# Patient Record
Sex: MALE | Race: WHITE | NOT HISPANIC OR LATINO | Employment: UNEMPLOYED | URBAN - METROPOLITAN AREA
[De-identification: names, ages, dates, MRNs, and addresses within clinical notes are randomized per-mention and may not be internally consistent; named-entity substitution may affect disease eponyms.]

---

## 2017-01-25 ENCOUNTER — TELEPHONE (OUTPATIENT)
Dept: NEUROLOGY | Facility: CLINIC | Age: 27
End: 2017-01-25

## 2017-01-25 DIAGNOSIS — G40.219 LOCALIZATION-RELATED EPILEPSY WITH COMPLEX PARTIAL SEIZURES WITH INTRACTABLE EPILEPSY (H): Primary | ICD-10-CM

## 2017-01-25 RX ORDER — DIAZEPAM ORAL SOLUTION (CONCENTRATE) 5 MG/ML
SOLUTION ORAL
Qty: 30 ML | Refills: 1 | Status: SHIPPED | OUTPATIENT
Start: 2017-01-25 | End: 2017-01-30

## 2017-01-30 ENCOUNTER — OFFICE VISIT (OUTPATIENT)
Dept: NEUROLOGY | Facility: CLINIC | Age: 27
End: 2017-01-30

## 2017-01-30 VITALS
DIASTOLIC BLOOD PRESSURE: 72 MMHG | HEART RATE: 91 BPM | SYSTOLIC BLOOD PRESSURE: 115 MMHG | BODY MASS INDEX: 28.25 KG/M2 | WEIGHT: 180 LBS | HEIGHT: 67 IN

## 2017-01-30 DIAGNOSIS — G40.219 LOCALIZATION-RELATED EPILEPSY WITH COMPLEX PARTIAL SEIZURES WITH INTRACTABLE EPILEPSY (H): Primary | ICD-10-CM

## 2017-01-30 RX ORDER — DIAZEPAM ORAL SOLUTION (CONCENTRATE) 5 MG/ML
SOLUTION ORAL
Qty: 30 ML | Refills: 1 | Status: SHIPPED | OUTPATIENT
Start: 2017-01-30 | End: 2018-09-13

## 2017-01-30 RX ORDER — TOPIRAMATE 100 MG/1
100 TABLET, FILM COATED ORAL 2 TIMES DAILY
Qty: 60 TABLET | Refills: 3 | Status: SHIPPED | OUTPATIENT
Start: 2017-01-30 | End: 2017-05-23

## 2017-01-30 NOTE — PROGRESS NOTES
[facility] Epilepsy Care Progress Note      Patient:  Tha Mcghee  :  1990   Age:  26 year old   Today's Office Visit:  2017    Epilepsy Data:    Patient History  Primary Epileptologist/Provider: Rajni Araujo M.D.  Epilepsy Syndrome: Localization-related epilepsy unspecified  Epilepsy Syndrome Status: Preliminary  Age of Onset: 20  Etiology  : Unknown  Other Relevant Dx/ Issues: Marijuana use 2-3x/day to treat anxiety.  History of alcohol abuse; has undergone treatment. K2 (synthetic marijuana) use     Tests/Surgery History  Last EE2011  Last MRI: 2011    Seizure Record  Current Visit Date: 17  Previous Visit Date: 10/18/16  Months since last visit: 3.42  Seizure Type 1: Partial seizures with secondary generalization  -  with complex partial seizures evolving to generalized seizures  Description of Sz Type 1: aura flush of anxiety starting in his chest and abdomen -> confusion with lip smacking -> GTC  Seizure Type 2: Complex partial seizures unspecified  Description of Sz Type 2: Stares, clicking noise with mouth,    History of Present Illness:    Copied forward: The patient had his first seizure on 2011 and he came under [facility] care on  2011 under Dr. Blanco's care initially.  He typically has aura -> GTC. His seizure type he describes as a flush of anxiety followed by confusion with lip smacking and clicking sound in his throat.  This progresses to a generalized tonic-clonic seizure.  He was initially started on phenytoin (this helps his GTC), then VPA was added 2013 (this helps HA and seizure). Lastly, topiramate was added in  which has helped his headaches and seizure. His EEGs in the past have been notable for rare left temporal epileptiform discharges.  His MRI of the brain is normal and nonlesional.     Interval History:    The patient is accompanied by his mom.  He was last seen by Dr. Araujo on 10/18/2016.  He had 2 grandmal seizures on 2017,  3 hours apart.  The patient lost his job on Monday, 2 days prior to the seizure, unrelated to seizures, and he was under stress.  The night he had seizures it was very hot in the basement.  He forgot to take his morning meds 5 days prior to the seizure, and remembered it at 10 pm and took it, and then took the night dose at 5 am, and then the next day, he took his meds at regular time.      His AED levels at the hospital, per patient's report:  Phenytoin total level: 15, Depkote: 91.5    Current Outpatient Prescriptions   Medication Sig Dispense Refill     diazepam (DIAZEPAM INTENSOL) 5 MG/ML (HIGH CONC) solution For generalized seizures lasting greater than 3 minutes give 5 mg. May repeat and give 2.5 mg after 10 minutes if needed. Do not exceed 7.5mg 30 mL 1     venlafaxine (EFFEXOR XR) 75 MG 24 hr capsule Take 225 mg by mouth every morning        topiramate (TOPAMAX) 25 MG tablet 75 mg am and 100 mg pm 630 tablet 3     phenytoin (DILANTIN) 30 MG ER capsule Take two po Q  capsule 3     phenytoin (DILANTIN) 100 MG ER capsule TAKE 1 CAP BY MOUTH DAILY IN THE AM AND 2 CAPS IN THE  capsule 3     divalproex (DEPAKOTE ER) 250 MG 24 hr tablet Take 1 tab in the morning (along with two 500mg tabs) 90 tablet 3     divalproex (DEPAKOTE ER) 500 MG 24 hr tablet Take 2 tabs in the morning and 3 tabs at night. 450 tablet 3     mometasone (ELOCON) 0.1 % ointment Apply twice daily for 3 days as needed for itching in the perianal area 15 g 1     medical cannabis inhalation (Patient's own supply.  Not a prescription) Inhale 200 mLs into the lungs Take one to two puffs every four to six hours       COMPOUND (CMPD RX) - PHARMACY TO MIX COMPOUNDED MEDICATION Apply twice daily to affected areas as needed. 30 g 0     OMEPRAZOLE PO        Multiple Vitamin (MULTIVITAMINS PO) 2 Chew a day       ALPRAZolam (XANAX) 0.5 MG tablet Take 0.5 mg by mouth as needed.        Medication Notes:        AED Medication Compliance:   "noncompliant some of the time    Results for SANTHOSH HODGES (MRN [id number]) as of 1/30/2017 11:21   Ref. Range 3/16/2016 12:23   Phenytoin Level Latest Ref Range: 10-20 mg/L 19.4   Phenytoin Free Unknown 2.25 (H)   Valproic Acid Level Latest Ref Range:  mg/L 92   Valproic Acid Free Unknown 7.0     Review of Systems:  Lethargy / Tiredness:  Yes  Nausea / Vomiting:  Yes  Double Vision:  No  Sleepiness:  Yes  Depression:  Yes  Slowed Cognitive Function:  Yes  Memory Problems:  No  Dizziness:  No  Appetite Changes:  No  Blurred Vision:  No  Sleep Changes:  No  Behavioral Changes:  No  Skin: negative  Respiratory: No shortness of breath and No cough  Cardiovascular: negative  Have you experienced a traumatic fall since your last visit: NO    Other Issues:    Is patient safe to drive:  No, last seizure 1/25/2017    Exam:    /72 mmHg  Pulse 91  Ht 5' 7.01\" (170.2 cm)  Wt 180 lb (81.647 kg)  BMI 28.19 kg/m2     Wt Readings from Last 5 Encounters:   01/30/17 180 lb (81.647 kg)   10/18/16 183 lb 9.6 oz (83.28 kg)   03/16/16 188 lb 12.8 oz (85.639 kg)   09/15/15 167 lb 12.8 oz (76.114 kg)   08/20/15 168 lb 3.2 oz (76.295 kg)     General Appearance: Alert, awake, cooperative, NAD  Gait and tandem gait: steady  Attention Span:  Normal  Language/speech:  No aphasia or dysarthria  Extraocular Movements:  Intact  Coordination:  Normal FNF  Facial Strength:  Normal  Tongue Strength:  Normal  Limb Strength:  Normal tone, bulk and strength  5/5 bilaterally     Assessment and Plan:     Localization-related epilepsy:  The patient had 2 GTC seizures last week.  He hasn't had any GTC seizures for a while.  We decided to increase his topiramate by 25 mg/d to 100 mg bid.  Patient's mom is confused about the instructions on giving DZP.  I instructed her to give 1 ml (5 mg) for a GTC seizures and may give an additional 2.5 mg, if needed.    - Increase topiramate to 100 mg bid  - Obtain AED levels for efficacy and " compliance  - RTC in 6 months        As described above, I met with the patient for 25 minutes and during this time counseling was greater than 50% of the visit time.  Mirela Stokes MD

## 2017-01-30 NOTE — LETTER
2017      RE: Tha Mcghee  [address]  [place] [address]       [facility] Epilepsy Care Progress Note      Patient:  Tha Mcghee  :  1990   Age:  26 year old   Today's Office Visit:  2017    Epilepsy Data:    Patient History  Primary Epileptologist/Provider: Rajni Araujo M.D.  Epilepsy Syndrome: Localization-related epilepsy unspecified  Epilepsy Syndrome Status: Preliminary  Age of Onset: 20  Etiology  : Unknown  Other Relevant Dx/ Issues: Marijuana use 2-3x/day to treat anxiety.  History of alcohol abuse; has undergone treatment. K2 (synthetic marijuana) use     Tests/Surgery History  Last EE2011  Last MRI: 2011    Seizure Record  Current Visit Date: 17  Previous Visit Date: 10/18/16  Months since last visit: 3.42  Seizure Type 1: Partial seizures with secondary generalization  -  with complex partial seizures evolving to generalized seizures  Description of Sz Type 1: aura flush of anxiety starting in his chest and abdomen -> confusion with lip smacking -> GTC  Seizure Type 2: Complex partial seizures unspecified  Description of Sz Type 2: Stares, clicking noise with mouth,    History of Present Illness:    Copied forward: The patient had his first seizure on 2011 and he came under [facility] care on  2011 under Dr. Blanco's care initially.  He typically has aura -> GTC. His seizure type he describes as a flush of anxiety followed by confusion with lip smacking and clicking sound in his throat.  This progresses to a generalized tonic-clonic seizure.  He was initially started on phenytoin (this helps his GTC), then VPA was added 2013 (this helps HA and seizure). Lastly, topiramate was added in  which has helped his headaches and seizure. His EEGs in the past have been notable for rare left temporal epileptiform discharges.  His MRI of the brain is normal and nonlesional.     Interval History:    The patient is accompanied by his mom.  He was last seen by  Dr. Araujo on 10/18/2016.  He had 2 grandmal seizures on Wednesday 1/25/2017, 3 hours apart.  The patient lost his job on Monday, 2 days prior to the seizure, unrelated to seizures, and he was under stress.  The night he had seizures it was very hot in the basement.  He forgot to take his morning meds 5 days prior to the seizure, and remembered it at 10 pm and took it, and then took the night dose at 5 am, and then the next day, he took his meds at regular time.      His AED levels at the hospital, per patient's report:  Phenytoin total level: 15, Depkote: 91.5    Current Outpatient Prescriptions   Medication Sig Dispense Refill     diazepam (DIAZEPAM INTENSOL) 5 MG/ML (HIGH CONC) solution For generalized seizures lasting greater than 3 minutes give 5 mg. May repeat and give 2.5 mg after 10 minutes if needed. Do not exceed 7.5mg 30 mL 1     venlafaxine (EFFEXOR XR) 75 MG 24 hr capsule Take 225 mg by mouth every morning        topiramate (TOPAMAX) 25 MG tablet 75 mg am and 100 mg pm 630 tablet 3     phenytoin (DILANTIN) 30 MG ER capsule Take two po Q  capsule 3     phenytoin (DILANTIN) 100 MG ER capsule TAKE 1 CAP BY MOUTH DAILY IN THE AM AND 2 CAPS IN THE  capsule 3     divalproex (DEPAKOTE ER) 250 MG 24 hr tablet Take 1 tab in the morning (along with two 500mg tabs) 90 tablet 3     divalproex (DEPAKOTE ER) 500 MG 24 hr tablet Take 2 tabs in the morning and 3 tabs at night. 450 tablet 3     mometasone (ELOCON) 0.1 % ointment Apply twice daily for 3 days as needed for itching in the perianal area 15 g 1     medical cannabis inhalation (Patient's own supply.  Not a prescription) Inhale 200 mLs into the lungs Take one to two puffs every four to six hours       COMPOUND (CMPD RX) - PHARMACY TO MIX COMPOUNDED MEDICATION Apply twice daily to affected areas as needed. 30 g 0     OMEPRAZOLE PO        Multiple Vitamin (MULTIVITAMINS PO) 2 Chew a day       ALPRAZolam (XANAX) 0.5 MG tablet Take 0.5 mg by mouth as  "needed.        Medication Notes:        AED Medication Compliance:  noncompliant some of the time    Results for SANTHOSH HODGES (MRN [id number]) as of 1/30/2017 11:21   Ref. Range 3/16/2016 12:23   Phenytoin Level Latest Ref Range: 10-20 mg/L 19.4   Phenytoin Free Unknown 2.25 (H)   Valproic Acid Level Latest Ref Range:  mg/L 92   Valproic Acid Free Unknown 7.0     Review of Systems:  Lethargy / Tiredness:  Yes  Nausea / Vomiting:  Yes  Double Vision:  No  Sleepiness:  Yes  Depression:  Yes  Slowed Cognitive Function:  Yes  Memory Problems:  No  Dizziness:  No  Appetite Changes:  No  Blurred Vision:  No  Sleep Changes:  No  Behavioral Changes:  No  Skin: negative  Respiratory: No shortness of breath and No cough  Cardiovascular: negative  Have you experienced a traumatic fall since your last visit: NO    Other Issues:    Is patient safe to drive:  No, last seizure 1/25/2017    Exam:    /72 mmHg  Pulse 91  Ht 5' 7.01\" (170.2 cm)  Wt 180 lb (81.647 kg)  BMI 28.19 kg/m2     Wt Readings from Last 5 Encounters:   01/30/17 180 lb (81.647 kg)   10/18/16 183 lb 9.6 oz (83.28 kg)   03/16/16 188 lb 12.8 oz (85.639 kg)   09/15/15 167 lb 12.8 oz (76.114 kg)   08/20/15 168 lb 3.2 oz (76.295 kg)     General Appearance: Alert, awake, cooperative, NAD  Gait and tandem gait: steady  Attention Span:  Normal  Language/speech:  No aphasia or dysarthria  Extraocular Movements:  Intact  Coordination:  Normal FNF  Facial Strength:  Normal  Tongue Strength:  Normal  Limb Strength:  Normal tone, bulk and strength  5/5 bilaterally     Assessment and Plan:     Localization-related epilepsy:  The patient had 2 GTC seizures last week.  He hasn't had any GTC seizures for a while.  We decided to increase his topiramate by 25 mg/d to 100 mg bid.  Patient's mom is confused about the instructions on giving DZP.  I instructed her to give 1 ml (5 mg) for a GTC seizures and may give an additional 2.5 mg, if needed.    - Increase " topiramate to 100 mg bid  - Obtain AED levels for efficacy and compliance  - RTC in 6 months        As described above, I met with the patient for 25 minutes and during this time counseling was greater than 50% of the visit time.  MD Mirela Lance MD

## 2017-01-30 NOTE — MR AVS SNAPSHOT
"              After Visit Summary   1/30/2017    Tha Mcghee    MRN: [id number]           Patient Information     Date Of Birth          1990        Visit Information        Provider Department      1/30/2017 11:00 AM Mirela Stokes [facility]        Today's Diagnoses     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)    -  1        Follow-ups after your visit        Follow-up notes from your care team     Return in about 6 months (around 7/30/2017).      Your next 10 appointments already scheduled     Aug 01, 2017  1:00 PM   Return Visit with MD JOSÉ MANUEL [facility] ([facility])    [address], [address]  [place] [address] [phone]              Who to contact     Please call your clinic at [phone] to:    Ask questions about your health    Make or cancel appointments    Discuss your medicines    Learn about your test results    Speak to your doctor   If you have compliments or concerns about an experience at your clinic, or if you wish to file a complaint, please contact [facility] Patient Relations at [phone] or email us at [email or web address]         Additional Information About Your Visit        Care EveryWhere ID     This is your Care EveryWhere ID. This could be used by other organizations to access your [place] medical records  [id number]        Your Vitals Were     Pulse Height BMI (Body Mass Index)             91 5' 7.01\" (170.2 cm) 28.19 kg/m2          Blood Pressure from Last 3 Encounters:   01/30/17 115/72   10/18/16 130/83   03/16/16 121/83    Weight from Last 3 Encounters:   01/30/17 180 lb (81.647 kg)   10/18/16 183 lb 9.6 oz (83.28 kg)   03/16/16 188 lb 12.8 oz (85.639 kg)              Today, you had the following     No orders found for display         Today's Medication Changes          These changes are accurate as of: 1/30/17 " 11:48 AM.  If you have any questions, ask your nurse or doctor.               These medicines have changed or have updated prescriptions.        Dose/Directions    diazepam 5 MG/ML (HIGH CONC) solution   Commonly known as:  DIAZEPAM INTENSOL   This may have changed:  additional instructions   Used for:  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)   Changed by:  Mirela Stokes MD        For generalized seizures give 5 mg.  May repeat and give 2.5 mg after 10 minutes if needed. Do not exceed 7.5mg   Quantity:  30 mL   Refills:  1       topiramate 100 MG tablet   Commonly known as:  TOPAMAX   This may have changed:    - medication strength  - how much to take  - how to take this  - when to take this  - additional instructions   Used for:  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)   Changed by:  Mirela Stokes MD        Dose:  100 mg   Take 1 tablet (100 mg) by mouth 2 times daily   Quantity:  60 tablet   Refills:  3            Where to get your medicines      These medications were sent to [place] [address] [facility] [place] - [address]  [address] [address]     Phone:  [phone]    - topiramate 100 MG tablet      Some of these will need a paper prescription and others can be bought over the counter.  Ask your nurse if you have questions.     Bring a paper prescription for each of these medications    - diazepam 5 MG/ML (HIGH CONC) solution             Primary Care Provider Office Phone # Fax #    Bhaskar Morales [phone]       [facility] [address]  [place] [address]        Thank you!     Thank you for choosing [facility]  for your care. Our goal is always to provide you with excellent care. Hearing back from our patients is one way we can continue to improve our services. Please take a few minutes to complete the written survey that  you may receive in the mail after your visit with us. Thank you!             Your Updated Medication List - Protect others around you: Learn how to safely use, store and throw away your medicines at www.disposemymeds.org.          This list is accurate as of: 1/30/17 11:48 AM.  Always use your most recent med list.                   Brand Name Dispense Instructions for use    ALPRAZolam 0.5 MG tablet    XANAX     Take 0.5 mg by mouth as needed.       COMPOUND - PHARMACY TO MIX COMPOUNDED MEDICATION    CMPD RX    30 g    Apply twice daily to affected areas as needed.       diazepam 5 MG/ML (HIGH CONC) solution    DIAZEPAM INTENSOL    30 mL    For generalized seizures give 5 mg.  May repeat and give 2.5 mg after 10 minutes if needed. Do not exceed 7.5mg       * divalproex 250 MG 24 hr tablet    DEPAKOTE ER    90 tablet    Take 1 tab in the morning (along with two 500mg tabs)       * divalproex 500 MG 24 hr tablet    DEPAKOTE ER    450 tablet    Take 2 tabs in the morning and 3 tabs at night.       EFFEXOR XR 75 MG 24 hr capsule   Generic drug:  venlafaxine      Take 225 mg by mouth every morning       medical cannabis inhalation (Patient's own supply.  Not a prescription)      Inhale 200 mLs into the lungs Take one to two puffs every four to six hours       mometasone 0.1 % ointment    ELOCON    15 g    Apply twice daily for 3 days as needed for itching in the perianal area       MULTIVITAMINS PO      2 Chew a day       OMEPRAZOLE PO          * phenytoin 30 MG CR capsule    DILANTIN    180 capsule    Take two po Q AM       * phenytoin 100 MG CR capsule    DILANTIN    270 capsule    TAKE 1 CAP BY MOUTH DAILY IN THE AM AND 2 CAPS IN THE PM       topiramate 100 MG tablet    TOPAMAX    60 tablet    Take 1 tablet (100 mg) by mouth 2 times daily       * Notice:  This list has 4 medication(s) that are the same as other medications prescribed for you. Read the directions carefully, and ask your doctor or other care provider  to review them with you.

## 2017-01-30 NOTE — Clinical Note
2017       RE: Tha Mcghee  : 1990   MRN: [id number]      Dear Colleague,    Thank you for referring your patient, Tha Mcghee, to the [facility] EPILEPSY CARE at [facility]. Please see a copy of my visit note below.    [facility] Epilepsy Care Progress Note      Patient:  Tha Mcghee  :  1990   Age:  26 year old   Today's Office Visit:  2017    Epilepsy Data:    Patient History  Primary Epileptologist/Provider: Rajni Araujo M.D.  Epilepsy Syndrome: Localization-related epilepsy unspecified  Epilepsy Syndrome Status: Preliminary  Age of Onset: 20  Etiology  : Unknown  Other Relevant Dx/ Issues: Marijuana use 2-3x/day to treat anxiety.  History of alcohol abuse; has undergone treatment. K2 (synthetic marijuana) use     Tests/Surgery History  Last EE2011  Last MRI: 2011    Seizure Record  Current Visit Date: 17  Previous Visit Date: 10/18/16  Months since last visit: 3.42  Seizure Type 1: Partial seizures with secondary generalization  -  with complex partial seizures evolving to generalized seizures  Description of Sz Type 1: aura flush of anxiety starting in his chest and abdomen -> confusion with lip smacking -> GTC  Seizure Type 2: Complex partial seizures unspecified  Description of Sz Type 2: Stares, clicking noise with mouth,    History of Present Illness:   Copied forward: The patient had his first seizure on 2011 and he came under [facility] care on  2011 under Dr. Blanco's care initially.  He typically has aura -> GTC. His seizure type he describes as a flush of anxiety followed by confusion with lip smacking and clicking sound in his throat.  This progresses to a generalized tonic-clonic seizure.  He was initially started on phenytoin (this helps his GTC), then VPA was added 2013 (this helps HA and seizure). Lastly, topiramate was added in  which has helped his headaches and seizure. His EEGs in the past have  been notable for rare left temporal epileptiform discharges.  His MRI of the brain is normal and nonlesional.     Interval History:   The patient is accompanied by his mom.  He had 2 grandmal seizures on Wednesday 1/25/2017, 3 hours apart.  The patient lost his job on Monday, 2 days prior to the seizure, unrelated to seizures, and he was under stress. That night he was very hot in the basement.  He forgot to take his morning meds 5 days prior to the seizure, and remembered it at 10 pm and took it, and then took the night dose at 5 am, and then the next day, he took his meds at regular time.      His AED levels at the hospital, per patient's report:  Phenytoin total level: 15, Depkote: 91.5    Current Outpatient Prescriptions   Medication Sig Dispense Refill     diazepam (DIAZEPAM INTENSOL) 5 MG/ML (HIGH CONC) solution For generalized seizures lasting greater than 3 minutes give 5 mg. May repeat and give 2.5 mg after 10 minutes if needed. Do not exceed 7.5mg 30 mL 1     venlafaxine (EFFEXOR XR) 75 MG 24 hr capsule Take 225 mg by mouth every morning        topiramate (TOPAMAX) 25 MG tablet 75 mg am and 100 mg pm 630 tablet 3     phenytoin (DILANTIN) 30 MG ER capsule Take two po Q  capsule 3     phenytoin (DILANTIN) 100 MG ER capsule TAKE 1 CAP BY MOUTH DAILY IN THE AM AND 2 CAPS IN THE  capsule 3     divalproex (DEPAKOTE ER) 250 MG 24 hr tablet Take 1 tab in the morning (along with two 500mg tabs) 90 tablet 3     divalproex (DEPAKOTE ER) 500 MG 24 hr tablet Take 2 tabs in the morning and 3 tabs at night. 450 tablet 3     mometasone (ELOCON) 0.1 % ointment Apply twice daily for 3 days as needed for itching in the perianal area 15 g 1     medical cannabis inhalation (Patient's own supply.  Not a prescription) Inhale 200 mLs into the lungs Take one to two puffs every four to six hours       COMPOUND (CMPD RX) - PHARMACY TO MIX COMPOUNDED MEDICATION Apply twice daily to affected areas as needed. 30 g 0      "OMEPRAZOLE PO        Multiple Vitamin (MULTIVITAMINS PO) 2 Chew a day       ALPRAZolam (XANAX) 0.5 MG tablet Take 0.5 mg by mouth as needed.        Medication Notes:        AED Medication Compliance:  {COMPLIANCE:5303::\"compliant most of the time\"}  Using a pill box:  {YES / NO:329629::\"Yes\"}    Results for SANTHOSH HODGES (MRN [id number]) as of 1/30/2017 11:21   Ref. Range 3/16/2016 12:23   Phenytoin Level Latest Ref Range: 10-20 mg/L 19.4   Phenytoin Free Unknown 2.25 (H)   Valproic Acid Level Latest Ref Range:  mg/L 92   Valproic Acid Free Unknown 7.0     Review of Systems:  Lethargy / Tiredness:  Yes  Nausea / Vomiting:  Yes  Double Vision:  No  Sleepiness:  Yes  Depression:  Yes  Slowed Cognitive Function:  Yes  Memory Problems:  No  Dizziness:  No  Appetite Changes:  No  Blurred Vision:  No  Sleep Changes:  No  Behavioral Changes:  No  Skin: negative  Respiratory: No shortness of breath and No cough  Cardiovascular: negative  Have you experienced a traumatic fall since your last visit: NO    Other Issues:    Is patient safe to drive:  No, last seizure 1/25/2017    Exam:    /72 mmHg  Pulse 91  Ht 5' 7.01\" (170.2 cm)  Wt 180 lb (81.647 kg)  BMI 28.19 kg/m2     Wt Readings from Last 5 Encounters:   01/30/17 180 lb (81.647 kg)   10/18/16 183 lb 9.6 oz (83.28 kg)   03/16/16 188 lb 12.8 oz (85.639 kg)   09/15/15 167 lb 12.8 oz (76.114 kg)   08/20/15 168 lb 3.2 oz (76.295 kg)     General Appearance: {NORMAL/AB/NE:093507::\"Normal\"}  Gait:  {NORMAL/AB/NE:514710::\"Normal\"}  Attention Span:  {NORMAL/AB/NE:141997::\"Normal\"}  Language:  {NORMAL/AB/NE:464738::\"Normal\"}  Extraocular Movements:  {NORMAL/AB/NE:421831::\"Normal\"}  Coordination:  {NORMAL/AB/NE:399476::\"Normal\"}  Visual Fields:  {NORMAL/AB/NE:078247::\"Normal\"}  Facial Sensation:  {NORMAL/AB/NE:076121::\"Normal\"}  Facial Strength:  {NORMAL/AB/NE:853064::\"Normal\"}  Tongue Strength:  {NORMAL/AB/NE:278508::\"Normal\"}  Limb Strength:  " "{NORMAL/AB/NE:424757::\"Normal\"}  Limb Tone:  {NORMAL/AB/NE:542464::\"Normal\"}  Limb Sensation:  {NORMAL/AB/NE:637453::\"Normal\"}  General Physical Findings:  {NORMAL/AB/NE:115662::\"Normal\"}    Assessment and Plan:     Localization-related epilepsy:      - Increase topiramate to 100 mg bid      As described above, I met with the patient for *** minutes and during this time counseling was {GREATER THAN_LESS THAN_WITHIN:385949487} 50% of the visit time.                      Again, thank you for allowing me to participate in the care of your patient.      Sincerely,    Mirela Stokes MD    "

## 2017-04-27 ENCOUNTER — TRANSFERRED RECORDS (OUTPATIENT)
Dept: HEALTH INFORMATION MANAGEMENT | Facility: CLINIC | Age: 27
End: 2017-04-27

## 2017-05-08 ENCOUNTER — TELEPHONE (OUTPATIENT)
Dept: NEUROLOGY | Facility: CLINIC | Age: 27
End: 2017-05-08

## 2017-05-08 NOTE — LETTER
2017       RE: Tha Mcghee  : 1990   MRN: [id number]      To Whom It May Concern,    This letter is written to confirm that Tha has diagnosis of Epilepsy and as a result has complex partial seizures. These seizures cause him to lose awareness, may become confused, and he may have certain automatisms such as lip smacking and making clicking noises. These seizures may also evolve into generalized tonic-clonic convulsions.      Tha is seen at [facility] Epilepsy clinic for evaluation and management of seizures. He is on three anti-epileptic medications including Topiramate, Depakote, and Phenytoin. Seizure length and frequency can vary.      For any additional information needed regarding his Epilepsy diagnosis and management please contact [facility] at [phone].                Dr. Mirela Stokes

## 2017-05-08 NOTE — TELEPHONE ENCOUNTER
Spoke with patient who explains that he was recently at the mall and had a seizure while in a store and was carrying some items at the time. Patient had complex partial seizure and as a results wandered out of the store and was accused of shop lifting. He needs letter confirming diagnosis of epilepsy to bring with him to court.     Letter to be drafted and routed to [facility] to be signed by house doc. Patient to pick letter up from clinic.

## 2017-05-08 NOTE — TELEPHONE ENCOUNTER
----- Message from Mik Rivera CMA sent at 5/8/2017 12:21 PM CDT -----  Regarding: brendan  Caller: Tha    Relationship to Patient: self    Call Back Number: [phone]    Reason for Call: patient has court tomorrow and needs a letter by today. Please call by today.

## 2017-05-18 ENCOUNTER — TELEPHONE (OUTPATIENT)
Dept: NEUROLOGY | Facility: CLINIC | Age: 27
End: 2017-05-18

## 2017-05-18 NOTE — TELEPHONE ENCOUNTER
Tha reports he's had a migraine x 2-3 weeks.  His eyes hurt if he makes a position change.  He was taken to [facility] after a breakthrough seizure (3 weeks ago).  While in the ER he was prescribed prednisone to help with headaches.  He also received ABX Z-for sinus infection  He has also tried Imitrex which has not been helpful.  He has also tried Benadryl/compazine which changed his headache to a dull/sharp pain.  He continues to have a migraine headache and would like further direction.  As of last Friday, he began to see spots/blotches.     PLAN: Discussed with Dr. Wayne    1. Increase Topiramate to 150-100. May further increase to 150 BID PRN headaches (Liza has TPM 25 mg tablets on hand and will use these to achieve the additional 50 mg QAM)  2. Follow up with Dr. Araujo on 5/23/17 as scheduled.

## 2017-05-23 ENCOUNTER — OFFICE VISIT (OUTPATIENT)
Dept: NEUROLOGY | Facility: CLINIC | Age: 27
End: 2017-05-23

## 2017-05-23 DIAGNOSIS — G40.219 PARTIAL EPILEPSY WITH IMPAIRMENT OF CONSCIOUSNESS, INTRACTABLE (H): Primary | ICD-10-CM

## 2017-05-23 DIAGNOSIS — G40.219 LOCALIZATION-RELATED EPILEPSY WITH COMPLEX PARTIAL SEIZURES WITH INTRACTABLE EPILEPSY (H): ICD-10-CM

## 2017-05-23 DIAGNOSIS — G43.809 MIGRAINE VARIANT: ICD-10-CM

## 2017-05-23 RX ORDER — OXYCODONE AND ACETAMINOPHEN 5; 325 MG/1; MG/1
1 TABLET ORAL EVERY 6 HOURS PRN
Qty: 20 TABLET | Refills: 0 | Status: SHIPPED | OUTPATIENT
Start: 2017-05-23 | End: 2018-09-26

## 2017-05-23 RX ORDER — PROMETHAZINE HYDROCHLORIDE 25 MG/1
25 TABLET ORAL EVERY 6 HOURS PRN
Qty: 20 TABLET | Refills: 1 | Status: SHIPPED | OUTPATIENT
Start: 2017-05-23 | End: 2018-01-04

## 2017-05-23 RX ORDER — ZOLMITRIPTAN 2.5 MG/1
2.5-5 TABLET, FILM COATED ORAL
Qty: 6 TABLET | Refills: 1 | Status: SHIPPED | OUTPATIENT
Start: 2017-05-23 | End: 2017-06-27

## 2017-05-23 RX ORDER — TOPIRAMATE 100 MG/1
TABLET, FILM COATED ORAL
Qty: 90 TABLET | Refills: 3 | Status: SHIPPED | OUTPATIENT
Start: 2017-05-23 | End: 2017-08-08

## 2017-05-23 NOTE — MR AVS SNAPSHOT
After Visit Summary   5/23/2017    Tha Mcghee    MRN: [id number]           Patient Information     Date Of Birth          1990        Visit Information        Provider Department      5/23/2017 3:00 PM Rajni Araujo MD [facility]        Today's Diagnoses     Partial epilepsy with impairment of consciousness, intractable (H)    -  1    Migraine variant        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)          Care Instructions    1. Migraine acute treatment (only take when you have a bad migraine - not everyday): Drink water, take phenergan 25 mg with Zomig 5 mg. This will make you sleepy. If this does not work try percocet.  If neither of these works we can try other triptans such as Eletriptan, Rizatriptan.    2. Increase topiramate 150 mg twice a day          Follow-ups after your visit        Follow-up notes from your care team     Return in about 4 weeks (around 6/20/2017).      Your next 10 appointments already scheduled     Jun 27, 2017 10:30 AM CDT   [facility]    [address] [place], [address]  [facility] [id number] [phone]            Aug 01, 2017  1:00 PM CDT   Return Visit with Rajni Araujo MD   [facility] ([facility])    [address] [place], [address]  [facility] [id number] [phone]              Who to contact     Please call your clinic at [phone] to:    Ask questions about your health    Make or cancel appointments    Discuss your medicines    Learn about your test results    Speak to your doctor   If you have compliments or concerns about an experience at your clinic, or if you wish to file a complaint, please contact [facility] Patient Relations at [phone] or email us at [email or web address]         Additional Information About Your Visit        [employer]  Information     [employer] is an electronic gateway that provides easy, online access to your medical records. With [employer], you can request a clinic appointment, read your test results, renew a prescription or communicate with your care team.     To sign up for [employer] visit the website at [email or web address]   You will be asked to enter the access code listed below, as well as some personal information. Please follow the directions to create your username and password.     Your access code is: [email or web address]  Expires: 2017  4:27 PM     Your access code will  in 90 days. If you need help or a new code, please contact your [facility] or call [phone] for assistance.        Care EveryWhere ID     This is your Care EveryWhere ID. This could be used by other organizations to access your [facility] medical records  [id number]         Blood Pressure from Last 3 Encounters:   17 115/72   10/18/16 130/83   16 121/83    Weight from Last 3 Encounters:   17 180 lb (81.6 kg)   10/18/16 183 lb 9.6 oz (83.3 kg)   16 188 lb 12.8 oz (85.6 kg)              Today, you had the following     No orders found for display         Today's Medication Changes          These changes are accurate as of: 17  4:27 PM.  If you have any questions, ask your nurse or doctor.               Start taking these medicines.        Dose/Directions    oxyCODONE-acetaminophen 5-325 MG per tablet   Commonly known as:  PERCOCET   Used for:  Partial epilepsy with impairment of consciousness, intractable (H), Migraine variant        Dose:  1 tablet   Take 1 tablet by mouth every 6 hours as needed for pain maximum 4 tablet(s) per day   Quantity:  20 tablet   Refills:  0       promethazine 25 MG tablet   Commonly known as:  PHENERGAN   Used for:  Partial epilepsy with impairment of consciousness, intractable (H), Migraine variant        Dose:  25 mg   Take 1 tablet (25 mg) by mouth  every 6 hours as needed for nausea   Quantity:  20 tablet   Refills:  1       ZOLMitriptan 2.5 MG tablet   Commonly known as:  ZOMIG   Used for:  Partial epilepsy with impairment of consciousness, intractable (H), Migraine variant        Dose:  2.5-5 mg   Take 1-2 tablets (2.5-5 mg) by mouth at onset of headache for migraine May repeat in 2 hours. Max 4 tablets/24 hours.   Quantity:  6 tablet   Refills:  1         These medicines have changed or have updated prescriptions.        Dose/Directions    topiramate 100 MG tablet   Commonly known as:  TOPAMAX   This may have changed:    - how much to take  - how to take this  - when to take this  - additional instructions   Used for:  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)        150 mg twice a day   Quantity:  90 tablet   Refills:  3            Where to get your medicines      These medications were sent to [place] [address] [place] [facility] - [address]  [address] [facility] [place] [address]     Phone:  [phone]     promethazine 25 MG tablet    topiramate 100 MG tablet    ZOLMitriptan 2.5 MG tablet         Some of these will need a paper prescription and others can be bought over the counter.  Ask your nurse if you have questions.     Bring a paper prescription for each of these medications     oxyCODONE-acetaminophen 5-325 MG per tablet                Primary Care Provider Office Phone # Fax #    Bhaskar Morales [phone]       [facility] [address]   [facility] [address]        Thank you!     Thank you for choosing [facility]  for your care. Our goal is always to provide you with excellent care. Hearing back from our patients is one way we can continue to improve our services. Please take a few minutes to complete the written survey that you may receive in the mail after your visit with us. Thank you!             Your Updated Medication List - Protect others around  you: Learn how to safely use, store and throw away your medicines at www.disposemymeds.org.          This list is accurate as of: 5/23/17  4:27 PM.  Always use your most recent med list.                   Brand Name Dispense Instructions for use    ALPRAZolam 0.5 MG tablet    XANAX     Take 0.5 mg by mouth as needed.       COMPOUND - PHARMACY TO MIX COMPOUNDED MEDICATION    CMPD RX    30 g    Apply twice daily to affected areas as needed.       diazepam 5 MG/ML (HIGH CONC) solution    DIAZEPAM INTENSOL    30 mL    For generalized seizures give 5 mg.  May repeat and give 2.5 mg after 10 minutes if needed. Do not exceed 7.5mg       * divalproex 250 MG 24 hr tablet    DEPAKOTE ER    90 tablet    Take 1 tab in the morning (along with two 500mg tabs)       * divalproex 500 MG 24 hr tablet    DEPAKOTE ER    450 tablet    Take 2 tabs in the morning and 3 tabs at night.       EFFEXOR XR 75 MG 24 hr capsule   Generic drug:  venlafaxine      Take 225 mg by mouth every morning       medical cannabis inhalation (Patient's own supply.  Not a prescription)      Inhale 200 mLs into the lungs Take one to two puffs every four to six hours       mometasone 0.1 % ointment    ELOCON    15 g    Apply twice daily for 3 days as needed for itching in the perianal area       MULTIVITAMINS PO      2 Chew a day       OMEPRAZOLE PO          oxyCODONE-acetaminophen 5-325 MG per tablet    PERCOCET    20 tablet    Take 1 tablet by mouth every 6 hours as needed for pain maximum 4 tablet(s) per day       * phenytoin 30 MG CR capsule    DILANTIN    180 capsule    Take two po Q AM       * phenytoin 100 MG CR capsule    DILANTIN    270 capsule    TAKE 1 CAP BY MOUTH DAILY IN THE AM AND 2 CAPS IN THE PM       promethazine 25 MG tablet    PHENERGAN    20 tablet    Take 1 tablet (25 mg) by mouth every 6 hours as needed for nausea       topiramate 100 MG tablet    TOPAMAX    90 tablet    150 mg twice a day       ZOLMitriptan 2.5 MG tablet    ZOMIG    6  tablet    Take 1-2 tablets (2.5-5 mg) by mouth at onset of headache for migraine May repeat in 2 hours. Max 4 tablets/24 hours.       * Notice:  This list has 4 medication(s) that are the same as other medications prescribed for you. Read the directions carefully, and ask your doctor or other care provider to review them with you.

## 2017-05-23 NOTE — LETTER
2017       RE: Tha Mcghee  : 1990   MRN: [id number]      Dear Colleague,    Thank you for referring your patient, Tha Mcghee, to the [facility] EPILEPSY CARE at [facility]. Please see a copy of my visit note below.    [facility] Epilepsy Care Progress Note    Patient:  Tha Mcghee  :  1990   Age:  26 year old   Today's Office Visit:  2017    Epilepsy Data:  Patient History  Primary Epileptologist/Provider: Rajni Araujo M.D.  Epilepsy Syndrome: Localization-related epilepsy unspecified  Epilepsy Syndrome Status: Preliminary  Age of Onset: 20  Etiology  : Unknown  Other Relevant Dx/ Issues: Marijuana use 2-3x/day to treat anxiety.  History of alcohol abuse; has undergone treatment. K2 (synthetic marijuana) use   Tests/Surgery History  Last EE2011  Last MRI: 2011  Seizure Record  Current Visit Date: 17  Previous Visit Date: 17  Months since last visit: 3.71  Seizure Type 1: Partial seizures with secondary generalization  -  with complex partial seizures evolving to generalized seizures  Description of Sz Type 1: aura flush of anxiety starting in his chest and abdomen -> confusion with lip smacking -> GTC  # of Type 1 Seizure since last visit: 2  Freq. Type 1 / Month: 0.54  Seizure Type 2: Complex partial seizures unspecified  Description of Sz Type 2: Stares, clicking noise with mouth,  # of Type 2 Seizure since last visit: 4  Freq. Type 2 / Month: 1.08      EPILEPSY HISTORY:  Copied forward: The patient had his first seizure on 2011 and he came under [facility] care on  2011 under Dr. Blanco's care initially.  He typically has aura -> GTC. His seizure type he describes as a flush of anxiety followed by confusion with lip smacking and clicking sound in his throat.  This progresses to a generalized tonic-clonic seizure.  He was initially started on phenytoin (this helps his GTC), then VPA was added 2013 (this helps HA  and seizure). Lastly, topiramate was added in 2014 which has helped his headaches and seizure. His EEGs in the past have been notable for rare left temporal epileptiform discharges.  His MRI of the brain is normal and nonlesional.        INTERVAL HISTORY:  Since last visit he had 2 generalized tonic-clonic convulsion and 4 complex partial seizure. He has a severe migraines, pain in left temporal region and left eye has retro-orbital stabbing pain, no N/V, pain is 6/10 on pain scale, not able to see on the left (spots in vision), he tired steroids for 6 days and this did not help, Imitrex/ibprofen has not helped. Compazine/benadryl IV helps headache. Worse with moving head, moving eyes, bending over worsens headache. No fevers. Topiramate was 150 mg am 100 mg pm       Prior to Admission medications    Medication Sig Start Date End Date Taking? Authorizing Provider   diazepam (DIAZEPAM INTENSOL) 5 MG/ML (HIGH CONC) solution For generalized seizures give 5 mg.  May repeat and give 2.5 mg after 10 minutes if needed. Do not exceed 7.5mg 1/30/17   Mirela Stokes MD   topiramate (TOPAMAX) 100 MG tablet Take 1 tablet (100 mg) by mouth 2 times daily 1/30/17   Mirela Stokes MD   venlafaxine (EFFEXOR XR) 75 MG 24 hr capsule Take 225 mg by mouth every morning  10/18/16   Reported, Patient   phenytoin (DILANTIN) 30 MG ER capsule Take two po Q AM 10/18/16   Rajni Araujo MD   phenytoin (DILANTIN) 100 MG ER capsule TAKE 1 CAP BY MOUTH DAILY IN THE AM AND 2 CAPS IN THE PM 10/18/16   Rajni Araujo MD   divalproex (DEPAKOTE ER) 250 MG 24 hr tablet Take 1 tab in the morning (along with two 500mg tabs) 10/18/16   Rajni Araujo MD   divalproex (DEPAKOTE ER) 500 MG 24 hr tablet Take 2 tabs in the morning and 3 tabs at night. 10/18/16   Rajni Araujo MD   mometasone (ELOCON) 0.1 % ointment Apply twice daily for 3 days as needed for itching in the perianal area 5/26/16   Jesi Martínez MD   medical cannabis  inhalation (Patient's own supply.  Not a prescription) Inhale 200 mLs into the lungs Take one to two puffs every four to six hours    Reported, Patient   COMPOUND (CMPD RX) - PHARMACY TO MIX COMPOUNDED MEDICATION Apply twice daily to affected areas as needed. 7/27/15   Camelia Starkey MD   OMEPRAZOLE PO     Reported, Patient   Multiple Vitamin (MULTIVITAMINS PO) 2 Chew a day    Reported, Patient   ALPRAZolam (XANAX) 0.5 MG tablet Take 0.5 mg by mouth as needed.    Reported, Patient          MEDICATIONS:     Depakote ER 1250 mg in the morning 1500 mg at night   Dilantin 160 mg in the morning, 200 mg in the evening.   Topirimate 150 mg AM and 100 mg PM         MEDICATION ISSUES:    1. Depakote: Started VPA 1/7/2013 increased depakote 1426-1725 July 2015.   2. Topiramate: TPM started 5/9/14 for seizure and headache. Topiramate higher than 75mg twice a day causes cognitive slowing.   3. Phenytoin:      REVIEW OF SYSTEMS:  He is not having any significant side effects.  Patient denies nausea, vomiting, diarrhea.      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 He is driving. Lives with parents.      NEUROLOGICAL EXAMINATION:  There were no vitals taken for this visit.  Pupils are equal, round, reactive to light. Decreased visual acuity in left lower quadrant.  Face is symmetric.  No focal weakness, no finger-to-nose dysmetria.  Tandem gait and gait is stable.     ASSESSMENT:   1.  Localization-related epilepsy, controlled.   Etiology is unclear.  His EEGs in the past have been notable for rare left temporal epileptiform discharges.  His MRI of the brain is normal and nonlesional.  Goal phenytoin  Level near 2.0-2.5 and depakote level above 90. In 2013 he averaged a generalized tonic-clonic convulsion every 2-3 months, in 2014 and 2015 we optimized depakote and phenytoin level which resulted in best seizure control.     2. Migraines with aura: Increase topiramate. Rescue with zomig or  percocet.      3.  Anxiety and depression.  The patient is taking effexor     PLAN:   1. Continue phenytoin, depakote as noted above.   2. Migraine acute treatment: phenergan 25 mg with Zomig 5 mg. If this does not work try percocet.  If neither of these works we can try other triptans such as Eletriptan, Rizatriptan.  3. Increase topiramate 150 mg twice a day    4. Follow up  1 month     I spent 35 minutes with the patient.  During this time, counseling and coordination of care exceeded 50% of the time.        cc:   Bhaskar Morales MD   [facility]   [address], [address]   [place] [address]         JORGE GARCIA MD

## 2017-05-23 NOTE — Clinical Note
Schedule nurse phone call. Please call Thursday or Friday to check on patients pain control with migraine. He has a headache 3 weeks. Thanks. Rajni Araujo MD

## 2017-05-23 NOTE — PATIENT INSTRUCTIONS
1. Migraine acute treatment (only take when you have a bad migraine - not everyday): Drink water, take phenergan 25 mg with Zomig 5 mg. This will make you sleepy. If this does not work try percocet.  If neither of these works we can try other triptans such as Eletriptan, Rizatriptan.    2. Increase topiramate 150 mg twice a day

## 2017-05-23 NOTE — PROGRESS NOTES
[facility] Epilepsy Care Progress Note    Patient:  Tha Mcghee  :  1990   Age:  26 year old   Today's Office Visit:  2017    Epilepsy Data:  Patient History  Primary Epileptologist/Provider: Rajni Araujo M.D.  Epilepsy Syndrome: Localization-related epilepsy unspecified  Epilepsy Syndrome Status: Preliminary  Age of Onset: 20  Etiology  : Unknown  Other Relevant Dx/ Issues: Marijuana use 2-3x/day to treat anxiety.  History of alcohol abuse; has undergone treatment. K2 (synthetic marijuana) use   Tests/Surgery History  Last EE2011  Last MRI: 2011  Seizure Record  Current Visit Date: 17  Previous Visit Date: 17  Months since last visit: 3.71  Seizure Type 1: Partial seizures with secondary generalization  -  with complex partial seizures evolving to generalized seizures  Description of Sz Type 1: aura flush of anxiety starting in his chest and abdomen -> confusion with lip smacking -> GTC  # of Type 1 Seizure since last visit: 2  Freq. Type 1 / Month: 0.54  Seizure Type 2: Complex partial seizures unspecified  Description of Sz Type 2: Stares, clicking noise with mouth,  # of Type 2 Seizure since last visit: 4  Freq. Type 2 / Month: 1.08      EPILEPSY HISTORY:  Copied forward: The patient had his first seizure on 2011 and he came under [facility] care on  2011 under Dr. Blanco's care initially.  He typically has aura -> GTC. His seizure type he describes as a flush of anxiety followed by confusion with lip smacking and clicking sound in his throat.  This progresses to a generalized tonic-clonic seizure.  He was initially started on phenytoin (this helps his GTC), then VPA was added 2013 (this helps HA and seizure). Lastly, topiramate was added in  which has helped his headaches and seizure. His EEGs in the past have been notable for rare left temporal epileptiform discharges.  His MRI of the brain is normal and nonlesional.        INTERVAL HISTORY:  Since last  visit he had 2 generalized tonic-clonic convulsion and 4 complex partial seizure. He has a severe migraines, pain in left temporal region and left eye has retro-orbital stabbing pain, no N/V, pain is 6/10 on pain scale, not able to see on the left (spots in vision), he tired steroids for 6 days and this did not help, Imitrex/ibprofen has not helped. Compazine/benadryl IV helps headache. Worse with moving head, moving eyes, bending over worsens headache. No fevers. Topiramate was 150 mg am 100 mg pm       Prior to Admission medications    Medication Sig Start Date End Date Taking? Authorizing Provider   diazepam (DIAZEPAM INTENSOL) 5 MG/ML (HIGH CONC) solution For generalized seizures give 5 mg.  May repeat and give 2.5 mg after 10 minutes if needed. Do not exceed 7.5mg 1/30/17   Mirela Stokes MD   topiramate (TOPAMAX) 100 MG tablet Take 1 tablet (100 mg) by mouth 2 times daily 1/30/17   Mirela Stokes MD   venlafaxine (EFFEXOR XR) 75 MG 24 hr capsule Take 225 mg by mouth every morning  10/18/16   Reported, Patient   phenytoin (DILANTIN) 30 MG ER capsule Take two po Q AM 10/18/16   Rajni Araujo MD   phenytoin (DILANTIN) 100 MG ER capsule TAKE 1 CAP BY MOUTH DAILY IN THE AM AND 2 CAPS IN THE PM 10/18/16   Rajni Araujo MD   divalproex (DEPAKOTE ER) 250 MG 24 hr tablet Take 1 tab in the morning (along with two 500mg tabs) 10/18/16   Rajni Araujo MD   divalproex (DEPAKOTE ER) 500 MG 24 hr tablet Take 2 tabs in the morning and 3 tabs at night. 10/18/16   Rajni Araujo MD   mometasone (ELOCON) 0.1 % ointment Apply twice daily for 3 days as needed for itching in the perianal area 5/26/16   Jesi Martínez MD   medical cannabis inhalation (Patient's own supply.  Not a prescription) Inhale 200 mLs into the lungs Take one to two puffs every four to six hours    Reported, Patient   COMPOUND (CMPD RX) - PHARMACY TO MIX COMPOUNDED MEDICATION Apply twice daily to affected areas as needed. 7/27/15    Camelia Starkey MD   OMEPRAZOLE PO     Reported, Patient   Multiple Vitamin (MULTIVITAMINS PO) 2 Chew a day    Reported, Patient   ALPRAZolam (XANAX) 0.5 MG tablet Take 0.5 mg by mouth as needed.    Reported, Patient          MEDICATIONS:     Depakote ER 1250 mg in the morning 1500 mg at night   Dilantin 160 mg in the morning, 200 mg in the evening.   Topirimate 150 mg AM and 100 mg PM         MEDICATION ISSUES:    1. Depakote: Started VPA 1/7/2013 increased depakote [id number] July 2015.   2. Topiramate: TPM started 5/9/14 for seizure and headache. Topiramate higher than 75mg twice a day causes cognitive slowing.   3. Phenytoin:      REVIEW OF SYSTEMS:  He is not having any significant side effects.  Patient denies nausea, vomiting, diarrhea.      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 He is driving. Lives with parents.      NEUROLOGICAL EXAMINATION:  There were no vitals taken for this visit.  Pupils are equal, round, reactive to light. Decreased visual acuity in left lower quadrant.  Face is symmetric.  No focal weakness, no finger-to-nose dysmetria.  Tandem gait and gait is stable.     ASSESSMENT:   1.  Localization-related epilepsy, controlled.   Etiology is unclear.  His EEGs in the past have been notable for rare left temporal epileptiform discharges.  His MRI of the brain is normal and nonlesional.  Goal phenytoin  Level near 2.0-2.5 and depakote level above 90. In 2013 he averaged a generalized tonic-clonic convulsion every 2-3 months, in 2014 and 2015 we optimized depakote and phenytoin level which resulted in best seizure control.     2. Migraines with aura: Increase topiramate. Rescue with zomig or percocet.      3.  Anxiety and depression.  The patient is taking effexor     PLAN:   1. Continue phenytoin, depakote as noted above.   2. Migraine acute treatment: phenergan 25 mg with Zomig 5 mg. If this does not work try percocet.  If neither of these works we can try  other triptans such as Eletriptan, Rizatriptan.  3. Increase topiramate 150 mg twice a day    4. Follow up  1 month     I spent 35 minutes with the patient.  During this time, counseling and coordination of care exceeded 50% of the time.        cc:   Bhaskar Morales MD   [facility]   [address], [address]   [place] [address]         JORGE GARCIA MD

## 2017-05-25 ENCOUNTER — TELEPHONE (OUTPATIENT)
Dept: NEUROLOGY | Facility: CLINIC | Age: 27
End: 2017-05-25

## 2017-05-25 DIAGNOSIS — G43.909 MIGRAINE: Primary | ICD-10-CM

## 2017-05-25 RX ORDER — RIZATRIPTAN BENZOATE 5 MG/1
10 TABLET ORAL
Qty: 18 TABLET | Refills: 0 | Status: SHIPPED | OUTPATIENT
Start: 2017-05-25 | End: 2017-05-28

## 2017-05-25 NOTE — TELEPHONE ENCOUNTER
Patient called into clinic with c/o continueing Migraine. Review of chart note indicates following plan outlined by Dr. Araujo;    PLAN:   1. Continue phenytoin, depakote as noted above.   2. Migraine acute treatment: phenergan 25 mg with Zomig 5 mg. If this does not work try percocet.  If neither of these works we can try other triptans such as Eletriptan, Rizatriptan.  3. Increase topiramate 150 mg twice a day      Writer called patient who indicates that he tried  phenergan with Zomig, and also tried percocet.  Patient also mentioned that Dr. Araujo had mentioned Eletriptan and  Rizatriptan.  As writer did not know what doses of Eletriptan or  Rizatriptan dr. Araujo would like to Prescribe, he spoke to Dr. Araujo who decided on giving patient option of 5-10 mg of Rizatriptan or he could go into hospital for infusion of DHE.    Writer called patient back and he chose to try Rizatriptan before resorting to going to hospital.   Order placed in chart and sent to pharmacy.

## 2017-06-08 ENCOUNTER — TRANSFERRED RECORDS (OUTPATIENT)
Dept: HEALTH INFORMATION MANAGEMENT | Facility: CLINIC | Age: 27
End: 2017-06-08

## 2017-06-13 ENCOUNTER — TELEPHONE (OUTPATIENT)
Dept: NEUROLOGY | Facility: CLINIC | Age: 27
End: 2017-06-13

## 2017-06-13 NOTE — TELEPHONE ENCOUNTER
----- Message from Mik Rivera CMA sent at 6/13/2017  9:59 AM CDT -----  Regarding: brendan  Caller: sandhya    Relationship to Patient: self    Call Back Number: [phone]    Reason for Call: patient missed yesterdays morning dose, would like to know what to do.

## 2017-06-19 DIAGNOSIS — G43.909 MIGRAINE: ICD-10-CM

## 2017-06-19 RX ORDER — RIZATRIPTAN BENZOATE 5 MG/1
TABLET ORAL
Qty: 18 TABLET | Refills: 0 | OUTPATIENT
Start: 2017-06-19

## 2017-06-20 DIAGNOSIS — L30.9 PERIANAL DERMATITIS: ICD-10-CM

## 2017-06-20 RX ORDER — MOMETASONE FUROATE 1 MG/G
OINTMENT TOPICAL
Qty: 15 G | Refills: 1 | Status: ON HOLD | OUTPATIENT
Start: 2017-06-20 | End: 2018-10-23

## 2017-06-20 NOTE — TELEPHONE ENCOUNTER
As resident on call, received refill request. Chart and attached communication reviewed. Patient last seen 11/3/16. Per Dr. Stephen and Dr. Richey, plan at that time was to continue mometasone ointment once daily as needed. Next appt scheduled for 7/6/17. Rx refilled.    Julita Crane MD  PGY-2, Dermatology  [phone]  Resident on Call

## 2017-06-20 NOTE — TELEPHONE ENCOUNTER
Last seen:11/3/16  Next appt:7/6/17      1. Perianal dermatitis, suspect irritant dermatitis possibly exacerbated by hemorrhoidal bleeding. Previously responded to clobetasol but would prefer not to resume this given potency. At this time, will continue current regimen with addition of barrier protection. Also recommended he start senna-docusate for further treatment of constipation and discuss hemorrhoids with general surgery given report of fairly significant bleeding. Lastly, will obtain bacterial culture to ensure no strep overgrowth.    Continue mometasone ointment and ketoconazole cream once daily as needed.    Start barrier protection with thick emollient such as zinc, Vaseline, Aquaphor, or Desitin.    Bacterial culture obtained today. Consider course of oral antibiotics pending results.    Start senna-docusate 1-2 tabs 1-2 times daily. Titrate to one loose stool daily.    General surgery referral replaced to discuss hemorrhoidal bleeding.   2. Benign nevi in setting of first degree history of melanoma. Counseled on ABCDEs of melanoma. Continue sun protection. Asked patient to return sooner if noticing changing or symptomatic moles.     Follow-up in 6 weeks, earlier for new or changing lesions

## 2017-06-27 ENCOUNTER — OFFICE VISIT (OUTPATIENT)
Dept: NEUROLOGY | Facility: CLINIC | Age: 27
End: 2017-06-27

## 2017-06-27 ENCOUNTER — APPOINTMENT (OUTPATIENT)
Dept: GENERAL RADIOLOGY | Facility: CLINIC | Age: 27
DRG: 123 | End: 2017-06-27
Attending: PSYCHIATRY & NEUROLOGY
Payer: COMMERCIAL

## 2017-06-27 ENCOUNTER — HOSPITAL ENCOUNTER (INPATIENT)
Facility: CLINIC | Age: 27
LOS: 2 days | Discharge: HOME | DRG: 123 | End: 2017-06-29
Attending: PSYCHIATRY & NEUROLOGY | Admitting: PSYCHIATRY & NEUROLOGY
Payer: COMMERCIAL

## 2017-06-27 VITALS
SYSTOLIC BLOOD PRESSURE: 130 MMHG | BODY MASS INDEX: 29.41 KG/M2 | WEIGHT: 187.4 LBS | HEIGHT: 67 IN | DIASTOLIC BLOOD PRESSURE: 87 MMHG | HEART RATE: 97 BPM

## 2017-06-27 DIAGNOSIS — H54.62 VISION LOSS OF LEFT EYE: Primary | ICD-10-CM

## 2017-06-27 DIAGNOSIS — H46.9 OPTIC NEURITIS: Primary | ICD-10-CM

## 2017-06-27 DIAGNOSIS — G43.809 MIGRAINE VARIANT: ICD-10-CM

## 2017-06-27 LAB
ALBUMIN SERPL-MCNC: 3.9 G/DL (ref 3.4–5)
ALBUMIN UR-MCNC: NEGATIVE MG/DL
ALP SERPL-CCNC: 68 U/L (ref 40–150)
ALT SERPL W P-5'-P-CCNC: 27 U/L (ref 0–70)
ANION GAP SERPL CALCULATED.3IONS-SCNC: 8 MMOL/L (ref 3–14)
APPEARANCE UR: CLEAR
AST SERPL W P-5'-P-CCNC: 16 U/L (ref 0–45)
BASOPHILS # BLD AUTO: 0.1 10E9/L (ref 0–0.2)
BASOPHILS NFR BLD AUTO: 0.6 %
BILIRUB SERPL-MCNC: 0.3 MG/DL (ref 0.2–1.3)
BILIRUB UR QL STRIP: NEGATIVE
BUN SERPL-MCNC: 13 MG/DL (ref 7–30)
CALCIUM SERPL-MCNC: 8.5 MG/DL (ref 8.5–10.1)
CHLORIDE SERPL-SCNC: 108 MMOL/L (ref 94–109)
CO2 SERPL-SCNC: 22 MMOL/L (ref 20–32)
COLOR UR AUTO: ABNORMAL
CREAT SERPL-MCNC: 0.66 MG/DL (ref 0.66–1.25)
DIFFERENTIAL METHOD BLD: ABNORMAL
EOSINOPHIL # BLD AUTO: 0.1 10E9/L (ref 0–0.7)
EOSINOPHIL NFR BLD AUTO: 1.3 %
ERYTHROCYTE [DISTWIDTH] IN BLOOD BY AUTOMATED COUNT: 13.2 % (ref 10–15)
FOLATE SERPL-MCNC: 6.9 NG/ML
GFR SERPL CREATININE-BSD FRML MDRD: ABNORMAL ML/MIN/1.7M2
GLUCOSE SERPL-MCNC: 103 MG/DL (ref 70–99)
GLUCOSE UR STRIP-MCNC: NEGATIVE MG/DL
HCT VFR BLD AUTO: 35.1 % (ref 40–53)
HGB BLD-MCNC: 12.1 G/DL (ref 13.3–17.7)
HGB UR QL STRIP: NEGATIVE
IMM GRANULOCYTES # BLD: 0 10E9/L (ref 0–0.4)
IMM GRANULOCYTES NFR BLD: 0.4 %
KETONES UR STRIP-MCNC: NEGATIVE MG/DL
LEUKOCYTE ESTERASE UR QL STRIP: NEGATIVE
LYMPHOCYTES # BLD AUTO: 3 10E9/L (ref 0.8–5.3)
LYMPHOCYTES NFR BLD AUTO: 39.1 %
MCH RBC QN AUTO: 31.6 PG (ref 26.5–33)
MCHC RBC AUTO-ENTMCNC: 34.5 G/DL (ref 31.5–36.5)
MCV RBC AUTO: 92 FL (ref 78–100)
MONOCYTES # BLD AUTO: 0.8 10E9/L (ref 0–1.3)
MONOCYTES NFR BLD AUTO: 10.4 %
MUCOUS THREADS #/AREA URNS LPF: PRESENT /LPF
NEUTROPHILS # BLD AUTO: 3.7 10E9/L (ref 1.6–8.3)
NEUTROPHILS NFR BLD AUTO: 48.2 %
NITRATE UR QL: NEGATIVE
NRBC # BLD AUTO: 0 10*3/UL
NRBC BLD AUTO-RTO: 0 /100
PH UR STRIP: 7.5 PH (ref 5–7)
PLATELET # BLD AUTO: 278 10E9/L (ref 150–450)
POTASSIUM SERPL-SCNC: 3.7 MMOL/L (ref 3.4–5.3)
PROT SERPL-MCNC: 7 G/DL (ref 6.8–8.8)
RBC # BLD AUTO: 3.83 10E12/L (ref 4.4–5.9)
RBC #/AREA URNS AUTO: <1 /HPF (ref 0–2)
SODIUM SERPL-SCNC: 138 MMOL/L (ref 133–144)
SP GR UR STRIP: 1 (ref 1–1.03)
TSH SERPL DL<=0.005 MIU/L-ACNC: 3.46 MU/L (ref 0.4–4)
URN SPEC COLLECT METH UR: ABNORMAL
UROBILINOGEN UR STRIP-MCNC: NORMAL MG/DL (ref 0–2)
WBC # BLD AUTO: 7.8 10E9/L (ref 4–11)
WBC #/AREA URNS AUTO: <1 /HPF (ref 0–2)

## 2017-06-27 PROCEDURE — 84425 ASSAY OF VITAMIN B-1: CPT | Performed by: PSYCHIATRY & NEUROLOGY

## 2017-06-27 PROCEDURE — 83921 ORGANIC ACID SINGLE QUANT: CPT | Performed by: PSYCHIATRY & NEUROLOGY

## 2017-06-27 PROCEDURE — 82746 ASSAY OF FOLIC ACID SERUM: CPT | Performed by: PSYCHIATRY & NEUROLOGY

## 2017-06-27 PROCEDURE — 85025 COMPLETE CBC W/AUTO DIFF WBC: CPT | Performed by: PSYCHIATRY & NEUROLOGY

## 2017-06-27 PROCEDURE — 80053 COMPREHEN METABOLIC PANEL: CPT | Performed by: PSYCHIATRY & NEUROLOGY

## 2017-06-27 PROCEDURE — 25000132 ZZH RX MED GY IP 250 OP 250 PS 637: Performed by: STUDENT IN AN ORGANIZED HEALTH CARE EDUCATION/TRAINING PROGRAM

## 2017-06-27 PROCEDURE — 86618 LYME DISEASE ANTIBODY: CPT | Performed by: PSYCHIATRY & NEUROLOGY

## 2017-06-27 PROCEDURE — 40000141 ZZH STATISTIC PERIPHERAL IV START W/O US GUIDANCE

## 2017-06-27 PROCEDURE — 82607 VITAMIN B-12: CPT | Performed by: PSYCHIATRY & NEUROLOGY

## 2017-06-27 PROCEDURE — 84443 ASSAY THYROID STIM HORMONE: CPT | Performed by: PSYCHIATRY & NEUROLOGY

## 2017-06-27 PROCEDURE — 81001 URINALYSIS AUTO W/SCOPE: CPT | Performed by: PSYCHIATRY & NEUROLOGY

## 2017-06-27 PROCEDURE — 83090 ASSAY OF HOMOCYSTEINE: CPT | Performed by: PSYCHIATRY & NEUROLOGY

## 2017-06-27 PROCEDURE — 12000008 ZZH R&B INTERMEDIATE UMMC

## 2017-06-27 PROCEDURE — 93005 ELECTROCARDIOGRAM TRACING: CPT

## 2017-06-27 PROCEDURE — 36415 COLL VENOUS BLD VENIPUNCTURE: CPT | Performed by: PSYCHIATRY & NEUROLOGY

## 2017-06-27 PROCEDURE — 71010 XR CHEST PORT 1 VW: CPT

## 2017-06-27 PROCEDURE — 93010 ELECTROCARDIOGRAM REPORT: CPT | Performed by: INTERNAL MEDICINE

## 2017-06-27 RX ORDER — POTASSIUM CHLORIDE 7.45 MG/ML
10 INJECTION INTRAVENOUS
Status: DISCONTINUED | OUTPATIENT
Start: 2017-06-27 | End: 2017-06-29 | Stop reason: HOSPADM

## 2017-06-27 RX ORDER — POTASSIUM CL/LIDO/0.9 % NACL 10MEQ/0.1L
10 INTRAVENOUS SOLUTION, PIGGYBACK (ML) INTRAVENOUS
Status: DISCONTINUED | OUTPATIENT
Start: 2017-06-27 | End: 2017-06-29 | Stop reason: HOSPADM

## 2017-06-27 RX ORDER — DIVALPROEX SODIUM 500 MG/1
1500 TABLET, EXTENDED RELEASE ORAL AT BEDTIME
Status: DISCONTINUED | OUTPATIENT
Start: 2017-06-27 | End: 2017-06-29 | Stop reason: HOSPADM

## 2017-06-27 RX ORDER — RIZATRIPTAN BENZOATE 10 MG/1
10 TABLET ORAL
Qty: 18 TABLET | Refills: 3 | Status: SHIPPED | OUTPATIENT
Start: 2017-06-27 | End: 2019-03-05

## 2017-06-27 RX ORDER — ACETAMINOPHEN 325 MG/1
650 TABLET ORAL EVERY 4 HOURS PRN
Status: DISCONTINUED | OUTPATIENT
Start: 2017-06-27 | End: 2017-06-29 | Stop reason: HOSPADM

## 2017-06-27 RX ORDER — POTASSIUM CHLORIDE 1.5 G/1.58G
20-40 POWDER, FOR SOLUTION ORAL
Status: DISCONTINUED | OUTPATIENT
Start: 2017-06-27 | End: 2017-06-29 | Stop reason: HOSPADM

## 2017-06-27 RX ORDER — MOMETASONE FUROATE 1 MG/G
OINTMENT TOPICAL 3 TIMES DAILY PRN
Status: DISCONTINUED | OUTPATIENT
Start: 2017-06-27 | End: 2017-06-29 | Stop reason: HOSPADM

## 2017-06-27 RX ORDER — PHENYTOIN SODIUM 100 MG/1
200 CAPSULE, EXTENDED RELEASE ORAL AT BEDTIME
Status: DISCONTINUED | OUTPATIENT
Start: 2017-06-27 | End: 2017-06-29 | Stop reason: HOSPADM

## 2017-06-27 RX ORDER — ONDANSETRON 4 MG/1
4 TABLET, ORALLY DISINTEGRATING ORAL EVERY 6 HOURS PRN
Status: DISCONTINUED | OUTPATIENT
Start: 2017-06-27 | End: 2017-06-29 | Stop reason: HOSPADM

## 2017-06-27 RX ORDER — NALOXONE HYDROCHLORIDE 0.4 MG/ML
.1-.4 INJECTION, SOLUTION INTRAMUSCULAR; INTRAVENOUS; SUBCUTANEOUS
Status: DISCONTINUED | OUTPATIENT
Start: 2017-06-27 | End: 2017-06-29 | Stop reason: HOSPADM

## 2017-06-27 RX ORDER — PROCHLORPERAZINE 25 MG
25 SUPPOSITORY, RECTAL RECTAL EVERY 12 HOURS PRN
Status: DISCONTINUED | OUTPATIENT
Start: 2017-06-27 | End: 2017-06-29 | Stop reason: HOSPADM

## 2017-06-27 RX ORDER — VENLAFAXINE HYDROCHLORIDE 225 MG/1
225 TABLET, EXTENDED RELEASE ORAL EVERY MORNING
Status: DISCONTINUED | OUTPATIENT
Start: 2017-06-28 | End: 2017-06-29 | Stop reason: HOSPADM

## 2017-06-27 RX ORDER — ALPRAZOLAM 0.5 MG
0.5 TABLET ORAL 2 TIMES DAILY PRN
Status: DISCONTINUED | OUTPATIENT
Start: 2017-06-27 | End: 2017-06-29 | Stop reason: HOSPADM

## 2017-06-27 RX ORDER — PHENYTOIN 50 MG/1
160 TABLET, CHEWABLE ORAL EVERY MORNING
Status: DISCONTINUED | OUTPATIENT
Start: 2017-06-28 | End: 2017-06-27

## 2017-06-27 RX ORDER — ONDANSETRON 2 MG/ML
4 INJECTION INTRAMUSCULAR; INTRAVENOUS EVERY 6 HOURS PRN
Status: DISCONTINUED | OUTPATIENT
Start: 2017-06-27 | End: 2017-06-29 | Stop reason: HOSPADM

## 2017-06-27 RX ORDER — AMOXICILLIN 250 MG
1-2 CAPSULE ORAL 2 TIMES DAILY
Status: DISCONTINUED | OUTPATIENT
Start: 2017-06-27 | End: 2017-06-29 | Stop reason: HOSPADM

## 2017-06-27 RX ORDER — MAGNESIUM CARB/ALUMINUM HYDROX 105-160MG
148 TABLET,CHEWABLE ORAL
Status: DISCONTINUED | OUTPATIENT
Start: 2017-06-27 | End: 2017-06-29 | Stop reason: HOSPADM

## 2017-06-27 RX ORDER — POTASSIUM CHLORIDE 750 MG/1
20-40 TABLET, EXTENDED RELEASE ORAL
Status: DISCONTINUED | OUTPATIENT
Start: 2017-06-27 | End: 2017-06-29 | Stop reason: HOSPADM

## 2017-06-27 RX ORDER — CALCIUM CARBONATE 500 MG/1
500-1000 TABLET, CHEWABLE ORAL 4 TIMES DAILY PRN
Status: DISCONTINUED | OUTPATIENT
Start: 2017-06-27 | End: 2017-06-29 | Stop reason: HOSPADM

## 2017-06-27 RX ORDER — MAGNESIUM SULFATE HEPTAHYDRATE 40 MG/ML
4 INJECTION, SOLUTION INTRAVENOUS EVERY 4 HOURS PRN
Status: DISCONTINUED | OUTPATIENT
Start: 2017-06-27 | End: 2017-06-29 | Stop reason: HOSPADM

## 2017-06-27 RX ORDER — PROCHLORPERAZINE MALEATE 5 MG
5-10 TABLET ORAL EVERY 6 HOURS PRN
Status: DISCONTINUED | OUTPATIENT
Start: 2017-06-27 | End: 2017-06-29 | Stop reason: HOSPADM

## 2017-06-27 RX ORDER — POTASSIUM CHLORIDE 29.8 MG/ML
20 INJECTION INTRAVENOUS
Status: DISCONTINUED | OUTPATIENT
Start: 2017-06-27 | End: 2017-06-29 | Stop reason: HOSPADM

## 2017-06-27 RX ORDER — BISACODYL 5 MG
5-15 TABLET, DELAYED RELEASE (ENTERIC COATED) ORAL DAILY PRN
Status: DISCONTINUED | OUTPATIENT
Start: 2017-06-27 | End: 2017-06-29 | Stop reason: HOSPADM

## 2017-06-27 RX ADMIN — PHENYTOIN SODIUM 200 MG: 100 CAPSULE ORAL at 22:15

## 2017-06-27 RX ADMIN — SENNOSIDES AND DOCUSATE SODIUM 2 TABLET: 8.6; 5 TABLET ORAL at 21:29

## 2017-06-27 RX ADMIN — TOPIRAMATE 150 MG: 50 TABLET ORAL at 22:16

## 2017-06-27 RX ADMIN — DIVALPROEX SODIUM 1500 MG: 500 TABLET, EXTENDED RELEASE ORAL at 22:16

## 2017-06-27 ASSESSMENT — ACTIVITIES OF DAILY LIVING (ADL)
TRANSFERRING: 0-->INDEPENDENT
RETIRED_EATING: 0-->INDEPENDENT
NUMBER_OF_TIMES_PATIENT_HAS_FALLEN_WITHIN_LAST_SIX_MONTHS: 0
RETIRED_COMMUNICATION: 0-->UNDERSTANDS/COMMUNICATES WITHOUT DIFFICULTY
FALL_HISTORY_WITHIN_LAST_SIX_MONTHS: NO
DRESS: 0-->INDEPENDENT
BATHING: 0-->INDEPENDENT
AMBULATION: 0-->INDEPENDENT
TOILETING: 0-->INDEPENDENT
COGNITION: 0 - NO COGNITION ISSUES REPORTED
SWALLOWING: 0-->SWALLOWS FOODS/LIQUIDS WITHOUT DIFFICULTY

## 2017-06-27 ASSESSMENT — VISUAL ACUITY: OU: NORMAL ACUITY

## 2017-06-27 NOTE — IP AVS SNAPSHOT
[address] [address]    Phone:  [phone]                                       After Visit Summary   6/27/2017    Tha Mcghee    MRN: [id number]           After Visit Summary Signature Page     I have received my discharge instructions, and my questions have been answered. I have discussed any challenges I see with this plan with the nurse or doctor.    ..........................................................................................................................................  Patient/Patient Representative Signature      ..........................................................................................................................................  Patient Representative Print Name and Relationship to Patient    ..................................................               ................................................  Date                                            Time    ..........................................................................................................................................  Reviewed by Signature/Title    ...................................................              ..............................................  Date                                                            Time

## 2017-06-27 NOTE — LETTER
2017       RE: Tha Mcghee  : 1990   MRN: [id number]      Dear Colleague,    Thank you for referring your patient, Tha Mcghee, to the [facility] EPILEPSY CARE at [facility]. Please see a copy of my visit note below.    [facility] Epilepsy Care Progress Note    Patient:  Tha Mcghee  :  1990   Age:  26 year old   Today's Office Visit:  2017    Epilepsy Data:  Patient History  Primary Epileptologist/Provider: Rajni Araujo M.D.  Epilepsy Syndrome: Localization-related epilepsy unspecified  Epilepsy Syndrome Status: Preliminary  Age of Onset: 20  Etiology  : Unknown  Other Relevant Dx/ Issues: Marijuana use 2-3x/day to treat anxiety.  History of alcohol abuse; has undergone treatment. K2 (synthetic marijuana) use   Tests/Surgery History  Last EE2011  Last MRI: 2011  Seizure Record  Current Visit Date: 17  Previous Visit Date: 17  Months since last visit: 1.15  Seizure Type 1: Partial seizures with secondary generalization  -  with complex partial seizures evolving to generalized seizures  Description of Sz Type 1: aura flush of anxiety starting in his chest and abdomen -> confusion with lip smacking -> GTC  # of Type 1 Seizure since last visit: 0  Freq. Type 1 / Month: 0  Seizure Type 2: Complex partial seizures unspecified  Description of Sz Type 2: Stares, clicking noise with mouth,  # of Type 2 Seizure since last visit: 0  Freq. Type 2 / Month: 0       EPILEPSY HISTORY:  Copied forward: The patient had his first seizure on 2011 and he came under [facility] care on  2011 under Dr. Blanco's care initially.  He typically has aura -> GTC. His seizure type he describes as a flush of anxiety followed by confusion with lip smacking and clicking sound in his throat.  This progresses to a generalized tonic-clonic seizure.  He was initially started on phenytoin (this helps his GTC), then VPA was added 2013 (this helps HA and  seizure). Lastly, topiramate was added in 2014 which has helped his headaches and seizure. His EEGs in the past have been notable for rare left temporal epileptiform discharges.  His MRI of the brain is normal and nonlesional.        INTERVAL HISTORY:  Last seizure was 5/14/2017 or 5/7/2017 he had generalized tonic-clonic convulsion. Since last visit he had no seizures. He states that the rizatriptan did help his headache and it resolved, but, he has loss of vision in left eye. I last saw him 5/23/2017 he had vision difficulty in his left eye in the setting of a migraine. But, 1 week after this his left eye vision has worsened and now it is very blurry vision. He is not blind in his left eye, but, he has a hard time seeing out of his eye. He has no headache now. No nausea, no vomiting, no focal weakness, and no sensory loss.  Vision problem in left eye started May 2017, he had a migraine that was 6 weeks long, this gradually progressed to whole left eye vision blurriness in early June. He saw doctor in opthometrist and was told he has swelling behind optic nerve. In May 2017 he got 7 days of steroids for severe migraines.     Prior to Admission medications    Medication Sig Start Date End Date Taking? Authorizing Provider   diazepam (DIAZEPAM INTENSOL) 5 MG/ML (HIGH CONC) solution For generalized seizures give 5 mg.  May repeat and give 2.5 mg after 10 minutes if needed. Do not exceed 7.5mg 1/30/17   Mirela Stokes MD   topiramate (TOPAMAX) 100 MG tablet Take 1 tablet (100 mg) by mouth 2 times daily 1/30/17   Mirela Stokes MD   venlafaxine (EFFEXOR XR) 75 MG 24 hr capsule Take 225 mg by mouth every morning  10/18/16   Reported, Patient   phenytoin (DILANTIN) 30 MG ER capsule Take two po Q AM 10/18/16   Rajni Araujo MD   phenytoin (DILANTIN) 100 MG ER capsule TAKE 1 CAP BY MOUTH DAILY IN THE AM AND 2 CAPS IN THE PM 10/18/16   Rajni Araujo MD   divalproex (DEPAKOTE ER) 250 MG 24 hr tablet Take 1  "tab in the morning (along with two 500mg tabs) 10/18/16   Rajni Araujo MD   divalproex (DEPAKOTE ER) 500 MG 24 hr tablet Take 2 tabs in the morning and 3 tabs at night. 10/18/16   Rajni Araujo MD   mometasone (ELOCON) 0.1 % ointment Apply twice daily for 3 days as needed for itching in the perianal area 5/26/16   Jesi Martínez MD   medical cannabis inhalation (Patient's own supply.  Not a prescription) Inhale 200 mLs into the lungs Take one to two puffs every four to six hours    Reported, Patient   COMPOUND (CMPD RX) - PHARMACY TO MIX COMPOUNDED MEDICATION Apply twice daily to affected areas as needed. 7/27/15   Camelia Starkey MD   OMEPRAZOLE PO     Reported, Patient   Multiple Vitamin (MULTIVITAMINS PO) 2 Chew a day    Reported, Patient   ALPRAZolam (XANAX) 0.5 MG tablet Take 0.5 mg by mouth as needed.    Reported, Patient          MEDICATIONS:     Depakote ER 1250 mg in the morning 1500 mg at night   Dilantin 160 mg in the morning, 200 mg in the evening.   Topirimate 150 mg AM and 150 mg PM         MEDICATION ISSUES:    1. Depakote: Started VPA 1/7/2013 increased depakote [id number] July 2015.   2. Topiramate: TPM started 5/9/14 for seizure and headache. Topiramate higher than 75mg twice a day causes cognitive slowing.   3. Phenytoin:      REVIEW OF SYSTEMS:  Left eye vision loss.  Patient denies nausea, vomiting, diarrhea.      SOCIAL: He is not working, he has a job appointment today. He is not driving. Lives with parents.      NEUROLOGICAL EXAMINATION:  /87 (BP Location: Left arm, Patient Position: Chair, Cuff Size: Adult Regular)  Pulse 97  Ht 5' 7.01\" (170.2 cm)  Wt 187 lb 6.4 oz (85 kg)  BMI 29.34 kg/m2  Pupils are equal, round, reactive to light. Decreased visual acuity in left lower quadrant.  Face is symmetric.  No focal weakness, no finger-to-nose dysmetria.  Tandem gait and gait is stable.   Fundoscopic exam: Left eye disc flat, more pale, and more atrophic than right " fundoscopic. Right eye visual acuity is 20/50 and left eye visual acuity is 20/200. Left eye is sluggish to light, but, does react, right eye is reactive to light (more brisk than left eye).       ASSESSMENT:   1.  Localization-related epilepsy, controlled.   Etiology is unclear.  His EEGs in the past have been notable for rare left temporal epileptiform discharges.  His MRI of the brain is normal and nonlesional.  Goal phenytoin  Level near 2.0-2.5 and depakote level above 90. In 2013 he averaged a generalized tonic-clonic convulsion every 2-3 months, in 2014 and 2015 we optimized depakote and phenytoin level which resulted in best seizure control.     2. Left eye vision loss: Left eye vision blurriness is concerning, he has a pale disc, optic nerve appears atrophic. He needs hospital admission. MRI brain/orbit, optho consult, LP, and consideration for trial of steroids if their is swelling. Patient does not have known diagnosis of optic neuritis of MS.      3. Migraines with aura: Increase topiramate. Headaches stopped. Rescue with rizatriptan or percocet.      4.  Anxiety and depression.  The patient is taking effexor         PLAN:   1. Hospital admission for left eye vision loss. He needs hospital admission. MRI brain/orbit, optho consult, LP, and consideration for trial of steroids if their is swelling.  2. Continue phenytoin, depakote as noted above.   3. Follow up  3 weeks     I spent 45 minutes with the patient.  During this time, counseling and coordination of care exceeded 50% of the time.        cc:   Bhaskar Morales MD   [address], [address]   [place] [address]         JORGE GARCIA MD

## 2017-06-27 NOTE — IP AVS SNAPSHOT
MRN:[id number]                      After Visit Summary   6/27/2017    Tha Mcghee    MRN: [id number]           Thank you!     Thank you for choosing [facility] for your care. Our goal is always to provide you with excellent care. Hearing back from our patients is one way we can continue to improve our services. Please take a few minutes to complete the written survey that you may receive in the mail after you visit with us. Thank you!        Patient Information     Date Of Birth          1990        Designated Caregiver       Most Recent Value    Caregiver    Will someone help with your care after discharge? no      About your hospital stay     You were admitted on:  June 27, 2017 You last received care in the:  [address] [facility] [place]    You were discharged on:  June 29, 2017        Reason for your hospital stay       You were hospitalized for blurry vision and evaluated for optic neuritis.                  Who to Call     For medical emergencies, please call 911.  For non-urgent questions about your medical care, please call your primary care provider or clinic, [phone]          Attending Provider     Provider Specialty    Lui Gillespie MD Student in organized health care education/training program       Primary Care Provider Office Phone # Fax #    Bhaskar Morales [phone]      After Care Instructions     Activity       Your activity upon discharge: activity as tolerated            Diet       Follow this diet upon discharge: Orders Placed This Encounter      Combination Diet Regular Diet Adult            Discharge Instructions       Continue your current seizure medications. Take prednisone as prescribed. Follow up with Dr. Araujo within 2 weeks. Follow up with Neuro-opthalmology within 4 weeks.                  Follow-up Appointments     Adult [facility] Follow-up and recommended labs and tests       Follow up with Neuro-opthalmology, at (location with  clinic name or city) [facility], within 4 weeks regarding new diagnosis. No follow up labs or test are needed.    Follow up with Dr. Araujo at [facility] neurology within 2-4 weeks regarding new diagnosis and for laboratory follow up.    Appointments on [place] and/or [facility] (with [facility] or [facility] provider or service). Call [phone] if you haven't heard regarding these appointments within 7 days of discharge.                  Your next 10 appointments already scheduled     Jul 06, 2017  8:00 AM CDT   (Arrive by 7:45 AM)   Return Visit with Erick Jasso MD   [facility] ([facility])    [address]  3[address] Floor  [place] [address] [phone]            Aug 01, 2017  1:00 PM CDT   Return Visit with Rajni Araujo MD   [facility] ([facility])    [address], [address]  [place] [id number] [phone]              Pending Results     Date and Time Order Name Status Description    6/29/2017 0101 Pyruvic acid In process     6/28/2017 1521 Oligoclonal banding CSF Tube 3 and Blood In process     6/28/2017 1521 CSF Culture Aerobic Bacterial Preliminary     6/28/2017 0100 Neuromyelitis Optica AQP4 IgG w Reflex Blood In process     6/28/2017 0100 Lupus Anticoagulant Panel In process     6/27/2017 2057 Methylmalonic acid In process     6/27/2017 2055 Vitamin B1 whole blood In process             Statement of Approval     Ordered          06/29/17 1314  I have reviewed and agree with all the recommendations and orders detailed in this document.  EFFECTIVE NOW     Approved and electronically signed by:  Ranulfo Werner MD             Admission Information     Date & Time Provider Department Dept. Phone    6/27/2017 Lui Gillespie MD [address] [facility] [place] [phone]      Your Vitals Were     Blood Pressure Pulse Temperature Respirations Height Weight    133/66 (BP Location: Right arm) 68 96.5  F (35.8  C) (Oral) 16  "1.727 m (5' 8\") 80.1 kg (176 lb 8 oz)    Pulse Oximetry BMI (Body Mass Index)                98% 26.84 kg/m2          [employer] Information     [employer] lets you send messages to your doctor, view your test results, renew your prescriptions, schedule appointments and more. To sign up, go to [email or web address] . Click on \"Log in\" on the left side of the screen, which will take you to the Welcome page. Then click on \"Sign up Now\" on the right side of the page.     You will be asked to enter the access code listed below, as well as some personal information. Please follow the directions to create your username and password.     Your access code is: [email or web address]  Expires: 2017  4:27 PM     Your access code will  in 90 days. If you need help or a new code, please call your [facility] clinic or [phone].        Care EveryWhere ID     This is your Care EveryWhere ID. This could be used by other organizations to access your [facility] medical records  [id number]        Equal Access to Services     KARTHIK CURTIS AH: Hadjanice Hernandez, vinayak vaughan, qaseth mccarthy, redd sinclair . So [facility] [phone].    ATENCIÓN: Si habla español, tiene a andrade disposición servicios gratuitos de asistencia lingüística. Dominic al [phone].    We comply with applicable federal civil rights laws and [place] laws. We do not discriminate on the basis of race, color, national origin, age, disability sex, sexual orientation or gender identity.               Review of your medicines      START taking        Dose / Directions    predniSONE 20 MG tablet   Commonly known as:  DELTASONE        Take 3 tabs (60 mg) by mouth daily x 3 days, 2 tabs (40 mg) daily x 3 days, 1 tab (20 mg) daily x 3 days, then 1/2 tab (10 mg) x 3 days.   Quantity:  20 tablet   Refills:  0         CONTINUE these medicines which have NOT CHANGED        Dose / Directions    ALPRAZolam 0.5 MG tablet   Commonly " known as:  XANAX        Dose:  0.5 mg   Take 0.5 mg by mouth as needed.   Refills:  0       COMPOUND - PHARMACY TO MIX COMPOUNDED MEDICATION   Commonly known as:  CMPD RX   Used for:  Perianal dermatitis        Apply twice daily to affected areas as needed.   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  May repeat and give 2.5 mg after 10 minutes if needed. 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   Quantity:  450 tablet   Refills:  3       EFFEXOR XR 75 MG 24 hr capsule   Generic drug:  venlafaxine        Dose:  225 mg   Take 225 mg by mouth every morning   Refills:  0       medical cannabis inhalation (Patient's own supply.  Not a prescription)   Used for:  Localization-related (focal) (partial) epilepsy and epileptic syndromes with complex partial seizures, with intractable epilepsy, Variants of migraine, not elsewhere classified, without mention of intractable migraine without mention of status migrainosus        Dose:  200 mL   Inhale 200 mLs into the lungs Take one to two puffs every four to six hours   Refills:  0       mometasone 0.1 % ointment   Commonly known as:  ELOCON   Used for:  Perianal dermatitis        Apply twice daily for 3 days as needed for itching in the perianal area    Quantity:  15 g   Refills:  1       MULTIVITAMINS PO   Used for:  Localization-related (focal) (partial) epilepsy and epileptic syndromes with complex partial seizures, with intractable epilepsy        2 Chew a day   Refills:  0       OMEPRAZOLE PO   Used for:  Localization-related (focal) (partial) epilepsy and epileptic syndromes with complex partial seizures, with intractable epilepsy, Variants of migraine, not elsewhere classified, without mention of intractable migraine without mention of status migrainosus        Refills:  0       oxyCODONE-acetaminophen 5-325 MG per tablet   Commonly known as:  PERCOCET   Used for:  Partial epilepsy with impairment of consciousness, intractable (H), Migraine variant        Dose:  1 tablet   Take 1 tablet by mouth every 6 hours as needed for pain maximum 4 tablet(s) per day   Quantity:  20 tablet   Refills:  0       * phenytoin 30 MG CR capsule   Commonly known as:  DILANTIN   Used for:  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)        Take two po Q AM   Quantity:  180 capsule   Refills:  3       * phenytoin 100 MG CR capsule   Commonly known as:  DILANTIN   Used for:  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)        TAKE 1 CAP BY MOUTH DAILY IN THE AM AND 2 CAPS IN THE PM   Quantity:  270 capsule   Refills:  3       promethazine 25 MG tablet   Commonly known as:  PHENERGAN   Used for:  Partial epilepsy with impairment of consciousness, intractable (H), Migraine variant        Dose:  25 mg   Take 1 tablet (25 mg) by mouth every 6 hours as needed for nausea   Quantity:  20 tablet   Refills:  1       rizatriptan 10 MG tablet   Commonly known as:  MAXALT   Used for:  Vision loss of left eye, Migraine variant        Dose:  10 mg   Take 1 tablet (10 mg) by mouth at onset of headache for migraine May repeat in 2 hours. Max 3 tablets/24 hours.   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 Read the directions carefully, and ask your doctor or other care provider to review them with you.         Where to get your medicines      These medications were sent to [facility] [place], [place] - [address] [address]     Phone:  [phone]     predniSONE 20 MG tablet                Protect others around you: Learn how to safely use, store and throw away your medicines at www.disposemymeds.org.             Medication List: This is a list of all your medications and when to take them. Check marks below indicate your daily home schedule. Keep this list as a reference.      Medications           Morning Afternoon Evening Bedtime As Needed    ALPRAZolam 0.5 MG tablet   Commonly known as:  XANAX   Take 0.5 mg by mouth as needed.                                COMPOUND - PHARMACY TO MIX COMPOUNDED MEDICATION   Commonly known as:  CMPD RX   Apply twice daily to affected areas as needed.                                diazepam 5 MG/ML (HIGH CONC) solution   Commonly known as:  DIAZEPAM INTENSOL   For generalized seizures give 5 mg.  May repeat and give 2.5 mg after 10 minutes if needed. Do not exceed 7.5mg                                * divalproex 250 MG 24 hr tablet   Commonly known as:  DEPAKOTE ER   Take 1 tab in the morning (along with two 500mg tabs)   Last time this was given:  1,250 mg on 6/29/2017  9:54 AM                                * divalproex 500 MG 24 hr tablet   Commonly known as:  DEPAKOTE ER   Take 2 tabs in the morning and 3 tabs at night.   Last time this was given:  1,250 mg on 6/29/2017  9:54 AM                                 EFFEXOR XR 75 MG 24 hr capsule   Take 225 mg by mouth every morning   Generic drug:  venlafaxine                                medical cannabis inhalation (Patient's own supply.  Not a prescription)   Inhale 200 mLs into the lungs Take one to two puffs every four to six hours                                mometasone 0.1 % ointment   Commonly known as:  ELOCON   Apply twice daily for 3 days as needed for itching in the perianal area                                MULTIVITAMINS PO   2 Chew a day                                OMEPRAZOLE PO   Last time this was given:  20 mg on 6/29/2017  9:56 AM                                oxyCODONE-acetaminophen 5-325 MG per tablet   Commonly known as:  PERCOCET   Take 1 tablet by mouth every 6 hours as needed for pain maximum 4 tablet(s) per day                                * phenytoin 30 MG CR capsule   Commonly known as:  DILANTIN   Take two po Q AM   Last time this was given:  160 mg on 6/29/2017  9:55 AM                                * phenytoin 100 MG CR capsule   Commonly known as:  DILANTIN   TAKE 1 CAP BY MOUTH DAILY IN THE AM AND 2 CAPS IN THE PM   Last time this was given:  160 mg on 6/29/2017  9:55 AM                                predniSONE 20 MG tablet   Commonly known as:  DELTASONE   Take 3 tabs (60 mg) by mouth daily x 3 days, 2 tabs (40 mg) daily x 3 days, 1 tab (20 mg) daily x 3 days, then 1/2 tab (10 mg) x 3 days.                                promethazine 25 MG tablet   Commonly known as:  PHENERGAN   Take 1 tablet (25 mg) by mouth every 6 hours as needed for nausea                                rizatriptan 10 MG tablet   Commonly known as:  MAXALT   Take 1 tablet (10 mg) by mouth at onset of headache for migraine May repeat in 2 hours. Max 3 tablets/24 hours.                                topiramate 100 MG tablet   Commonly known as:  TOPAMAX   150 mg twice a day   Last time this was given:  150 mg on 6/29/2017  9:54 AM                                 * Notice:  This list has 4 medication(s) that are the same as other medications prescribed for you. Read the directions carefully, and ask your doctor or other care provider to review them with you.

## 2017-06-27 NOTE — PROGRESS NOTES
[facility] Ep[facility]psy [facility] Progress Note    Patient:  Tha Mcghee  :  1990   Age:  26 year old   Today's Office Visit:  2017    Epilepsy Data:  Patient History  Primary Epileptologist/Provider: Rajni Araujo M.D.  Epilepsy Syndrome: Localization-related epilepsy unspecified  Epilepsy Syndrome Status: Preliminary  Age of Onset: 20  Etiology  : Unknown  Other Relevant Dx/ Issues: Marijuana use 2-3x/day to treat anxiety.  History of alcohol abuse; has undergone treatment. K2 (synthetic marijuana) use   Tests/Surgery History  Last EE2011  Last MRI: 2011  Seizure Record  Current Visit Date: 17  Previous Visit Date: 17  Months since last visit: 1.15  Seizure Type 1: Partial seizures with secondary generalization  -  with complex partial seizures evolving to generalized seizures  Description of Sz Type 1: aura flush of anxiety starting in his chest and abdomen -> confusion with lip smacking -> GTC  # of Type 1 Seizure since last visit: 0  Freq. Type 1 / Month: 0  Seizure Type 2: Complex partial seizures unspecified  Description of Sz Type 2: Stares, clicking noise with mouth,  # of Type 2 Seizure since last visit: 0  Freq. Type 2 / Month: 0       EPILEPSY HISTORY:  Copied forward: The patient had his first seizure on 2011 and he came under [facility] care on  2011 under Dr. Blanco's care initially.  He typically has aura -> GTC. His seizure type he describes as a flush of anxiety followed by confusion with lip smacking and clicking sound in his throat.  This progresses to a generalized tonic-clonic seizure.  He was initially started on phenytoin (this helps his GTC), then VPA was added 2013 (this helps HA and seizure). Lastly, topiramate was added in  which has helped his headaches and seizure. His EEGs in the past have been notable for rare left temporal epileptiform discharges.  His MRI of the brain is normal and nonlesional.        INTERVAL HISTORY:  Last seizure was  5/14/2017 or 5/7/2017 he had generalized tonic-clonic convulsion. Since last visit he had no seizures. He states that the rizatriptan did help his headache and it resolved, but, he has loss of vision in left eye. I last saw him 5/23/2017 he had vision difficulty in his left eye in the setting of a migraine. But, 1 week after this his left eye vision has worsened and now it is very blurry vision. He is not blind in his left eye, but, he has a hard time seeing out of his eye. He has no headache now. No nausea, no vomiting, no focal weakness, and no sensory loss.  Vision problem in left eye started May 2017, he had a migraine that was 6 weeks long, this gradually progressed to whole left eye vision blurriness in early June. He saw doctor in opthometrist and was told he has swelling behind optic nerve. In May 2017 he got 7 days of steroids for severe migraines.     Prior to Admission medications    Medication Sig Start Date End Date Taking? Authorizing Provider   diazepam (DIAZEPAM INTENSOL) 5 MG/ML (HIGH CONC) solution For generalized seizures give 5 mg.  May repeat and give 2.5 mg after 10 minutes if needed. Do not exceed 7.5mg 1/30/17   Mirela Stokes MD   topiramate (TOPAMAX) 100 MG tablet Take 1 tablet (100 mg) by mouth 2 times daily 1/30/17   Mirela Stokes MD   venlafaxine (EFFEXOR XR) 75 MG 24 hr capsule Take 225 mg by mouth every morning  10/18/16   Reported, Patient   phenytoin (DILANTIN) 30 MG ER capsule Take two po Q AM 10/18/16   Rajni Araujo MD   phenytoin (DILANTIN) 100 MG ER capsule TAKE 1 CAP BY MOUTH DAILY IN THE AM AND 2 CAPS IN THE PM 10/18/16   Rajni Araujo MD   divalproex (DEPAKOTE ER) 250 MG 24 hr tablet Take 1 tab in the morning (along with two 500mg tabs) 10/18/16   Rajni Araujo MD   divalproex (DEPAKOTE ER) 500 MG 24 hr tablet Take 2 tabs in the morning and 3 tabs at night. 10/18/16   Rajni Araujo MD   mometasone (ELOCON) 0.1 % ointment Apply twice daily for 3 days as  "needed for itching in the perianal area 5/26/16   Jesi Martínez MD   medical cannabis inhalation (Patient's own supply.  Not a prescription) Inhale 200 mLs into the lungs Take one to two puffs every four to six hours    Reported, Patient   COMPOUND (CMPD RX) - PHARMACY TO MIX COMPOUNDED MEDICATION Apply twice daily to affected areas as needed. 7/27/15   Camelia Starkey MD   OMEPRAZOLE PO     Reported, Patient   Multiple Vitamin (MULTIVITAMINS PO) 2 Chew a day    Reported, Patient   ALPRAZolam (XANAX) 0.5 MG tablet Take 0.5 mg by mouth as needed.    Reported, Patient          MEDICATIONS:     Depakote ER 1250 mg in the morning 1500 mg at night   Dilantin 160 mg in the morning, 200 mg in the evening.   Topirimate 150 mg AM and 150 mg PM         MEDICATION ISSUES:    1. Depakote: Started VPA 1/7/2013 increased depakote 4930-3834 July 2015.   2. Topiramate: TPM started 5/9/14 for seizure and headache. Topiramate higher than 75mg twice a day causes cognitive slowing.   3. Phenytoin:      REVIEW OF SYSTEMS:  Left eye vision loss.  Patient denies nausea, vomiting, diarrhea.      SOCIAL: He is not working, he has a job appointment today. He is not driving. Lives with parents.      NEUROLOGICAL EXAMINATION:  /87 (BP Location: Left arm, Patient Position: Chair, Cuff Size: Adult Regular)  Pulse 97  Ht 5' 7.01\" (170.2 cm)  Wt 187 lb 6.4 oz (85 kg)  BMI 29.34 kg/m2  Pupils are equal, round, reactive to light. Decreased visual acuity in left lower quadrant.  Face is symmetric.  No focal weakness, no finger-to-nose dysmetria.  Tandem gait and gait is stable.   Fundoscopic exam: Left eye disc flat, more pale, and more atrophic than right fundoscopic. Right eye visual acuity is 20/50 and left eye visual acuity is 20/200. Left eye is sluggish to light, but, does react, right eye is reactive to light (more brisk than left eye).       ASSESSMENT:   1.  Localization-related epilepsy, controlled.   Etiology is " unclear.  His EEGs in the past have been notable for rare left temporal epileptiform discharges.  His MRI of the brain is normal and nonlesional.  Goal phenytoin  Level near 2.0-2.5 and depakote level above 90. In 2013 he averaged a generalized tonic-clonic convulsion every 2-3 months, in 2014 and 2015 we optimized depakote and phenytoin level which resulted in best seizure control.     2. Left eye vision loss: Left eye vision blurriness is concerning, he has a pale disc, optic nerve appears atrophic. He needs hospital admission. MRI brain/orbit, optho consult, LP, and consideration for trial of steroids if their is swelling. Patient does not have known diagnosis of optic neuritis of MS.      3. Migraines with aura: Increase topiramate. Headaches stopped. Rescue with rizatriptan or percocet.      4.  Anxiety and depression.  The patient is taking effexor         PLAN:   1. Hospital admission for left eye vision loss. He needs hospital admission. MRI brain/orbit, optho consult, LP, and consideration for trial of steroids if their is swelling.  2. Continue phenytoin, depakote as noted above.   3. Follow up  3 weeks     I spent 45 minutes with the patient.  During this time, counseling and coordination of care exceeded 50% of the time.        cc:   Bhaskar Morales MD   [address], [address]   [place] [address]         JORGE GARCIA MD

## 2017-06-27 NOTE — MR AVS SNAPSHOT
After Visit Summary   6/27/2017    Tha Mcghee    MRN: [id number]           Patient Information     Date Of Birth          1990        Visit Information        Provider Department      6/27/2017 10:30 AM Rajni Araujo MD [facility]        Today's Diagnoses     Vision loss of left eye    -  1    Migraine variant           Follow-ups after your visit        Additional Services     OPHTHALMOLOGY ADULT REFERRAL       Your provider has referred you to: [facility]: Eye Clinic - [place] [phone]   [email or web address] He has left eye vision.     Please be aware that coverage of these services is subject to the terms and limitations of your health insurance plan.  Call member services at your health plan with any benefit or coverage questions.      Please bring the following with you to your appointment:    (1) Any X-Rays, CTs or MRIs which have been performed.  Contact the facility where they were done to arrange for  prior to your scheduled appointment.    (2) List of current medications  (3) This referral request   (4) Any documents/labs given to you for this referral                  Follow-up notes from your care team     Return in about 3 weeks (around 7/18/2017).      Your next 10 appointments already scheduled     Jul 06, 2017  8:00 AM CDT   (Arrive by 7:45 AM)   Return Visit with Erick Jasso MD   [facility] ([facility])    [address]  [address] [facility] [address] [phone]            Aug 01, 2017  1:00 PM CDT   Return Visit with MD JOSÉ [facility] ([facility])    [address] [place] [address], [address]  [facility] [id number] [phone]              Future tests that were ordered for you today     Open Future Orders        Priority Expected Expires Ordered    MR Brain and Orbits Routine  6/27/2018 6/27/2017            Who to contact     Please call  "your clinic at [phone] to:    Ask questions about your health    Make or cancel appointments    Discuss your medicines    Learn about your test results    Speak to your doctor   If you have compliments or concerns about an experience at your clinic, or if you wish to file a complaint, please contact [facility] Patient Relations at [phone] or email us at [email or web address]         Additional Information About Your Visit        [employer] Information     Evver is an electronic gateway that provides easy, online access to your medical records. With Evver, you can request a clinic appointment, read your test results, renew a prescription or communicate with your care team.     To sign up for Evver visit the website at [email or web address]   You will be asked to enter the access code listed below, as well as some personal information. Please follow the directions to create your username and password.     Your access code is: [email or web address]  Expires: 2017  4:27 PM     Your access code will  in 90 days. If you need help or a new code, please contact your [facility] or call [phone] for assistance.        Care EveryWhere ID     This is your Care EveryWhere ID. This could be used by other organizations to access your [place] medical records  [id number]        Your Vitals Were     Pulse Height BMI (Body Mass Index)             97 5' 7.01\" (170.2 cm) 29.34 kg/m2          Blood Pressure from Last 3 Encounters:   17 130/87   17 115/72   10/18/16 130/83    Weight from Last 3 Encounters:   17 187 lb 6.4 oz (85 kg)   17 180 lb (81.6 kg)   10/18/16 183 lb 9.6 oz (83.3 kg)              We Performed the Following     OPHTHALMOLOGY ADULT REFERRAL          Today's Medication Changes          These changes are accurate as of: 17 11:20 AM.  If you have any questions, ask your nurse or doctor.             "   Start taking these medicines.        Dose/Directions    rizatriptan 10 MG tablet   Commonly known as:  MAXALT   Used for:  Vision loss of left eye, Migraine variant   Started by:  Rajni Araujo MD        Dose:  10 mg   Take 1 tablet (10 mg) by mouth at onset of headache for migraine May repeat in 2 hours. Max 3 tablets/24 hours.   Quantity:  18 tablet   Refills:  3         Stop taking these medicines if you haven't already. Please contact your care team if you have questions.     ZOLMitriptan 2.5 MG tablet   Commonly known as:  ZOMIG   Stopped by:  Rajni Araujo MD                Where to get your medicines      These medications were sent to [place] [address] [place] [facility] [address]  [address] [facility] [address]     Phone:  [phone]     rizatriptan 10 MG tablet                Primary Care Provider Office Phone # Fax #    Bhaskar Morales [phone]       [facility] [address]   [facility] [place] [address]        Equal Access to Services     [facility]: Hadii aad ku hadasho Soomaali, waaxda luqadaha, qaybta kaalmada adeegyada, waxay idiin hayerosn ginna sinclair . So [facility] [phone].    ATENCIÓN: Si habla español, tiene a andrade disposición servicios gratuitos de asistencia lingüística. Llame al [phone].    We comply with applicable federal civil rights laws and [place] laws. We do not discriminate on the basis of race, color, national origin, age, disability sex, sexual orientation or gender identity.            Thank you!     Thank you for choosing [facility]  for your care. Our goal is always to provide you with excellent care. Hearing back from our patients is one way we can continue to improve our services. Please take a few minutes to complete the written survey that you may receive in the mail after your visit with us. Thank you!             Your Updated Medication List - Protect others around you: Learn how to safely use, store and  throw away your medicines at www.disposemymeds.org.          This list is accurate as of: 6/27/17 11:20 AM.  Always use your most recent med list.                   Brand Name Dispense Instructions for use Diagnosis    ALPRAZolam 0.5 MG tablet    XANAX     Take 0.5 mg by mouth as needed.        COMPOUND - PHARMACY TO MIX COMPOUNDED MEDICATION    CMPD RX    30 g    Apply twice daily to affected areas as needed.    Perianal dermatitis       diazepam 5 MG/ML (HIGH CONC) solution    DIAZEPAM INTENSOL    30 mL    For generalized seizures give 5 mg.  May repeat and give 2.5 mg after 10 minutes if needed. 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  Not a prescription)      Inhale 200 mLs into the lungs Take one to two puffs every four to six hours    Localization-related (focal) (partial) epilepsy and epileptic syndromes with complex partial seizures, with intractable epilepsy, Variants of migraine, not elsewhere classified, without mention of intractable migraine without mention of status migrainosus       mometasone 0.1 % ointment    ELOCON    15 g    Apply twice daily for 3 days as needed for itching in the perianal area    Perianal dermatitis       MULTIVITAMINS PO      2 Chew a day    Localization-related (focal) (partial) epilepsy and epileptic syndromes with complex  partial seizures, with intractable epilepsy       OMEPRAZOLE PO       Localization-related (focal) (partial) epilepsy and epileptic syndromes with complex partial seizures, with intractable epilepsy, Variants of migraine, not elsewhere classified, without mention of intractable migraine without mention of status migrainosus       oxyCODONE-acetaminophen 5-325 MG per tablet    PERCOCET    20 tablet    Take 1 tablet by mouth every 6 hours as needed for pain maximum 4 tablet(s) per day    Partial epilepsy with impairment of consciousness, intractable (H), Migraine variant       * phenytoin 30 MG CR capsule    DILANTIN    180 capsule    Take two po Q AM    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)       * phenytoin 100 MG CR capsule    DILANTIN    270 capsule    TAKE 1 CAP BY MOUTH DAILY IN THE AM AND 2 CAPS IN THE PM    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)       promethazine 25 MG tablet    PHENERGAN    20 tablet    Take 1 tablet (25 mg) by mouth every 6 hours as needed for nausea    Partial epilepsy with impairment of consciousness, intractable (H), Migraine variant       rizatriptan 10 MG tablet    MAXALT    18 tablet    Take 1 tablet (10 mg) by mouth at onset of headache for migraine May repeat in 2 hours. Max 3 tablets/24 hours.    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 Read the directions carefully, and ask your doctor or other care provider to review them with you.

## 2017-06-28 ENCOUNTER — APPOINTMENT (OUTPATIENT)
Dept: MRI IMAGING | Facility: CLINIC | Age: 27
DRG: 123 | End: 2017-06-28
Attending: PSYCHIATRY & NEUROLOGY
Payer: COMMERCIAL

## 2017-06-28 ENCOUNTER — OFFICE VISIT (OUTPATIENT)
Dept: OPHTHALMOLOGY | Facility: CLINIC | Age: 27
DRG: 123 | End: 2017-06-28
Attending: OPHTHALMOLOGY
Payer: COMMERCIAL

## 2017-06-28 DIAGNOSIS — H46.9 OPTIC NEUROPATHY, LEFT: Primary | ICD-10-CM

## 2017-06-28 LAB
ACE SERPL-CCNC: 22 U/L
ANION GAP SERPL CALCULATED.3IONS-SCNC: 10 MMOL/L (ref 3–14)
APPEARANCE CSF: CLEAR
B BURGDOR IGG+IGM SER QL: 0.18 (ref 0–0.89)
BUN SERPL-MCNC: 15 MG/DL (ref 7–30)
CALCIUM SERPL-MCNC: 9.3 MG/DL (ref 8.5–10.1)
CHLORIDE SERPL-SCNC: 109 MMOL/L (ref 94–109)
CO2 SERPL-SCNC: 21 MMOL/L (ref 20–32)
COLOR CSF: COLORLESS
CREAT SERPL-MCNC: 0.75 MG/DL (ref 0.66–1.25)
CRP SERPL-MCNC: <2.9 MG/L (ref 0–8)
ENA SS-A IGG SER IA-ACNC: NORMAL AI (ref 0–0.9)
ENA SS-B IGG SER IA-ACNC: NORMAL AI (ref 0–0.9)
ERYTHROCYTE [DISTWIDTH] IN BLOOD BY AUTOMATED COUNT: 13.1 % (ref 10–15)
ERYTHROCYTE [SEDIMENTATION RATE] IN BLOOD BY WESTERGREN METHOD: 2 MM/H (ref 0–15)
GFR SERPL CREATININE-BSD FRML MDRD: ABNORMAL ML/MIN/1.7M2
GLUCOSE BLDC GLUCOMTR-MCNC: 151 MG/DL (ref 70–99)
GLUCOSE BLDC GLUCOMTR-MCNC: 160 MG/DL (ref 70–99)
GLUCOSE CSF-MCNC: 77 MG/DL (ref 40–70)
GLUCOSE SERPL-MCNC: 146 MG/DL (ref 70–99)
GRAM STN SPEC: NORMAL
HCT VFR BLD AUTO: 38.7 % (ref 40–53)
HCYS SERPL-SCNC: 8.5 UMOL/L (ref 4–12)
HGB BLD-MCNC: 13.5 G/DL (ref 13.3–17.7)
INTERPRETATION ECG - MUSE: NORMAL
MCH RBC QN AUTO: 31.8 PG (ref 26.5–33)
MCHC RBC AUTO-ENTMCNC: 34.9 G/DL (ref 31.5–36.5)
MCV RBC AUTO: 91 FL (ref 78–100)
MICRO REPORT STATUS: NORMAL
MYELOPEROXIDASE AB SER-ACNC: 0.5 AI (ref 0–0.9)
PLATELET # BLD AUTO: 329 10E9/L (ref 150–450)
POTASSIUM SERPL-SCNC: 4 MMOL/L (ref 3.4–5.3)
PROT CSF-MCNC: 43 MG/DL (ref 15–60)
PROTEINASE3 IGG SER-ACNC: NORMAL AI (ref 0–0.9)
RBC # BLD AUTO: 4.25 10E12/L (ref 4.4–5.9)
RBC # CSF MANUAL: 2 /UL (ref 0–2)
SODIUM SERPL-SCNC: 140 MMOL/L (ref 133–144)
SPECIMEN SOURCE: NORMAL
TUBE # CSF: 3 #
VIT B12 SERPL-MCNC: 840 PG/ML (ref 193–986)
WBC # BLD AUTO: 9.4 10E9/L (ref 4–11)
WBC # CSF MANUAL: 2 /UL (ref 0–5)

## 2017-06-28 PROCEDURE — 92133 CPTRZD OPH DX IMG PST SGM ON: CPT | Mod: ZF | Performed by: OPHTHALMOLOGY

## 2017-06-28 PROCEDURE — 83916 OLIGOCLONAL BANDS: CPT | Performed by: STUDENT IN AN ORGANIZED HEALTH CARE EDUCATION/TRAINING PROGRAM

## 2017-06-28 PROCEDURE — 92083 EXTENDED VISUAL FIELD XM: CPT | Mod: ZF | Performed by: OPHTHALMOLOGY

## 2017-06-28 PROCEDURE — 85652 RBC SED RATE AUTOMATED: CPT | Performed by: PSYCHIATRY & NEUROLOGY

## 2017-06-28 PROCEDURE — 82164 ANGIOTENSIN I ENZYME TEST: CPT | Performed by: PSYCHIATRY & NEUROLOGY

## 2017-06-28 PROCEDURE — 86235 NUCLEAR ANTIGEN ANTIBODY: CPT | Performed by: PSYCHIATRY & NEUROLOGY

## 2017-06-28 PROCEDURE — 86140 C-REACTIVE PROTEIN: CPT | Performed by: PSYCHIATRY & NEUROLOGY

## 2017-06-28 PROCEDURE — 84157 ASSAY OF PROTEIN OTHER: CPT | Performed by: STUDENT IN AN ORGANIZED HEALTH CARE EDUCATION/TRAINING PROGRAM

## 2017-06-28 PROCEDURE — 36415 COLL VENOUS BLD VENIPUNCTURE: CPT | Performed by: PSYCHIATRY & NEUROLOGY

## 2017-06-28 PROCEDURE — 25000132 ZZH RX MED GY IP 250 OP 250 PS 637: Performed by: STUDENT IN AN ORGANIZED HEALTH CARE EDUCATION/TRAINING PROGRAM

## 2017-06-28 PROCEDURE — 00000167 ZZHCL STATISTIC INR NC: Performed by: PSYCHIATRY & NEUROLOGY

## 2017-06-28 PROCEDURE — 72156 MRI NECK SPINE W/O & W/DYE: CPT

## 2017-06-28 PROCEDURE — 80048 BASIC METABOLIC PNL TOTAL CA: CPT | Performed by: PSYCHIATRY & NEUROLOGY

## 2017-06-28 PROCEDURE — 89050 BODY FLUID CELL COUNT: CPT | Performed by: STUDENT IN AN ORGANIZED HEALTH CARE EDUCATION/TRAINING PROGRAM

## 2017-06-28 PROCEDURE — 00000146 ZZHCL STATISTIC GLUCOSE BY METER IP

## 2017-06-28 PROCEDURE — 85613 RUSSELL VIPER VENOM DILUTED: CPT | Performed by: PSYCHIATRY & NEUROLOGY

## 2017-06-28 PROCEDURE — 86255 FLUORESCENT ANTIBODY SCREEN: CPT | Performed by: PSYCHIATRY & NEUROLOGY

## 2017-06-28 PROCEDURE — 85730 THROMBOPLASTIN TIME PARTIAL: CPT | Performed by: PSYCHIATRY & NEUROLOGY

## 2017-06-28 PROCEDURE — 009U3ZX DRAINAGE OF SPINAL CANAL, PERCUTANEOUS APPROACH, DIAGNOSTIC: ICD-10-PCS | Performed by: PSYCHIATRY & NEUROLOGY

## 2017-06-28 PROCEDURE — 86038 ANTINUCLEAR ANTIBODIES: CPT | Performed by: PSYCHIATRY & NEUROLOGY

## 2017-06-28 PROCEDURE — 83516 IMMUNOASSAY NONANTIBODY: CPT | Performed by: PSYCHIATRY & NEUROLOGY

## 2017-06-28 PROCEDURE — 25000128 H RX IP 250 OP 636: Performed by: STUDENT IN AN ORGANIZED HEALTH CARE EDUCATION/TRAINING PROGRAM

## 2017-06-28 PROCEDURE — 00000401 ZZHCL STATISTIC THROMBIN TIME NC: Performed by: PSYCHIATRY & NEUROLOGY

## 2017-06-28 PROCEDURE — 85027 COMPLETE CBC AUTOMATED: CPT | Performed by: PSYCHIATRY & NEUROLOGY

## 2017-06-28 PROCEDURE — 82042 OTHER SOURCE ALBUMIN QUAN EA: CPT | Performed by: STUDENT IN AN ORGANIZED HEALTH CARE EDUCATION/TRAINING PROGRAM

## 2017-06-28 PROCEDURE — 99212 OFFICE O/P EST SF 10 MIN: CPT | Mod: 25,ZF

## 2017-06-28 PROCEDURE — 82164 ANGIOTENSIN I ENZYME TEST: CPT | Performed by: STUDENT IN AN ORGANIZED HEALTH CARE EDUCATION/TRAINING PROGRAM

## 2017-06-28 PROCEDURE — 82784 ASSAY IGA/IGD/IGG/IGM EACH: CPT | Performed by: STUDENT IN AN ORGANIZED HEALTH CARE EDUCATION/TRAINING PROGRAM

## 2017-06-28 PROCEDURE — 83876 ASSAY MYELOPEROXIDASE: CPT | Performed by: PSYCHIATRY & NEUROLOGY

## 2017-06-28 PROCEDURE — 25000128 H RX IP 250 OP 636: Performed by: PSYCHIATRY & NEUROLOGY

## 2017-06-28 PROCEDURE — 82040 ASSAY OF SERUM ALBUMIN: CPT | Performed by: STUDENT IN AN ORGANIZED HEALTH CARE EDUCATION/TRAINING PROGRAM

## 2017-06-28 PROCEDURE — 82945 GLUCOSE OTHER FLUID: CPT | Performed by: STUDENT IN AN ORGANIZED HEALTH CARE EDUCATION/TRAINING PROGRAM

## 2017-06-28 PROCEDURE — 87070 CULTURE OTHR SPECIMN AEROBIC: CPT | Performed by: STUDENT IN AN ORGANIZED HEALTH CARE EDUCATION/TRAINING PROGRAM

## 2017-06-28 PROCEDURE — 87205 SMEAR GRAM STAIN: CPT | Performed by: STUDENT IN AN ORGANIZED HEALTH CARE EDUCATION/TRAINING PROGRAM

## 2017-06-28 PROCEDURE — 12000008 ZZH R&B INTERMEDIATE UMMC

## 2017-06-28 PROCEDURE — A9585 GADOBUTROL INJECTION: HCPCS | Performed by: PSYCHIATRY & NEUROLOGY

## 2017-06-28 PROCEDURE — 70553 MRI BRAIN STEM W/O & W/DYE: CPT

## 2017-06-28 RX ORDER — GADOBUTROL 604.72 MG/ML
7.5 INJECTION INTRAVENOUS ONCE
Status: COMPLETED | OUTPATIENT
Start: 2017-06-28 | End: 2017-06-28

## 2017-06-28 RX ADMIN — SODIUM CHLORIDE 1000 MG: 9 INJECTION, SOLUTION INTRAVENOUS at 13:02

## 2017-06-28 RX ADMIN — GADOBUTROL 7.5 ML: 604.72 INJECTION INTRAVENOUS at 13:00

## 2017-06-28 RX ADMIN — VENLAFAXINE HYDROCHLORIDE 225 MG: 225 TABLET, EXTENDED RELEASE ORAL at 09:25

## 2017-06-28 RX ADMIN — TOPIRAMATE 150 MG: 50 TABLET ORAL at 09:24

## 2017-06-28 RX ADMIN — ACETAMINOPHEN 650 MG: 325 TABLET, FILM COATED ORAL at 16:23

## 2017-06-28 RX ADMIN — PHENYTOIN SODIUM 160 MG: 100 CAPSULE ORAL at 09:24

## 2017-06-28 RX ADMIN — SENNOSIDES AND DOCUSATE SODIUM 2 TABLET: 8.6; 5 TABLET ORAL at 21:48

## 2017-06-28 RX ADMIN — TOPIRAMATE 150 MG: 50 TABLET ORAL at 21:48

## 2017-06-28 RX ADMIN — DIVALPROEX SODIUM 1500 MG: 500 TABLET, EXTENDED RELEASE ORAL at 21:48

## 2017-06-28 RX ADMIN — ACETAMINOPHEN 650 MG: 325 TABLET, FILM COATED ORAL at 20:11

## 2017-06-28 RX ADMIN — SODIUM CHLORIDE 1000 MG: 9 INJECTION, SOLUTION INTRAVENOUS at 00:01

## 2017-06-28 RX ADMIN — VITAMIN D, TAB 1000IU (100/BT) 5000 UNITS: 25 TAB at 09:25

## 2017-06-28 RX ADMIN — DIVALPROEX SODIUM 1250 MG: 250 TABLET, FILM COATED, EXTENDED RELEASE ORAL at 09:23

## 2017-06-28 RX ADMIN — PHENYTOIN SODIUM 200 MG: 100 CAPSULE ORAL at 21:48

## 2017-06-28 RX ADMIN — OMEPRAZOLE 20 MG: 20 CAPSULE, DELAYED RELEASE ORAL at 09:24

## 2017-06-28 ASSESSMENT — VISUAL ACUITY
OU: NORMAL ACUITY
METHOD: SNELLEN - LINEAR
CORRECTION_TYPE: GLASSES
OD_CC: 20/20
OS_PH_CC: 20/70
OU: NORMAL ACUITY
OS_CC: 20/100
OU: BLURRED VISION

## 2017-06-28 ASSESSMENT — CONF VISUAL FIELD
METHOD: COUNTING FINGERS
OS_NORMAL: 1
OD_NORMAL: 1

## 2017-06-28 ASSESSMENT — CUP TO DISC RATIO
OS_RATIO: 0.3
OD_RATIO: 0.3

## 2017-06-28 ASSESSMENT — TONOMETRY
OS_IOP_MMHG: 21
IOP_METHOD: ICARE
OD_IOP_MMHG: 17

## 2017-06-28 ASSESSMENT — SLIT LAMP EXAM - LIDS
COMMENTS: NORMAL
COMMENTS: NORMAL

## 2017-06-28 ASSESSMENT — EXTERNAL EXAM - RIGHT EYE: OD_EXAM: NORMAL

## 2017-06-28 ASSESSMENT — EXTERNAL EXAM - LEFT EYE: OS_EXAM: NORMAL

## 2017-06-28 NOTE — PROCEDURES
Lumbar Puncture Note    Date: 06/28/17    Procedure: Lumbar Puncture to collect cerebral spinal fluid    Consent: Procedure, benefits, risks, and alternatives explained to the patient who voiced understanding of the information and agreed to proceed with lumbar puncture.      Indication: Diagnostic, rule out demyelinating disease     Performed by: Ranulfo Werner    Description: Pt placed in lateral decubitus position. Area prepped and draped in a sterile fashion. 22 gauge needle placed between third and fourth vertebrate and approximately 3-4 mL spinal fluid collected in 4 different containers. Specimen appears clear and colorless. Specimen sent for cell count & differential, protein & glucose, and cytology.    Complications: none including bleeding, very stable.    Opening Pressure: 19 cm     Disposition: Patient will remain on back for 1 hour post procedure.    Ranulfo Werner MD  Neurology PGY4  1052

## 2017-06-28 NOTE — PROGRESS NOTES
"HPI  Tha Mcghee is a 26 year old male inpatient here for ophthalmology consult for left eye vision loss. He began to notice vision changes and pain in the left eye in early May of 2017. This was associated with the \"worst migraine\" he's ever had as well as with pain with eye movements. He was treated with a \"small\" steroid and azithromycin for presumed sinus infection. This was not helpful. He was seen in the ER on Mother's Day and treated with his usual post-ictal regiment of IV benadryl and compazine. This made him groggy, but he still had both the pain and migraine. He was without health insurance until May 23, so he dealt with the symptoms until then. At that time, he saw Dr. Araujo who thought his vision would improve after his migraine resolved. His migraine and extreme eye pain did finally improve with razitriptan. However, the vision has gotten no better. He was seen by his local eye care provider in [place] about 3 weeks ago who performed a visual field test. Tha was told his vision and visual field test were abnormal and that there was \"swelling behind\" the optic disc on the left. Per patient, he had a normal eye exam in January with that same eye care provider (Dr. Rhoades) where his vision was correctable to 20/20 OD and OS.     POH: Normal eye exam in January 2017, 3 weeks ago told he had left disc swelling  PMH: Epilepsy, migraine    Assessment & Plan      (H46.9) Optic neuropathy, left  (primary encounter diagnosis)  Comment: Decreased acuity left eye with left relative afferent pupillary defect, severe generalized depression on left visual field testing, and optic nerve pallor on exam.   Plan: He is scheduled for MRI and LP today - agree with this evaluate for retrobulbar etiology of optic neuropathy.     -----------------------------------------------------------------------------------    Patient disposition:   Return pending results of MRI today.    Teaching statement:  Complete documentation " of historical and exam elements from today's encounter can be found in the full encounter summary report (not reduplicated in this progress note). I personally obtained the chief complaint(s) and history of present illness.  I confirmed and edited as necessary the review of systems, past medical/surgical history, family history, social history, and examination findings as documented by others; and I examined the patient myself. I personally reviewed the relevant tests, images, and reports as documented above.     I formulated and edited as necessary the assessment and plan and discussed the findings and management plan with the patient and family.    Marina Watson MD  Comprehensive Ophthalmology & Ocular Pathology  Department of Ophthalmology and Visual Neurosciences  [email or web address]  Pager [phone]    ADDENDUM 6/28/17 4:55PM: MRI reviewed with the neuro-ophthalmology team (Dr. Montiel). Increased T2 signal and subtle intraorbital left optic nerve enhancement. No findings in the remainder of the brain.  Imaging and exam consistent with episode of isolated optic neuritis 2 months ago. Isaiah of vision unknown at this time. Continue work-up as ordered by inpatient neurology team. No role for steroid treatment at this time.    Marina Watson MD  Comprehensive Ophthalmology & Ocular Pathology  Department of Ophthalmology and Visual Neurosciences  [email or web address]  Pager [phone]

## 2017-06-28 NOTE — PLAN OF CARE
Problem: Goal Outcome Summary  Goal: Goal Outcome Summary  Outcome: No Change  VSS aside from tachycardia at 1600. Neuro's intact aside from L eye blurry vision/neglect. C/o moderate HA; Tylenol administered with fair relief. PIV SL between solumedrol doses. Voiding spontaneously. Adequate PO. Pt to optho clinic this am. MR completed. LP performed at bedside around 1500; to remain flat until 1700. Up ad alonso in room. Family at bedside and supportive in cares. Will continue to monitor and update MD's accordingly.

## 2017-06-28 NOTE — MR AVS SNAPSHOT
After Visit Summary   6/28/2017    Tha Mcghee    MRN: [id number]           Patient Information     Date Of Birth          1990        Visit Information        Provider Department      6/28/2017 9:45 AM Marina Watson MD [facility]        Today's Diagnoses     Optic neuropathy, left    -  1       Follow-ups after your visit        Follow-up notes from your care team     Return for pending results of MRI .      Your next 10 appointments already scheduled     Jun 28, 2017 12:00 PM CDT   MR BRAIN AND ORBITS with UUMR2   [facility], [place], [facility] ([facility], [facility])    [address] [id number] [phone]           Take your medicines as usual, unless your doctor tells you not to. Bring a list of your current medicines to your exam (including vitamins, minerals and over-the-counter drugs). Also bring the results of similar scans you may have had.  Please remove any body piercings and hair extensions before you arrive.  Follow your doctor s orders. If you do not, we may have to postpone your exam.  You will not have contrast for this exam. You do not need to do anything special to prepare.  The MRI machine uses a strong magnet. Please wear clothes without metal (snaps, zippers). A sweatsuit works well, or we may give you a hospital gown.   **IMPORTANT** THE INSTRUCTIONS BELOW ARE ONLY FOR THOSE PATIENTS WHO HAVE BEEN TOLD THEY WILL RECEIVE SEDATION OR GENERAL ANESTHESIA DURING THEIR MRI PROCEDURE:  IF YOU WILL RECEIVE SEDATION (take medicine to help you relax during your exam):   You must get the medicine from your doctor before you arrive. Bring the medicine to the exam. Do not take it at home.   Arrive one hour early. Bring someone who can take you home after the test. Your medicine will make you sleepy. After the exam, you may not drive, take a bus or take a taxi by yourself.   No eating 8 hours before your exam. You may have  clear liquids up until 4 hours before your exam. (Clear liquids include water, clear tea, black coffee and fruit juice without pulp.)  IF YOU WILL RECEIVE ANESTHESIA (be asleep for your exam):   Arrive 1 1/2 hours early. Bring someone who can take you home after the test. You may not drive, take a bus or take a taxi by yourself.   No eating 8 hours before your exam. You may have clear liquids up until 4 hours before your exam. (Clear liquids include water, clear tea, black coffee and fruit juice without pulp.)   You will spend four to five hours in the recovery room.  Please call the Imaging Department at your exam site with any questions.            Jun 28, 2017  1:00 PM CDT   MR CERVICAL SPINE W/O & W CONTRAST with UUMR2   [facility], [place], [facility] ([facility], [facility])    [address] [address] [phone]           Take your medicines as usual, unless your doctor tells you not to. Bring a list of your current medicines to your exam (including vitamins, minerals and over-the-counter drugs).  You will be given intravenous contrast for this exam. To prepare:   The day before your exam, drink extra fluids at least six 8-ounce glasses (unless your doctor tells you to restrict your fluids).   Have a blood test (creatinine test) within 30 days of your exam. Go to your clinic or Diagnostic Imaging Department for this test.  The MRI machine uses a strong magnet. Please wear clothes without metal (snaps, zippers). A sweatsuit works well, or we may give you a hospital gown.  Please remove any body piercings and hair extensions before you arrive. You will also remove watches, jewelry, hairpins, wallets, dentures, partial dental plates and hearing aids. You may wear contact lenses, and you may be able to wear your rings. We have a safe place to keep your personal items, but it is safer to leave them at home.   **IMPORTANT** THE INSTRUCTIONS BELOW ARE ONLY FOR  THOSE PATIENTS WHO HAVE BEEN TOLD THEY WILL RECEIVE SEDATION OR GENERAL ANESTHESIA DURING THEIR MRI PROCEDURE:  IF YOU WILL RECEIVE SEDATION (take medicine to help you relax during your exam):   You must get the medicine from your doctor before you arrive. Bring the medicine to the exam. Do not take it at home.   Arrive one hour early. Bring someone who can take you home after the test. Your medicine will make you sleepy. After the exam, you may not drive, take a bus or take a taxi by yourself.   No eating 8 hours before your exam. You may have clear liquids up until 4 hours before your exam. (Clear liquids include water, clear tea, black coffee and fruit juice without pulp.)  IF YOU WILL RECEIVE ANESTHESIA (be asleep for your exam):   Arrive 1 1/2 hours early. Bring someone who can take you home after the test. You may not drive, take a bus or take a taxi by yourself.   No eating 8 hours before your exam. You may have clear liquids up until 4 hours before your exam. (Clear liquids include water, clear tea, black coffee and fruit juice without pulp.)  Please call the Imaging Department at your exam site with any questions.            Jul 06, 2017  8:00 AM CDT   (Arrive by 7:45 AM)   Return Visit with Erick Jasso MD   [facility] ([facility])    [address]  [address] [facility] [address] [phone]            Aug 01, 2017  1:00 PM CDT   Return Visit with Rajni Araujo MD   [facility] ([facility])    [address], [address]  [facility] [address] [phone]              Future tests that were ordered for you today     Open Standing Orders        Priority Remaining Interval Expires Ordered    Magnesium Routine 100/100 CONDITIONAL (SPECIFY)  6/27/2017    Basic metabolic panel Routine 15/16 AM DRAW  6/27/2017    CBC with platelets Routine 15/16 AM DRAW  6/27/2017    Potassium Routine 100/100 CONDITIONAL (SPECIFY)   2017          Open Future Orders        Priority Expected Expires Ordered    MR Brain and Orbits Routine  2018            Who to contact     Please call your clinic at [phone] to:    Ask questions about your health    Make or cancel appointments    Discuss your medicines    Learn about your test results    Speak to your doctor   If you have compliments or concerns about an experience at your clinic, or if you wish to file a complaint, please contact [facility] Patient Relations at [phone] or email us at [email or web address]         Additional Information About Your Visit        [employer] Information     Mobile Complete is an electronic gateway that provides easy, online access to your medical records. With Mobile Complete, you can request a clinic appointment, read your test results, renew a prescription or communicate with your care team.     To sign up for Mobile Complete visit the website at [email or web address]   You will be asked to enter the access code listed below, as well as some personal information. Please follow the directions to create your username and password.     Your access code is: [email or web address]  Expires: 2017  4:27 PM     Your access code will  in 90 days. If you need help or a new code, please contact your [facility] Clinic or call [phone] for assistance.        Care EveryWhere ID     This is your Care EveryWhere ID. This could be used by other organizations to access your [place] medical records  [id number]         Blood Pressure from Last 3 Encounters:   17 121/71   17 130/87   17 115/72    Weight from Last 3 Encounters:   17 80.1 kg (176 lb 8 oz)   17 85 kg (187 lb 6.4 oz)   17 81.6 kg (180 lb)              We Performed the Following     Glaucoma Top OU     OCT Optic Nerve RNFL Spectralis OU (both eyes)          Today's Medication Changes      Notice     This visit is  during an admission. Changes to the med list made in this visit will be reflected in the After Visit Summary of the admission.             Primary Care Provider Office Phone # Fax #    Bhaskar Morales [phone]       [address] [place] [address]   [facility] [address]        Equal Access to Services     [facility]: Hadii aad ku hadasho Soomaali, waaxda luqadaha, qaybta kaalmada adeegyada, waxay mimiin hayaan adeeg kharash laenedelian . So [facility] [phone].    ATENCIÓN: Si habla español, tiene a andrade disposición servicios gratuitos de asistencia lingüística. Llame al [phone].    We comply with applicable federal civil rights laws and [place] laws. We do not discriminate on the basis of race, color, national origin, age, disability sex, sexual orientation or gender identity.            Thank you!     Thank you for choosing [facility]  for your care. Our goal is always to provide you with excellent care. Hearing back from our patients is one way we can continue to improve our services. Please take a few minutes to complete the written survey that you may receive in the mail after your visit with us. Thank you!             Your Updated Medication List - Protect others around you: Learn how to safely use, store and throw away your medicines at www.disposemymeds.org.      Notice     This visit is during an admission. Changes to the med list made in this visit will be reflected in the After Visit Summary of the admission.

## 2017-06-28 NOTE — H&P
[facility]: [place]  Neurology History and Physical    Patient Name:  Tha Mcghee  MRN:  [id number]    :  1990  Date of Admission:  2017  Date of Service:  2017  Primary care provider:  Bhaskar Morales      Chief Complaint:  L eye vision blurring for about 8 weeks    History of Present Illness:   26 year old male h/o migraines and epilepsy presents for about 8 weeks of L eye vision blurring. The patient awoke with his usual severe migraine about 8 weeks ago, this was associated with L eye vision blurring and painful eye movements in both eyes. The patient's migraines usually last no longer than one day and have never before been associated with vision changes or eye pain. Unfortunately, the patient's headache and eye problems persisted for 6 weeks despite several attempts at treatment: steroid burst, compazine/benadryl, NSAIDs, and imitrex. Then, the patient was prescribed rizatriptan by his neurologist which resolved the headache but did not change his vision nor his eye pain. The eye pain resolved spontaneously about a week later. The patient was seen today by his epileptologist who noted worsening vision and pale/atrophic optic disc and thus directly admitted him to the Neurology service for further work-up. No recent tick bites, fevers, numbness/tingling, weaknesss, clumsiness, speech/cognitive changes, rashes/lesions, no lhermitte's sign. The patient denies any personal or family h/o autoimmune diseases or eye problems. Of note, during much of this time, the patient was suffering from maxillary sinusitis, which finally resolved after a course of azithromycin.      ROS: A 10-point ROS was performed as per HPI.     PMH:  Past Medical History:   Diagnosis Date     Anxiety      Depression      Localization-related (focal) (partial) epilepsy and epileptic syndromes with complex partial seizures, without mention of intractable epilepsy     preliminary     No past  surgical history on file.    Allergies:  Allergies   Allergen Reactions     Amoxicillin Hives     Ceclor [Cefaclor Monohydrate] Hives     Hydrocodone-Acetaminophen Nausea     Penicillins Hives     Sulfa Drugs Hives       Medications:      Current Facility-Administered Medications:      ALPRAZolam (XANAX) tablet 0.5 mg, 0.5 mg, Oral, BID PRN, Baldomero Pablo MD     mometasone (ELOCON) 0.1 % ointment, , Topical, TID PRN, Baldomero Pablo MD     omeprazole (priLOSEC) CR capsule 20 mg, 20 mg, Oral, QAM, Baldomero Pablo MD     topiramate (TOPAMAX) tablet 150 mg, 150 mg, Oral, BID, Baldomero Pablo MD, 150 mg at 06/27/17 2216     venlafaxine (EFFEXOR-ER) 24 hr tablet 225 mg, 225 mg, Oral, Samy SANTIAGO Edward, MD     naloxone (NARCAN) injection 0.1-0.4 mg, 0.1-0.4 mg, Intravenous, Q2 Min PRN, Baldomero Pablo MD     potassium chloride SA (K-DUR/KLOR-CON M) CR tablet 20-40 mEq, 20-40 mEq, Oral, Q2H PRN, Baldomero Pablo MD     potassium chloride (KLOR-CON) Packet 20-40 mEq, 20-40 mEq, Oral or Feeding Tube, Q2H PRN, Baldomero Pablo MD     potassium chloride 10 mEq in 100 mL intermittent infusion, 10 mEq, Intravenous, Q1H PRN, Baldomero Pablo MD     potassium chloride 10 mEq in 100 mL intermittent infusion with 10 mg lidocaine, 10 mEq, Intravenous, Q1H PRN, Baldomero Pablo MD     potassium chloride 20 mEq in 50 mL intermittent infusion, 20 mEq, Intravenous, Q1H PRN, Baldomero Pablo MD     magnesium sulfate 2 g in NS intermittent infusion (PharMEDium or FV Cmpd), 2 g, Intravenous, Daily PRNSamy Edward, MD     magnesium sulfate 4 g in 100 mL sterile water (premade), 4 g, Intravenous, Q4H PRN, Baldomero Pablo MD     acetaminophen (TYLENOL) tablet 650 mg, 650 mg, Oral, Q4H PRN, Baldomero Pablo MD     senna-docusate (SENOKOT-S;PERICOLACE) 8.6-50 MG per tablet 1-2 tablet, 1-2 tablet, Oral, BID, Baldomero Pablo MD, 2 tablet at 06/27/17 2129     melatonin tablet 1 mg, 1 mg, Oral, At Bedtime PRN, Baldomero Pablo MD     bisacodyl (DULCOLAX) EC tablet  5-15 mg, 5-15 mg, Oral, Daily PRN, Baldomero Pablo MD     magnesium citrate solution 148 mL, 148 mL, Oral, Once PRN, Baldomero Pablo MD     ondansetron (ZOFRAN-ODT) ODT tab 4 mg, 4 mg, Oral, Q6H PRN **OR** ondansetron (ZOFRAN) injection 4 mg, 4 mg, Intravenous, Q6H PRN, Baldomero Pablo MD     prochlorperazine (COMPAZINE) injection 5-10 mg, 5-10 mg, Intravenous, Q6H PRN **OR** prochlorperazine (COMPAZINE) tablet 5-10 mg, 5-10 mg, Oral, Q6H PRN **OR** prochlorperazine (COMPAZINE) Suppository 25 mg, 25 mg, Rectal, Q12H PRN, Baldomero Pablo MD     calcium carbonate (TUMS) chewable tablet 500-1,000 mg, 500-1,000 mg, Oral, 4x Daily PRN, Baldomero Pablo MD     divalproex (DEPAKOTE ER) 24 hr tablet 1,500 mg, 1,500 mg, Oral, At Bedtime, Baldomero Pablo MD, 1,500 mg at 06/27/17 2216     divalproex (DEPAKOTE ER) 24 hr tablet 1,250 mg, 1,250 mg, Oral, Daily, Baldomero Pablo MD     phenytoin (DILANTIN) CR capsule 200 mg, 200 mg, Oral, At Bedtime, Baldomero Pablo MD, 200 mg at 06/27/17 2215     methylPREDNISolone sodium succinate (solu-MEDROL) 1,000 mg in NaCl 0.9 % 250 mL intermittent infusion, 1,000 mg, Intravenous, Q24H, Baldomero Pablo MD     phenytoin (DILANTIN) CR capsule 160 mg, 160 mg, Oral, QAMSamy Edward, MD    Social History:  Social History   Substance Use Topics     Smoking status: Former Smoker     Years: 9.00     Types: Cigars     Quit date: 7/22/2013     Smokeless tobacco: Former User     Types: Chew     Alcohol use No       Family History:    Family History   Problem Relation Age of Onset     CANCER No family hx of      No family history of skin cancer     Skin Cancer No family hx of      Melanoma Mother        Physical Examination:   Vitals:  B/P: 121/78, T: 96.7, P: 88, R: 16  General: pt laying comfortably in bed, breathing easily on ra, in NAD   HEENT: no oral ulcers   Chest: cta   Heart: rrr  Abdomen: soft, nt, nd, +BS  Ext: no edema   Skin: no rashes  Neuro:   -MS: alert, speech fluent and coherent  -CN: vff, R  eye vision 20/30, L eye vision 20/200 with red desaturation, perrla, eomi, facial sensation and movement intact b/l, hearing intact to conversation, palate raises symmetrically, shoulder shrug and scm intact, tongue midline  -Motor: tone and bulk normal   --LUE: 5/5 shoulder abd, arm flex/ext, wrist flex/ext, finger flex/ext/abd/add  --RUE: 5/5 shoulder abd, arm flex/ext, wrist flex/ext, finger flex/ext/abd/add  --LLE: 5/5 hip flexor, leg flex/ext, plantar/dorsiflexion  --RLE: 5/5 hip flexor, leg flex/ext, plantar/dorsiflexion  -Reflexes: normoactive and symmetric at achilles, patella, brachioradialis, and biceps. Toes downgoing b/l   -Sensory: intact to LT and PP in all ext  -Coordination: intact to FNF, H2S b/l  -Gait: deferred    Investigations:    -pending below    Assessment and Plan:  27 yo m with h/o epilepsy and migraines presents for approximately 8 weeks of L eye vision blurring that started with headache and previously associated with painful eye movements.    #Optic Neuritis: Ongoing for ~ 8 weeks, initially associated with bilateral eye pain with movement. Now, ongoing blurry vision and red desaturation in the L eye. No s/sx to suggest other lesions. Ddx MS, NMO, SLE, Sarcoid, Sjogrens, Behcets, ?complication from sinusitis, b12/folate/b1 deficiency, less likely infectious/parainfectious, Margarito's.   -MR Brain with special cuts through orbits and MR C Spine WOW  -1g methylpred x 5 doses   +omeprazole, vitamin D  -LIEN, SSA/SSB, ACE, b1, b12, folate levels  -lumbar puncture in AM  -ophthalmology consult    #Epilepsy/Migraine  -continue pta depakote 4905-3104  -continue pta dilantin 160-200  -continue pta topamax 150-150  -continue pta venlafaxine    FEN: lyte replacement protocol, regular diet, senna  PPx: SCDs, omeprazole, chemical ppx not indicated-ambulate  Full Code    Patient was seen and discussed with attending neurologist, Dr. Jose Miguel Pablo MD  Neurology G3  [phone]    Lui SPAIN  Jose Miguel, saw this patient on June 28, 2017  with the resident and agree with the resident s findings and plan of care as documented in the resident s note, with the following additional information he has a left APD, able to count finger with left eye  MRI brain and C spine with no suggestion of other demyelination. LP and labs pending    Total time spent seeing the patient was 35 minutes, more than half that time in discussing possible diagnosis and treatment options and counseling the patient.    Lui Gillespie MD Neurology

## 2017-06-28 NOTE — NURSING NOTE
Chief Complaints and History of Present Illnesses   Patient presents with     Consult For     Optic neuritis     HPI    Affected eye(s):  Both   Symptoms:        Duration:  2 months   Frequency:  Constant       Do you have eye pain now?:  No      Comments:  Pt. States that he started with a migraine in the beginning of May.  Headache was a stabbing pain behind BE.  Headaches did resolve by the end of May.  Was treated with steroids and was given new seizure regimen.   VA LE has been extremely blurry since.   Patricia Siegel COT 9:59 AM June 28, 2017

## 2017-06-28 NOTE — PLAN OF CARE
Problem: Goal Outcome Summary  Goal: Goal Outcome Summary  Outcome: No Change  Pt admitted for left eye blurriness that he has had for the past 8 weeks. Has hx of migraines and seizures but stated he has never had vision changes before. Denied any pain. IV SL. On a regular diet with good PO intake. Voiding spon. Pt up with SBA d/t hx of sz. Pt is compliant and calls appropriately. Pt states he gets an aura with his sz (warm flush feeling and gets anxious) and will be able to alert staff, last sz was approx one month ago. A&Ox4. AVSS. Neuros intact besides left eye blurriness and left field cut. Optho consult placed. Chest CT, EKG, and first dose of methylpred completed. Will have brain/spine MRI and LP today. On evenings pt asked to go outside to smoke, this writer stated it was okay as long as pt went in WC with family. When pt got back he stated he was smoking medical mariajuana as he has a prescription for this for his sz. Charge RN and MD notified, no new orders placed. Pharmacy consulted about this. Continue with POC.

## 2017-06-29 VITALS
DIASTOLIC BLOOD PRESSURE: 66 MMHG | SYSTOLIC BLOOD PRESSURE: 133 MMHG | WEIGHT: 176.5 LBS | BODY MASS INDEX: 26.75 KG/M2 | OXYGEN SATURATION: 98 % | HEART RATE: 68 BPM | RESPIRATION RATE: 16 BRPM | HEIGHT: 68 IN | TEMPERATURE: 96.5 F

## 2017-06-29 LAB
ANA SER QL IA: 1.3
ANION GAP SERPL CALCULATED.3IONS-SCNC: 7 MMOL/L (ref 3–14)
AQP4 H2O CHANNEL IGG TITR SERPL IF: NORMAL {TITER}
BUN SERPL-MCNC: 18 MG/DL (ref 7–30)
CALCIUM SERPL-MCNC: 8.9 MG/DL (ref 8.5–10.1)
CHLORIDE SERPL-SCNC: 111 MMOL/L (ref 94–109)
CO2 SERPL-SCNC: 22 MMOL/L (ref 20–32)
CREAT SERPL-MCNC: 0.7 MG/DL (ref 0.66–1.25)
ERYTHROCYTE [DISTWIDTH] IN BLOOD BY AUTOMATED COUNT: 13.3 % (ref 10–15)
GFR SERPL CREATININE-BSD FRML MDRD: ABNORMAL ML/MIN/1.7M2
GLUCOSE BLDC GLUCOMTR-MCNC: 127 MG/DL (ref 70–99)
GLUCOSE SERPL-MCNC: 100 MG/DL (ref 70–99)
HCT VFR BLD AUTO: 34.9 % (ref 40–53)
HGB BLD-MCNC: 12 G/DL (ref 13.3–17.7)
LA PPP-IMP: NORMAL
LACTATE BLD-SCNC: 1.3 MMOL/L (ref 0.7–2.1)
MCH RBC QN AUTO: 31.2 PG (ref 26.5–33)
MCHC RBC AUTO-ENTMCNC: 34.4 G/DL (ref 31.5–36.5)
MCV RBC AUTO: 91 FL (ref 78–100)
METHYLMALONATE SERPL-SCNC: NORMAL
PLATELET # BLD AUTO: 308 10E9/L (ref 150–450)
POTASSIUM SERPL-SCNC: 3.4 MMOL/L (ref 3.4–5.3)
RBC # BLD AUTO: 3.85 10E12/L (ref 4.4–5.9)
SODIUM SERPL-SCNC: 140 MMOL/L (ref 133–144)
WBC # BLD AUTO: 12.4 10E9/L (ref 4–11)

## 2017-06-29 PROCEDURE — 25000132 ZZH RX MED GY IP 250 OP 250 PS 637: Performed by: STUDENT IN AN ORGANIZED HEALTH CARE EDUCATION/TRAINING PROGRAM

## 2017-06-29 PROCEDURE — 25000128 H RX IP 250 OP 636: Performed by: STUDENT IN AN ORGANIZED HEALTH CARE EDUCATION/TRAINING PROGRAM

## 2017-06-29 PROCEDURE — 85027 COMPLETE CBC AUTOMATED: CPT | Performed by: PSYCHIATRY & NEUROLOGY

## 2017-06-29 PROCEDURE — 36415 COLL VENOUS BLD VENIPUNCTURE: CPT | Performed by: PSYCHIATRY & NEUROLOGY

## 2017-06-29 PROCEDURE — 80048 BASIC METABOLIC PNL TOTAL CA: CPT | Performed by: PSYCHIATRY & NEUROLOGY

## 2017-06-29 PROCEDURE — 83605 ASSAY OF LACTIC ACID: CPT | Performed by: PSYCHIATRY & NEUROLOGY

## 2017-06-29 PROCEDURE — 84210 ASSAY OF PYRUVATE: CPT | Performed by: PSYCHIATRY & NEUROLOGY

## 2017-06-29 PROCEDURE — 00000146 ZZHCL STATISTIC GLUCOSE BY METER IP

## 2017-06-29 RX ORDER — PREDNISONE 20 MG/1
TABLET ORAL
Qty: 20 TABLET | Refills: 0 | Status: SHIPPED | OUTPATIENT
Start: 2017-06-29 | End: 2018-01-04

## 2017-06-29 RX ADMIN — OMEPRAZOLE 20 MG: 20 CAPSULE, DELAYED RELEASE ORAL at 09:56

## 2017-06-29 RX ADMIN — VENLAFAXINE HYDROCHLORIDE 225 MG: 225 TABLET, EXTENDED RELEASE ORAL at 09:56

## 2017-06-29 RX ADMIN — MAGNESIUM SULFATE IN DEXTROSE 1 G: 10 INJECTION, SOLUTION INTRAVENOUS at 11:58

## 2017-06-29 RX ADMIN — SODIUM CHLORIDE 1000 MG: 9 INJECTION, SOLUTION INTRAVENOUS at 09:53

## 2017-06-29 RX ADMIN — VITAMIN D, TAB 1000IU (100/BT) 5000 UNITS: 25 TAB at 10:08

## 2017-06-29 RX ADMIN — TOPIRAMATE 150 MG: 50 TABLET ORAL at 09:54

## 2017-06-29 RX ADMIN — DIVALPROEX SODIUM 1250 MG: 250 TABLET, FILM COATED, EXTENDED RELEASE ORAL at 09:54

## 2017-06-29 RX ADMIN — PHENYTOIN SODIUM 160 MG: 100 CAPSULE ORAL at 09:55

## 2017-06-29 ASSESSMENT — VISUAL ACUITY
OU: BLURRED VISION

## 2017-06-29 NOTE — PLAN OF CARE
Problem: Goal Outcome Summary  Goal: Goal Outcome Summary  OT: cancel, pt DISCH prior to OT eval.

## 2017-06-29 NOTE — DISCHARGE SUMMARY
"[facility]  NEUROLOGY DISCHARGE SUMMARY    Patient Name:  Tha Mcghee  MRN:  [id number]      :  1990      Date of Admission:  2017  Date of Discharge:  {DISCHARGE DATE:1194}  Admitting Physician:  Lui Gillespie MD  Discharge Physician:  Lui Gillespie MD  Primary Care Provider:   Bhaskar Morales  Discharge Disposition:   { :[id number]::\"Discharged to home\"}    Admission Diagnoses:  left eye vision loss  Optic neuritis    Discharge Diagnoses:    left eye vision loss  Optic neuritis    Brief History of Illness:   26 year old male h/o migraines and epilepsy presents for about 8 weeks of L eye vision blurring. The patient awoke with his usual severe migraine about 8 weeks ago, this was associated with L eye vision blurring and painful eye movements in both eyes. The patient's migraines usually last no longer than one day and have never before been associated with vision changes or eye pain. Unfortunately, the patient's headache and eye problems persisted for 6 weeks despite several attempts at treatment: steroid burst, compazine/benadryl, NSAIDs, and imitrex. Then, the patient was prescribed rizatriptan by his neurologist which resolved the headache but did not change his vision nor his eye pain. The eye pain resolved spontaneously about a week later. The patient was seen today by his epileptologist who noted worsening vision and pale/atrophic optic disc and thus directly admitted him to the Neurology service for further work-up. No recent tick bites, fevers, numbness/tingling, weaknesss, clumsiness, speech/cognitive changes, rashes/lesions, no lhermitte's sign. The patient denies any personal or family h/o autoimmune diseases or eye problems. Of note, during much of this time, the patient was suffering from maxillary sinusitis, which finally resolved after a course of azithromycin.    For additional information regarding Mr. Mcghee's presentation, " "please see H&P dated 6/28/17.    Hospital Course:  Mr. Mcghee was admitted for high dose IV methylprednisolone (1g) to treat optic neuritis. He received three total doses, the last on the morning of 6/29/17. Mr. Mcghee remained clinically stable, with progressive improvement in left eye vision (20/200 in clinic, 20/100 on admission, 20/70 on discharge). A lumbar puncture was performed: initial CSF studies were unremarkable, oligoclonal band studies were pending at time of discharge. Autoimmune workup was negative or pending at time of discharge.    Pertinent Investigations:  CSF Studies:  - Opening Pressure 19, Glucose 77, protein 43, RBC 2, WBC 2, Colorless, Clear  - Gram Stain Negative, Aerobic Culture Pending  - Oligoclonal Bands, IgG, ACE pending    Autoimmune Studies  - SSA/SSB    Negative  - ACE     22 (normal limits)  - Myeloperoxidase IgG Negative  - Proteinase 3 IgG  Negative  - Lupus Anticoagulant  Pending  - LIEN    Pending  - AQP4 IgG   Pending    Consultations:    None    Discharge physical examination:   /66 (BP Location: Right arm)  Pulse 68  Temp 96.5  F (35.8  C) (Oral)  Resp 16  Ht 1.727 m (5' 8\")  Wt 80.1 kg (176 lb 8 oz)  SpO2 98%  BMI 26.84 kg/m2  General: No acute distress, laying comfortably in bed  HEENT: Normocephalic, atraumatic  Cardiac: RRR, no m/g/r  Chest: Normal chest wall movement, no respiratory distress  Abdomen: Soft, non-tender, non-distended, bowel sounds present.  Extremities: No cyanosis or edema.  Skin: Gynecomastia, no obvious rash or lesion. LP site clear.  Psych: Situation-appropriate anxiety, full range of affect  Neuro:   Mental status: Alert, oriented X3. Speech fluent.   Cranial nerves: 20/70 left eye vision, weak left afferent pupil reflex, EOMI, facial sensation intact to light touch, facial movements full and symmetric, hearing intact to conversation, no dysarthria, palate elevates symmetric, 5/5 trapezius, tongue midline.   Motor: No abnormal posture, tone, " atrophy, or movements/fasciculations. 5/5 symmetric strength throughout upper and lower extremities.   Reflexes: 2+ and symmetric throughout. Absent Babinski.   Sensory: Intact to light touch in all extremities.   Coordination: Intact FNF and heel-shin. No Dysmetria. No Dysdiadochokinesia.   Gait: Normal width, stride length, turn, and arm swing.    Discharge Medications:  Current Discharge Medication List      CONTINUE these medications which have NOT CHANGED    Details   rizatriptan (MAXALT) 10 MG tablet Take 1 tablet (10 mg) by mouth at onset of headache for migraine May repeat in 2 hours. Max 3 tablets/24 hours.  Qty: 18 tablet, Refills: 3    Associated Diagnoses: Vision loss of left eye; Migraine variant      mometasone (ELOCON) 0.1 % ointment Apply twice daily for 3 days as needed for itching in the perianal area  Qty: 15 g, Refills: 1    Associated Diagnoses: Perianal dermatitis      promethazine (PHENERGAN) 25 MG tablet Take 1 tablet (25 mg) by mouth every 6 hours as needed for nausea  Qty: 20 tablet, Refills: 1    Associated Diagnoses: Partial epilepsy with impairment of consciousness, intractable (H); Migraine variant      oxyCODONE-acetaminophen (PERCOCET) 5-325 MG per tablet Take 1 tablet by mouth every 6 hours as needed for pain maximum 4 tablet(s) per day  Qty: 20 tablet, Refills: 0    Associated Diagnoses: Partial epilepsy with impairment of consciousness, intractable (H); Migraine variant      topiramate (TOPAMAX) 100 MG tablet 150 mg twice a day  Qty: 90 tablet, Refills: 3    Associated Diagnoses: 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)      diazepam (DIAZEPAM INTENSOL) 5 MG/ML (HIGH CONC) solution For generalized seizures give 5 mg.  May repeat and give 2.5 mg after 10 minutes if needed. Do not exceed 7.5mg  Qty: 30 mL, Refills: 1    Associated Diagnoses: 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)       venlafaxine (EFFEXOR XR) 75 MG 24 hr capsule Take 225 mg by mouth every morning       !! phenytoin (DILANTIN) 30 MG ER capsule Take two po Q AM  Qty: 180 capsule, Refills: 3    Associated Diagnoses: 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)      !! phenytoin (DILANTIN) 100 MG ER capsule TAKE 1 CAP BY MOUTH DAILY IN THE AM AND 2 CAPS IN THE PM  Qty: 270 capsule, Refills: 3    Associated Diagnoses: 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)      !! divalproex (DEPAKOTE ER) 250 MG 24 hr tablet Take 1 tab in the morning (along with two 500mg tabs)  Qty: 90 tablet, Refills: 3    Associated Diagnoses: 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)      !! divalproex (DEPAKOTE ER) 500 MG 24 hr tablet Take 2 tabs in the morning and 3 tabs at night.  Qty: 450 tablet, Refills: 3    Associated Diagnoses: 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)      medical cannabis inhalation (Patient's own supply.  Not a prescription) Inhale 200 mLs into the lungs Take one to two puffs every four to six hours    Associated Diagnoses: Localization-related (focal) (partial) epilepsy and epileptic syndromes with complex partial seizures, with intractable epilepsy; Variants of migraine, not elsewhere classified, without mention of intractable migraine without mention of status migrainosus      COMPOUND (CMPD RX) - PHARMACY TO MIX COMPOUNDED MEDICATION Apply twice daily to affected areas as needed.  Qty: 30 g, Refills: 0    Comments: 50 parts Nizoral 2% cream mixed with 50 parts mometasone 0.1% ointment.  Please dispense 30 g tube.  Associated Diagnoses: Perianal dermatitis      OMEPRAZOLE PO     Associated Diagnoses: Localization-related (focal) (partial) epilepsy and epileptic syndromes with complex partial seizures, with intractable epilepsy; Variants of  migraine, not elsewhere classified, without mention of intractable migraine without mention of status migrainosus      Multiple Vitamin (MULTIVITAMINS PO) 2 Chew a day    Associated Diagnoses: Localization-related (focal) (partial) epilepsy and epileptic syndromes with complex partial seizures, with intractable epilepsy      ALPRAZolam (XANAX) 0.5 MG tablet Take 0.5 mg by mouth as needed.       !! - Potential duplicate medications found. Please discuss with provider.          Discharge follow up and instructions:  No discharge procedures on file.    Patient seen and discussed with  ***    Sathish Blackwell  Neurology PGY2  Pgr: [phone]

## 2017-06-29 NOTE — DISCHARGE SUMMARY
[facility], [place]    Neurology Discharge Summary    Date of Admission: 6/27/2017  Date of Discharge: June 29, 2017    Disposition: Discharged to home  Primary Care Physician: Bhaskar Morales     Admission Diagnosis:   Optic Neuritis  Epilepsy  Migraine    Discharge Diagnosis:   Optic Neuritis  Epilepsy  Migraine    Problem Leading to Hospitalization (from [facility]):   From H&P dated 6/27/17:    26 year old male h/o migraines and epilepsy presents for about 8 weeks of L eye vision blurring. The patient awoke with his usual severe migraine about 8 weeks ago, this was associated with L eye vision blurring and painful eye movements in both eyes. The patient's migraines usually last no longer than one day and have never before been associated with vision changes or eye pain. Unfortunately, the patient's headache and eye problems persisted for 6 weeks despite several attempts at treatment: steroid burst, compazine/benadryl, NSAIDs, and imitrex. Then, the patient was prescribed rizatriptan by his neurologist which resolved the headache but did not change his vision nor his eye pain. The eye pain resolved spontaneously about a week later. The patient was seen today by his epileptologist who noted worsening vision and pale/atrophic optic disc and thus directly admitted him to the Neurology service for further work-up. No recent tick bites, fevers, numbness/tingling, weaknesss, clumsiness, speech/cognitive changes, rashes/lesions, no lhermitte's sign. The patient denies any personal or family h/o autoimmune diseases or eye problems. Of note, during much of this time, the patient was suffering from maxillary sinusitis, which finally resolved after a course of azithromycin.    Please see H&P dated 6/27/2017 for further details about presentation.    Brief Hospital Course:   # Optic neuritis:  Patient presented with ~8 week history of decreased vision in his left eye. Originally in setting of headache  although headache resolved within about 2 weeks. Patient was admitted for further workup. MRI was performed which showed slight FLAIR lesion in left optic nerve (see below). LP was performed, initial labs unconcerning, labs are pending on discharge. Patient was seen by ophthalmology who agreed with diagnosis of optic neuritis. Patient was started on methylprednisolone 1g q24 and received three doses total. It is thought due to the chronic nature of the condition that further high dose steroids are not warranted. Will discharge on prednisone taper with follow up with Dr. Araujo and with neuro-opthamology.   -Prednisone taper starting at 60mg over 12 days  -F/U with Dr. Araujo within 4 weeks  -F/U neuro-opthalmology within 4 weeks    # Epilepsy:  # Migraine:  Patient was continued on home regimen and did not have any seizure-like events.      PERTINENT INVESTIGATIONS    Labs  Lab Results   Component Value Date    WBC 12.4 06/29/2017     Lab Results   Component Value Date    RBC 3.85 06/29/2017     Lab Results   Component Value Date    HGB 12.0 06/29/2017     Lab Results   Component Value Date    HCT 34.9 06/29/2017     No components found for: MCT  Lab Results   Component Value Date    MCV 91 06/29/2017     Lab Results   Component Value Date    MCH 31.2 06/29/2017     Lab Results   Component Value Date    MCHC 34.4 06/29/2017     Lab Results   Component Value Date    RDW 13.3 06/29/2017     Lab Results   Component Value Date     06/29/2017     Last Basic Metabolic Panel:  Lab Results   Component Value Date     06/29/2017      Lab Results   Component Value Date    POTASSIUM 3.4 06/29/2017     Lab Results   Component Value Date    CHLORIDE 111 06/29/2017     Lab Results   Component Value Date    ROSA 8.9 06/29/2017     Lab Results   Component Value Date    CO2 22 06/29/2017     Lab Results   Component Value Date    BUN 18 06/29/2017     Lab Results   Component Value Date    CR 0.70 06/29/2017     Lab Results    Component Value Date     06/29/2017           PHYSICAL EXAMINATION  Constitutional : well-built  Head: atraumatic, anicteric. See neuroexam  Eyes: see neuroexam  CVC: S1/S2 sinusal no murmurs  LUng: Clear. On room air. No respiratory distress.   Adomen: soft, non tender, bowel movements heard  Extremities: Pulses preserved in four extremities. No edema. Well perfused  Psychiatry: in good mood. Collaborative    NEURO EXAM:  Mental Status: Fully alert, attentive and oriented. Speech clear and fluent. Thought processes clear and goal oriented.    Cranial Nerves: Visual fields intact. PERRL. Visual acuity 20/20 in R eye, 20/70 in left eye on day of discharge. EOMI with normal smooth pursuit. Facial movements symmetric. Hearing not formally tested but intact to conversation. Palate elevation symmetric, uvula midline. No dysarthria. Shoulder shrug strong bilaterally. Tongue protrusion midline.  Motor: No abnormal movements. Normal tone throughout. No pronator drift. Strength 5/5 throughout upper and lower extremities.   Reflexes: 2+ and symmetric throughout. No clonus. Toes downgoing.  Sensory: Intact/symmetric to light touch throughout upper and lower extremities.   Coordination: FNF and HS intact without dysmetria.   Station/Gait: Deferred      Additional recommendations and follow up:       Review of your medicines      START taking       Dose / Directions    predniSONE 20 MG tablet   Commonly known as:  DELTASONE   Used for:  Optic neuritis        Take 3 tabs (60 mg) by mouth daily x 3 days, 2 tabs (40 mg) daily x 3 days, 1 tab (20 mg) daily x 3 days, then 1/2 tab (10 mg) x 3 days.   Quantity:  20 tablet   Refills:  0         CONTINUE these medicines which have NOT CHANGED       Dose / Directions    ALPRAZolam 0.5 MG tablet   Commonly known as:  XANAX        Dose:  0.5 mg   Take 0.5 mg by mouth as needed.   Refills:  0       COMPOUND - PHARMACY TO MIX COMPOUNDED MEDICATION   Commonly known as:  CMPD RX   Used  for:  Perianal dermatitis        Apply twice daily to affected areas as needed.   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  May repeat and give 2.5 mg after 10 minutes if needed. 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   Quantity:  450 tablet   Refills:  3       EFFEXOR XR 75 MG 24 hr capsule   Generic drug:  venlafaxine        Dose:  225 mg   Take 225 mg by mouth every morning   Refills:  0       medical cannabis inhalation (Patient's own supply.  Not a prescription)   Used for:  Localization-related (focal) (partial) epilepsy and epileptic syndromes with complex partial seizures, with intractable epilepsy, Variants of migraine, not elsewhere classified, without mention of intractable migraine without mention of status migrainosus        Dose:  200 mL   Inhale 200 mLs into the lungs Take one to two puffs every four to six hours   Refills:  0       mometasone 0.1 % ointment   Commonly known as:  ELOCON   Used for:  Perianal dermatitis        Apply twice daily for 3 days as needed for itching in the perianal area   Quantity:  15 g   Refills:  1       MULTIVITAMINS PO   Used for:  Localization-related (focal) (partial) epilepsy and epileptic syndromes with complex partial seizures, with  intractable epilepsy        2 Chew a day   Refills:  0       OMEPRAZOLE PO   Used for:  Localization-related (focal) (partial) epilepsy and epileptic syndromes with complex partial seizures, with intractable epilepsy, Variants of migraine, not elsewhere classified, without mention of intractable migraine without mention of status migrainosus        Refills:  0       oxyCODONE-acetaminophen 5-325 MG per tablet   Commonly known as:  PERCOCET   Used for:  Partial epilepsy with impairment of consciousness, intractable (H), Migraine variant        Dose:  1 tablet   Take 1 tablet by mouth every 6 hours as needed for pain maximum 4 tablet(s) per day   Quantity:  20 tablet   Refills:  0       * phenytoin 30 MG CR capsule   Commonly known as:  DILANTIN   Used for:  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)        Take two po Q AM   Quantity:  180 capsule   Refills:  3       * phenytoin 100 MG CR capsule   Commonly known as:  DILANTIN   Used for:  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)        TAKE 1 CAP BY MOUTH DAILY IN THE AM AND 2 CAPS IN THE PM   Quantity:  270 capsule   Refills:  3       promethazine 25 MG tablet   Commonly known as:  PHENERGAN   Used for:  Partial epilepsy with impairment of consciousness, intractable (H), Migraine variant        Dose:  25 mg   Take 1 tablet (25 mg) by mouth every 6 hours as needed for nausea   Quantity:  20 tablet   Refills:  1       rizatriptan 10 MG tablet   Commonly known as:  MAXALT   Used for:  Vision loss of left eye, Migraine variant        Dose:  10 mg   Take 1 tablet (10 mg) by mouth at onset of headache for migraine May repeat in 2 hours. Max 3 tablets/24 hours.   Quantity:  18 tablet   Refills:  3       topiramate 100 MG tablet   Commonly known as:  TOPAMAX   Used for:  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)        150 mg twice  a day   Quantity:  90 tablet   Refills:  3       * Notice:  This list has 4 medication(s) that are the same as other medications prescribed for you. Read the directions carefully, and ask your doctor or other care provider to review them with you.         Where to get your medicines      These medications were sent to [facility] - [place], [place] - [address]  [address], [place] [address]     Phone:  [phone]      predniSONE 20 MG tablet             Reason for your hospital stay   You were hospitalized for blurry vision and evaluated for optic neuritis.     Adult [facility] Follow-up and recommended labs and tests   Follow up with Neuro-opthalmology, at (location with clinic name or city) [facility], within 4 weeks regarding new diagnosis. No follow up labs or test are needed.    Follow up with Dr. Araujo at [facility] neurology within 2-4 weeks regarding new diagnosis and for laboratory follow up.    Appointments on [place] and/or [facility] (with [facility] or [facility] provider or service). Call [phone] if you haven't heard regarding these appointments within 7 days of discharge.     Activity   Your activity upon discharge: activity as tolerated     Discharge Instructions   Continue your current seizure medications. Take prednisone as prescribed. Follow up with Dr. Araujo within 2 weeks. Follow up with Neuro-opthalmology within 4 weeks.     Full Code     Diet   Follow this diet upon discharge: Orders Placed This Encounter     Combination Diet Regular Diet Adult         Patient was seen and discussed with Dr. Gillespie.    Musa Love  PGY2 Neurology  [phone]      I, Lui Gillespie, saw this patient on June 29, 2017  with the resident and agree with the resident s findings and plan of care as documented in the resident s note, with the following additional information follow up with Dr Araujo and Neuro-opthalmo    Total time spent seeing the patient was 25 minutes, more than  half that time in discussing possible diagnosis and treatment options and counseling the patient.    Lui Gillespie MD Neurology

## 2017-06-29 NOTE — PLAN OF CARE
Problem: Goal Outcome Summary  Goal: Goal Outcome Summary  Outcome: Adequate for Discharge Date Met:  06/29/17  Pt admitted w L eye blurry vision. Labs still pending. Received 3 doses of Methylpred. Discharged home on Prednisone taper. DC instructions/followups reviewed.

## 2017-06-29 NOTE — PLAN OF CARE
Problem: Goal Outcome Summary  Goal: Goal Outcome Summary  Outcome: No Change  Pt admitted with blurry vision for the past 8 weeks. VSS except tachcardic in 110's at times. Pt anxious at times d/t hospitalizatio; hx ADHD. A&Ox4. Neuros intact except L eye blurry vision/neglect. C/o of HA, given Tylenol x1 with slight relief. LP done yesterday, LP site with bandage, no drainage noted. Regular diet. Good PO intake. Voiding spont. Pt up ad alonso in room. PIV SL. Dose 3 of 5 Methypred to be given this AM. Continue to monitor and follow POC.

## 2017-06-30 ENCOUNTER — CARE COORDINATION (OUTPATIENT)
Dept: CARE COORDINATION | Facility: CLINIC | Age: 27
End: 2017-06-30

## 2017-06-30 LAB
ACE CSF-CCNC: 2.4 U/L
VIT B1 BLD-MCNC: 136 UG/DL

## 2017-06-30 NOTE — PROGRESS NOTES
"[facility]  \"Hello, my name is Ayanna Rincon , and I am calling from the [facility].  I want to check in and see how you are doing, after leaving the hospital.  You may also receive a call from your Care Coordinator (care team), but I want to make sure you don t have any urgent needs.  I have a couple questions to review with you:     Post-Discharge Outreach                                                    Tha Mcghee is a 26 year old male     Follow-up Appointments           Adult [facility] Follow-up and recommended labs and tests         Follow up with Neuro-opthalmology, at (location with clinic name or city) [facility], within 4 weeks regarding new diagnosis. No follow up labs or test are needed.     Follow up with Dr. Araujo at [facility] neurology within 2-4 weeks regarding new diagnosis and for laboratory follow up.     Appointments on [place] and/or [facility] (with [facility] or [facility] provider or service). Call [phone] if you haven't heard regarding these appointments within 7 days of discharge.                       Your next 10 appointments already scheduled            Jul 06, 2017  8:00 AM CDT   (Arrive by 7:45 AM)   Return Visit with Erick Jasso MD   [facility] Dermatology ([facility])     [address]  3rd Floor  [facility] [address] [phone]                  Aug 01, 2017  1:00 PM CDT   Return Visit with Rajni Araujo MD   [facility] Epilepsy Care ([facility])     [address], [address]  [place] [address] [phone]                      Care Team:    Patient Care Team       Relationship Specialty Notifications Start End    Bhaskar Morales MD PCP - General Emergency Medicine  6/11/13     Phone: [phone] Fax: [phone]         [facility] [address]  [facility] [address]    Camelia Starkey MD Resident Student in organized health care education/training " program  7/17/15     Phone: [phone] Pager: [phone] Fax: [phone]        [address] [facility] [address]    Rajni Araujo MD MD Neurology  8/20/15     Phone: [phone] Fax: [phone]         [facility] [address] [facility] [address]    Bo Ortega MD MD Dermapathology  4/5/16     Phone: [phone] Fax: [phone]         [address] [address]            Transition of Care Review                                                      Did you have a surgery or procedure during your hospital visit? Yes   If yes, do you have any of the following:     Signs of infection:  NO    Pain:  Yes     Pain Scale (0-10) 2/10     Location: Back     Wound/incision concerns? NO    Do you have all of your medications/refills?  Yes    Are you having any side effects or questions about your medication(s)? No    Do you have any new or worsening symptoms?  No    Do you have any future appointments scheduled?   Yes             Plan                                                      Thanks for your time.  Your Care Coordinator may follow-up within the next couple days.  In the meantime if you have questions, concerns or problems call your care team.        Ayanna Rincon

## 2017-07-01 LAB
ALB CSF/SERPL: 4.2 {RATIO}
ALBUMIN CSF-MCNC: 20 MG/DL
ALBUMIN SERPL-MCNC: 4750 G/DL
IGG CSF-MCNC: 2.6 MG/DL
IGG SERPL-MCNC: 1130 MG/DL
IGG SYNTH RATE SER+CSF CALC-MRATE: NORMAL MG/DL
IGG/ALB CLEAR SER+CSF-RTO: 0.55
IGG/ALB CSF: 0.13 {RATIO}
OLIGOCLONAL BANDS CSF ELPH-IMP: NORMAL
OLIGOCLONAL BANDS CSF ELPH-IMP: NORMAL
OLIGOCLONAL BANDS CSF IEF: 0

## 2017-07-03 LAB
BACTERIA SPEC CULT: NO GROWTH
MICRO REPORT STATUS: NORMAL
PYRUVATE CSF-SCNC: NORMAL MMOL/L
SPECIMEN SOURCE: NORMAL

## 2017-07-06 ENCOUNTER — DOCUMENTATION ONLY (OUTPATIENT)
Dept: DERMATOLOGY | Facility: CLINIC | Age: 27
End: 2017-07-06

## 2017-07-06 NOTE — PROGRESS NOTES
Patient no showed his Dermatology appointment on 7/6/2017. Please do not refill any topical steroids until seen by Dermatology.     Erick Jasso MD, PhD  Medicine-Dermatology PGY-2

## 2017-07-07 ENCOUNTER — TELEPHONE (OUTPATIENT)
Dept: NEUROLOGY | Facility: CLINIC | Age: 27
End: 2017-07-07

## 2017-07-07 NOTE — TELEPHONE ENCOUNTER
Called Tha,     His vision in left eye is slightly better after steriods 20/70 as oppose to 20/200. He was seen by optho and had an extensive workup (MRI brain/orbits, LP, labs) in the hospital with no clear cause for his optic neuritis. I encouraged him to follow up   With neurophthalmology.     Rajni Araujo MD     Component      Latest Ref Rng & Units 6/27/2017 6/28/2017            7:52 AM   Immunoglobulin Serum IgG           Oligoclonal IgG CSF           Albumin by Nephelometry           Oligoclonal Albumin CSF           Oligoclonal Albumin Index           Oligoclonal IgG Index           CSF IgG/Albumin Ratio           Oligoclonal Banding           CSF Oligoclonal Bands Number           IgG Synthesis Rate      mg/dL     Oligoclonal Interpretation           WBC CSF      0 - 5 /uL     RBC CSF      0 - 2 /uL     Tube Number      #     Color CSF      CLRL     Appearance CSF      CLER     Specimen Description           Gram Stain Smear           Micro Report Status           Culture Micro           Myeloperoxidase Antibody IgG      0.0 - 0.9 AI  0.5   Proteinase 3 Antibody IgG      0.0 - 0.9 AI  <0.2 . . .   Vitamin B12      193 - 986 pg/mL 840    Vitamin B1 Whole Blood Level       136    Lyme Disease Antibodies Serum      0.00 - 0.89 0.18    Methylmalonic Acid       <0.10 . . .    Homocysteine umol/L      4.0 - 12.0 umol/L 8.5    Folate      >5.4 ng/mL 6.9    TSH      0.40 - 4.00 mU/L 3.46    Angiotensin Converting Enzyme        22   Lupus Result      NEG  Negative . . .   Neuromyelitis Optica AQP4 IgG Blood        <1:10 . . .   SSA (Ro) (ERINN) Antibody, IgG      0.0 - 0.9 AI  <0.2 . . .   SSB (La) (ERINN) Antibody, IgG      0.0 - 0.9 AI  <0.2 . . .   LIEN Screen by EIA      <1.0  1.3 (H)   Sed Rate      0 - 15 mm/h     CRP Inflammation      0.0 - 8.0 mg/L  <2.9   Glucose CSF      40 - 70 mg/dL     Protein Total CSF      15 - 60 mg/dL     ACE Angiotensin Conv Enz CSF             Component      Latest Ref Rng &  Units 6/28/2017 6/28/2017           3:24 PM  3:24 PM   Immunoglobulin Serum IgG       1130    Oligoclonal IgG CSF       2.6    Albumin by Nephelometry       4750    Oligoclonal Albumin CSF       20    Oligoclonal Albumin Index       4.2    Oligoclonal IgG Index       0.55    CSF IgG/Albumin Ratio       0.13    Oligoclonal Banding       Negative . . .    CSF Oligoclonal Bands Number       0    IgG Synthesis Rate      mg/dL <0.0 . . .    Oligoclonal Interpretation       SEE NOTE . . .    WBC CSF      0 - 5 /uL 2    RBC CSF      0 - 2 /uL 2    Tube Number      # 3    Color CSF      CLRL Colorless    Appearance CSF      CLER Clear    Specimen Description       Cerebrospinal fluid Cerebrospinal fluid   Gram Stain Smear       No organisms seen . . .    Micro Report Status       FINAL 06/28/2017 FINAL 07/03/2017   Culture Micro        No growth   Myeloperoxidase Antibody IgG      0.0 - 0.9 AI     Proteinase 3 Antibody IgG      0.0 - 0.9 AI     Vitamin B12      193 - 986 pg/mL     Vitamin B1 Whole Blood Level           Lyme Disease Antibodies Serum      0.00 - 0.89     Methylmalonic Acid           Homocysteine umol/L      4.0 - 12.0 umol/L     Folate      >5.4 ng/mL     TSH      0.40 - 4.00 mU/L     Angiotensin Converting Enzyme           Lupus Result      NEG     Neuromyelitis Optica AQP4 IgG Blood           SSA (Ro) (ERINN) Antibody, IgG      0.0 - 0.9 AI     SSB (La) (ERINN) Antibody, IgG      0.0 - 0.9 AI     LIEN Screen by EIA      <1.0     Sed Rate      0 - 15 mm/h     CRP Inflammation      0.0 - 8.0 mg/L     Glucose CSF      40 - 70 mg/dL 77 (H)    Protein Total CSF      15 - 60 mg/dL 43    ACE Angiotensin Conv Enz CSF       2.4      Component      Latest Ref Rng & Units 6/28/2017           3:49 PM   Immunoglobulin Serum IgG          Oligoclonal IgG CSF          Albumin by Nephelometry          Oligoclonal Albumin CSF          Oligoclonal Albumin Index          Oligoclonal IgG Index          CSF IgG/Albumin Ratio           Oligoclonal Banding          CSF Oligoclonal Bands Number          IgG Synthesis Rate      mg/dL    Oligoclonal Interpretation          WBC CSF      0 - 5 /uL    RBC CSF      0 - 2 /uL    Tube Number      #    Color CSF      CLRL    Appearance CSF      CLER    Specimen Description          Gram Stain Smear          Micro Report Status          Culture Micro          Myeloperoxidase Antibody IgG      0.0 - 0.9 AI    Proteinase 3 Antibody IgG      0.0 - 0.9 AI    Vitamin B12      193 - 986 pg/mL    Vitamin B1 Whole Blood Level          Lyme Disease Antibodies Serum      0.00 - 0.89    Methylmalonic Acid          Homocysteine umol/L      4.0 - 12.0 umol/L    Folate      >5.4 ng/mL    TSH      0.40 - 4.00 mU/L    Angiotensin Converting Enzyme          Lupus Result      NEG    Neuromyelitis Optica AQP4 IgG Blood          SSA (Ro) (ERINN) Antibody, IgG      0.0 - 0.9 AI    SSB (La) (ERINN) Antibody, IgG      0.0 - 0.9 AI    LIEN Screen by EIA      <1.0    Sed Rate      0 - 15 mm/h 2   CRP Inflammation      0.0 - 8.0 mg/L    Glucose CSF      40 - 70 mg/dL    Protein Total CSF      15 - 60 mg/dL    ACE Angiotensin Conv Enz CSF

## 2017-08-08 ENCOUNTER — VIRTUAL VISIT (OUTPATIENT)
Dept: NEUROLOGY | Facility: CLINIC | Age: 27
End: 2017-08-08

## 2017-08-08 DIAGNOSIS — G40.219 LOCALIZATION-RELATED EPILEPSY WITH COMPLEX PARTIAL SEIZURES WITH INTRACTABLE EPILEPSY (H): ICD-10-CM

## 2017-08-08 RX ORDER — DIVALPROEX SODIUM 500 MG/1
TABLET, EXTENDED RELEASE ORAL
Qty: 450 TABLET | Refills: 11 | Status: SHIPPED | OUTPATIENT
Start: 2017-08-08 | End: 2018-01-04

## 2017-08-08 RX ORDER — TOPIRAMATE 100 MG/1
TABLET, FILM COATED ORAL
Qty: 90 TABLET | Refills: 11 | Status: SHIPPED | OUTPATIENT
Start: 2017-08-08 | End: 2018-01-04

## 2017-08-08 RX ORDER — DIVALPROEX SODIUM 250 MG/1
TABLET, EXTENDED RELEASE ORAL
Qty: 90 TABLET | Refills: 11 | Status: SHIPPED | OUTPATIENT
Start: 2017-08-08 | End: 2018-01-04

## 2017-08-08 RX ORDER — PHENYTOIN SODIUM 100 MG/1
CAPSULE, EXTENDED RELEASE ORAL
Qty: 270 CAPSULE | Refills: 11 | Status: SHIPPED | OUTPATIENT
Start: 2017-08-08 | End: 2018-01-04

## 2017-08-08 NOTE — PROGRESS NOTES
His eyesight on left is better, he is able to see out of his left eye. He did not go opthalmology due to lack of insurance. He is working on trying to get [facility]. He started working [employer], he will not get health insurance for some time.   He has prescription assistance, he is paying cash for antiepileptic drugs. He had a seizure 2 weeks ago ( generalized tonic-clonic convulsion).     I did advised him to call us if he has any acute neurological deficit (vision loss, weakness, sensory loss, etc). He had one episode of optic neuritis with no clear etiology.     I renewed all his antiepileptic drug     Phone visit in 6 months.     Rajni Araujo MD

## 2017-08-08 NOTE — MR AVS SNAPSHOT
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)           Follow-ups after your visit        Who to contact     Please call your clinic at [phone] to:    Ask questions about your health    Make or cancel appointments    Discuss your medicines    Learn about your test results    Speak to your doctor   If you have compliments or concerns about an experience at your clinic, or if you wish to file a complaint, please contact [facility] Patient Relations at [phone] or email us at [email or web address]         Additional Information About Your Visit        MyChart Information     [employer] is an electronic gateway that provides easy, online access to your medical records. With [employer], you can request a clinic appointment, read your test results, renew a prescription or communicate with your care team.     To sign up for [employer] visit the website at [email or web address]   You will be asked to enter the access code listed below, as well as some personal information. Please follow the directions to create your username and password.     Your access code is: [email or web address]  Expires: 2017  4:27 PM     Your access code will  in 90 days. If you need help or a new code, please contact your [facility] or call [phone] for assistance.        Care EveryWhere ID     This is your Care EveryWhere ID. This could be used by other organizations to access your [place] medical records  [id number]         Blood Pressure from Last 3 Encounters:   17 133/66   17 130/87   17 115/72    Weight from Last  3 Encounters:   06/28/17 176 lb 8 oz (80.1 kg)   06/27/17 187 lb 6.4 oz (85 kg)   01/30/17 180 lb (81.6 kg)              Today, you had the following     No orders found for display         Where to get your medicines      These medications were sent to [id number] - [place], [place] - [address]  [address], [place] [address]     Phone:  [phone]     divalproex 250 MG 24 hr tablet    divalproex 500 MG 24 hr tablet    phenytoin 100 MG CR capsule    phenytoin 30 MG CR capsule    topiramate 100 MG tablet          Primary Care Provider Office Phone # Fax #    Bhaskar Morales [phone]       [facility] [address]   [facility] [address]        Equal Access to Services     [facility] Hospital[facility] : Hadii aad ku hadasho Soximena, waaxda luqadaha, qaybta kaalmada adejonathonyada, redd sinclair . So [facility] [phone].    ATENCIÓN: Si habla español, tiene a andrade disposición servicios gratuitos de asistencia lingüística. Dominic al [phone].    We comply with applicable federal civil rights laws and [place] laws. We do not discriminate on the basis of race, color, national origin, age, disability sex, sexual orientation or gender identity.            Thank you!     Thank you for choosing [facility]  for your care. Our goal is always to provide you with excellent care. Hearing back from our patients is one way we can continue to improve our services. Please take a few minutes to complete the written survey that you may receive in the mail after your visit with us. Thank you!             Your Updated Medication List - Protect others around you: Learn how to safely use, store and throw away your medicines at www.disposemymeds.org.          This list is accurate as of: 8/8/17 11:35 AM.  Always use your most recent med list.                   Brand Name Dispense Instructions for use Diagnosis    ALPRAZolam 0.5 MG tablet    XANAX     Take 0.5 mg by mouth  as needed.        COMPOUNDED NON-CONTROLLED SUBSTANCE - PHARMACY TO MIX COMPOUNDED MEDICATION    CMPD RX    30 g    Apply twice daily to affected areas as needed.    Perianal dermatitis       diazepam 5 MG/ML (HIGH CONC) solution    DIAZEPAM INTENSOL    30 mL    For generalized seizures give 5 mg.  May repeat and give 2.5 mg after 10 minutes if needed. 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  Not a prescription)      Inhale 200 mLs into the lungs Take one to two puffs every four to six hours    Localization-related (focal) (partial) epilepsy and epileptic syndromes with complex partial seizures, with intractable epilepsy, Variants of migraine, not elsewhere classified, without mention of intractable migraine without mention of status migrainosus       mometasone 0.1 % ointment    ELOCON    15 g    Apply twice daily for 3 days as needed for itching in the perianal area    Perianal dermatitis       MULTIVITAMINS PO      2 Chew a day    Localization-related (focal) (partial) epilepsy and epileptic syndromes with complex partial seizures, with intractable epilepsy       OMEPRAZOLE PO       Localization-related (focal) (partial) epilepsy and epileptic syndromes with complex partial seizures, with intractable epilepsy, Variants of migraine, not elsewhere classified,  without mention of intractable migraine without mention of status migrainosus       oxyCODONE-acetaminophen 5-325 MG per tablet    PERCOCET    20 tablet    Take 1 tablet by mouth every 6 hours as needed for pain maximum 4 tablet(s) per day    Partial epilepsy with impairment of consciousness, intractable (H), Migraine variant       * phenytoin 100 MG CR capsule    DILANTIN    270 capsule    TAKE 1 CAP BY MOUTH DAILY IN THE AM AND 2 CAPS IN THE PM    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    Optic neuritis       promethazine 25 MG tablet    PHENERGAN    20 tablet    Take 1 tablet (25 mg) by mouth every 6 hours as needed for nausea    Partial epilepsy with impairment of consciousness, intractable (H), Migraine variant       rizatriptan 10 MG tablet    MAXALT    18 tablet    Take 1 tablet (10 mg) by mouth at onset of headache for migraine May repeat in 2 hours. Max 3 tablets/24 hours.    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 Read the directions carefully, and ask your doctor or other care provider to review them with you.

## 2017-08-14 ENCOUNTER — TELEPHONE (OUTPATIENT)
Dept: NEUROLOGY | Facility: CLINIC | Age: 27
End: 2017-08-14

## 2017-08-14 NOTE — TELEPHONE ENCOUNTER
Social Work:  D/I: Request from Dr Araujo to contact Pt re insurance issues. Pt had insurance that ended 6/30. He went to [facility] to get an application for a state plan. He had questions about how to respond to some of the questions on the paper application but there staff there said they couldn't help him and gave him a phone #. He hasn't completed the application.   Discussed that it's faster if he can do the online application. He indicated he is able to use the internet and could do that. Provided the website info and he indicated he plans to complete the application today on his day off. He recently started a new part time job that may bloom into a full time job. Enc him to answer the questions based on his reality today.  P: Pt has my contact info and encouraged him to call me if he encounters any issues w/the application or getting his medications. For now, He has Pt assistance programs for some of his meds and his parents are helping with the depakote for now.

## 2017-11-17 ENCOUNTER — TELEPHONE (OUTPATIENT)
Dept: NEUROLOGY | Facility: CLINIC | Age: 27
End: 2017-11-17
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
## 2018-01-04 ENCOUNTER — TELEPHONE (OUTPATIENT)
Dept: NEUROLOGY | Facility: CLINIC | Age: 28
End: 2018-01-04

## 2018-01-04 ENCOUNTER — OFFICE VISIT (OUTPATIENT)
Dept: NEUROLOGY | Facility: CLINIC | Age: 28
End: 2018-01-04

## 2018-01-04 VITALS
BODY MASS INDEX: 28.76 KG/M2 | HEIGHT: 68 IN | HEART RATE: 94 BPM | DIASTOLIC BLOOD PRESSURE: 83 MMHG | SYSTOLIC BLOOD PRESSURE: 118 MMHG | WEIGHT: 189.8 LBS

## 2018-01-04 DIAGNOSIS — F33.9 EPISODE OF RECURRENT MAJOR DEPRESSIVE DISORDER, UNSPECIFIED DEPRESSION EPISODE SEVERITY (H): Primary | ICD-10-CM

## 2018-01-04 DIAGNOSIS — G40.219 LOCALIZATION-RELATED EPILEPSY WITH COMPLEX PARTIAL SEIZURES WITH INTRACTABLE EPILEPSY (H): ICD-10-CM

## 2018-01-04 PROBLEM — F32.9 MAJOR DEPRESSIVE DISORDER: Status: ACTIVE | Noted: 2018-01-04

## 2018-01-04 RX ORDER — DIVALPROEX SODIUM 500 MG/1
TABLET, EXTENDED RELEASE ORAL
Qty: 450 TABLET | Refills: 3 | Status: SHIPPED | OUTPATIENT
Start: 2018-01-04 | End: 2018-02-14

## 2018-01-04 RX ORDER — TOPIRAMATE 100 MG/1
TABLET, FILM COATED ORAL
Qty: 270 TABLET | Refills: 3 | Status: SHIPPED | OUTPATIENT
Start: 2018-01-04 | End: 2018-02-14

## 2018-01-04 RX ORDER — DIVALPROEX SODIUM 250 MG/1
TABLET, EXTENDED RELEASE ORAL
Qty: 90 TABLET | Refills: 3 | Status: SHIPPED | OUTPATIENT
Start: 2018-01-04 | End: 2018-02-14

## 2018-01-04 RX ORDER — DOCUSATE SODIUM 100 MG/1
100 CAPSULE, LIQUID FILLED ORAL 2 TIMES DAILY
COMMUNITY
Start: 2017-08-10 | End: 2020-07-28

## 2018-01-04 RX ORDER — LEVETIRACETAM 250 MG/1
TABLET ORAL
Qty: 180 TABLET | Refills: 11 | Status: SHIPPED | OUTPATIENT
Start: 2018-01-04 | End: 2018-02-14

## 2018-01-04 RX ORDER — VENLAFAXINE HYDROCHLORIDE 75 MG/1
225 CAPSULE, EXTENDED RELEASE ORAL EVERY MORNING
Qty: 270 CAPSULE | Refills: 3 | Status: SHIPPED | OUTPATIENT
Start: 2018-01-04 | End: 2020-11-10

## 2018-01-04 RX ORDER — PHENYTOIN SODIUM 100 MG/1
CAPSULE, EXTENDED RELEASE ORAL
Qty: 270 CAPSULE | Refills: 3 | Status: SHIPPED | OUTPATIENT
Start: 2018-01-04 | End: 2018-02-14

## 2018-01-04 NOTE — MR AVS SNAPSHOT
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 Establish working relationship with a county . Go to your city call or call for . This person will help identify resources for your in your community.     2. We can do phone visit in 6-8 months, if you do not have insurance.     3. No driving until you are 3 months seizure free.      4. Call Hospital and see if ricardo funds can be applied to your hospital bill because he is not able to pay hospital bill.     5. If you have any symptoms of (vision loss, weakness, sensory loss, slurred speech) call JOSÉ [facility]EL [phone]                   Medication Name   Tablet Size       Week 1 (2/19/2018)   AM  (morning)   PM (Night)   Notes    Levetiracetam 250 mg   250 mg   250 mg start after you have insurance    Topiramate 100 mg   150 mg   150 mg      Phenytoin   mg   100 mg   200 mg      Phenytoin  CR 30 mg   60 mg   0 mg      Divalproex  mg   1000 mg   1500 mg      Divalproex  mg   250 mg                                         Medication Name   Tablet Size       Week 2   AM  (morning)   PM (Night)   Notes    Levetiracetam 250 mg   500 mg   500 mg start after you have insurance    Topiramate 100 mg   150 mg   150 mg      Phenytoin   mg   100 mg   200 mg      Phenytoin  CR 30 mg   60 mg   0 mg      Divalproex  mg   1000 mg   1500 mg      Divalproex  mg   250 mg                                       Medication Name   Tablet Size       Week 3 - onward   AM   (morning)   PM (Night)   Notes    Levetiracetam 250 mg   750 mg   750 mg start after you have insurance    Topiramate 100 mg   150 mg   150 mg      Phenytoin   mg   100 mg   200 mg      Phenytoin  CR 30 mg   60 mg   0 mg      Divalproex  mg   1000 mg   1500 mg      Divalproex  mg   250 mg                              CONTINUE TAKING YOUR OTHER MEDICATIONS AS PREVIOUSLY DIRECTED.  IF YOU  HAVE ANY SIDE EFFECTS OR CONCERNS ABOUT YOUR ANTIEPILEPTIC DRUG CALL [facility] OFFICE [phone]. PLEASE FOLLOW MEDICATION CHANGES AS ADVISED.     MD Rajni LANGE MD           Follow-ups after your visit        Follow-up notes from your care team     Return in about 6 months (around 7/4/2018).      Your next 10 appointments already scheduled     Feb 14, 2018  3:00 PM CST   Telephone Call with Rajni Araujo MD   [facility] ([facility])    [address], [address]  [facility] [address] [phone]           Note: This is not an onsite visit; there is no need to come to the facility.              Who to contact     Please call your clinic at [phone] to:    Ask questions about your health    Make or cancel appointments    Discuss your medicines    Learn about your test results    Speak to your doctor   If you have compliments or concerns about an experience at your clinic, or if you wish to file a complaint, please contact [facility] Patient Relations at [phone] or email us at [email or web address]         Additional Information About Your Visit        MyChart Information     [employer] is an electronic gateway that provides easy, online access to your medical records. With [employer], you can request a clinic appointment, read your test results, renew a prescription or communicate with your care team.     To sign up for [employer] visit the website at [email or web address]   You will be asked to enter the  "access code listed below, as well as some personal information. Please follow the directions to create your username and password.     Your access code is: [email or web address]  Expires: 2018 12:22 PM     Your access code will  in 90 days. If you need help or a new code, please contact your [facility] or call [phone] for assistance.        Care EveryWhere ID     This is your Care EveryWhere ID. This could be used by other organizations to access your [place] medical records  [id number]        Your Vitals Were     Pulse Height BMI (Body Mass Index)             94 5' 8\" (172.7 cm) 28.86 kg/m2          Blood Pressure from Last 3 Encounters:   18 118/83   17 133/66   17 130/87    Weight from Last 3 Encounters:   18 189 lb 12.8 oz (86.1 kg)   17 176 lb 8 oz (80.1 kg)   17 187 lb 6.4 oz (85 kg)              Today, you had the following     No orders found for display         Today's Medication Changes          These changes are accurate as of: 18 12:29 PM.  If you have any questions, ask your nurse or doctor.               Start taking these medicines.        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)   Started by:  Rajni Araujo MD        Titrate to 750 mg mg as instructed   Quantity:  180 tablet   Refills:  11            Where to get your medicines      These medications were sent to [facility] [id number] [facility] [address]   [address] [address]     Phone:  [phone]     divalproex 250 MG 24 hr tablet    divalproex 500 MG 24 hr tablet    levETIRAcetam 250 MG tablet    phenytoin 100 MG CR capsule    phenytoin 30 MG CR capsule    topiramate 100 MG tablet    venlafaxine 75 MG 24 hr capsule                Primary Care Provider Office Phone # Fax " #    Bhaskar Morales [phone]       [facility] [address]   [facility] [address]        Equal Access to Services     KARTHIK KIRSTEN : Bridger kyle hernandez mckaylao Sohuyenali, waaxda luqadaha, qaybta kaalmada adejazzyda, redd olmedo laenedeliamalika shira. So [facility] [phone].    ATENCIÓN: Si habla español, tiene a andrade disposición servicios gratuitos de asistencia lingüística. Llame al [phone].    We comply with applicable federal civil rights laws and [place] laws. We do not discriminate on the basis of race, color, national origin, age, disability, sex, sexual orientation, or gender identity.            Thank you!     Thank you for choosing [facility]  for your care. Our goal is always to provide you with excellent care. Hearing back from our patients is one way we can continue to improve our services. Please take a few minutes to complete the written survey that you may receive in the mail after your visit with us. Thank you!             Your Updated Medication List - Protect others around you: Learn how to safely use, store and throw away your medicines at www.disposemymeds.org.          This list is accurate as of: 1/4/18 12:29 PM.  Always use your most recent med list.                   Brand Name Dispense Instructions for use Diagnosis    ALPRAZolam 0.5 MG tablet    XANAX     Take 0.5 mg by mouth as needed.        COMPOUNDED NON-CONTROLLED SUBSTANCE - PHARMACY TO MIX COMPOUNDED MEDICATION    CMPD RX    30 g    Apply twice daily to affected areas as needed.    Perianal dermatitis       diazepam 5 MG/ML (HIGH CONC) solution    DIAZEPAM INTENSOL    30 mL    For generalized seizures give 5 mg.  May repeat and give 2.5 mg after 10 minutes if needed. 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  Not a prescription)      Inhale 200 mLs into the lungs Take one to two puffs every four to six hours    Localization-related (focal) (partial) epilepsy and epileptic syndromes with complex partial seizures, with intractable epilepsy, Variants of migraine, not elsewhere classified, without mention of intractable migraine without mention of status migrainosus       mometasone 0.1 % ointment    ELOCON    15 g    Apply twice daily for 3 days as needed for itching in the perianal area    Perianal dermatitis       MULTIVITAMINS PO      2 Chew a day    Localization-related (focal) (partial) epilepsy and epileptic syndromes with complex partial seizures, with intractable epilepsy       OMEPRAZOLE PO       Localization-related (focal) (partial) epilepsy and epileptic syndromes with complex partial seizures, with intractable epilepsy, Variants of migraine, not elsewhere classified, without mention of intractable migraine without mention of status migrainosus       oxyCODONE-acetaminophen 5-325 MG per tablet    PERCOCET    20 tablet    Take 1 tablet by mouth every 6 hours as needed for pain maximum 4 tablet(s) per day    Partial epilepsy with impairment of consciousness, intractable (H), Migraine variant       * phenytoin 100 MG CR capsule    DILANTIN    270 capsule    TAKE 1 CAP BY MOUTH  DAILY IN THE AM AND 2 CAPS IN THE PM    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 Max 3 tablets/24 hours.    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 Read the directions carefully, and ask your doctor or other care provider to review them with you.

## 2018-01-04 NOTE — PATIENT INSTRUCTIONS
1. Establish working relationship with a county . Go to your city call or call for . This person will help identify resources for your in your community.     2. We can do phone visit in 6-8 months, if you do not have insurance.     3. No driving until you are 3 months seizure free.      4. Call Hospital and see if ricardo funds can be applied to your hospital bill because he is not able to pay hospital bill.     5. If you have any symptoms of (vision loss, weakness, sensory loss, slurred speech) [facility] [phone]                   Medication Name   Tablet Size       Week 1 (2/19/2018)   AM  (morning)   PM (Night)   Notes    Levetiracetam 250 mg   250 mg   250 mg start after you have insurance    Topiramate 100 mg   150 mg   150 mg      Phenytoin   mg   100 mg   200 mg      Phenytoin  CR 30 mg   60 mg   0 mg      Divalproex  mg   1000 mg   1500 mg      Divalproex  mg   250 mg                                         Medication Name   Tablet Size       Week 2   AM  (morning)   PM (Night)   Notes    Levetiracetam 250 mg   500 mg   500 mg start after you have insurance    Topiramate 100 mg   150 mg   150 mg      Phenytoin   mg   100 mg   200 mg      Phenytoin  CR 30 mg   60 mg   0 mg      Divalproex  mg   1000 mg   1500 mg      Divalproex  mg   250 mg                                       Medication Name   Tablet Size       Week 3 - onward   AM  (morning)   PM (Night)   Notes    Levetiracetam 250 mg   750 mg   750 mg start after you have insurance    Topiramate 100 mg   150 mg   150 mg      Phenytoin   mg   100 mg   200 mg      Phenytoin  CR 30 mg   60 mg   0 mg      Divalproex  mg   1000 mg   1500 mg      Divalproex  mg   250 mg                              CONTINUE TAKING YOUR OTHER MEDICATIONS AS PREVIOUSLY DIRECTED.  IF YOU  HAVE ANY SIDE EFFECTS OR CONCERNS ABOUT YOUR ANTIEPILEPTIC DRUG [facility] [phone]. PLEASE  FOLLOW MEDICATION CHANGES AS ADVISED.     MD Rajni LANGE MD
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
## 2018-01-04 NOTE — TELEPHONE ENCOUNTER
DMV form received, via patient in clinic on 1-4-18. Form completed by Dr. Araujo during office visit.    DMV form signed, faxed to [employer] on 1-4-18, sent to scanning, and copy given to patient at check out.
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
## 2018-02-14 ENCOUNTER — VIRTUAL VISIT (OUTPATIENT)
Dept: NEUROLOGY | Facility: CLINIC | Age: 28
End: 2018-02-14
Payer: COMMERCIAL

## 2018-02-14 DIAGNOSIS — G40.219 LOCALIZATION-RELATED EPILEPSY WITH COMPLEX PARTIAL SEIZURES WITH INTRACTABLE EPILEPSY (H): ICD-10-CM

## 2018-02-14 RX ORDER — LEVETIRACETAM 250 MG/1
TABLET ORAL
Qty: 180 TABLET | Refills: 11 | Status: SHIPPED | OUTPATIENT
Start: 2018-02-14 | End: 2018-07-27

## 2018-02-14 RX ORDER — DIVALPROEX SODIUM 250 MG/1
TABLET, EXTENDED RELEASE ORAL
Qty: 90 TABLET | Refills: 3 | Status: SHIPPED | OUTPATIENT
Start: 2018-02-14 | End: 2018-07-27

## 2018-02-14 RX ORDER — TOPIRAMATE 100 MG/1
TABLET, FILM COATED ORAL
Qty: 270 TABLET | Refills: 3 | Status: SHIPPED | OUTPATIENT
Start: 2018-02-14 | End: 2018-07-27

## 2018-02-14 RX ORDER — DIVALPROEX SODIUM 500 MG/1
TABLET, EXTENDED RELEASE ORAL
Qty: 450 TABLET | Refills: 3 | Status: SHIPPED | OUTPATIENT
Start: 2018-02-14 | End: 2018-07-27

## 2018-02-14 RX ORDER — PHENYTOIN SODIUM 100 MG/1
CAPSULE, EXTENDED RELEASE ORAL
Qty: 270 CAPSULE | Refills: 3 | Status: SHIPPED | OUTPATIENT
Start: 2018-02-14 | End: 2018-07-27

## 2018-02-14 NOTE — MR AVS SNAPSHOT
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)           Follow-ups after your visit        Your next 10 appointments already scheduled     2018 11:30 AM CDT   Return Visit with MD [facility] ([facility])    [address], [address]  [facility] [id number] [phone]              Who to contact     Please call your clinic at [phone] to:    Ask questions about your health    Make or cancel appointments    Discuss your medicines    Learn about your test results    Speak to your doctor            Additional Information About Your Visit        MyChart Information     [employer] is an electronic gateway that provides easy, online access to your medical records. With [employer], you can request a clinic appointment, read your test results, renew a prescription or communicate with your care team.     To sign up for [employer] visit the website at [email or web address]   You will be asked to enter the access code listed below, as well as some personal information. Please follow the directions to create your username and password.     Your access code is: [email or web address]  Expires: 2018 12:22 PM     Your access code will  in 90 days. If you need help or a new code, please contact your [facility] or call [phone] for assistance.        Care EveryWhere ID     This is your Care EveryWhere ID. This could be used by other organizations to access your [facility] medical records  [id number]         Blood Pressure from Last 3 Encounters:   18 118/83   17 133/66   17 130/87    Weight  from Last 3 Encounters:   01/04/18 189 lb 12.8 oz (86.1 kg)   06/28/17 176 lb 8 oz (80.1 kg)   06/27/17 187 lb 6.4 oz (85 kg)              Today, you had the following     No orders found for display         Where to get your medicines      These medications were sent to [facility] [id number]  [place], [place] - [address] [place] [address]   [address] [place] [address] [address]     Phone:  [phone]     divalproex sodium extended-release 250 MG 24 hr tablet    divalproex sodium extended-release 500 MG 24 hr tablet    levETIRAcetam 250 MG tablet    phenytoin 100 MG CR capsule    phenytoin 30 MG CR capsule    topiramate 100 MG tablet          Primary Care Provider Office Phone # Fax #    Bhaskar Morales [phone]       [facility] [address]   [place] [address]        Equal Access to Services     : Hadii aad ku hadasho Soomaali, waaxda luqadaha, qaybta kaalmada adeegyada, waxay idiin hayerosn ginna sinclair . So [facility] [phone].    ATENCIÓN: Si habla español, tiene a andrade disposición servicios gratuitos de asistencia lingüística. Llame al [phone].    We comply with applicable federal civil rights laws and [place] laws. We do not discriminate on the basis of race, color, national origin, age, disability, sex, sexual orientation, or gender identity.            Thank you!     Thank you for choosing [facility] EPILEPSY [facility]  for your care. Our goal is always to provide you with excellent care. Hearing back from our patients is one way we can continue to improve our services. Please take a few minutes to complete the written survey that you may receive in the mail after your visit with us. Thank you!             Your Updated Medication List - Protect others around you: Learn how to safely use, store and throw away your medicines at www.disposemymeds.org.          This list is accurate as of 2/14/18  3:47 PM.  Always use your most recent med  list.                   Brand Name Dispense Instructions for use Diagnosis    ALPRAZolam 0.5 MG tablet    XANAX     Take 0.5 mg by mouth as needed.        COMPOUNDED NON-CONTROLLED SUBSTANCE - PHARMACY TO MIX COMPOUNDED MEDICATION    CMPD RX    30 g    Apply twice daily to affected areas as needed.    Perianal dermatitis       diazepam 5 MG/ML (HIGH CONC) solution    DIAZEPAM INTENSOL    30 mL    For generalized seizures give 5 mg.  May repeat and give 2.5 mg after 10 minutes if needed. 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  Not a prescription)      Inhale 200 mLs into the lungs Take one to two puffs every four to six hours    Localization-related (focal) (partial) epilepsy and epileptic syndromes with complex partial seizures, with intractable epilepsy, Variants of migraine, not elsewhere classified, without mention of intractable migraine without mention of status migrainosus       mometasone 0.1 % ointment    ELOCON    15 g    Apply twice daily for 3 days as needed for itching in the perianal area    Perianal dermatitis       MULTIVITAMINS PO      2 Chew a  day    Localization-related (focal) (partial) epilepsy and epileptic syndromes with complex partial seizures, with intractable epilepsy       OMEPRAZOLE PO       Localization-related (focal) (partial) epilepsy and epileptic syndromes with complex partial seizures, with intractable epilepsy, Variants of migraine, not elsewhere classified, without mention of intractable migraine without mention of status migrainosus       oxyCODONE-acetaminophen 5-325 MG per tablet    PERCOCET    20 tablet    Take 1 tablet by mouth every 6 hours as needed for pain maximum 4 tablet(s) per day    Partial epilepsy with impairment of consciousness, intractable (H), Migraine variant       * phenytoin 100 MG CR capsule    DILANTIN    270 capsule    TAKE 1 CAP BY MOUTH DAILY IN THE AM AND 2 CAPS IN THE PM    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 Max 3 tablets/24 hours.    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 Read the directions carefully, and ask your doctor or other care provider to review them with you.
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
## 2018-04-24 ENCOUNTER — TELEPHONE (OUTPATIENT)
Dept: NEUROLOGY | Facility: CLINIC | Age: 28
End: 2018-04-24

## 2018-04-24 NOTE — TELEPHONE ENCOUNTER
A letter was prepared on behalf of Tha Mcghee stating that he is certified to take medical cannabis.\  No further action needed.

## 2018-07-27 ENCOUNTER — OFFICE VISIT (OUTPATIENT)
Dept: NEUROLOGY | Facility: CLINIC | Age: 28
End: 2018-07-27
Payer: COMMERCIAL

## 2018-07-27 VITALS
TEMPERATURE: 98.3 F | SYSTOLIC BLOOD PRESSURE: 136 MMHG | BODY MASS INDEX: 26.13 KG/M2 | HEART RATE: 105 BPM | DIASTOLIC BLOOD PRESSURE: 78 MMHG | WEIGHT: 172.4 LBS | HEIGHT: 68 IN

## 2018-07-27 DIAGNOSIS — G43.809 MIGRAINE VARIANT: ICD-10-CM

## 2018-07-27 DIAGNOSIS — G40.219 LOCALIZATION-RELATED EPILEPSY WITH COMPLEX PARTIAL SEIZURES WITH INTRACTABLE EPILEPSY (H): ICD-10-CM

## 2018-07-27 DIAGNOSIS — H54.62 VISION LOSS OF LEFT EYE: ICD-10-CM

## 2018-07-27 RX ORDER — DIVALPROEX SODIUM 250 MG/1
TABLET, EXTENDED RELEASE ORAL
Qty: 90 TABLET | Refills: 3 | Status: SHIPPED | OUTPATIENT
Start: 2018-07-27 | End: 2018-08-29

## 2018-07-27 RX ORDER — TOPIRAMATE 100 MG/1
TABLET, FILM COATED ORAL
Qty: 270 TABLET | Refills: 3 | Status: ON HOLD | OUTPATIENT
Start: 2018-07-27 | End: 2018-10-26

## 2018-07-27 RX ORDER — PHENYTOIN SODIUM 100 MG/1
CAPSULE, EXTENDED RELEASE ORAL
Qty: 270 CAPSULE | Refills: 3 | Status: SHIPPED | OUTPATIENT
Start: 2018-07-27 | End: 2019-08-03

## 2018-07-27 RX ORDER — DIVALPROEX SODIUM 500 MG/1
TABLET, EXTENDED RELEASE ORAL
Qty: 450 TABLET | Refills: 3 | Status: SHIPPED | OUTPATIENT
Start: 2018-07-27 | End: 2019-03-05

## 2018-07-27 NOTE — MR AVS SNAPSHOT
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     Wean off levetiracetam because your mood has worsened.        Medication Name   Tablet Size       Week 1  AM  (morning)   PM (Night)   Notes    Levetiracetam 250 mg   500 mg   0 mg      Topiramate 100 mg   150 mg   150 mg      Phenytoin   mg   100 mg   200 mg      Phenytoin  CR 30 mg   60 mg   0 mg      Divalproex  mg   1000 mg   1500 mg      Divalproex  mg   250 mg                           Medication Name   Tablet Size       Week 2  AM  (morning)   PM (Night)   Notes    Levetiracetam 250 mg   250 mg  0 mg      Topiramate 100 mg   150 mg   150 mg      Phenytoin   mg   100 mg   200 mg      Phenytoin  CR 30 mg   60 mg   0 mg      Divalproex  mg   1000 mg   1500 mg      Divalproex  mg   250 mg                             Medication Name   Tablet Size       Week 3  AM  (morning)   PM (Night)   Notes    Levetiracetam 250 mg   0 mg  0 mg      Topiramate 100 mg   150 mg   150 mg      Phenytoin   mg   100 mg   200 mg      Phenytoin  CR 30 mg   60 mg   0 mg      Divalproex  mg   1000 mg   1500 mg      Divalproex  mg   250 mg                        Please be cautious and do not drive as we wean off levetiracetam      If you have any symptoms of (vision loss, weakness, sensory loss, slurred speech) call [facility] [phone], he has a history of optic neuritis and should get immediate MRI of brain and treatment as needed.     Rajni Araujo MD           Follow-ups after your  "visit        Follow-up notes from your care team     Return in about 4 weeks (around 8/24/2018).      Your next 10 appointments already scheduled     Aug 16, 2018  1:30 PM CDT   Telephone Call with [facility] Nurse 2   [facility] ([facility])    [address], [address]  [facility] [id number] [phone]           Note: this is not an onsite visit; there is no need to come to the facility.            Aug 28, 2018  3:00 PM CDT   Return Visit with [facility] Nurse 1   [facility] ([facility])    [address], [address]  [facility] [id number] [phone]              Who to contact     Please call your clinic at [phone] to:    Ask questions about your health    Make or cancel appointments    Discuss your medicines    Learn about your test results    Speak to your doctor            Additional Information About Your Visit        Care EveryWhere ID     This is your Care EveryWhere ID. This could be used by other organizations to access your [place] medical records  [id number]        Your Vitals Were     Pulse Temperature Height BMI (Body Mass Index)          105 98.3  F (36.8  C) (Temporal) 5' 8\" (172.7 cm) 26.21 kg/m2         Blood Pressure from Last 3 Encounters:   07/27/18 136/78   01/04/18 118/83   06/29/17 133/66    Weight from Last 3 Encounters:   07/27/18 172 lb 6.4 oz (78.2 kg)   01/04/18 189 lb 12.8 oz (86.1 kg)   06/28/17 176 lb 8 oz (80.1 kg)              Today, you had the following     No orders found for display         Today's Medication Changes          These changes are accurate as of 7/27/18  2:48 PM.  If you have any questions, ask your nurse or doctor.               Stop taking these medicines if you haven't already. Please contact your care team if you have questions.     levETIRAcetam 250 MG tablet   Commonly known as:  KEPPRA   Stopped by:  Rajni Araujo MD                Where to get your medicines      These medications " were sent to [facility] - [place] - [address]   [address] [address]     Phone:  [phone]     divalproex sodium extended-release 250 MG 24 hr tablet    divalproex sodium extended-release 500 MG 24 hr tablet    phenytoin 100 MG CR capsule    phenytoin 30 MG CR capsule    topiramate 100 MG tablet                Primary Care Provider Office Phone # Fax #    Bhaskar Morales [phone]       [facility] [address]   [place] [address]        Equal Access to Services     [facility] : Hadii kyle hernandez hadjudyo Sohuyenali, waaxda luqadaha, qaybta kaalmada adeegyada, redd sinclair . So [facility] [phone].    ATENCIÓN: Si habla español, tiene a andrade disposición servicios gratuitos de asistencia lingüística. Llame al [phone].    We comply with applicable federal civil rights laws and [place] laws. We do not discriminate on the basis of race, color, national origin, age, disability, sex, sexual orientation, or gender identity.            Thank you!     Thank you for choosing [facility]  for your care. Our goal is always to provide you with excellent care. Hearing back from our patients is one way we can continue to improve our services. Please take a few minutes to complete the written survey that you may receive in the mail after your visit with us. Thank you!             Your Updated Medication List - Protect others around you: Learn how to safely use, store and throw away your medicines at www.disposemymeds.org.          This list is accurate as of 7/27/18  2:48 PM.  Always use your most recent med list.                   Brand Name Dispense Instructions for use Diagnosis    ALPRAZolam 0.5 MG tablet    XANAX     Take 0.5 mg by mouth as needed.        cholecalciferol 5000 units Tabs tablet    vitamin D3     Take 5,000 Units by mouth        COMPOUNDED NON-CONTROLLED SUBSTANCE -  PHARMACY TO MIX COMPOUNDED MEDICATION    CMPD RX    30 g    Apply twice daily to affected areas as needed.    Perianal dermatitis       diazepam 5 MG/ML (HIGH CONC) solution    DIAZEPAM INTENSOL    30 mL    For generalized seizures give 5 mg.  May repeat and give 2.5 mg after 10 minutes if needed. 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  Not a prescription)      Inhale 200 mLs into the lungs Take one to two puffs every four to six hours    Localization-related (focal) (partial) epilepsy and epileptic syndromes with complex partial seizures, with intractable epilepsy, Variants of migraine, not elsewhere classified, without mention of intractable migraine without mention of status migrainosus       mometasone 0.1 % ointment    ELOCON    15 g    Apply twice daily for 3 days as needed for itching in the perianal area    Perianal dermatitis       MULTIVITAMINS PO      2 Chew a day    Localization-related (focal) (partial) epilepsy and epileptic syndromes with complex partial seizures, with intractable epilepsy       OMEPRAZOLE PO       Localization-related (focal) (partial) epilepsy and epileptic syndromes with complex partial seizures, with intractable epilepsy, Variants of migraine, not elsewhere classified, without mention of intractable migraine  without mention of status migrainosus       oxyCODONE-acetaminophen 5-325 MG per tablet    PERCOCET    20 tablet    Take 1 tablet by mouth every 6 hours as needed for pain maximum 4 tablet(s) per day    Partial epilepsy with impairment of consciousness, intractable (H), Migraine variant       * phenytoin 100 MG CR capsule    DILANTIN    270 capsule    TAKE 1 CAP BY MOUTH DAILY IN THE AM AND 2 CAPS IN THE PM    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 Max 3 tablets/24 hours.    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 Read the directions carefully, and ask your doctor or other care provider to review them with you.
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
## 2018-07-27 NOTE — PATIENT INSTRUCTIONS
Call [facility]     Wean off levetiracetam because your mood has worsened.        Medication Name   Tablet Size       Week 1  AM  (morning)   PM (Night)   Notes    Levetiracetam 250 mg   500 mg   0 mg      Topiramate 100 mg   150 mg   150 mg      Phenytoin   mg   100 mg   200 mg      Phenytoin  CR 30 mg   60 mg   0 mg      Divalproex  mg   1000 mg   1500 mg      Divalproex  mg   250 mg                           Medication Name   Tablet Size       Week 2  AM  (morning)   PM (Night)   Notes    Levetiracetam 250 mg   250 mg  0 mg      Topiramate 100 mg   150 mg   150 mg      Phenytoin   mg   100 mg   200 mg      Phenytoin  CR 30 mg   60 mg   0 mg      Divalproex  mg   1000 mg   1500 mg      Divalproex  mg   250 mg                             Medication Name   Tablet Size       Week 3  AM  (morning)   PM (Night)   Notes    Levetiracetam 250 mg   0 mg  0 mg      Topiramate 100 mg   150 mg   150 mg      Phenytoin   mg   100 mg   200 mg      Phenytoin  CR 30 mg   60 mg   0 mg      Divalproex  mg   1000 mg   1500 mg      Divalproex  mg   250 mg                        Please be cautious and do not drive as we wean off levetiracetam      If you have any symptoms of (vision loss, weakness, sensory loss, slurred speech) call [facility] [phone], he has a history of optic neuritis and should get immediate MRI of brain and treatment as needed.     Rajni Araujo MD

## 2018-08-13 ENCOUNTER — TELEPHONE (OUTPATIENT)
Dept: NEUROLOGY | Facility: CLINIC | Age: 28
End: 2018-08-13

## 2018-08-13 DIAGNOSIS — G40.209 LOCALIZATION-RELATED FOCAL EPILEPSY WITH COMPLEX PARTIAL SEIZURES (H): Primary | ICD-10-CM

## 2018-08-13 NOTE — TELEPHONE ENCOUNTER
Tha would like the following labs drawn at the [facility] center:    Divalproex  Phenytoin/total  & free  Topamax    Orders entered and FAXED to [phone] attention: Lab

## 2018-08-14 ENCOUNTER — TRANSFERRED RECORDS (OUTPATIENT)
Dept: HEALTH INFORMATION MANAGEMENT | Facility: CLINIC | Age: 28
End: 2018-08-14

## 2018-08-15 LAB
DILANTIN: 12.1 UG/ML (ref 10–20)
PHENYTOIN FREE: 2.2 UG/ML (ref 1–2.5)
TOPIRAMATE LEVEL: 3.9 UG/ML (ref 2–25)
VALPROIC ACID FREE: 11.3 UG/ML (ref 6–20)
VALPROIC ACID TOTAL: 93.6 UG/ML (ref 50–100)

## 2018-08-16 ENCOUNTER — VIRTUAL VISIT (OUTPATIENT)
Dept: NEUROLOGY | Facility: CLINIC | Age: 28
End: 2018-08-16
Payer: COMMERCIAL

## 2018-08-16 DIAGNOSIS — G40.209 LOCALIZATION-RELATED FOCAL EPILEPSY WITH COMPLEX PARTIAL SEIZURES (H): ICD-10-CM

## 2018-08-16 DIAGNOSIS — G40.219 PARTIAL EPILEPSY WITH IMPAIRMENT OF CONSCIOUSNESS, INTRACTABLE (H): Primary | ICD-10-CM

## 2018-08-16 NOTE — MR AVS SNAPSHOT
After Visit Summary   8/16/2018    Tha Mcghee    MRN: [id number]           Patient Information     Date Of Birth          1990        Visit Information        Provider Department      8/16/2018 1:30 PM 2, [facility] Nurse [facility]        Today's Diagnoses     Partial epilepsy with impairment of consciousness, intractable (H)    -  1       Follow-ups after your visit        Your next 10 appointments already scheduled     Aug 16, 2018  1:30 PM CDT   Telephone Call with [facility] Nurse 2   [facility] ([facility])    [address], [address]  [facility] [address] [phone]           Note: this is not an onsite visit; there is no need to come to the facility.            Aug 28, 2018  3:00 PM CDT   Return Visit with [facility] Nurse 1   [facility] ([facility])    [address], [address]  [facility] [address] [phone]              Who to contact     Please call your clinic at [phone] to:    Ask questions about your health    Make or cancel appointments    Discuss your medicines    Learn about your test results    Speak to your doctor            Additional Information About Your Visit        Care EveryWhere ID     This is your Care EveryWhere ID. This could be used by other organizations to access your [place] medical records  [id number]         Blood Pressure from Last 3 Encounters:   07/27/18 136/78   01/04/18 118/83   06/29/17 133/66    Weight from Last 3 Encounters:   07/27/18 172 lb 6.4 oz (78.2 kg)   01/04/18 189 lb 12.8 oz (86.1 kg)   06/28/17 176 lb 8 oz (80.1 kg)              Today, you had the following     No orders found for display       Primary Care Provider Office Phone # Fax #    Bhaskar Morales [phone]       [place] [address]   [place] [address]        Equal Access to Services     KARTHIK CURTIS AH: Bridger Hernandez, vinayak vaughan, johnybliseth  redd aguiar tammymalika ginna sinclair ah. [facility] [phone].    ATENCIÓN: Si prosper contreras, tiene a andrade disposición servicios gratuitos de asistencia lingüística. Dominic al [phone].    We comply with applicable federal civil rights laws and [place] laws. We do not discriminate on the basis of race, color, national origin, age, disability, sex, sexual orientation, or gender identity.            Thank you!     Thank you for choosing [facility]  for your care. Our goal is always to provide you with excellent care. Hearing back from our patients is one way we can continue to improve our services. Please take a few minutes to complete the written survey that you may receive in the mail after your visit with us. Thank you!             Your Updated Medication List - Protect others around you: Learn how to safely use, store and throw away your medicines at www.disposemymeds.org.          This list is accurate as of 8/16/18  1:00 PM.  Always use your most recent med list.                   Brand Name Dispense Instructions for use Diagnosis    ALPRAZolam 0.5 MG tablet    XANAX     Take 0.5 mg by mouth as needed.        cholecalciferol 5000 units Tabs tablet    vitamin D3     Take 5,000 Units by mouth        COMPOUNDED NON-CONTROLLED SUBSTANCE - PHARMACY TO MIX COMPOUNDED MEDICATION    CMPD RX    30 g    Apply twice daily to affected areas as needed.    Perianal dermatitis       diazepam 5 MG/ML (HIGH CONC) solution    DIAZEPAM INTENSOL    30 mL    For generalized seizures give 5 mg.  May repeat and give 2.5 mg after 10 minutes if needed. 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  Not a prescription)      Inhale 200 mLs into the lungs Take one to two puffs every four to six hours    Localization-related (focal) (partial) epilepsy and epileptic syndromes with complex partial seizures, with intractable epilepsy, Variants of migraine, not elsewhere classified, without mention of intractable migraine without mention of status migrainosus       mometasone 0.1 % ointment    ELOCON    15 g    Apply twice daily for 3 days as needed for itching in the perianal area    Perianal dermatitis       MULTIVITAMINS PO      2 Chew a day    Localization-related (focal) (partial) epilepsy and epileptic syndromes with complex partial seizures, with intractable epilepsy       OMEPRAZOLE PO       Localization-related (focal) (partial) epilepsy and epileptic syndromes with complex partial seizures, with intractable epilepsy, Variants of migraine, not elsewhere classified, without mention of intractable migraine without mention of status migrainosus       oxyCODONE-acetaminophen 5-325 MG per tablet    PERCOCET    20 tablet    Take 1 tablet by mouth every 6 hours as needed for pain maximum 4 tablet(s) per day    Partial epilepsy with impairment of consciousness, intractable (H), Migraine variant       * phenytoin 100 MG CR capsule    DILANTIN    270 capsule    TAKE 1 CAP BY MOUTH DAILY IN THE AM AND 2 CAPS IN THE PM    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 Max 3 tablets/24 hours.    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 Read the directions carefully, and ask your doctor or other care provider to review them with you.

## 2018-08-16 NOTE — PROGRESS NOTES
Care coordination follow up phone call:    Tha had a seizure last Thursday (GTC). Duration unknown/Tha feels the seizure may have lasted 1 minute.     He was taken to [address] [place] [address] in [place] for evaluation. No injuries. No medication changes.    History of Optic Neuritis, no current concerns. Agreeable to MRI PRN.    Currenet/confirmed ASDs:    Levetiracetam: Discontinued    Topiramate 100) 150-150    Phenytoin  () 160-200    Divalproex (1000/250) [id number]    Mood continues to improve.     Denies additional questions/concerns.    Scheduled to return to clinic on 8/28/18 at at 3:00 PM

## 2018-08-20 ENCOUNTER — TELEPHONE (OUTPATIENT)
Dept: NEUROLOGY | Facility: CLINIC | Age: 28
End: 2018-08-20

## 2018-08-20 DIAGNOSIS — G40.209 LOCALIZATION-RELATED FOCAL EPILEPSY WITH COMPLEX PARTIAL SEIZURES (H): Primary | ICD-10-CM

## 2018-08-20 NOTE — TELEPHONE ENCOUNTER
----- Message from Jazmín Valadez sent at 8/20/2018  1:07 PM CDT -----  Regarding: brendan  Caller: Tha     Relationship to Patient: pt    Call Back Number: [phone]    Reason for Call: Pt would like to discuss a recent seizure with someone.
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
## 2018-08-21 RX ORDER — GABAPENTIN 300 MG/1
CAPSULE ORAL
Qty: 60 CAPSULE | Refills: 3 | Status: SHIPPED | OUTPATIENT
Start: 2018-08-21 | End: 2018-09-26

## 2018-08-21 NOTE — TELEPHONE ENCOUNTER
Rajni Araujo MD Vassar, Allison, RN                   Hi. I had to wean him off levetiracetam due to depression on levetiracetam. We can start  Gabapentin, please review side effects mainly mood changes, rash, weight gain, lower extremities  Swelling.     Start 300 mg at night, then increase to 300 mg twice a day after 1 week,     I will see him in clinic after that. Thanks. Rajni Araujo MD

## 2018-08-21 NOTE — TELEPHONE ENCOUNTER
Patient was contacted regarding Dr. Araujo's instructions below. He was advised on how to take the medication and potential side effects of drowsiness, dizziness, loss of coordination, blurred vision, weight gain, lower leg swelling, or mood changes. I explained that we will start with the evening dose to allow time for his body to get used to the new medication before adding to his daytime hours.     He is agreeable to start this medication. He confirmed his pharmacy as [facility] off of [address]     Patient was instructed to call [facility] to reschedule his appointment with Dr. Araujo for a later date.     Dylon England RN, BSN  Epilepsy Care Coordinator

## 2018-08-29 ENCOUNTER — TELEPHONE (OUTPATIENT)
Dept: NEUROLOGY | Facility: CLINIC | Age: 28
End: 2018-08-29

## 2018-08-29 DIAGNOSIS — G40.219 LOCALIZATION-RELATED EPILEPSY WITH COMPLEX PARTIAL SEIZURES WITH INTRACTABLE EPILEPSY (H): ICD-10-CM

## 2018-08-29 RX ORDER — DIVALPROEX SODIUM 250 MG/1
TABLET, EXTENDED RELEASE ORAL
Qty: 90 TABLET | Refills: 3 | Status: SHIPPED | OUTPATIENT
Start: 2018-08-29 | End: 2019-03-05

## 2018-08-29 NOTE — TELEPHONE ENCOUNTER
----- Message from Claudia Stroud sent at 8/29/2018  1:36 PM CDT -----  Spoke with pharmacy and they didn't no receive refill request from 7/27/18 for Divalproex  250 mg.  Please resend.    Thanks, Claudia

## 2018-09-13 ENCOUNTER — TELEPHONE (OUTPATIENT)
Dept: NEUROLOGY | Facility: CLINIC | Age: 28
End: 2018-09-13

## 2018-09-13 DIAGNOSIS — G40.219 LOCALIZATION-RELATED EPILEPSY WITH COMPLEX PARTIAL SEIZURES WITH INTRACTABLE EPILEPSY (H): ICD-10-CM

## 2018-09-13 RX ORDER — DIAZEPAM ORAL SOLUTION (CONCENTRATE) 5 MG/ML
SOLUTION ORAL
Qty: 30 ML | Refills: 0 | Status: SHIPPED | OUTPATIENT
Start: 2018-09-13 | End: 2018-09-17

## 2018-09-13 NOTE — TELEPHONE ENCOUNTER
----- Message from Alicia Kunz MA sent at 9/12/2018  1:16 PM CDT -----  Regarding: refill  Pt states that he needs this before this weekend as he is going out of town    RE: diazepam (DIAZEPAM INTENSOL) 5 MG/ML (HIGH CONC) solution    SIG:  For generalized seizures give 5 mg.  May repeat and give 2.5 mg after 10 minutes if needed. Do not exceed 7.5mg    # of days supply: 30    Pharmacy:    [id number] [place] [facility] [facility] [facility]    RT date: 09/26/2018    Request from: Patient calling

## 2018-09-17 ENCOUNTER — TELEPHONE (OUTPATIENT)
Dept: NEUROLOGY | Facility: CLINIC | Age: 28
End: 2018-09-17

## 2018-09-17 DIAGNOSIS — G40.219 LOCALIZATION-RELATED EPILEPSY WITH COMPLEX PARTIAL SEIZURES WITH INTRACTABLE EPILEPSY (H): ICD-10-CM

## 2018-09-17 RX ORDER — DIAZEPAM ORAL SOLUTION (CONCENTRATE) 5 MG/ML
SOLUTION ORAL
Qty: 30 ML | Refills: 0 | Status: SHIPPED | OUTPATIENT
Start: 2018-09-17 | End: 2019-05-20

## 2018-09-17 NOTE — TELEPHONE ENCOUNTER
----- Message from Jazmín Valadez sent at 9/17/2018 12:47 PM CDT -----  Regarding: FW: fills  Was sent to the wrong pharmacy. [facility] [place]   ----- Message -----     From: Katina Segura [facility]     Sent: 9/14/2018   4:10 PM       To: Mandy Cerna [facility]  Pool  Subject: RE: fills                                        Was done yesterday! Was it signed and faxed?  Katina  ----- Message -----     From: Jazmín Valadez     Sent: 9/14/2018   3:11 PM       To: Me Mincep Refill Pool  Subject: fills                                            RE: diazepam (DIAZEPAM INTENSOL) 5 MG/ML (HIGH CONC) solution    SIG: For generalized seizures give 5 mg.  May repeat and give 2.5 mg after 10 minutes if needed. Do not exceed 7.5mg    # of days supply: 90    Pharmacy:    [facility] [address] [place] [address] AT [facility] [address]     RTC date: in clinic 7/27/18    Request from: Patient calling. Please send a new script to the pharmacy above.

## 2018-09-26 ENCOUNTER — ALLIED HEALTH/NURSE VISIT (OUTPATIENT)
Dept: NEUROLOGY | Facility: CLINIC | Age: 28
End: 2018-09-26
Payer: COMMERCIAL

## 2018-09-26 VITALS
BODY MASS INDEX: 26.3 KG/M2 | TEMPERATURE: 98.7 F | SYSTOLIC BLOOD PRESSURE: 118 MMHG | WEIGHT: 173 LBS | DIASTOLIC BLOOD PRESSURE: 74 MMHG | HEART RATE: 94 BPM

## 2018-09-26 DIAGNOSIS — G40.209 LOCALIZATION-RELATED FOCAL EPILEPSY WITH COMPLEX PARTIAL SEIZURES (H): ICD-10-CM

## 2018-09-26 DIAGNOSIS — G40.219 LOCALIZATION-RELATED EPILEPSY WITH COMPLEX PARTIAL SEIZURES WITH INTRACTABLE EPILEPSY (H): Primary | ICD-10-CM

## 2018-09-26 RX ORDER — GABAPENTIN 300 MG/1
CAPSULE ORAL
Qty: 186 CAPSULE | Refills: 3 | Status: SHIPPED | OUTPATIENT
Start: 2018-09-26 | End: 2019-03-05

## 2018-09-26 ASSESSMENT — PAIN SCALES - GENERAL: PAINLEVEL: NO PAIN (0)
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
## 2018-09-26 NOTE — PATIENT INSTRUCTIONS
9/26/2018      Times of Days  morning   evening      Medication  Gabapentin Tablet Size Number of Tablets/Capsules Total Daily Dosage   Week 1 300 mg 1    2   900 mg   Week 2 300 mg 2    2   1200 mg   Week 3 300 mg 2    3   1500 mg   Week 4 onwards 300 mg 3    3   1800 mg     Carry this with you at all times.  CONTINUE TAKING YOUR OTHER MEDICATIONS AS PREVIOUSLY DIRECTED.      * * *Do not store medications in the bathroom.  Keep medications away from children!* * *

## 2018-09-26 NOTE — Clinical Note
Quinn Schmidt, Not sure if you want to add a note, or comment in the note I have placed. Thank you! Lamine

## 2018-09-26 NOTE — MR AVS SNAPSHOT
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  CONTINUE TAKING YOUR OTHER MEDICATIONS AS PREVIOUSLY DIRECTED.      * * *Do not store medications in the bathroom.  Keep medications away from children!* * *             Follow-ups after your visit        Additional Services     MENTAL HEALTH REFERRAL  - Adult; Psychiatry and Medication Management; Psychiatry; Other: Not Listed - Enter Referral Details in Scheduling Comments Below; Patient call to schedule       All scheduling is subject to the client's specific insurance plan & benefits, provider/location availability, and provider clinical specialities.  Please arrive 15 minutes early for your first appointment and bring your completed paperwork.    Please be aware that coverage of these services is subject to the terms and limitations of your health insurance plan.  Call member services at your health plan with any benefit or coverage questions.                      Neuropsych Testing Referral - [facility]       Please schedule a visit to complete Neuropsychological Testing at [facility].  Presurgical assessment                  Follow-up notes from your care team     Return in about 4 weeks (around 10/24/2018).      Your next 10  appointments already scheduled     Oct 10, 2018 12:30 PM CDT   New Patient Visit with Chano Gamez, PhD LP   [facility]    [address], [address]  [facility] [id number] [phone]            Oct 16, 2018 10:00 AM CDT   (Arrive by 9:15 AM)   Video Hookup Visit with [facility] EEG TECH 4   [facility] EEG ([facility])    [facility]  [address] [id number] [phone]           [place]: Your appointment is scheduled at [facility]. [address] [place] [address]              Future tests that were ordered for you today     Open Future Orders        Priority Expected Expires Ordered    ORDER:  EEG video monitoring Routine  12/25/2018 9/26/2018    MR Brain w/o & w Contrast Routine  9/26/2019 9/26/2018    PET Brain Routine  9/26/2019 9/26/2018            Who to contact     Please call your clinic at [phone] to:    Ask questions about your health    Make or cancel appointments    Discuss your medicines    Learn about your test results    Speak to your doctor            Additional Information About Your Visit        Care EveryWhere ID     This is your Care EveryWhere ID. This could be used by other organizations to access your [facility] medical records  [id number]        Your Vitals Were     Pulse Temperature BMI (Body Mass Index)             94 98.7  F (37.1  C) 26.3 kg/m2          Blood Pressure from Last 3 Encounters:   09/26/18 118/74   07/27/18 136/78   01/04/18 118/83    Weight from Last 3 Encounters:   09/26/18 173 lb (78.5 kg)   07/27/18 172 lb 6.4 oz (78.2 kg)   01/04/18 189 lb 12.8 oz (86.1 kg)              We Performed the Following     Admit to Inpatient     MENTAL HEALTH REFERRAL  - Adult; Psychiatry and Medication Management; Psychiatry; Other: Not Listed - Enter Referral Details in Scheduling Comments Below; Patient call to schedule     Neuropsych Testing  Referral - [facility]          Today's Medication Changes          These changes are accurate as of 9/26/18  1:18 PM.  If you have any questions, ask your nurse or doctor.               These medicines have changed or have updated prescriptions.        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)   Changed by:  1, Me Ump Nurse        Increase as directed to a goal dose of 900mg twice daily   Quantity:  186 capsule   Refills:  3            Where to get your medicines      These medications were sent to [facility] [id number]  [place] - [address]   [address] [address]     Phone:  [phone]     gabapentin 300 MG capsule                Primary Care Provider Office Phone # Fax #    Bhaskar Morales [phone]       [facility] [address]   [place] [address]        Equal Access to Services     [facility]: Hadii aad ku hadasho Soomaali, waaxda luqadaha, qaybta kaalmada adeegyada, waxay idiin hayaan ginna sinclair . So [facility] [phone].    ATENCIÓN: Si habla español, tiene a andrade disposición servicios gratuitos de asistencia lingüística. [facility] [phone].    We comply with applicable federal civil rights laws and [place] laws. We do not discriminate on the basis of race, color, national origin, age, disability, sex, sexual orientation, or gender identity.            Thank you!     Thank you for choosing [facility]  for your care. Our goal is always to provide you with excellent care. Hearing back from our patients is one way we can continue to improve our services. Please take a few minutes to complete the written survey that you may receive in the mail after your visit with us. Thank you!             Your Updated Medication List - Protect  others around you: Learn how to safely use, store and throw away your medicines at www.disposemymeds.org.          This list is accurate as of 9/26/18  1:18 PM.  Always use your most recent med list.                   Brand Name Dispense Instructions for use Diagnosis    ALPRAZolam 0.5 MG tablet    XANAX     Take 0.5 mg by mouth as needed.        cholecalciferol 5000 units Tabs tablet    vitamin D3     Take 5,000 Units by mouth        COMPOUNDED NON-CONTROLLED SUBSTANCE - PHARMACY TO MIX COMPOUNDED MEDICATION    CMPD RX    30 g    Apply twice daily to affected areas as needed.    Perianal dermatitis       diazepam 5 MG/ML (HIGH CONC) solution    DIAZEPAM INTENSOL    30 mL    For generalized seizures give 5 mg.  May repeat and give 2.5 mg after 10 minutes if needed. 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  Not a prescription)      Inhale 200 mLs into the lungs Take one to two puffs every four to six hours    Localization-related (focal) (partial) epilepsy and epileptic syndromes with complex partial seizures, with intractable epilepsy, Variants of migraine, not  elsewhere classified, without mention of intractable migraine without mention of status migrainosus       mometasone 0.1 % ointment    ELOCON    15 g    Apply twice daily for 3 days as needed for itching in the perianal area    Perianal dermatitis       MULTIVITAMINS PO      2 Chew a day    Localization-related (focal) (partial) epilepsy and epileptic syndromes with complex partial seizures, with intractable epilepsy       * phenytoin 100 MG CR capsule    DILANTIN    270 capsule    TAKE 1 CAP BY MOUTH DAILY IN THE AM AND 2 CAPS IN THE PM    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 Max 3 tablets/24 hours.    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 Read the directions carefully, and ask your doctor or other care provider to review them with you.

## 2018-10-10 ENCOUNTER — OFFICE VISIT (OUTPATIENT)
Dept: NEUROLOGY | Facility: CLINIC | Age: 28
End: 2018-10-10
Payer: COMMERCIAL

## 2018-10-10 ENCOUNTER — TRANSFERRED RECORDS (OUTPATIENT)
Dept: HEALTH INFORMATION MANAGEMENT | Facility: CLINIC | Age: 28
End: 2018-10-10

## 2018-10-10 DIAGNOSIS — F41.9 ANXIETY: ICD-10-CM

## 2018-10-10 DIAGNOSIS — F09 MENTAL DISORDER DUE TO GENERAL MEDICAL CONDITION: ICD-10-CM

## 2018-10-10 DIAGNOSIS — G40.919 EPILEPSY WITH ALTERED CONSCIOUSNESS WITH INTRACTABLE EPILEPSY (H): Primary | ICD-10-CM

## 2018-10-10 DIAGNOSIS — F33.2 SEVERE RECURRENT MAJOR DEPRESSION WITHOUT PSYCHOTIC FEATURES (H): ICD-10-CM

## 2018-10-10 NOTE — LETTER
10/10/2018     RE: Tha Mcghee  : 1990   MRN: [id number]      Dear Colleague,    Thank you for referring your patient, Tha Mcghee, to the [facility] EPILEPS[facility]ARE at [facility]. Please see a copy of my visit note below.    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 He was unaccompanied to the evaluation.    BACKGROUND INFORMATION / INTERVIEW FINDINGS    Records indicate that Mr. Mgchee suffered a first onset seizure in . His seizures are characterized by a flush of anxiety, followed by confusion with lip-smacking, and a clicking sound in his throat. These events may progress to a generalized tonic-clonic seizure. His seizures have been generally infrequently occurring, but are medically intractable. There is now some discussion about potential surgical candidacy. Please see Dr. Rajni Araujo s notes, most recently from 2018 for more details and background information about his seizure disorder. The most recent MRI of his brain on 2017 documented  1. Regarding the orbits and globes, there is mildly increased T2 signal and mild enhancement within the left optic nerve. This appears new since , though is age indeterminate, representing optic neuritis versus optic neuropathy. 2. Regarding the remainder of the brain, no abnormalities are demonstrated. No evidence of demyelinating disorder within the brain.  Past EEGs have reportedly documented rare left temporal epileptiform discharges. His other medical history includes depression, vision loss in the left eye, optic neuritis, and migraine variant  headaches. The current evaluation was requested by Dr. Araujo, in this context.    On interview, Mr. Mcghee confirmed the above history. He reported that he has a couple generalized tonic-clonic of seizures per year. He estimates that he has  focal  seizures about once per month, but he acknowledged that he is not aware of when these seizures occur. He reported that his last known generalized tonic-clonic seizure occurred at the beginning of September, 2018. He stated that triggers for seizure onset include stress and heat.    Regarding cognition, Mr. Mcghee stated that he began noticing problems with his memory in approximately 2014. He indicated that these issues develop slowly and gradually, and have been worsening. He stated that he misplaces items, and also has trouble remembering language related information. He stated that he was prescribed topiramate, which also caused  a fog  with his thinking. He is still taking topiramate for headaches and seizures. He noted that he has always had symptoms of ADHD, and was diagnosed as such in middle school. He was prescribed a stimulant for a brief period of time, but discontinued this medicine. He otherwise denied having identified cognitive changes or difficulties.    Regarding mental health, Mr. Mcghee stated that he began experiencing symptoms of depression following seizure onset. He stated that he began taking antidepressant medication in 2012 or 2013, and has been taking a medication for depression since that time. He is currently prescribed venlafaxine. He has seen a psychiatrist in the past. He has never seen a therapist. He has never had a psychiatric hospitalization. He reported that he has been diagnosed with depression and anxiety, but denied other mental health related diagnoses. He denied suicidal ideation. He denied past suicide attempts.    Regarding other medical background, Mr. Mcghee denied prior TBI or stroke. He reported that he sleeps too much, and  indicated that he gets 10 hours of sleep per night on average. He reported that he slept normally the night before the current exam. He indicated that he has pain in his back and neck, and rated this pain at 6/10 at the time of the interview. Per records, his current medications include alprazolam, cholecalciferol, diazepam, divalproex, docusate sodium, gabapentin, medical cannabis, mometasone, multivitamin, phenytoin, rizatriptan, topiramate, and venlafaxine. He reported that he uses marijuana on a daily basis, with his last use the night before the exam. He reported that he uses marijuana several times per day. He reported that he used K2 (a synthetic drug) in the past. He reported that he had chemical dependency treatment in high school. He denied alcohol or tobacco use.    Mr. Mcghee lives with his parents. He manages his own basic and instrumental daily activities. He is not driving due to seizures. By way of background, he has never been , although he was engaged in the past. He does not have children. He has a younger brother and sister. Regarding educational background, he graduated from high school early. He stated that he had good grades. He completed some college coursework through his high school s [employer] program. Professionally, he worked as a cook in restaurants and hospitals. He last worked in April, 2018. He stated that he has lost jobs due to his seizures. He reported that he is in the process of applying for disability.    BEHAVIORAL OBSERVATIONS  Mr. Mcghee was polite and mostly cooperative with the exam. His speech was notable for mild difficulties with word finding, but was otherwise broadly normal. Comprehension was normal. His thought processes were notable for inattention. His mood was depressed and anxious with congruent affect. He did not appear to take all of the testing seriously. For example, he became frustrated easily, and engaged in inappropriate behaviors such as scribbling on test  protocols, and leanna a penis on one test protocol before scribbling it out. He also did not obtain passing scores on all measures of cognitive engagement. His engagement with testing was substandard at several points during the exam. The current results are likely to be an underrepresentation of his cognitive capacity in some cases.     RESULTS OF EXAM  His performances on measures of neuropsychological functioning were as follows:      He was fully oriented to time, place, and various aspects of personal information. Performance on a measure of single word reading was average. He did not obtain a passing score on a stand-alone measure of cognitive performance validity, and his performance was below criterion on one embedded metric of cognitive performance validity. He did obtain a passing score on a second embedded metric of cognitive performance validity. Auditory attention for digits was performed in the borderline impaired range. Mental calculations were performed in the average range. Visual attention for spatial sequences was performed in the impaired range. Learning of words in a list format was performed in the impaired range. Short delayed recall, and 30 minute delayed recall of the list words were both performed in the average range. Delayed recognition of list words was performed in the average range, and performed without error. Learning, and delayed recall of story information were both performed in the low average range. Delayed recognition of story information was performed in the average range. His drawing of a complicated geometric figure was performed below expectation, and was notable for inattention to the figure s details. Short delayed recall of the figure was performed in the average range. 30 minute delayed recall of the figure was performed in the average range. When he initially began drawing this figure, he leanna a penis on the figure before scribbling out his work. He then went on to draw  the figure from memory, and his performance was average. Delayed recognition of the figure s elements was performed in the borderline impaired range. Visuoperceptual matching of faces was performed within normal limits. Visual problem solving with blocks was performed in the low average range. Nonverbal reasoning for incomplete matrices was performed in the average range. Comprehension of phrases and short stories was performed in the impaired range. Verbal associative fluency was performed in the impaired range. Semantic verbal fluency was performed in the impaired range. Naming to confrontation was performed in the borderline impaired range. Verbal abstract reasoning was performed in the low average range. Fund of knowledge was performed in the average range. Speeded visual sequencing under focused attention was performed in the average range, with two errors. A similar measure with a divided attention component was performed in the impaired range, and discontinued with four errors. The patient discontinued this task, as he also scribbled all over the page on this measure. Performance on a measure of speeded single word reading was low average. Speeded color naming was performed in the borderline impaired range. Speeded inhibition of an overlearned response was performed in the average range. Speeded visuomotor coding was performed in the borderline impaired range. Speeded fine motor dexterity was performed in the impaired range, bilaterally.    He endorsed items consistent with severe symptoms of depression, and severe symptoms of anxiety on self-report measures. On a longer measure of personality and emotional functioning, he responded in a manner that is consistent with severe emotional distress and possible over-reporting of symptoms. Also noted was likely over-reporting of somatically based symptoms. Interpreted cautiously, his profile is consistent with widespread elevations and disturbance, but most notably  for somatization tendencies, persecutory ideation, and hypomanic activation. Also noted are demoralization, difficulties getting along with others, anxiety, and unusual experiences. Other elevations are consistent with feelings of helplessness, self-doubt, inefficacy, stress, worry, and proneness to anger. Other elevations are compatible with juvenile conduct problems, substance abuse, aggression, and a tendency to act out. Overall, this profile is consistent with aggressiveness, psychoticism, disconstraint, and negative emotionality.    IMPRESSIONS  Mr. Mcghee did not or was unable to provide full and consistent effort on the current exam. There are two leading explanations for this inconsistent engagement. A first explanation is that he is experiencing depression, anxiety, and psychological distress to such a degree that he was able to focus fully on testing. A second potential explanation is that he willfully attempted to do poorly on the tests. It is the case that he is reporting severe symptoms of depression, severe symptoms of anxiety, and personality testing is consistent with widespread and severe psychiatric disturbance. I do suspect that psychological factors are a contributor to the variable engagement with the testing in this exam. It could also be the case that marijuana use, and pain contributed to cognitive variability. Unfortunately, the test results in this exam are invalid and cannot be interpreted to determine if he has lateralizing or localizing cognitive dysfunction. Importantly, however, he clearly demonstrated the capacity for normal (likely average range) verbal memory, nonverbal memory, verbal reasoning, nonverbal problem solving, visuoperception, and aspects of executive functioning. I would predict a reduction in his subjective concerns about cognitive dysfunction and reduced cognitive variability following improved management of his mental health.    RECOMMENDATIONS  Preliminary results and  feedback were provided to the patient on the date of the appointment, and all questions were answered.    1. I strongly encourage the patient to follow through with the remaining portions of his pre-surgical workup for epilepsy.    2. If he continues to be considered for surgical candidacy, a [place] exam is recommended.     3. In spite of treatment, he remains severely depressed and anxious. If medically indicated, consideration could be given to modified treatment of his mental health. A referral to a psychiatrist is recommended. I think it will be important that his mental health providers give a  go-ahead  and weigh in on his psychiatric readiness for surgical treatment of his epilepsy.    4. Along similar lines, referral for psychotherapy services is recommended. One possible referral option would be [facility], with locations throughout the [facility]. They can be reached by calling [phone].    5. If he continues to have difficulties with memory, routine use of a memory notebook or other assistive device could be of benefit.    6.  Follow-up neuropsychological evaluation could be considered in the future, if clinically indicated. I do think it would be worthwhile to have a follow-up neuropsychology exam for pre-surgical planning purposes. However, I feel that this exam should be undertaken following more intense treatment of his mental health. It will be important to  him on the seriousness of the decisions that are being made regarding potentially performing neurosurgery to treat his epilepsy. It will be critical that he provide full and consistent effort on a future neuropsychology exam.    Chano Gamez, Ph.D., L.P., ABPP-CN   / Licensed Psychologist [id number]  Department of Rehabilitation Medicine  Division of Adult Neuropsychology  [facility]    Time spent:  four hours professional time, including record review, data integration, and report writing  (CPT 16066); three hours of testing administered by a psychometrist and interpreted by a neuropsychologist (CPT 25589). Diagnoses: G40.919, F06.8, F33.2, F41.9.

## 2018-10-10 NOTE — MR AVS SNAPSHOT
After Visit Summary   10/10/2018    Tha Mcghee    MRN: [id number]           Patient Information     Date Of Birth          1990        Visit Information        Provider Department      10/10/2018 12:30 PM Chano Gamez, PhD LP [facility]        Today's Diagnoses     Epilepsy with altered consciousness with intractable epilepsy (H)    -  1    Mental disorder due to general medical condition        Severe recurrent major depression without psychotic features (H)        Anxiety           Follow-ups after your visit        Your next 10 appointments already scheduled     Oct 23, 2018 11:00 AM CDT   (Arrive by 10:15 AM)   Video Hookup Visit with [facility] TECH 4   [facility] ([facility])    [address] [id number] [phone]           [place]: Your appointment is scheduled at [address] [address]            Nov 07, 2018  1:00 PM CST   Return Visit with Rajni Araujo MD   [facility]    [address], [address]  [facility] [address] [phone]              Who to contact     Please call your clinic at [phone] to:    Ask questions about your health    Make or cancel appointments    Discuss your medicines    Learn about your test results    Speak to your doctor            Additional Information About Your Visit        Care EveryWhere ID     This is your Care EveryWhere ID. This could be used by other organizations to access your [place] medical records  [id number]         Blood Pressure from Last 3 Encounters:   09/26/18 118/74   07/27/18 136/78   01/04/18 118/83    Weight from Last 3 Encounters:   09/26/18 78.5 kg (173 lb)   07/27/18 78.2 kg (172 lb 6.4 oz)   01/04/18 86.1 kg (189 lb 12.8 oz)              We Performed the Following     [id number] TESTING, PER HR/PSYCHOLOGIST     NEUROPSYCH  TESTING BY [facility]        Primary Care Provider Office Phone # Fax #    Bhaskar Morales [phone]       [facility] [address]   [facility] [address]        Equal Access to Services     KARTHIK CURTIS : Hadii aad ku hadjudyo Soomaali, waaxda luqadaha, qaybta kaalmada adeegyada, redd munozn ginna olmedo laenedeliamalika silva. So [facility] [phone].    ATENCIÓN: Si habla español, tiene a andrade disposición servicios gratuitos de asistencia lingüística. Llame al [phone].    We comply with applicable federal civil rights laws and [place] laws. We do not discriminate on the basis of race, color, national origin, age, disability, sex, sexual orientation, or gender identity.            Thank you!     Thank you for choosing [facility] EPILEPSY [facility]  for your care. Our goal is always to provide you with excellent care. Hearing back from our patients is one way we can continue to improve our services. Please take a few minutes to complete the written survey that you may receive in the mail after your visit with us. Thank you!             Your Updated Medication List - Protect others around you: Learn how to safely use, store and throw away your medicines at www.disposemymeds.org.          This list is accurate as of 10/10/18 11:59 PM.  Always use your most recent med list.                   Brand Name Dispense Instructions for use Diagnosis    ALPRAZolam 0.5 MG tablet    XANAX     Take 0.5 mg by mouth as needed.        cholecalciferol 5000 units Tabs tablet    vitamin D3     Take 5,000 Units by mouth        COMPOUNDED NON-CONTROLLED SUBSTANCE - PHARMACY TO MIX COMPOUNDED MEDICATION    CMPD RX    30 g    Apply twice daily to affected areas as needed.    Perianal dermatitis       diazepam 5 MG/ML (HIGH CONC) solution    DIAZEPAM INTENSOL    30 mL    For generalized seizures give 5 mg.  May repeat and give 2.5 mg after 10 minutes if needed. 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  Not a prescription)      Inhale 200 mLs into the lungs Take one to two puffs every four to six hours    Localization-related (focal) (partial) epilepsy and epileptic syndromes with complex partial seizures, with intractable epilepsy, Variants of migraine, not elsewhere classified, without mention of intractable migraine without mention of status migrainosus       mometasone 0.1 % ointment    ELOCON    15 g    Apply twice daily for 3 days as needed for itching in the perianal area    Perianal dermatitis       MULTIVITAMINS PO      2 Chew a day    Localization-related (focal) (partial) epilepsy and epileptic syndromes with complex partial seizures, with intractable epilepsy       * phenytoin 100 MG CR capsule    DILANTIN    270 capsule    TAKE 1 CAP BY MOUTH DAILY IN THE AM AND 2 CAPS IN THE PM    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)       * phenytoin 30 MG CR capsule    DILANTIN    180 capsule    Take two po Q AM    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)       rizatriptan 10  MG tablet    MAXALT    18 tablet    Take 1 tablet (10 mg) by mouth at onset of headache for migraine May repeat in 2 hours. Max 3 tablets/24 hours.    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 Read the directions carefully, and ask your doctor or other care provider to review them with you.

## 2018-10-12 NOTE — PROGRESS NOTES
__ Orientation            Time          ___0____        Place        ____2____        Personal Info.     ____4____    WAIS-IV   FSIQ____VCI_____PRI_____ WMI____PSI____     Raw  Age SS  __Similarities  __18___ ___6___  __Vocabulary  _______ _______  __Information  ___12___          ___9___  __Comprehension _______ _______  __Block Design  __28___ ___6___  __Matrix Reas.  ___16___ ___8___  __Visual Puzzles _______ _______  __Picture Comp. _______ _______  __Figure Weights _______ _______  __Digit Span  __ 19___ ___5___  __Arithmetic  __ 12___             ___9___  __L-N Sequencing _______ _______  __Symbol Search _______ _______  __Coding  __44___ ___5___  __Cancellation  _______ _______    __AVLT  Trial   1         2         3          4          5           B        6            _4_    _5_     _7_     _9_     _11_     _6_     _11__      Raw  Zscore  Learning   _36___   -2.84  Short Delay   __11___  -.22  30 min. delayed recall  __11___  -.12  Recognition hits   _15___   -.64  Recognition false positives __0___        -    __Fletcher/Jazmín Complex Figure Test    Raw  T-score   %ile  Copy   __30.0__        -                  <1  Imm. Recall __25.0__ __52___ _58__  30  Delay _22.5__  ___46___ __34__  Recognition __19__   ___35___ __7__  Time               _313 __                                   2-5        __BVRT  (form C)  Copy E-10 rating ____/4     Raw  Expected  Correct  ___6____ ___8____  Incorrect ___5____ ___3____    __WRAT-4   Reading SS = 103     %ile = 58    GE = 12.9    __COWA   Raw__14___ Tscore__21___   __Semantic Fluency/Animals   Raw = 13 Tscore = 26  __BNT   Raw = 49/60 Tscore = 36  __Complex Ideational Material   Raw = 8  Tscore = 17    __Trail Making Test   A =   25                 Errors = 2    %ile = 44    B =   DC-203         Errors = 4    %ile =   __Stroop                       Raw        Tscore        Word = _88_      __40__        Color =  _54_      __33__        C/W   =  _41_     __  46__    __Benton Facial Recognition   Raw = 49  %ile = WNL    __Grooved Pegboard   RH = 89          Tscore = 25     Drops = 2   LH =  97          TScore = 29      Drops = 0    __BDI-II   Raw__36__ Interp.__Severe___   __BAI     Raw__38__ Interp. __Severe___  __MMPI-2RF    __WMS-III   LM1 = 30 SS = 7   LM2 = 15  SS = 7   LM2R = 25 Zscore = -.13  Spatial Span= 7  SS= 1    __Dot Counting   E-Score= 20

## 2018-10-12 NOTE — PROGRESS NOTES
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 He was unaccompanied to the evaluation.    BACKGROUND INFORMATION / INTERVIEW FINDINGS    Records indicate that Mr. Mcghee suffered a first onset seizure in April, 2011. His seizures are characterized by a flush of anxiety, followed by confusion with lip-smacking, and a clicking sound in his throat. These events may progress to a generalized tonic-clonic seizure. His seizures have been generally infrequently occurring, but are medically intractable. There is now some discussion about potential surgical candidacy. Please see Dr. Rajni Araujo s notes, most recently from July 27, 2018 for more details and background information about his seizure disorder. The most recent MRI of his brain on June 27, 2017 documented  1. Regarding the orbits and globes, there is mildly increased T2 signal and mild enhancement within the left optic nerve. This appears new since 2013, though is age indeterminate, representing optic neuritis versus optic neuropathy. 2. Regarding the remainder of the brain, no abnormalities are demonstrated. No evidence of demyelinating disorder within the brain.  Past EEGs have reportedly documented rare left temporal epileptiform discharges. 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 The current evaluation was requested by Dr. Araujo, in this context.    On interview, Mr. Mcghee confirmed the above history. He reported that he has a couple generalized tonic-clonic of seizures per year. He estimates that he has  focal  seizures about once per  month, but he acknowledged that he is not aware of when these seizures occur. He reported that his last known generalized tonic-clonic seizure occurred at the beginning of September, 2018. He stated that triggers for seizure onset include stress and heat.    Regarding cognition, Mr. Mcghee stated that he began noticing problems with his memory in approximately 2014. He indicated that these issues develop slowly and gradually, and have been worsening. He stated that he misplaces items, and also has trouble remembering language related information. He stated that he was prescribed topiramate, which also caused  a fog  with his thinking. He is still taking topiramate for headaches and seizures. He noted that he has always had symptoms of ADHD, and was diagnosed as such in middle school. He was prescribed a stimulant for a brief period of time, but discontinued this medicine. He otherwise denied having identified cognitive changes or difficulties.    Regarding mental health, Mr. Mcghee stated that he began experiencing symptoms of depression following seizure onset. He stated that he began taking antidepressant medication in 2012 or 2013, and has been taking a medication for depression since that time. He is currently prescribed venlafaxine. He has seen a psychiatrist in the past. He has never seen a therapist. He has never had a psychiatric hospitalization. He reported that he has been diagnosed with depression and anxiety, but denied other mental health related diagnoses. He denied suicidal ideation. He denied past suicide attempts.    Regarding other medical background, Mr. Mcghee denied prior TBI or stroke. He reported that he sleeps too much, and indicated that he gets 10 hours of sleep per night on average. He reported that he slept normally the night before the current exam. He indicated that he has pain in his back and neck, and rated this pain at 6/10 at the time of the interview. Per records, his current medications  include alprazolam, cholecalciferol, diazepam, divalproex, docusate sodium, gabapentin, medical cannabis, mometasone, multivitamin, phenytoin, rizatriptan, topiramate, and venlafaxine. He reported that he uses marijuana on a daily basis, with his last use the night before the exam. He reported that he uses marijuana several times per day. He reported that he used K2 (a synthetic drug) in the past. He reported that he had chemical dependency treatment in high school. He denied alcohol or tobacco use.    Mr. Mcghee lives with his parents. He manages his own basic and instrumental daily activities. He is not driving due to seizures. By way of background, he has never been , although he was engaged in the past. He does not have children. He has a younger brother and sister. Regarding educational background, he graduated from high school early. He stated that he had good grades. He completed some college coursework through his high school s [employer] program. Professionally, he worked as a cook in restaurants and hospitals. He last worked in April, 2018. He stated that he has lost jobs due to his seizures. He reported that he is in the process of applying for disability.    BEHAVIORAL OBSERVATIONS  Mr. Mcghee was polite and mostly cooperative with the exam. His speech was notable for mild difficulties with word finding, but was otherwise broadly normal. Comprehension was normal. His thought processes were notable for inattention. His mood was depressed and anxious with congruent affect. He did not appear to take all of the testing seriously. For example, he became frustrated easily, and engaged in inappropriate behaviors such as scribbling on test protocols, and leanna a penis on one test protocol before scribbling it out. He also did not obtain passing scores on all measures of cognitive engagement. His engagement with testing was substandard at several points during the exam. The current results are likely to be an  underrepresentation of his cognitive capacity in some cases.     RESULTS OF EXAM  His performances on measures of neuropsychological functioning were as follows:      He was fully oriented to time, place, and various aspects of personal information. Performance on a measure of single word reading was average. He did not obtain a passing score on a stand-alone measure of cognitive performance validity, and his performance was below criterion on one embedded metric of cognitive performance validity. He did obtain a passing score on a second embedded metric of cognitive performance validity. Auditory attention for digits was performed in the borderline impaired range. Mental calculations were performed in the average range. Visual attention for spatial sequences was performed in the impaired range. Learning of words in a list format was performed in the impaired range. Short delayed recall, and 30 minute delayed recall of the list words were both performed in the average range. Delayed recognition of list words was performed in the average range, and performed without error. Learning, and delayed recall of story information were both performed in the low average range. Delayed recognition of story information was performed in the average range. His drawing of a complicated geometric figure was performed below expectation, and was notable for inattention to the figure s details. Short delayed recall of the figure was performed in the average range. 30 minute delayed recall of the figure was performed in the average range. When he initially began drawing this figure, he leanna a penis on the figure before scribbling out his work. He then went on to draw the figure from memory, and his performance was average. Delayed recognition of the figure s elements was performed in the borderline impaired range. Visuoperceptual matching of faces was performed within normal limits. Visual problem solving with blocks was performed in  the low average range. Nonverbal reasoning for incomplete matrices was performed in the average range. Comprehension of phrases and short stories was performed in the impaired range. Verbal associative fluency was performed in the impaired range. Semantic verbal fluency was performed in the impaired range. Naming to confrontation was performed in the borderline impaired range. Verbal abstract reasoning was performed in the low average range. Fund of knowledge was performed in the average range. Speeded visual sequencing under focused attention was performed in the average range, with two errors. A similar measure with a divided attention component was performed in the impaired range, and discontinued with four errors. The patient discontinued this task, as he also scribbled all over the page on this measure. Performance on a measure of speeded single word reading was low average. Speeded color naming was performed in the borderline impaired range. Speeded inhibition of an overlearned response was performed in the average range. Speeded visuomotor coding was performed in the borderline impaired range. Speeded fine motor dexterity was performed in the impaired range, bilaterally.    He endorsed items consistent with severe symptoms of depression, and severe symptoms of anxiety on self-report measures. On a longer measure of personality and emotional functioning, he responded in a manner that is consistent with severe emotional distress and possible over-reporting of symptoms. Also noted was likely over-reporting of somatically based symptoms. Interpreted cautiously, his profile is consistent with widespread elevations and disturbance, but most notably for somatization tendencies, persecutory ideation, and hypomanic activation. Also noted are demoralization, difficulties getting along with others, anxiety, and unusual experiences. Other elevations are consistent with feelings of helplessness, self-doubt, inefficacy,  stress, worry, and proneness to anger. Other elevations are compatible with juvenile conduct problems, substance abuse, aggression, and a tendency to act out. Overall, this profile is consistent with aggressiveness, psychoticism, disconstraint, and negative emotionality.    IMPRESSIONS  Mr. Mcghee did not or was unable to provide full and consistent effort on the current exam. There are two leading explanations for this inconsistent engagement. A first explanation is that he is experiencing depression, anxiety, and psychological distress to such a degree that he was able to focus fully on testing. A second potential explanation is that he willfully attempted to do poorly on the tests. It is the case that he is reporting severe symptoms of depression, severe symptoms of anxiety, and personality testing is consistent with widespread and severe psychiatric disturbance. I do suspect that psychological factors are a contributor to the variable engagement with the testing in this exam. It could also be the case that marijuana use, and pain contributed to cognitive variability. Unfortunately, the test results in this exam are invalid and cannot be interpreted to determine if he has lateralizing or localizing cognitive dysfunction. Importantly, however, he clearly demonstrated the capacity for normal (likely average range) verbal memory, nonverbal memory, verbal reasoning, nonverbal problem solving, visuoperception, and aspects of executive functioning. I would predict a reduction in his subjective concerns about cognitive dysfunction and reduced cognitive variability following improved management of his mental health.    RECOMMENDATIONS  Preliminary results and feedback were provided to the patient on the date of the appointment, and all questions were answered.    1. I strongly encourage the patient to follow through with the remaining portions of his pre-surgical workup for epilepsy.    2. If he continues to be considered  for surgical candidacy, a [place] exam is recommended.     3. In spite of treatment, he remains severely depressed and anxious. If medically indicated, consideration could be given to modified treatment of his mental health. A referral to a psychiatrist is recommended. I think it will be important that his mental health providers give a  go-ahead  and weigh in on his psychiatric readiness for surgical treatment of his epilepsy.    4. Along similar lines, referral for psychotherapy services is recommended. One possible referral option would be [facility], with locations throughout the [facility]. They can be reached by calling [phone].    5. If he continues to have difficulties with memory, routine use of a memory notebook or other assistive device could be of benefit.    6.  Follow-up neuropsychological evaluation could be considered in the future, if clinically indicated. I do think it would be worthwhile to have a follow-up neuropsychology exam for pre-surgical planning purposes. However, I feel that this exam should be undertaken following more intense treatment of his mental health. It will be important to  him on the seriousness of the decisions that are being made regarding potentially performing neurosurgery to treat his epilepsy. It will be critical that he provide full and consistent effort on a future neuropsychology exam.    Chano Gamez, Ph.D., L.P., ABPP-CN   / Licensed Psychologist [id number]  Department of Rehabilitation Medicine  Division of Adult Neuropsychology  [facility]    Time spent:  four hours professional time, including record review, data integration, and report writing (CPT 02553); three hours of testing administered by a psychometrist and interpreted by a neuropsychologist (CPT 06702). Diagnoses: G40.919, F06.8, F33.2, F41.9.

## 2018-10-23 ENCOUNTER — ALLIED HEALTH/NURSE VISIT (OUTPATIENT)
Dept: NEUROLOGY | Facility: CLINIC | Age: 28
End: 2018-10-23
Attending: PSYCHIATRY & NEUROLOGY
Payer: COMMERCIAL

## 2018-10-23 ENCOUNTER — HOSPITAL ENCOUNTER (INPATIENT)
Facility: CLINIC | Age: 28
LOS: 3 days | Discharge: HOME | End: 2018-10-26
Attending: PSYCHIATRY & NEUROLOGY | Admitting: PSYCHIATRY & NEUROLOGY
Payer: COMMERCIAL

## 2018-10-23 DIAGNOSIS — G40.219 PARTIAL EPILEPSY WITH IMPAIRMENT OF CONSCIOUSNESS, INTRACTABLE (H): Primary | ICD-10-CM

## 2018-10-23 LAB
ALBUMIN SERPL-MCNC: 3.7 G/DL (ref 3.4–5)
ALP SERPL-CCNC: 67 U/L (ref 40–150)
ALT SERPL W P-5'-P-CCNC: 22 U/L (ref 0–70)
ANION GAP SERPL CALCULATED.3IONS-SCNC: 9 MMOL/L (ref 3–14)
AST SERPL W P-5'-P-CCNC: 16 U/L (ref 0–45)
BILIRUB SERPL-MCNC: 0.1 MG/DL (ref 0.2–1.3)
BUN SERPL-MCNC: 15 MG/DL (ref 7–30)
CALCIUM SERPL-MCNC: 8.5 MG/DL (ref 8.5–10.1)
CHLORIDE SERPL-SCNC: 106 MMOL/L (ref 94–109)
CO2 SERPL-SCNC: 25 MMOL/L (ref 20–32)
CREAT SERPL-MCNC: 0.84 MG/DL (ref 0.66–1.25)
ERYTHROCYTE [DISTWIDTH] IN BLOOD BY AUTOMATED COUNT: 12.7 % (ref 10–15)
GABAPENTIN SERPLBLD-MCNC: 2.9 UG/ML
GFR SERPL CREATININE-BSD FRML MDRD: >90 ML/MIN/1.7M2
GLUCOSE SERPL-MCNC: 92 MG/DL (ref 70–99)
HCT VFR BLD AUTO: 38.3 % (ref 40–53)
HGB BLD-MCNC: 12.9 G/DL (ref 13.3–17.7)
MCH RBC QN AUTO: 32 PG (ref 26.5–33)
MCHC RBC AUTO-ENTMCNC: 33.7 G/DL (ref 31.5–36.5)
MCV RBC AUTO: 95 FL (ref 78–100)
PHENYTOIN FREE SERPL-MCNC: 2.08 UG/ML
PHENYTOIN SERPL-MCNC: 13.7 MG/L (ref 10–20)
PLATELET # BLD AUTO: 256 10E9/L (ref 150–450)
POTASSIUM SERPL-SCNC: 4.2 MMOL/L (ref 3.4–5.3)
PROT SERPL-MCNC: 7 G/DL (ref 6.8–8.8)
RBC # BLD AUTO: 4.03 10E12/L (ref 4.4–5.9)
SODIUM SERPL-SCNC: 140 MMOL/L (ref 133–144)
VALPROATE FREE SERPL-MCNC: 20.1 UG/ML
VALPROATE SERPL-MCNC: 112 MG/L (ref 50–100)
WBC # BLD AUTO: 5.9 10E9/L (ref 4–11)

## 2018-10-23 PROCEDURE — 80186 ASSAY OF PHENYTOIN FREE: CPT | Performed by: PSYCHIATRY & NEUROLOGY

## 2018-10-23 PROCEDURE — 80201 ASSAY OF TOPIRAMATE: CPT | Performed by: PSYCHIATRY & NEUROLOGY

## 2018-10-23 PROCEDURE — 40000141 ZZH STATISTIC PERIPHERAL IV START W/O US GUIDANCE

## 2018-10-23 PROCEDURE — 80164 ASSAY DIPROPYLACETIC ACD TOT: CPT | Performed by: PSYCHIATRY & NEUROLOGY

## 2018-10-23 PROCEDURE — 80171 DRUG SCREEN QUANT GABAPENTIN: CPT | Performed by: PSYCHIATRY & NEUROLOGY

## 2018-10-23 PROCEDURE — 80185 ASSAY OF PHENYTOIN TOTAL: CPT | Performed by: PSYCHIATRY & NEUROLOGY

## 2018-10-23 PROCEDURE — 80165 DIPROPYLACETIC ACID FREE: CPT | Performed by: PSYCHIATRY & NEUROLOGY

## 2018-10-23 PROCEDURE — 95951 ZZHC EEG VIDEO < 12 HR: CPT | Mod: 52,ZF

## 2018-10-23 PROCEDURE — 25000132 ZZH RX MED GY IP 250 OP 250 PS 637: Performed by: PSYCHIATRY & NEUROLOGY

## 2018-10-23 PROCEDURE — 12000001 ZZH R&B MED SURG/OB UMMC

## 2018-10-23 PROCEDURE — 85027 COMPLETE CBC AUTOMATED: CPT | Performed by: PSYCHIATRY & NEUROLOGY

## 2018-10-23 PROCEDURE — 36415 COLL VENOUS BLD VENIPUNCTURE: CPT | Performed by: PSYCHIATRY & NEUROLOGY

## 2018-10-23 PROCEDURE — 80053 COMPREHEN METABOLIC PANEL: CPT | Performed by: PSYCHIATRY & NEUROLOGY

## 2018-10-23 RX ORDER — LORAZEPAM 2 MG/ML
2 INJECTION INTRAMUSCULAR
Status: DISCONTINUED | OUTPATIENT
Start: 2018-10-23 | End: 2018-10-26 | Stop reason: HOSPADM

## 2018-10-23 RX ORDER — PHENYTOIN SODIUM 100 MG/1
200 CAPSULE, EXTENDED RELEASE ORAL AT BEDTIME
Status: DISCONTINUED | OUTPATIENT
Start: 2018-10-23 | End: 2018-10-26 | Stop reason: HOSPADM

## 2018-10-23 RX ORDER — DOCUSATE SODIUM 100 MG/1
100 CAPSULE, LIQUID FILLED ORAL 2 TIMES DAILY PRN
Status: DISCONTINUED | OUTPATIENT
Start: 2018-10-23 | End: 2018-10-26 | Stop reason: HOSPADM

## 2018-10-23 RX ORDER — LIDOCAINE 40 MG/G
CREAM TOPICAL
Status: DISCONTINUED | OUTPATIENT
Start: 2018-10-23 | End: 2018-10-26 | Stop reason: HOSPADM

## 2018-10-23 RX ORDER — DOCUSATE SODIUM 100 MG/1
100 CAPSULE, LIQUID FILLED ORAL 2 TIMES DAILY
Status: DISCONTINUED | OUTPATIENT
Start: 2018-10-23 | End: 2018-10-26 | Stop reason: HOSPADM

## 2018-10-23 RX ORDER — TOPIRAMATE 50 MG/1
50 TABLET, FILM COATED ORAL ONCE
Status: COMPLETED | OUTPATIENT
Start: 2018-10-23 | End: 2018-10-23

## 2018-10-23 RX ORDER — MULTIVITAMIN,THERAPEUTIC
1 TABLET ORAL DAILY
Status: DISCONTINUED | OUTPATIENT
Start: 2018-10-23 | End: 2018-10-26 | Stop reason: HOSPADM

## 2018-10-23 RX ORDER — PROMETHAZINE HYDROCHLORIDE 25 MG/1
25 TABLET ORAL EVERY 6 HOURS PRN
COMMUNITY
End: 2019-03-05

## 2018-10-23 RX ORDER — GABAPENTIN 300 MG/1
300 CAPSULE ORAL 2 TIMES DAILY
Status: DISCONTINUED | OUTPATIENT
Start: 2018-10-23 | End: 2018-10-26 | Stop reason: HOSPADM

## 2018-10-23 RX ORDER — DIVALPROEX SODIUM 500 MG/1
1500 TABLET, EXTENDED RELEASE ORAL AT BEDTIME
Status: DISCONTINUED | OUTPATIENT
Start: 2018-10-23 | End: 2018-10-26 | Stop reason: HOSPADM

## 2018-10-23 RX ORDER — GINSENG 100 MG
CAPSULE ORAL 3 TIMES DAILY PRN
Status: DISCONTINUED | OUTPATIENT
Start: 2018-10-23 | End: 2018-10-26 | Stop reason: HOSPADM

## 2018-10-23 RX ORDER — ACETAMINOPHEN 325 MG/1
650 TABLET ORAL EVERY 4 HOURS PRN
Status: DISCONTINUED | OUTPATIENT
Start: 2018-10-23 | End: 2018-10-26 | Stop reason: HOSPADM

## 2018-10-23 RX ADMIN — GABAPENTIN 300 MG: 300 CAPSULE ORAL at 22:18

## 2018-10-23 RX ADMIN — DOCUSATE SODIUM 100 MG: 100 CAPSULE, LIQUID FILLED ORAL at 22:17

## 2018-10-23 RX ADMIN — TOPIRAMATE 50 MG: 50 TABLET ORAL at 22:21

## 2018-10-23 RX ADMIN — VITAMIN D, TAB 1000IU (100/BT) 5000 UNITS: 25 TAB at 22:18

## 2018-10-23 RX ADMIN — THERA TABS 1 TABLET: TAB at 15:29

## 2018-10-23 RX ADMIN — DIVALPROEX SODIUM 1500 MG: 500 TABLET, EXTENDED RELEASE ORAL at 22:18

## 2018-10-23 RX ADMIN — PHENYTOIN SODIUM 200 MG: 100 CAPSULE, EXTENDED RELEASE ORAL at 22:18

## 2018-10-23 ASSESSMENT — ACTIVITIES OF DAILY LIVING (ADL)
AMBULATION: 0-->INDEPENDENT
ADLS_ACUITY_SCORE: 8
DRESS: 0-->INDEPENDENT
RETIRED_COMMUNICATION: 0-->UNDERSTANDS/COMMUNICATES WITHOUT DIFFICULTY
COGNITION: 0 - NO COGNITION ISSUES REPORTED
TRANSFERRING: 0-->INDEPENDENT
SWALLOWING: 0-->SWALLOWS FOODS/LIQUIDS WITHOUT DIFFICULTY
TOILETING: 0-->INDEPENDENT
BATHING: 0-->INDEPENDENT
FALL_HISTORY_WITHIN_LAST_SIX_MONTHS: NO
ADLS_ACUITY_SCORE: 8
RETIRED_EATING: 0-->INDEPENDENT

## 2018-10-23 NOTE — MR AVS SNAPSHOT
After Visit Summary   10/23/2018    Tha Mcghee    MRN: [id number]           Patient Information     Date Of Birth          1990        Visit Information        Provider Department      10/23/2018 11:00 AM [facility] EEG TECH 4 [facility] EEG        Today's Diagnoses     Partial epilepsy with impairment of consciousness, intractable (H)    -  1       Follow-ups after your visit        Your next 10 appointments already scheduled     Oct 24, 2018  7:00 AM CDT   24 Hour Video Visit with [facility] EEG TECH 4   [facility] EEG ([facility])    [facility]  [address] [id number] [phone]           [place]: Your appointment is scheduled at [facility]. [address] [place] [address]            Nov 07, 2018  1:00 PM CST   Return Visit with Rajni [facility]jo MD   [facility]    [address], [address]  [facility] [id number] [phone]              Who to contact     Please call your clinic at [phone] to:    Ask questions about your health    Make or cancel appointments    Discuss your medicines    Learn about your test results    Speak to your doctor            Additional Information About Your Visit        Care EveryWhere ID     This is your Care EveryWhere ID. This could be used by other organizations to access your [place] medical records  [id number]         Blood Pressure from Last 3 Encounters:   09/26/18 118/74   07/27/18 136/78   01/04/18 118/83    Weight from Last 3 Encounters:   10/23/18 80.3 kg (177 lb)   09/26/18 78.5 kg (173 lb)   07/27/18 78.2 kg (172 lb 6.4 oz)              Today, you had the following     No orders found for display         Today's Medication Changes      Notice     This visit is during an admission. Changes to the med list made in this visit will be reflected in the After Visit Summary of the admission.             Primary Care Provider  Office Phone # Fax #    Bhaskar Morales [phone]       [facility] [address]   [facility] [address]        Equal Access to Services     KARTHIK CURTIS : Hadjanice hernandez hadjudyo Soomaali, waaxda luqadaha, qaybta kaalmada adeegyada, redd munozmalika nievesjonathon olmedo laMatthieuyobany silva. So [facility] [phone].    ATENCIÓN: Si habla español, tiene a andrade disposición servicios gratuitos de asistencia lingüística. Llame al [phone].    We comply with applicable federal civil rights laws and [place] laws. We do not discriminate on the basis of race, color, national origin, age, disability, sex, sexual orientation, or gender identity.            Thank you!     Thank you for choosing [facility]  for your care. Our goal is always to provide you with excellent care. Hearing back from our patients is one way we can continue to improve our services. Please take a few minutes to complete the written survey that you may receive in the mail after your visit with us. Thank you!             Your Updated Medication List - Protect others around you: Learn how to safely use, store and throw away your medicines at www.disposemymeds.org.      Notice     This visit is during an admission. Changes to the med list made in this visit will be reflected in the After Visit Summary of the admission.

## 2018-10-23 NOTE — IP AVS SNAPSHOT
[address] [address]    Phone:  [phone]                                       After Visit Summary   10/23/2018    Tha Mcghee    MRN: [id number]           After Visit Summary Signature Page     I have received my discharge instructions, and my questions have been answered. I have discussed any challenges I see with this plan with the nurse or doctor.    ..........................................................................................................................................  Patient/Patient Representative Signature      ..........................................................................................................................................  Patient Representative Print Name and Relationship to Patient    ..................................................               ................................................  Date                                   Time    ..........................................................................................................................................  Reviewed by Signature/Title    ...................................................              ..............................................  Date                                               Time          22EPIC Rev 08/18
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
## 2018-10-23 NOTE — IP AVS SNAPSHOT
MRN:[id number]                      After Visit Summary   10/23/2018    Tha Mcghee    MRN: [id number]           Thank you!     Thank you for choosing [facility] for your care. Our goal is always to provide you with excellent care. Hearing back from our patients is one way we can continue to improve our services. Please take a few minutes to complete the written survey that you may receive in the mail after you visit with us. Thank you!        Patient Information     Date Of Birth          1990        About your hospital stay     You were admitted on:  October 23, 2018 You last received care in the:  [address] [facility]    You were discharged on:  October 26, 2018        Reason for your hospital stay       Inpatient continuous video EEG monitoring performed to record and characterize seizures, as part of an assessment for epilepsy surgery.  Multiple seizures were recorded.   Medications were adjusted; topiramate (Topamax) discontinued and lacosamide (Vimpat) started.  Further outpatient testing will need to be performed. You should call to schedule your PET scan, and set up an appointment with the psychiatrist ( [facility] A [place] will also need to be scheduled, you will be contacted about this, and any other psychometric testing that needs to be performed.                  Who to Call     For medical emergencies, please call [phone].  For non-urgent questions about your medical care, please call your primary care provider or clinic, [phone]          Attending Provider     Provider Specialty    Neel Preston MD Neurology    [facility], Tha Recinos MD Neurology       Primary Care Provider Office Phone # Fax #    Bhaskar Morales [phone]       When to contact your care team       If you have epilepsy/seizure related questions or concerns, please call 's office, [facility] at [phone]                  After Care  Instructions     Activity       Your activity upon discharge: State laws prohibit operating a motor vehicle following any seizure or other episode with loss of awareness, or inability to sit up (Varies by state, be aware and comply with the regulations applicable to you). State law may require the licensed  to report any future episode to the DMV . Avoid any activities that might lead to self-injury or injury of others following any event with impaired awareness or impaired motor control. Such activities include but are not limited to holding babies or young children at heights from which they might be injured if dropped, bathing infants or young children in situations in which they might drown without continuous interactive care by an adult who is fully capable at all times during the bath, operating power cutting or other tools, handling firearms, exposure to heights from which you might fall, exposure to vessels with hot cooking oil or water, and swimming alone.            Diet       Follow this diet upon discharge: as prior to admission            Discharge Instructions       Missed Doses:    IF YOU REALIZE WITHIN 24 HOURS:    ...You MISSED ONE DOSE of your anticonvulsant medication(s), take the missed dose immediately unless it is time for your next scheduled dose. If that is the case, take your next scheduled dose, wait two hours, and then take the missed dose.    ...You MISSED TWO OR MORE DOSES, take one of the missed doses immediately, even if it is time for your next scheduled dose. Then call the [facility] clinic to schedule an appointment.     IF YOU REALIZE NOW YOU MISSED A DOSE MORE THAN 24 HOURS AGO, josiah it on your calendar. DO NOT take an extra dose.      An extra dose of an antiepileptic drug is not serious. Ordinarily, at most, you may experience increased side effects for several hours.     When to call 911 for Seizures:    Call 911 if:    The person does not start breathing within  "1 minute after the seizure. If this happens call 911 immediately and start CPR.    The person sustains an injury    The person has one seizure right after another without regaining consciousness in between    A GTC seizure lasts over 5 minutes    The person requests an ambulance  ---  Try to maintain a regular daily routine, with 8 hours of sleep, regular healthy meals, and routine activity/exercise.   Learn stress reduction techniques, such as controlled breathing exercises and meditation/mindfullness.   Avoid getting over tired, alcohol, and unprescribed drugs.  Avoid herbal remedies, as these may contain substances that alter how your body handles medications, or have undesirable effects on you.  Discuss any over the counter medications with your pharmacist or other health care provider.  ---  If your mood worsens and you have thoughts of hurting yourself or others, seek help immediately. This can be done by calling emergency services \"9-1-1\", or calling other mental health resources such as National Suicide Prevention LifeLine 6-884-228-SQOC (5996), your local [facility] help line, or calling your mental health provider.  ---    Knowing how to relax is not something our society teaches. In fact, it teaches us to do the opposite. The relaxation response was developed by the [place]  and physician Dr. Alexandre Liu. You can google both \"Alexandre Liu\" and \"Relaxation Response\" to find a multitude of information. Breath work is the center of this process and can reduce anxiety and stress markedly.    Dr. Liu talks about the need for four things:    1. A quiet environment    3. A passive mental attitude  2. A comfortable position    4. A mental device    The passive mental attitude means that when distracting thoughts come into your mind while deep breathing, you do not  them. Instead of saying to yourself, \"Darn, I just can't calm my mind,\" say, \"Oh, that was interesting,\" and then refocus on the " breath. Our brains are a complex network of connections that were meant to fire. It takes practice, lots of practice to focus and breathe. Try not to  your thoughts as good or bad, they just are.    The mental device can be just about anything. It can be meditation, prayer, saying one sound out loud, whatever you want it to be. The key is to breathe from your belly. Place your hand on your belly. As you inhale to the count of 3 through your nose, feel your hand rising. This helps to remind you to get the breath all the way down into the belly. Exhale to the count of 6 (or double whatever the count of the inhale) through pursed lips. Pursed lips keep the exhale lasting longer and decreases the loss of carbon dioxide. When we breathe fast from our chest, we lose too much carbon dioxide which can cause many of the physical symptoms of anxiety (tremor, lightheadedness, ringing in the ear, feeling of unreality).     So, inhale through your nose, think 'All is well,' Exhale through pursed lips, thinking 'This and every day'. Do this twice a day, first thing in the morning and before dinner in the evening. Do it for a minimum of 10 minutes, a maximum of 20 minutes (Dr. Alexandre Liu's Relaxation Response). These two phrases are just suggestions. Change it to whatever resonates with you.    Remember, practice this daily, so that you will have this skill when you need it most.                  Follow-up Appointments     Adult [facility] Follow-up and recommended labs and tests       [facility] Nurse will call 10/29/2018 at 1.30pm --o0o-- Clinic visit 11/7/2018 at 1 pm with  (please make sure your parents attend this appointment with you, so they can ask surgery related questions as needed)    Call [facility] at [phone] to arrange an appointment with  for a presurgical mental health screen                  Your next 10 appointments already scheduled     Oct 27, 2018  7:00 AM CDT   24 Hour Video Visit  "with [facility] EEG TECH 4   [facility] EEG ([facility])    [facility]  [address] [address] [phone]           [place]: Your appointment is scheduled at [facility]. [address], [place] [address]            Oct 29, 2018  1:30 PM CDT   Telephone Call with [facility] Nurse 1   [facility] ([facility])    [address], [address]  [facility] [place] [facility] [address] [phone]           Note: this is not an onsite visit; there is no need to come to the facility.            Nov 07, 2018  1:00 PM CST   Return Visit with Rajni Araujo MD   [facility] ([facility])    [address], [address]  [place] [place] [facility] [place] [id number] [phone]              Further instructions from your care team       For 5 days after discharge, take Vimpat 100mg (half tab) every morning, and 200mg (one tab) every evening, then increase to 200mg twice daily    Pending Results     No orders found from 10/21/2018 to 10/24/2018.            Statement of Approval     Ordered          10/26/18 1158  I have reviewed and agree with all the recommendations and orders detailed in this document.  EFFECTIVE NOW     Approved and electronically signed by:  Tha Calvo MD             Admission Information     Date & Time Provider Department Dept. Phone    10/23/2018 Tha Calvo MD [address] [facility] [phone]      Your Vitals Were     Blood Pressure Pulse Temperature Respirations Height Weight    119/75 (BP Location: Left arm) 94 98.1  F (36.7  C) (Oral) 16 1.753 m (5' 9\") 80.3 kg (177 lb)    Pulse Oximetry BMI (Body Mass Index)                100% 26.14 kg/m2          [facility] Information     [facility] lets you send messages to your doctor, view your test results, renew your prescriptions, schedule appointments and more. To sign up, go to [email or web address] . Click on \"Log in\" " "on the left side of the screen, which will take you to the Welcome page. Then click on \"Sign up Now\" on the right side of the page.     You will be asked to enter the access code listed below, as well as some personal information. Please follow the directions to create your username and password.     Your access code is: [email or web address]  Expires: 2019 12:07 PM     Your access code will  in 90 days. If you need help or a new code, please call your [place] clinic or [phone].        Care EveryWhere ID     This is your Care EveryWhere ID. This could be used by other organizations to access your [place] medical records  [id number]        Equal Access to Services     KARTHIK CURTIS : Bridger Hernandez, vinayak vaughan, duran mccarthy, redd silva. So [facility] [phone].    ATENCIÓN: Si habla español, tiene a andrade disposición servicios gratuitos de asistencia lingüística. Llame al [phone].    We comply with applicable federal civil rights laws and [place] laws. We do not discriminate on the basis of race, color, national origin, age, disability, sex, sexual orientation, or gender identity.               Review of your medicines      START taking        Dose / Directions    lacosamide 200 MG Tabs tablet   Commonly known as:  VIMPAT   Used for:  Partial epilepsy with impairment of consciousness, intractable (H)        Dose:  200 mg   Take 1 tablet (200 mg) by mouth 2 times daily   Quantity:  60 tablet   Refills:  1         CONTINUE these medicines which have NOT CHANGED        Dose / Directions    ALPRAZolam 0.5 MG tablet   Commonly known as:  XANAX        Dose:  0.5 mg   Take 0.5 mg by mouth as needed.   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  May repeat and give 2.5 mg after 10 minutes if needed. 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  Not a prescription)   Used for:  Localization-related (focal) (partial) epilepsy and epileptic syndromes with complex partial seizures, with intractable epilepsy, Variants of migraine, not elsewhere classified, without mention of intractable migraine without mention of status migrainosus        Dose:  200 mL   Inhale 200 mLs into the lungs Take one to two puffs every four to six hours, red solution ([employer])   Refills:  0       MULTIVITAMINS PO   Used for:  Localization-related (focal) (partial) epilepsy and epileptic syndromes with complex partial seizures, with intractable epilepsy        2 Chew a day   Refills:  0       * phenytoin 100 MG  CR capsule   Commonly known as:  DILANTIN   Used for:  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)        TAKE 1 CAP BY MOUTH DAILY IN THE AM AND 2 CAPS IN THE PM   Quantity:  270 capsule   Refills:  3       * phenytoin 30 MG CR capsule   Commonly known as:  DILANTIN   Used for:  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 Max 3 tablets/24 hours.   Quantity:  18 tablet   Refills:  3       venlafaxine 75 MG 24 hr capsule   Commonly known as:  EFFEXOR XR   Used for:  Episode of recurrent major depressive disorder, unspecified depression episode severity (H)        Dose:  225 mg   Take 3 capsules (225 mg) by mouth every morning   Quantity:  270 capsule   Refills:  3       * Notice:  This list has 4 medication(s) that are the same as other medications prescribed for you. Read the directions carefully, and ask your doctor or other care provider to review them with you.      STOP taking     topiramate 100 MG tablet   Commonly known as:  TOPAMAX                Where to get your medicines      Some of these will need a paper prescription and others can be bought over the counter. Ask your nurse if you have questions.     Bring a paper prescription for each of these medications     lacosamide 200 MG Tabs tablet                Protect others around you: Learn how to safely use, store and throw away your medicines at www.disposemymeds.org.              Medication List: This is a list of all your medications and when to take them. Check marks below indicate your daily home schedule. Keep this list as a reference.      Medications           Morning Afternoon Evening Bedtime As Needed    ALPRAZolam 0.5 MG tablet   Commonly known as:  XANAX   Take 0.5 mg by mouth as needed.                                cholecalciferol 5000 units Tabs tablet   Commonly known as:  vitamin D3   Take 5,000 Units by mouth   Last time this was given:  5,000 Units on 10/26/2018 12:09 AM                                diazepam 5 MG/ML (HIGH CONC) solution   Commonly known as:  DIAZEPAM INTENSOL   For generalized seizures give 5 mg.  May repeat and give 2.5 mg after 10 minutes if needed. Do not exceed 7.5mg                                * divalproex sodium extended-release 500 MG 24 hr tablet   Commonly known as:  DEPAKOTE ER   Take 2 tabs in the morning and 3 tabs at night.   Last time this was given:  1,250 mg on 10/26/2018 10:05 AM                                * divalproex sodium extended-release 250 MG 24 hr tablet   Commonly known as:  DEPAKOTE ER   Take 1 tab in the morning (along with two 500mg tabs)   Last time this was given:  1,250 mg on 10/26/2018 10:05 AM                                docusate sodium 100 MG capsule   Commonly known as:  COLACE   Take 100 mg by mouth 2 times daily   Last time this was given:  100 mg on 10/26/2018 10:07 AM                                gabapentin 300 MG capsule   Commonly known as:  NEURONTIN   Increase as directed to a goal dose of 900mg twice daily   Last time this was given:  300 mg on 10/26/2018 10:08 AM                                lacosamide 200 MG Tabs tablet   Commonly known as:  VIMPAT   Take 1 tablet (200 mg) by mouth 2 times daily   Last time this was given:  200 mg on 10/26/2018 10:13 AM                                medical cannabis inhalation (Patient's own supply.  Not a prescription)   Inhale 200 mLs into the lungs Take  one to two puffs every four to six hours, red solution ([employer])                                MULTIVITAMINS PO   2 Chew a day                                * phenytoin 100 MG CR capsule   Commonly known as:  DILANTIN   TAKE 1 CAP BY MOUTH DAILY IN THE AM AND 2 CAPS IN THE PM   Last time this was given:  160 mg on 10/26/2018 10:04 AM                                * phenytoin 30 MG CR capsule   Commonly known as:  DILANTIN   Take two po Q AM   Last time this was given:  160 mg on 10/26/2018 10:04 AM                                promethazine 25 MG tablet   Commonly known as:  PHENERGAN   Take 25 mg by mouth every 6 hours as needed for nausea (take with maxalt for nausea with migraines)                                rizatriptan 10 MG tablet   Commonly known as:  MAXALT   Take 1 tablet (10 mg) by mouth at onset of headache for migraine May repeat in 2 hours. Max 3 tablets/24 hours.                                venlafaxine 75 MG 24 hr capsule   Commonly known as:  EFFEXOR XR   Take 3 capsules (225 mg) by mouth every morning   Last time this was given:  225 mg on 10/26/2018 10:10 AM                                * Notice:  This list has 4 medication(s) that are the same as other medications prescribed for you. Read the directions carefully, and ask your doctor or other care provider to review them with you.

## 2018-10-23 NOTE — PLAN OF CARE
Problem: Patient Care Overview  Goal: Plan of Care/Patient Progress Review  Outcome: No Change  Pt admitted this afternoon for VEEG monitoring and presurgical eval. AVSS. A&Ox4. Neuros intact. VEEG leads intact, no events witnessed or reported. Denied pain. On a regular diet with good PO intake. Voiding spon. Had BM PTA. Up with SBA, GB. Needs reminders with sz precautions, pt refused to let staff in the bathroom with him and then locked himself in the bathroom. Charge RN aware and pt re-educated on safety and precautions while in the hospital, stated he understands. BA on. Continue with POC.

## 2018-10-24 ENCOUNTER — ALLIED HEALTH/NURSE VISIT (OUTPATIENT)
Dept: NEUROLOGY | Facility: CLINIC | Age: 28
End: 2018-10-24
Attending: PSYCHIATRY & NEUROLOGY
Payer: COMMERCIAL

## 2018-10-24 DIAGNOSIS — G40.219 PARTIAL EPILEPSY WITH IMPAIRMENT OF CONSCIOUSNESS, INTRACTABLE (H): Primary | ICD-10-CM

## 2018-10-24 LAB — TOPIRAMATE SERPL-MCNC: 4.3 UG/ML (ref 5–20)

## 2018-10-24 PROCEDURE — 25000132 ZZH RX MED GY IP 250 OP 250 PS 637: Performed by: PSYCHIATRY & NEUROLOGY

## 2018-10-24 PROCEDURE — 93010 ELECTROCARDIOGRAM REPORT: CPT | Performed by: INTERNAL MEDICINE

## 2018-10-24 PROCEDURE — 25000128 H RX IP 250 OP 636: Performed by: STUDENT IN AN ORGANIZED HEALTH CARE EDUCATION/TRAINING PROGRAM

## 2018-10-24 PROCEDURE — 95951 ZZHC EEG VIDEO EACH 24 HR: CPT | Mod: ZF

## 2018-10-24 PROCEDURE — 12000001 ZZH R&B MED SURG/OB UMMC

## 2018-10-24 PROCEDURE — 25000132 ZZH RX MED GY IP 250 OP 250 PS 637: Performed by: INTERNAL MEDICINE

## 2018-10-24 PROCEDURE — 93005 ELECTROCARDIOGRAM TRACING: CPT

## 2018-10-24 RX ORDER — DIPHENHYDRAMINE HYDROCHLORIDE 50 MG/ML
12.5 INJECTION INTRAMUSCULAR; INTRAVENOUS EVERY 6 HOURS PRN
Status: DISCONTINUED | OUTPATIENT
Start: 2018-10-24 | End: 2018-10-26 | Stop reason: HOSPADM

## 2018-10-24 RX ORDER — DIPHENHYDRAMINE HYDROCHLORIDE 50 MG/ML
12.5 INJECTION INTRAMUSCULAR; INTRAVENOUS EVERY 6 HOURS PRN
Status: DISCONTINUED | OUTPATIENT
Start: 2018-10-24 | End: 2018-10-24

## 2018-10-24 RX ORDER — KETOROLAC TROMETHAMINE 30 MG/ML
30 INJECTION, SOLUTION INTRAMUSCULAR; INTRAVENOUS ONCE
Status: COMPLETED | OUTPATIENT
Start: 2018-10-24 | End: 2018-10-24

## 2018-10-24 RX ORDER — PROCHLORPERAZINE MALEATE 5 MG
5-10 TABLET ORAL EVERY 6 HOURS PRN
Status: DISCONTINUED | OUTPATIENT
Start: 2018-10-24 | End: 2018-10-26 | Stop reason: HOSPADM

## 2018-10-24 RX ADMIN — VITAMIN D, TAB 1000IU (100/BT) 5000 UNITS: 25 TAB at 22:02

## 2018-10-24 RX ADMIN — DIVALPROEX SODIUM 1250 MG: 250 TABLET, FILM COATED, EXTENDED RELEASE ORAL at 10:28

## 2018-10-24 RX ADMIN — DOCUSATE SODIUM 100 MG: 100 CAPSULE, LIQUID FILLED ORAL at 10:29

## 2018-10-24 RX ADMIN — KETOROLAC TROMETHAMINE 30 MG: 30 INJECTION, SOLUTION INTRAMUSCULAR at 19:47

## 2018-10-24 RX ADMIN — GABAPENTIN 300 MG: 300 CAPSULE ORAL at 22:01

## 2018-10-24 RX ADMIN — DIVALPROEX SODIUM 1500 MG: 500 TABLET, EXTENDED RELEASE ORAL at 22:01

## 2018-10-24 RX ADMIN — THERA TABS 1 TABLET: TAB at 10:29

## 2018-10-24 RX ADMIN — PHENYTOIN SODIUM 160 MG: 100 CAPSULE, EXTENDED RELEASE ORAL at 10:30

## 2018-10-24 RX ADMIN — DIPHENHYDRAMINE HYDROCHLORIDE 12.5 MG: 50 INJECTION, SOLUTION INTRAMUSCULAR; INTRAVENOUS at 19:47

## 2018-10-24 RX ADMIN — PROCHLORPERAZINE MALEATE 5 MG: 5 TABLET, FILM COATED ORAL at 22:04

## 2018-10-24 RX ADMIN — ACETAMINOPHEN 650 MG: 325 TABLET, FILM COATED ORAL at 18:45

## 2018-10-24 RX ADMIN — DOCUSATE SODIUM 100 MG: 100 CAPSULE, LIQUID FILLED ORAL at 22:02

## 2018-10-24 RX ADMIN — VENLAFAXINE HYDROCHLORIDE 225 MG: 150 CAPSULE, EXTENDED RELEASE ORAL at 10:29

## 2018-10-24 RX ADMIN — PEDIATRIC MULTIPLE VITAMIN W/ MINERALS & C CHEW TAB 60 MG 2 TABLET: CHEW TAB at 10:29

## 2018-10-24 RX ADMIN — PHENYTOIN SODIUM 200 MG: 100 CAPSULE, EXTENDED RELEASE ORAL at 22:02

## 2018-10-24 RX ADMIN — GABAPENTIN 300 MG: 300 CAPSULE ORAL at 10:30

## 2018-10-24 ASSESSMENT — ACTIVITIES OF DAILY LIVING (ADL)
ADLS_ACUITY_SCORE: 8

## 2018-10-24 NOTE — MR AVS SNAPSHOT
After Visit Summary   10/24/2018    Tha Mcghee    MRN: [id number]           Patient Information     Date Of Birth          1990        Visit Information        Provider Department      10/24/2018 7:00 AM [facility] EEG TECH 4 [facility] EEG        Today's Diagnoses     Partial epilepsy with impairment of consciousness, intractable (H)    -  1       Follow-ups after your visit        Your next 10 appointments already scheduled     Nov 07, 2018  1:00 PM CST   Return Visit with Rajni Araujo MD   [facility] ([facility])    [address], [address]  [facility] [address] [phone]              Who to contact     Please call your clinic at [phone] to:    Ask questions about your health    Make or cancel appointments    Discuss your medicines    Learn about your test results    Speak to your doctor            Additional Information About Your Visit        Care EveryWhere ID     This is your Care EveryWhere ID. This could be used by other organizations to access your [place] medical records  [id number]         Blood Pressure from Last 3 Encounters:   10/24/18 109/69   09/26/18 118/74   07/27/18 136/78    Weight from Last 3 Encounters:   10/23/18 80.3 kg (177 lb)   09/26/18 78.5 kg (173 lb)   07/27/18 78.2 kg (172 lb 6.4 oz)              Today, you had the following     No orders found for display         Today's Medication Changes      Notice     This visit is during an admission. Changes to the med list made in this visit will be reflected in the After Visit Summary of the admission.             Primary Care Provider Office Phone # Fax #    Bhaskar Morales [phone]       [address]   [facility] [address]        Equal Access to Services     [facility] : Bridger Hernandez, vinayak vaughan, redd pardo. So [facility] [phone].    ATENCIÓN: Si madeleinela  español, tiene a andrade disposición servicios gratuitos de asistencia lingüística. Dominic stewart [phone].    We comply with applicable federal civil rights laws and [place] laws. We do not discriminate on the basis of race, color, national origin, age, disability, sex, sexual orientation, or gender identity.            Thank you!     Thank you for choosing [facility]  for your care. Our goal is always to provide you with excellent care. Hearing back from our patients is one way we can continue to improve our services. Please take a few minutes to complete the written survey that you may receive in the mail after your visit with us. Thank you!             Your Updated Medication List - Protect others around you: Learn how to safely use, store and throw away your medicines at www.disposemymeds.org.      Notice     This visit is during an admission. Changes to the med list made in this visit will be reflected in the After Visit Summary of the admission.

## 2018-10-24 NOTE — PLAN OF CARE
"Time/length of seizure event: started 0840, unsure of end time, pt unable to articulate   Movements (head, eyes, extremities):patient alert, slow to respond, no particular movement/activity  Orientation during seizure:A&Ox4  After seizure, remembers the unique phrase given during seizure: No  After seizure, remembers an unnamed visual object shown during seizure:Yes, with choices  Able to identify/name aloud item during seizure:Yes  Able to follow command during seizure:Yes  Able to read test sentence aloud during seizure:Yes  Able to read test sentence aloud again after the seizure:Yes  After seizure, remembers name of object shown during seizure:Yes, with choices  Orientation and level of consciousness after seizure:A&Ox4  VS and oxygen saturation during/after seizure:AVSS  Recall of the event?:\"I didn't feel like I had one\".  Was this a typical seizure/event?:Patient denied any aura or particular feeling during event. Stated he felt like it was one because he can tell how his body feels afterwards, tired.   Presence of aura or pre-seizure activity: Denied  Incontinence:No    Will continue to monitor  "

## 2018-10-24 NOTE — PLAN OF CARE
Problem: Patient Care Overview  Goal: Plan of Care/Patient Progress Review  Outcome: No Change  AVSS. A&Ox4. Neuros intact. VEEG leads intact, x1 event this AM, please see previous note. Still on depakote and dilantin. Denied pain. On a regular diet with good PO intake. Voiding spon. Had BM today. Up with SBA, GB. Ambulated hallways x2. Compliant with sz precautions this shift. BA/CA on, VPM also in place. Continue with POC.    Around 1840 pt c/o HA that he usually gets post ictally. PRN tylenol given, waiting for PRN benadryl and PRN compazine order which pt states usually works. MD notified.

## 2018-10-24 NOTE — PLAN OF CARE
Problem: Patient Care Overview  Goal: Plan of Care/Patient Progress Review  Outcome: No Change  Care from 1900 - 2330  Status: Pt admitted for seizure monitoring and pre-surgical evaulation. No events witnessed or reported  VS: VSS on RA  Neuros: Neuros intact   GI: Tolerating regular diet, no BM this shift   : Voiding spontaneously ?  IV: PIV SL  Activity: Up SBA with GB. Pt refused to allow staff to be in the bathroom with him, allows staff to stand outside the bathroom with door open. Set off bed alarm x 1, got out of bed without staff. Pt reminded on the importance of calling for assistance   Pain: Denies   Skin: VEEG leads intact   Plan of care: Topamax discontinued after this evening. Continue to monitor for events and educate pt on seizure precautions

## 2018-10-24 NOTE — PROGRESS NOTES
"[facility] - Epilepsy Service Daily Note      Interval History:   Patient had a seizure this morning with right hemispheric origin. Official EEG report pending. No major complaints. Overall feeling well. Parents at bedside.     Review of System:   Denies nausea, vomitting, abdominal pain, headaches, dizziness and chest pain.     Medications:   Antiepileptic Medications Home Doses:   1. Depakote ER 1250- 1500   2. Dilantin 160- 200  3. Topamax 150- 150  4. Gabapentin 300- 300    Antiepileptic Medications Current Doses:   1. Depakote ER 1250- 1500  2. Dilantin 160- 200  3. Gabapentin 300- 300    Exam: Blood pressure 119/71, pulse 82, temperature 97  F (36.1  C), temperature source Oral, resp. rate 18, height 1.753 m (5' 9\"), weight 80.3 kg (177 lb), SpO2 100 %.  General appearance: No acute distress. Awake, alert and oriented x 4.   Neuro: EOMs intact, face symmetric, no pronator drift, equal  strength, normal to light touch with no sensory deficits noted, finger to nose normal, no focal deficits noted.    Video EEG:   10/24/2018: Pending.       Assessment/ Plan: Patient seen and discussed with attending physician Dr. Calvo.   1. Patient is a 28 year old right handed male with a h/o depression, anxiety, left eye optic neuritis in 2017 and seizure disorder since 2011 is admitted for pre surgical evaluation. He had one seizure this morning with right hemispheric origin.        - Continue video EGG monitoring  - Seizure precautions  - SCDs while in bed for DVT prevention  - Ambulate with staff at least three times a day  - No medication changes today        Discharge planning:  Type of target event identified: staring spell with altered awareness, event with alteration in awareness - partial motor component and convulsion   Number of events: more needed  Discharge medication plan: To be determined  Further Imaging studies needed prior to discharge: No imaging required prior to " discharge  Discharge transportation: Patient to arrange  Other pertinent issues/goals for discharge: None at this time    Total time: 25 minute was spent in the care of this patient. The patient agrees with the above mentioned plan of care. I answered all the patient's questions and addressed immediate concerns. More than 50% of time spent consisted of counseling and coordinating care, including discussion of the diagnostic significance of EEG findings, anti-seizure medication management, and planning for discharge home.     Deborah Webb, CNP  Neurology

## 2018-10-24 NOTE — PLAN OF CARE
Problem: Patient Care Overview  Goal: Plan of Care/Patient Progress Review  Outcome: No Change    VSS. Denied pain. Neuros intact. VEEG leads in place; no events witnessed or reported. Seizure precautions maintained; pt compliant this shift. PIV SL. On regular diet. Denied issues with bladder and bowel. Up with SBA and GB. Off 1 of 3 PTA AED's. Continue with POC.

## 2018-10-25 ENCOUNTER — DOCUMENTATION ONLY (OUTPATIENT)
Dept: PHARMACY | Facility: CLINIC | Age: 28
End: 2018-10-25

## 2018-10-25 ENCOUNTER — ALLIED HEALTH/NURSE VISIT (OUTPATIENT)
Dept: NEUROLOGY | Facility: CLINIC | Age: 28
End: 2018-10-25
Attending: PSYCHIATRY & NEUROLOGY
Payer: COMMERCIAL

## 2018-10-25 DIAGNOSIS — G40.219 PARTIAL EPILEPSY WITH IMPAIRMENT OF CONSCIOUSNESS, INTRACTABLE (H): Primary | ICD-10-CM

## 2018-10-25 LAB — INTERPRETATION ECG - MUSE: NORMAL

## 2018-10-25 PROCEDURE — 25000132 ZZH RX MED GY IP 250 OP 250 PS 637: Performed by: PSYCHIATRY & NEUROLOGY

## 2018-10-25 PROCEDURE — 25000132 ZZH RX MED GY IP 250 OP 250 PS 637: Performed by: INTERNAL MEDICINE

## 2018-10-25 PROCEDURE — 12000001 ZZH R&B MED SURG/OB UMMC

## 2018-10-25 PROCEDURE — 25000131 ZZH RX MED GY IP 250 OP 636 PS 637: Performed by: INTERNAL MEDICINE

## 2018-10-25 PROCEDURE — 95951 ZZHC EEG VIDEO EACH 24 HR: CPT | Mod: ZF

## 2018-10-25 RX ORDER — LACOSAMIDE 100 MG/1
100 TABLET ORAL 2 TIMES DAILY
Status: COMPLETED | OUTPATIENT
Start: 2018-10-25 | End: 2018-10-26

## 2018-10-25 RX ORDER — LACOSAMIDE 200 MG/1
200 TABLET ORAL ONCE
Status: COMPLETED | OUTPATIENT
Start: 2018-10-25 | End: 2018-10-25

## 2018-10-25 RX ORDER — ONDANSETRON 4 MG/1
4 TABLET, FILM COATED ORAL EVERY 6 HOURS PRN
Status: DISCONTINUED | OUTPATIENT
Start: 2018-10-25 | End: 2018-10-26 | Stop reason: HOSPADM

## 2018-10-25 RX ORDER — LACOSAMIDE 200 MG/1
200 TABLET ORAL 2 TIMES DAILY
Status: DISCONTINUED | OUTPATIENT
Start: 2018-10-26 | End: 2018-10-26 | Stop reason: HOSPADM

## 2018-10-25 RX ADMIN — LACOSAMIDE 200 MG: 200 TABLET, FILM COATED ORAL at 12:22

## 2018-10-25 RX ADMIN — ACETAMINOPHEN 650 MG: 325 TABLET, FILM COATED ORAL at 16:46

## 2018-10-25 RX ADMIN — PROCHLORPERAZINE MALEATE 5 MG: 5 TABLET, FILM COATED ORAL at 23:27

## 2018-10-25 RX ADMIN — LACOSAMIDE 100 MG: 100 TABLET, FILM COATED ORAL at 17:27

## 2018-10-25 RX ADMIN — PHENYTOIN SODIUM 160 MG: 100 CAPSULE, EXTENDED RELEASE ORAL at 10:11

## 2018-10-25 RX ADMIN — THERA TABS 1 TABLET: TAB at 10:12

## 2018-10-25 RX ADMIN — DIVALPROEX SODIUM 1250 MG: 250 TABLET, FILM COATED, EXTENDED RELEASE ORAL at 10:12

## 2018-10-25 RX ADMIN — DOCUSATE SODIUM 100 MG: 100 CAPSULE, LIQUID FILLED ORAL at 10:12

## 2018-10-25 RX ADMIN — ONDANSETRON HYDROCHLORIDE 4 MG: 4 TABLET, FILM COATED ORAL at 22:48

## 2018-10-25 RX ADMIN — PEDIATRIC MULTIPLE VITAMIN W/ MINERALS & C CHEW TAB 60 MG 2 TABLET: CHEW TAB at 10:12

## 2018-10-25 RX ADMIN — VENLAFAXINE HYDROCHLORIDE 225 MG: 150 CAPSULE, EXTENDED RELEASE ORAL at 10:13

## 2018-10-25 RX ADMIN — GABAPENTIN 300 MG: 300 CAPSULE ORAL at 10:13

## 2018-10-25 ASSESSMENT — ACTIVITIES OF DAILY LIVING (ADL)
ADLS_ACUITY_SCORE: 8

## 2018-10-25 NOTE — PLAN OF CARE
Problem: Patient Care Overview  Goal: Plan of Care/Patient Progress Review  Outcome: No Change    VSS except intermittent tachy. Denied pain. Neuros intact. VEEG leads in place; no events witnessed or reported. Seizure precautions maintained; pt compliant this shift. VPM in room for safety.  PIV SL. On regular diet-pt states he did not eat anything yesterday because he doesn't want to eat hospital food.  Pt encouraged to have family bring food in for him. Denied issues with bladder and bowel. Up with SBA and GB. Off 1 of 4 PTA AED's. Continue with POC.

## 2018-10-25 NOTE — PROGRESS NOTES
"[facility] - Epilepsy Service Daily Note      Interval History:   Three seizures yesterday and another today. Yesterday following seizures he had a bad migraine and required toradol and compazine. Slept ok and no headache today.     Review of System:   Denies nausea, vomiting, abdominal pain, headaches, dizziness and chest pain.     Medications:   Antiepileptic Medications Home Doses:   1. Depakote ER 1250- 1500   2. Dilantin 160- 200  3. Topamax 150- 150  4. Gabapentin 300- 300    Antiepileptic Medications Current Doses:   1. Depakote ER 1250- 1500  2. Dilantin 160- 200  3. Gabapentin 300- 300    Exam: Blood pressure 144/85, pulse 112, temperature 98.4  F (36.9  C), temperature source Oral, resp. rate 18, height 1.753 m (5' 9\"), weight 80.3 kg (177 lb), SpO2 100 %.   General: NAD  Head: NC/AT  Neuro: Alert and oriented. Speech fluent. Hearing intact to normal conversation.Pupils equal, round and reactive to light. EOM's intact. Face symmetric, tongue midline. No drift or pronation. Intact FNF.     EEG: focal cerebral dysfunction over the right hemisphere.  The slowing may be associated with structural lesion.  Alternatively, persistent focal slowing can be seen in people with focal epilepsy who do not have structural lesions on neuro imaging    Assessment/ Plan: Patient seen and discussed with attending physician Dr. Calvo.   1. Patient is a 28 year old right handed male with a h/o depression, anxiety, left eye optic neuritis in 2017 and seizure disorder since 2011 is admitted for pre surgical evaluation. He had one seizure this morning with right hemispheric origin. Has had a total of 4 seizures recorded. Will not restart topiramate as this would interfere with [place] exam, will initiate lacosamide.     -continue vEEG monitoring   -give lacosamide 200 now, 100 this evening and 100 hs  -tomorrow start lacosamide to 200-200  -brain PET as outpatient   -anticipate discharge " tomorrow      Anastasia Hansen PA-C    Type of target event identified: staring spell with altered awareness   Number of events: adequate number recorded  Discharge medication plan: lacosamide and PTA divalproex, phenytoin and gabapentin  Further Imaging studies needed prior to discharge: PET   Discharge transportation: not discussed  Other pertinent issues/goals for discharge: not at this time     Total time: 20 minutes was spent in the care of this patient. The patient agrees with the above mentioned plan of care. I answered all the patient's questions and addressed immediate concerns. More than 50% of time spent consisted of counseling and coordinating care, including discussion of the diagnostic significance of EEG findings, anti-seizure medication management, and planning for discharge home

## 2018-10-25 NOTE — PROCEDURES
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  Seizures have persisted in spite of treatment with many different medications.  He is being evaluated for resective epilepsy surgery.  He is currently being treated with Depakote 2750 mg per day, gabapentin 600 mg per day, phenytoin 360 mg per day, and topiramate 300 mg per day.  Topiramate was tapered and discontinued during this study to facilitate recording of seizures.     TECHNICAL SUMMARY: This continuous video- EEG monitoring procedure was performed with 23 scalp electrodes in 10-20 electrode system placements, and additional scalp, precordial and other surface electrodes used for electrical referencing and artifact detection.  Video monitoring was utilized and periodically reviewed by EEG technologists and the physician for electroclinical correlations.    FINDINGS:  During quiet wakefulness, 9 Hz posterior dominant rhythm, symmetrical, reactive.  Desynchronized activity while awake.  Persistent right temporal slowing, usually polymorphic but at times somewhat more rhythmic is seen during some portions of the recording.  It is relatively absent during other portions of the recording.  Amplitude maximum is at T2, F8 and T4 with lower amplitudes at T6. Hyperventilation is performed and increases the focal slowing.  It does not induce other abnormalities.  Photic stimulation does not induce any abnormalities.  Sleep is recorded with vertex waves and sleep spindles.  Right temporal slowing is much less obvious during sleep.      OTHER INTERICTAL ABNORMALITIES:  No epileptiform discharges.      ICTAL ABNORMALITIES:  No electrographic seizures.       IMPRESSION:  Abnormal.  There is evidence of persistent focal cerebral dysfunction over the right hemisphere.  The slowing may be associated with structural lesion.  Alternatively, persistent focal slowing can be seen in people with focal epilepsy who do not have structural lesions on neuro imaging.  Epileptiform activity or seizures were not seen.         SANTHOSH BORJA MD             D: 10/24/2018   T: 10/25/2018   MT:       Name:     SANTHOSH HODGES   MRN:      [id number]        Account:        [id number]   :      1990           Procedure Date: 10/23/2018      Document: [id number]

## 2018-10-25 NOTE — PLAN OF CARE
Problem: Patient Care Overview  Goal: Plan of Care/Patient Progress Review  Outcome: No Change  AVSS. A&Ox4. Neuros intact. VEEG leads intact, x1 event this AM around 1100 with VEEG techs during baseline testing. Restarted on all AED's this afternoon, pt hoping to discharge home tomorrow. Denied pain today. On a regular diet with good PO intake. Voiding spon. Up with SBA, GB. Ambulated hallways x1. Non compliant with sz precautions this AM, wanted to sit at the EOB and didn't want gait belt on when using the bathroom, re educated on importance of safety while in the hospital for sz monitoring. Pt has now been compliant. BA/CA on, VPM also in place. Continue with POC.

## 2018-10-25 NOTE — PLAN OF CARE
Problem: Patient Care Overview  Goal: Plan of Care/Patient Progress Review  Time/length of seizure event: Approx 2240  Movements (head, eyes, extremities): Head/eyes deviated LEFT, tremor of lips/face, hands tremoring.   Orientation during seizure: Pt non-responsive   After seizure, remembers the unique phrase given during seizure: NA  After seizure, remembers an unnamed visual object shown during seizure: NA   Able to identify/name aloud item during seizure:No  Able to follow command during seizure:No   Able to read test sentence aloud during seizure:No   Able to read test sentence aloud again after the seizure:Yes   After seizure, remembers name of object shown during seizure:NA   Orientation and level of consciousness after seizure: Oriented, very tired   VS and oxygen saturation during/after seizure: During/immediately after seizure, HR was up to 160. Then came down to 88 after event.   Recall of the event?: No   Was this a typical seizure/event?: Yes   Presence of aura or pre-seizure activity: No   Incontinence: No

## 2018-10-25 NOTE — MR AVS SNAPSHOT
After Visit Summary   10/25/2018    Tha Mcghee    MRN: [id number]           Patient Information     Date Of Birth          1990        Visit Information        Provider Department      10/25/2018 7:00 AM [facility] EEG TECH 4 [facility] EEG        Today's Diagnoses     Partial epilepsy with impairment of consciousness, intractable (H)    -  1       Follow-ups after your visit        Your next 10 appointments already scheduled     Nov 07, 2018  1:00 PM CST   Return Visit with Rajni Araujo MD   [facility]psy [facility] ([facility])    [address] [place], [address]  [facility] [address] [phone]              Who to contact     Please call your clinic at [phone] to:    Ask questions about your health    Make or cancel appointments    Discuss your medicines    Learn about your test results    Speak to your doctor            Additional Information About Your Visit        Care EveryWhere ID     This is your Care EveryWhere ID. This could be used by other organizations to access your [place] medical records  [id number]         Blood Pressure from Last 3 Encounters:   10/25/18 144/85   09/26/18 118/74   07/27/18 136/78    Weight from Last 3 Encounters:   10/23/18 80.3 kg (177 lb)   09/26/18 78.5 kg (173 lb)   07/27/18 78.2 kg (172 lb 6.4 oz)              Today, you had the following     No orders found for display         Today's Medication Changes      Notice     This visit is during an admission. Changes to the med list made in this visit will be reflected in the After Visit Summary of the admission.             Primary Care Provider Office Phone # Fax #    Bhaskar Morales [phone]       [address]   [facility] [address]        Equal Access to Services     [facility] Center[facility] : Bridger Hernandez, vinayak vaughan, redd pardo. So [facility] [phone].    ATENCIÓN: Si madeleinela  español, tiene a andrade disposición servicios gratuitos de asistencia lingüística. Dominic stewart [phone].    We comply with applicable federal civil rights laws and [place] laws. We do not discriminate on the basis of race, color, national origin, age, disability, sex, sexual orientation, or gender identity.            Thank you!     Thank you for choosing [facility]  for your care. Our goal is always to provide you with excellent care. Hearing back from our patients is one way we can continue to improve our services. Please take a few minutes to complete the written survey that you may receive in the mail after your visit with us. Thank you!             Your Updated Medication List - Protect others around you: Learn how to safely use, store and throw away your medicines at www.disposemymeds.org.      Notice     This visit is during an admission. Changes to the med list made in this visit will be reflected in the After Visit Summary of the admission.

## 2018-10-25 NOTE — PLAN OF CARE
Problem: Patient Care Overview  Goal: Plan of Care/Patient Progress Review  [id number]  Status: Pt here for presurgical evaluation on VEEG monitoring. Pt had seizure at 2240, MD/EEG notified, awaiting on if to administer ativan.   VS: VSS on RA   Neuros: Neuros intact except for HA. Pt said HA began after 1840 when he believes he may have had a seizure d/t HA.   GI: Reg diet, tolerating well.   : Voiding spont.  ?  IV: PIV SL   Activity: Up with 1, GB. BA/CA on, pt compliant with all precautions. Walked in halls x1  Pain: HA controlled with benadryl x1, toradol x1. Compazine 5mg given, another 5mg available if needed.   Social: No family/friends present at bedside currently.     Plan of care: Pt now off of home topamax, other AEDs unchanged at this point.  Cont to monitor. VPM in room in case of seizure.

## 2018-10-25 NOTE — PROGRESS NOTES
Prior Authorization Approval    vimpat 50 mg tabs  Date Initiated: 10/25/2018  Date Completed: 10/25/2018  Prior Auth Type: Non-Formulary     Status: Approved    Effective Date: 10/25/2018 - 10/25/2019  Copay: 8.13  [place] Filled: Yes    Insurance: [facility]  Ph: [phone]  ID: [id number]  Case Number: [id number]  Submitted Via: Website: [email or web address]        Julita Larson  [facility] Pharmacy Liaison  Ph: [phone] Page: [phone]

## 2018-10-26 ENCOUNTER — ALLIED HEALTH/NURSE VISIT (OUTPATIENT)
Dept: NEUROLOGY | Facility: CLINIC | Age: 28
End: 2018-10-26
Attending: PSYCHIATRY & NEUROLOGY
Payer: COMMERCIAL

## 2018-10-26 VITALS
DIASTOLIC BLOOD PRESSURE: 75 MMHG | SYSTOLIC BLOOD PRESSURE: 119 MMHG | WEIGHT: 177 LBS | HEART RATE: 94 BPM | TEMPERATURE: 98.1 F | RESPIRATION RATE: 16 BRPM | BODY MASS INDEX: 26.22 KG/M2 | OXYGEN SATURATION: 100 % | HEIGHT: 69 IN

## 2018-10-26 DIAGNOSIS — G40.219 PARTIAL EPILEPSY WITH IMPAIRMENT OF CONSCIOUSNESS, INTRACTABLE (H): Primary | ICD-10-CM

## 2018-10-26 PROCEDURE — 25000132 ZZH RX MED GY IP 250 OP 250 PS 637: Performed by: INTERNAL MEDICINE

## 2018-10-26 PROCEDURE — 25000132 ZZH RX MED GY IP 250 OP 250 PS 637: Performed by: PSYCHIATRY & NEUROLOGY

## 2018-10-26 PROCEDURE — 25000131 ZZH RX MED GY IP 250 OP 636 PS 637: Performed by: INTERNAL MEDICINE

## 2018-10-26 PROCEDURE — 95951 ZZHC EEG VIDEO < 12 HR: CPT | Mod: 52,ZF

## 2018-10-26 RX ORDER — LACOSAMIDE 200 MG/1
200 TABLET ORAL 2 TIMES DAILY
Qty: 60 TABLET | Refills: 1 | Status: SHIPPED | OUTPATIENT
Start: 2018-10-26 | End: 2018-11-07

## 2018-10-26 RX ADMIN — LACOSAMIDE 100 MG: 100 TABLET, FILM COATED ORAL at 00:16

## 2018-10-26 RX ADMIN — DIVALPROEX SODIUM 1250 MG: 250 TABLET, FILM COATED, EXTENDED RELEASE ORAL at 10:05

## 2018-10-26 RX ADMIN — PHENYTOIN SODIUM 160 MG: 100 CAPSULE, EXTENDED RELEASE ORAL at 10:04

## 2018-10-26 RX ADMIN — PEDIATRIC MULTIPLE VITAMIN W/ MINERALS & C CHEW TAB 60 MG 2 TABLET: CHEW TAB at 10:07

## 2018-10-26 RX ADMIN — PROCHLORPERAZINE MALEATE 10 MG: 5 TABLET, FILM COATED ORAL at 11:55

## 2018-10-26 RX ADMIN — ONDANSETRON HYDROCHLORIDE 4 MG: 4 TABLET, FILM COATED ORAL at 11:20

## 2018-10-26 RX ADMIN — VITAMIN D, TAB 1000IU (100/BT) 5000 UNITS: 25 TAB at 00:09

## 2018-10-26 RX ADMIN — DOCUSATE SODIUM 100 MG: 100 CAPSULE, LIQUID FILLED ORAL at 00:11

## 2018-10-26 RX ADMIN — DIVALPROEX SODIUM 1500 MG: 500 TABLET, EXTENDED RELEASE ORAL at 00:09

## 2018-10-26 RX ADMIN — VENLAFAXINE HYDROCHLORIDE 225 MG: 150 CAPSULE, EXTENDED RELEASE ORAL at 10:10

## 2018-10-26 RX ADMIN — DOCUSATE SODIUM 100 MG: 100 CAPSULE, LIQUID FILLED ORAL at 10:07

## 2018-10-26 RX ADMIN — GABAPENTIN 300 MG: 300 CAPSULE ORAL at 00:11

## 2018-10-26 RX ADMIN — PHENYTOIN SODIUM 200 MG: 100 CAPSULE, EXTENDED RELEASE ORAL at 00:09

## 2018-10-26 RX ADMIN — LACOSAMIDE 200 MG: 200 TABLET, FILM COATED ORAL at 10:13

## 2018-10-26 RX ADMIN — GABAPENTIN 300 MG: 300 CAPSULE ORAL at 10:08

## 2018-10-26 ASSESSMENT — ACTIVITIES OF DAILY LIVING (ADL)
ADLS_ACUITY_SCORE: 8

## 2018-10-26 NOTE — MR AVS SNAPSHOT
After Visit Summary   10/26/2018    Tha Mcghee    MRN: [id number]           Patient Information     Date Of Birth          1990        Visit Information        Provider Department      10/26/2018 7:00 AM [facility] EEG TECH 4 [facility] EEG        Today's Diagnoses     Partial epilepsy with impairment of consciousness, intractable (H)    -  1       Follow-ups after your visit        Your next 10 appointments already scheduled     Oct 27, 2018  7:00 AM CDT   24 Hour Video Visit with [facility] EEG TECH 4   [facility] ([facility])    [facility]  [address] [facility] [address]   [phone]           [place]: Your appointment is scheduled at [facility]. [address] [place] [address]            Oct 29, 2018  1:30 PM CDT   Telephone Call with [facility] Nurse 1   [facility]    [address] [place], [address]  [facility] [address] [phone]           Note: this is not an onsite visit; there is no need to come to the facility.            Nov 07, 2018  1:00 PM CST   Return Visit with Rajni Araujo MD   [facility] ([facility])    [address], [address]  [facility] [address] [phone]              Who to contact     Please call your clinic at [phone] to:    Ask questions about your health    Make or cancel appointments    Discuss your medicines    Learn about your test results    Speak to your doctor            Additional Information About Your Visit        MyChart Information     [employer] is an electronic gateway that provides easy, online access to your medical records. With [employer], you can request a clinic appointment, read your test results, renew a prescription or communicate with your care team.     To sign up for [employer] visit the website at [email or web address] ([email or web address]   You will be asked to enter the access code listed below, as well  as some personal information. Please follow the directions to create your username and password.     Your access code is: [email or web address]  Expires: 2019 12:07 PM     Your access code will  in 90 days. If you need help or a new code, please contact your [facility] or call [phone] for assistance.        Care EveryWhere ID     This is your Care EveryWhere ID. This could be used by other organizations to access your [place] medical records  [id number]         Blood Pressure from Last 3 Encounters:   10/26/18 119/75   18 118/74   18 136/78    Weight from Last 3 Encounters:   10/23/18 80.3 kg (177 lb)   18 78.5 kg (173 lb)   18 78.2 kg (172 lb 6.4 oz)              Today, you had the following     No orders found for display         Today's Medication Changes      Notice     This visit is during an admission. Changes to the med list made in this visit will be reflected in the After Visit Summary of the admission.             Primary Care Provider Office Phone # Fax #    Bhaskar Morales [phone]       [facility] [address]   [facility] [address]        Equal Access to Services     [facility] RegionTHAD : Hadii aad ku hadasho Soomaali, waaxda luqadaha, qaybta kaalmada adeegyada, waxay mimiin marta sinclair . So [facility] [phone].    ATENCIÓN: Si habla español, tiene a andrade disposición servicios gratuitos de asistencia lingüística. Llame al [phone].    We comply with applicable federal civil rights laws and [place] laws. We do not discriminate on the basis of race, color, national origin, age, disability, sex, sexual orientation, or gender identity.            Thank you!     Thank you for choosing [facility]  for your care. Our goal is always to provide you with excellent care. Hearing back from our patients is one way we can continue to improve our services. Please take a few minutes to complete the written  survey that you may receive in the mail after your visit with us. Thank you!             Your Updated Medication List - Protect others around you: Learn how to safely use, store and throw away your medicines at www.disposemymeds.org.      Notice     This visit is during an admission. Changes to the med list made in this visit will be reflected in the After Visit Summary of the admission.

## 2018-10-26 NOTE — PROCEDURES
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 He is being evaluated for resective epilepsy surgery.  He is currently being treated with Depakote 2750 mg per day, gabapentin 600 mg per day, phenytoin 360 mg per day.  He presented on topiramate 300 mg per day, which was discontinued to facilitate recording of seizures.     TECHNICAL SUMMARY: This continuous video- EEG monitoring procedure was performed with 23 scalp electrodes in 10-20 electrode system placements, and additional scalp, precordial and other surface electrodes used for electrical referencing and artifact detection.  Video monitoring was utilized and periodically reviewed by EEG technologists and the physician for electroclinical correlations.    FINDINGS:  During quiet wakefulness, 9 Hz posterior dominant rhythm, symmetrical, reactive.  Desynchronized activity while awake.  Right temporal slowing was less obvious during today's recordings.  Sleep is recorded with vertex waves and sleep spindles.  A period of REM sleep is also seen in available samples at around 8:38 a.m., during which background is synchronized.      OTHER INTERICTAL ABNORMALITIES:  No epileptiform discharges.      ICTAL ABNORMALITIES: A focal seizure is recorded out of sleep.  Clinical onset is around 08:40:00 with some eye blinking.  He opens up his eyes at 08:40:12.  He then rubs his face, but it is not clear with which hand because both hands are under covers.  Staff is alerted by EEG techs and enters room at around 08:40:56.  They present memory items.  The patient is able to follow commands at 08:41:24.  He is able to read test sentence at 08:41:34.   Unfortunately, it appears that staff does not inquire about his recollection of the memory items delivered following the seizure.  EEG-wise, seizure onset at approximately 08:38:47 with rhythmic beta involving the right hemisphere, somewhat better developed over right temporal regions than elsewhere.  Distribution does not change much with O1 or CZ reference.  Serial right hemispheric low amplitude spikes at 08:38:51.  Rhythmic right hemispheric theta 08:38:53.  Rhythmic sharpish delta right temporal region 08:39:08.  Amplitude maximum of this discharge is at T4, T6 and somewhat lower at T2 and F8.  Delta discharge spreads to the entire right hemisphere and to a lesser extent to the left hemisphere.  The delta discharge gradually slows and then terminates at around 08:40:30.  Postictally, posterior dominant rhythm is seen on the left, but is significantly attenuated on the right.      Second seizure starts clinically at around 5:01:06 p.m. with lip smacking movements.  At 5:02:03, his head turns completely to the left, but it is not clear whether this is tonic posturing or not.  Following termination of the seizure, he sits up and rubs his face with his right hand.  EEG-wise, onset at around 5:00:36 with obliteration of the posterior dominant rhythm and irregular theta, probably best seen in the right temporal region. At 5:00:39 p.m., rhythmic right temporal delta, amplitude maximum T4, T6 with initially somewhat lower amplitude at T2 and F8.  Interestingly, posterior dominant rhythm is seen at 02 and 01 at this point in time.  Posterior dominant rhythm disappears at 5:00:42 p.m. and rhythmic delta is now seen equally in all right temporal electrodes.  As seizure continues, right temporal delta gives way to right temporal sharpish delta.  This then gives way to more spiky right temporal discharge.  Simultaneously, rhythmic delta is seen on the left.  Polyspikes are noted over O2, T6, T4 at 5:01:56 p.m.  These  gradually slow.  The seizure discharge terminates over both hemispheres at 5:02:33 p.m.  Postictally, there is a very transient diffuse attenuation.  Posterior dominant rhythm returns on the left at 5:02:38, but remains attenuated on the right for a period of time.      Third seizure starts clinically at around 10:37:33 with lip smacking.  His hands are clasped prior to the seizure.  As the seizure continues, he elevates his clasped hands.  Staff notes that a seizure is ongoing and performs examination.  The patient does not respond and cannot read test sentence.  At 10:38:11 p.m., head starts jerking to the left.  There is left lip jerking and eyes are deviated to the left.  Left leg adductor jerking is also noted and clonic guttural sounds are heard.  Postictally, there are some stertorous respirations and he moves his right arm while the left arm is still.  Eventually, he rubs his face with his right hand.  EEG-wise, first changes are around 10:37:03 p.m.  Some irregular theta is seen over the right temporal region, including the P4, O2 linkage.  This then gives way to a more diffuse right temporal theta.  10:37:11 p.m., rhythmic right temporal delta with amplitude maximum at F8 and T4 as well as possibly T2.  This then gives way to a rhythmic sharpish theta discharge best developed at T2, F8 and T4, but with a field extending to FP2.  Rhythmic right temporal theta continues, giving way to a right posterior hemisphere polyspike and then a right hemisphere and bilateral polyspike.  The frequency of polyspike gradually decreases and stops at 10:39:03 p.m.  Postictally, there is significant attenuation over right hemisphere while posterior dominant rhythm persists on the left.      IMPRESSION:  Abnormal.  Three focal impaired (complex partial) seizures are noted, one with partial secondary generalization.  Clinical features suggest activation of a right temporal symptomatogenic zone.  Ictal EEG clearly shows right  hemispheric onset.  EEG features for second seizure suggest a mid to posterior temporal rather than an anterior temporal onset.  For the third seizure, which is clinically similar, a more anterior temporal onset is noted.      Study confirms that patient suffers from epilepsy and indicates a right hemispheric, probably right temporal onset.  Further localization, however, is not clear.         SANTHOSH BORJA MD             D: 10/25/2018   T: 10/26/2018   MT: ST      Name:     SANTHOSH HODGES   MRN:      [id number]        Account:        [id number]   :      1990           Procedure Date: 10/24/2018      Document: [id number]

## 2018-10-26 NOTE — PLAN OF CARE
"Problem: Patient Care Overview  Goal: Plan of Care/Patient Progress Review  [id number]  Status: Pt here for presurgical evaluation on VEEG monitoring.   VS: VSS on RA   Neuros: Neuros intact   GI: Reg diet, pt refusing to eat, but was able to tolerate saltine crackers. Pt vomited x2, states its probably because he isn't eating \"refusing\" to eat \"hospital food\". He has been given several options about what to eat but apparently still refusing, but will accept saltine crackers. Still had emesis. Zofran given x1 with no relief, compazine given x1 awaiting to see if resolved.    : Voiding spont.  ?  IV: PIV SL   Activity: Up with 1, GB. BA/CA on, pt compliant with all precautions. Refused to walk in halls.   Pain: Denies  Social: No family/friends present at bedside currently. Pt states he got upset with his mom and told her to leave today.      Plan of care: Pt now off of home topamax, other AEDs unchanged at this point.  Cont to monitor. VPM in room in case of seizure.  Pt still needs AEDs, pt still nauseated, awaiting to see if compazine worked.             "

## 2018-10-26 NOTE — PLAN OF CARE
Problem: Seizure Disorder/Epilepsy (Adult)  Goal: Signs and Symptoms of Listed Potential Problems Will be Absent, Minimized or Managed (Seizure Disorder/Epilepsy)  Signs and symptoms of listed potential problems will be absent, minimized or managed by discharge/transition of care (reference Seizure Disorder/Epilepsy (Adult) CPG).   Outcome: Adequate for Discharge Date Met: 10/26/18  VSS. Denies pain. Neuros intact. EEG leads were removed, pt prefers to shower at home. Refuses to eat, had emesis x 2 (meds given). Up with SBA. Pt is being discharged home at this time. Discharge medications and orders gone over with no further questions. Mom will come pick pt up around 1400.

## 2018-10-26 NOTE — DISCHARGE INSTRUCTIONS
For 5 days after discharge, take Vimpat 100mg (half tab) every morning, and 200mg (one tab) every evening, then increase to 200mg twice daily

## 2018-10-26 NOTE — PLAN OF CARE
Problem: Patient Care Overview  Goal: Plan of Care/Patient Progress Review  [id number]:  A&Ox4.  A/VSS.  No seizures/events this shift, witnessed or reported.  Up with SBA and GB.  Compliant with seizure precautions. PIV in place.  C/o headache, Tylenol given with some relief.

## 2018-10-26 NOTE — PROCEDURES
EEG #: -3      TYPE OF STUDY:  Inpatient video EEG monitoring.      DATE OF STUDY:  10/25/2018      DURATION OF STUDY:  23 hours, 48 minutes, 12 seconds.      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  He is being evaluated for resective epilepsy surgery.       TECHNICAL SUMMARY: This continuous video- EEG monitoring procedure was performed with 23 scalp electrodes in 10-20 electrode system placements, and additional scalp, precordial and other surface electrodes used for electrical referencing and artifact detection.  Video monitoring was utilized and periodically reviewed by EEG technologists and the physician for electroclinical correlations.    MEDICATIONS:  He presented on phenytoin, divalproex, gabapentin and topiramate.  Topiramate was discontinued to facilitate recording of seizures.  Following the recording the fourth seizure in this study, lacosamide was initiated.      FINDINGS:  During sleep, symmetrical vertex waves and sleep spindles.  During quiet wakefulness, 9-10 Hz posterior dominant rhythm, symmetrical, reactive.  Irregular right temporal slowing is seen on occasion.      Hyperventilation x 5 minutes potentiated irregular right temporal slowing.  A focal impaired seizure was recorded about 40 seconds following termination of hyperventilation (see below).      OTHER INTERICTAL ABNORMALITIES:  A single right temporal spike was seen occurring out of sleep at 06:34:42.  With Cz reference, amplitude maximum was at F8 and T2 with lower amplitude at T4 and Fp2 and some field also extending to F4.      ICTAL ACTIVITIES:  The patient experienced a focal impaired seizure of approximately 40 seconds  following hyperventilation.  Staff asked him to point to the ceiling at 10:54L27 which he was able to do.  Lip smacking started  occurring at about 10:54:30. He was able to name an object 10:54:37 and to read the test sentence at 10:54:42.  Interestingly, lip smacking was interrupted by the reading of the test sentence.  Following around 10:54:45, he became less responsive.  His head turned slightly to the right.  He was asked to point to the ceiling once again at 10:55:10 and made a feeble attempt to do so, but was unsuccessful in completing the entire motion.  Immediately upon the electrographic termination of the seizure, he rubbed his face with his right hand.  He coughed.  He was able to recall the test sentence that had been delivered early in the seizure and the command that he had been asked to perform.  He, however, was unable to recall words that were delivered during the seizure.  He was able to read the test sentence again at 10:57:21.        EEG-wise, some irregular right temporal slowing had been seen following hyperventilation.  Slowing became a little bit more organized at 10:54:08 which probably marked the beginning of the seizure.  The delta was highest in amplitude at T4, T6 and T2 with clearly lower amplitude at F8.  Rhythmic delta with the same distribution started at 10:54:18.  A more anterior seizure discharge was seen at 10:54:25 engaging T2 and F8.  Seizure discharge engaged the entire right temporal lobe at 10:54:30. There was some spread of the seizure discharge to the right parasagittal region.  There was reflection over the left hemisphere, but no independent seizure discharge there.  Seizure terminated at 10:55:14 with significant bilateral attenuation.  Posterior dominant rhythm emerged on the left at 10:55:48, but remained attenuated on the right for a period of time.      IMPRESSION:  Abnormal.  There is evidence of right temporal focal cerebral dysfunction.  A single right anterior temporal spike is seen.      A focal impaired seizure is recorded shortly following hyperventilation.  The clinical features of  the seizure are consistent with activation of the nondominant temporal lobe.  Ictal EEG does demonstrate a right temporal onset.  Early features of EEG suggest a posterior temporal onset with subsequent propagation of the seizure more anteriorly into the more anterior temporal lobe.      No further seizures were seen following addition of lacosamide, around noontime.         SANTHOSH BORJA MD             D: 10/26/2018   T: 10/26/2018   MT: NTS      Name:     SANTHOSH HODGES   MRN:      [id number]        Account:        [id number]   :      1990           Procedure Date: 10/25/2018      Document: [id number]

## 2018-10-26 NOTE — DISCHARGE SUMMARY
[facility]  EPILEPSY SERVICE DISCHARGE SUMMARY    Patient Name:  Tha Mcghee  MRN:  [id number]      :  1990      Date of Admission:  10/23/2018  Date of Discharge::  10/26/2018  1:57 PM  Admitting Physician:  Rajni Araujo MD  Discharge Physician:  ANA Early, Dr. Calvo  Primary Care Provider:   Bhaskar Morales  Discharge Disposition:   Discharged to home    Admission Diagnoses:  1. Intractable focal epilepsy  2. Anxiety  3. Depression    Discharge Diagnoses:    1. Intractable focal epilepsy  2. Anxiety  3. Depression    Brief History of Illness:   Jay is a 28 year old right handed male with a h/o depression, anxiety and seizure disorder since  who was admitted for pre surgical evaluation. Patient's goals for this admission are 1) To find out where the seizures are coming from and 2) to have surgery in the future. He had his first seizure on 2011 and he came under [facility] care on  2011 under Dr. Blanco's.  He typically has aura -> GTC. His seizure type he describes as a flush of anxiety followed by confusion with lip smacking and clicking sound in his throat.  This progresses to a generalized tonic-clonic seizure.  He was initially started on phenytoin (this helps his GTC), then VPA was added 2013 (this helps HA and seizure). Lastly, topiramate was added in  which has helped his headaches and seizure. His EEGs in the past have been notable for rare left temporal epileptiform discharges.  His MRI of the brain is normal and nonlesional.  Types of Seizures: Both patient and his parents report two types of seizures.   Type I: Focal seizures. He will stare off, will make a clicking sound with his tongue/ lip movement, he will lose awareness and will be unable to respond to questions. Sometimes he will have bilateral hand automatisms. Usually last for 15- 20 seconds. Unsure about the frequency, but at least 1-2 times a month.Type  "II: GTCs. First GTC in 4/2011 out of sleep. This was witnessed by his ex fiance. With majority of his grand mal seizures, he has a warning of \"flash of heat throughout his body\". Then he will lose consciousness and will have shaking and stiffening movements. He had bitten his tongue with some of his seizures. Denies bowel or bladder incontinence. With his first seizure, he dislocated his shoulder. No other major injuries since then. Post ictally, he is tired and confused and has \"really bad headache\". He usually goes to ED to get IV Benadryl and Toradol. Back to baseline in 45 minutes to an hour. Most recent seizure was in 8/2018. Prior to that he had GTCs in 11/2017, 7/2017 and 5/7/2017.     Hospital course:  His topiramate was decreased day 1 and then stopped day 2. He remained on PTA doses of divalproex, phenytoin and gabapentin throughout admission. Four seizures recorded this admission (three on day 2, one on day 3)    His topiramate was not restarted as he will need a wada test in the future. Vimpat was initiated and he was loaded with 400 mg PO the day prior to discharge with plans to discharge at 200-200. He did get nauseous and vomit later that evening (of note he hadn't eaten all day). On day of discharge shortly after getting his morning Vimpat dose of 200 mg (took after eating saltines) he again got nauseous and vomited. We discharged him on 100-200 for a few days and then instructed him to increase to 200-200. If unable to tolerate he should call [facility].     He still will need to get PET and psychiatry consult completed as outpatient. Neuropsychological testing was invalid and would possibly need to be repeated. Patient will need to discuss with his outpatient MD, Dr. Araujo.     Attending Physician (Dr. Calvo) Summary and Plan:  \"28 year old with medically intractable seizures presents for evaluation for potential resective epilepsy surgery.     Reviewed outpatient evaluation. 3T epilepsy directed MRI " of brain was normal. Outpatient neuropsych testing was invalid because patient did not provide full and consistent effort; not clear whether this was willful or not. Dr Gamez's impression was that he demonstrated capacity for average memory, reasoning and some aspects of executive function but information available not sufficient for evaluation for surgery.     During four days of video monitoring he experienced four focal impaired seizures, two with partial tonic clonic seizure. Seizures exhibiited some responsiveness initially followed by unresponsiveness and then quick postictal recovery. Head to left with left hmiclonic activity prior with partial generalization. EEG wise one seizure with left hemispheric beta followed by right posterior delta. Other seizures with right posterior delta T4 T6 T2 amplitude maximum for four seconds followed by right anterior temporal discharge. Significant right hemispheric attenuation postically seen in all seizures.     Seizures emerged quicklky following discontinuation of topiramate; he remained on phenytoin and valproate throughout. Loaded with lacosamide following fourth seizure and seizures stayed controlled. However reported nausea so lacosamide decreased to 100 in AM and 200 in PM from 200 twice a day which was administered following the iv load. Discharged on phenytoin, valproate, lacosamide. Tho topiramate appeared to be very helpful for seizure control, we elected to replace with lacosamide  in preparation for wada testing and to help optimize repeat neuropsych testing. However, it may need to be resumed if seizures worsen significantly.     Patient still interested in surgery tho his family remained dubious. We discussed need for repeat neruopscyh testing, PET scan, intracarotid amytal testing, psychiatric evaluation and likely need for intracranial recording. He was not seen by psychiatry during this admission; he had been scheduled to see psychiatry in clinic but  "this did not occur. We encouraged him to devote serious effort to neuropsych testing on next go around; perhaps psychiatry should see him first.     He was anxious during evaluation and not entirely cooperative with roberts routines. Was taught progressive muscle relaxation and encouraged to practice twice daily.\"    Consultations:    None     Discharge Medications:    Current Discharge Medication List      START taking these medications    Details   lacosamide (VIMPAT) 200 MG TABS tablet Take 1 tablet (200 mg) by mouth 2 times daily  Qty: 60 tablet, Refills: 1    Associated Diagnoses: Partial epilepsy with impairment of consciousness, intractable (H)         CONTINUE these medications which have NOT CHANGED    Details   !! divalproex sodium extended-release (DEPAKOTE ER) 250 MG 24 hr tablet Take 1 tab in the morning (along with two 500mg tabs)  Qty: 90 tablet, Refills: 3    Associated Diagnoses: 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)      !! divalproex sodium extended-release (DEPAKOTE ER) 500 MG 24 hr tablet Take 2 tabs in the morning and 3 tabs at night.  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  Not a prescription) Inhale 200 mLs into the lungs Take one to two puffs every four to six hours, red solution (CC video)    Associated Diagnoses: Localization-related (focal) (partial) epilepsy and epileptic syndromes with complex partial seizures, with intractable epilepsy; Variants of migraine, not " elsewhere classified, without mention of intractable migraine without mention of status migrainosus      Multiple Vitamin (MULTIVITAMINS PO) 2 Chew a day    Associated Diagnoses: Localization-related (focal) (partial) epilepsy and epileptic syndromes with complex partial seizures, with intractable epilepsy      !! 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 phenytoin (DILANTIN) 30 MG CR capsule Take two po Q AM  Qty: 180 capsule, Refills: 3    Comments: Generic is fine.  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      cholecalciferol (VITAMIN D3) 5000 units TABS tablet Take 5,000 Units by mouth      diazepam (DIAZEPAM INTENSOL) 5 MG/ML (HIGH CONC) solution For generalized seizures give 5 mg.  May repeat and give 2.5 mg after 10 minutes if needed. 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 Max 3 tablets/24 hours.  Qty: 18 tablet, Refills: 3    Associated Diagnoses: Vision loss of left eye; Migraine variant       !! - Potential duplicate medications found.  Please discuss with provider.      STOP taking these medications       topiramate (TOPAMAX) 100 MG tablet Comments:   Reason for Stopping:               Discharge physical examination:   Vitals:  B/P: 119/75, T: 98.1, P: 94, R: 16  General:  Adult, in NAD, cooperative  HEENT:  NC/AT  Cardiac:  RRR, no m/r/g  Chest:  CTAB  Abdomen:  S/NT/ND  Extremities:  No LE swelling.    Skin:  No rash or lesion.    Psych:  Mood pleasant, affect congruent  Neuro: Awake, alert, attentive, oriented. Speech fluent. Hearing intact to normal conversation.  Eyes conjugate, PERRL, EOMI, face symmetric, shoulder shrug strong, tongue/uvula midline. 5/5 strength in all 4 extremities.  No drift or pronation. Intact FNF. Sensation grossly intact to light touch. Casual gait normal, stable. Some mild unsteadiness with tandem. Able to stand on one foot > 3 seconds bilaterally.        Discharge follow up and instructions:    1.  Diet:   As prior to admission  2.  Activity:  State laws prohibit operating a motor vehicle following any seizure or other episode with loss of awareness, or inability to sit up (Varies by state, be aware and comply with the regulations applicable to you). State law may require the licensed  to report any future episode to the DMV . Avoid any activities that might lead to self-injury or injury of others following any event with impaired awareness or impaired motor control. Such activities include but are not limited to holding babies or young children at heights from which they might be injured if dropped, bathing infants or young children in situations in which they might drown without continuous interactive care by an adult who is fully capable at all times during the bath, operating power cutting or other tools, handling firearms, exposure to heights from which you might fall, exposure to vessels with hot cooking oil or water, and swimming alone.  3.  Follow up: Nurse call in 2-3 days. Follow-up in clinic with  Dr. Araujo as schedule 11/7. He needs PET and  psychiatry follow-up as outpatient.     ANA Early    Total time: 20 minutes was spent in the care of this patient. The patient agrees with the above mentioned plan of care. I answered all the patient's questions and addressed immediate concerns. More than 50% of time spent consisted of counseling and coordinating care, including discussion of the diagnostic significance of EEG findings, anti-seizure medication management, and planning for discharge home

## 2018-10-26 NOTE — PLAN OF CARE
"Problem: Patient Care Overview  Goal: Plan of Care/Patient Progress Review  Outcome: No Change  VSS except tachy. Denied pain. Neuros intact. VEEG leads in place; no events witnessed or reported. Seizure precautions maintained; pt compliant this shift. VPM in room for safety.  PIV SL. On regular diet-no emesis overnight.  Has only had few crackers this whole admission due to not wanted to eat \"hospital food\". Denied issues with bladder and bowel. Up with SBA and GB. Handout for new med, Vimpat, given to pt; not yet reviewed with pt as he wanted to sleep.  Plan is discharge today. Continue with POC.       "

## 2018-10-29 ENCOUNTER — VIRTUAL VISIT (OUTPATIENT)
Dept: NEUROLOGY | Facility: CLINIC | Age: 28
End: 2018-10-29
Payer: COMMERCIAL

## 2018-10-29 DIAGNOSIS — G40.219 PARTIAL EPILEPSY WITH IMPAIRMENT OF CONSCIOUSNESS, INTRACTABLE (H): Primary | ICD-10-CM

## 2018-10-29 NOTE — MR AVS SNAPSHOT
After Visit Summary   10/29/2018    Tha Mcghee    MRN: [id number]           Patient Information     Date Of Birth          1990        Visit Information        Provider Department      10/29/2018 1:30 PM 1, Mandy Ruffin Nurse [facility]        Today's Diagnoses     Partial epilepsy with impairment of consciousness, intractable (H)    -  1       Follow-ups after your visit        Your next 10 appointments already scheduled     Oct 29, 2018  1:30 PM CDT   Telephone Call with Me Zofia Nurse 1   [facility]    [address] [place], [address]  [facility] [address] [phone]           Note: this is not an onsite visit; there is no need to come to the facility.            2018  1:00 PM CST   Return Visit with MD [facility] ([facility])    [address], [address]  [place] [address] [phone]              Who to contact     Please call your clinic at [phone] to:    Ask questions about your health    Make or cancel appointments    Discuss your medicines    Learn about your test results    Speak to your doctor            Additional Information About Your Visit        MyChart Information     [employer] is an electronic gateway that provides easy, online access to your medical records. With [employer], you can request a clinic appointment, read your test results, renew a prescription or communicate with your care team.     To sign up for [employer] visit the website at [email or web address]   You will be asked to enter the access code listed below, as well as some personal information. Please follow the directions to create your username and password.     Your access code is: [email or web address]  Expires: 2019 12:07 PM     Your access code will  in 90 days. If you need help or a new code, please contact your [facility] or call [phone] for assistance.         Care EveryWhere ID     This is your Care EveryWhere ID. This could be used by other organizations to access your [facility] medical records  [id number]         Blood Pressure from Last 3 Encounters:   10/26/18 119/75   09/26/18 118/74   07/27/18 136/78    Weight from Last 3 Encounters:   10/23/18 177 lb (80.3 kg)   09/26/18 173 lb (78.5 kg)   07/27/18 172 lb 6.4 oz (78.2 kg)              Today, you had the following     No orders found for display       Primary Care Provider Office Phone # Fax #    Bhaskar Morales [phone]       [facility] [address]   [facility] [address]        Equal Access to Services     ISHA CURTIS : Hadii kyle hernandez hadasho Soomaali, waaxda luqadaha, qaybta kaalmada adeegyada, redd lein marta sinclair . So [facility] [phone].    ATENCIÓN: Si habla español, tiene a andrade disposición servicios gratuitos de asistencia lingüística. [facility] [phone].    We comply with applicable federal civil rights laws and [place] laws. We do not discriminate on the basis of race, color, national origin, age, disability, sex, sexual orientation, or gender identity.            Thank you!     Thank you for choosing [facility]  for your care. Our goal is always to provide you with excellent care. Hearing back from our patients is one way we can continue to improve our services. Please take a few minutes to complete the written survey that you may receive in the mail after your visit with us. Thank you!             Your Updated Medication List - Protect others around you: Learn how to safely use, store and throw away your medicines at www.disposemymeds.org.          This list is accurate as of 10/29/18 12:08 PM.  Always use your most recent med list.                   Brand Name Dispense Instructions for use Diagnosis    ALPRAZolam 0.5 MG tablet    XANAX     Take 0.5 mg by mouth as needed.        cholecalciferol 5000 units Tabs tablet    vitamin  D3     Take 5,000 Units by mouth        diazepam 5 MG/ML (HIGH CONC) solution    DIAZEPAM INTENSOL    30 mL    For generalized seizures give 5 mg.  May repeat and give 2.5 mg after 10 minutes if needed. 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  Not a prescription)      Inhale 200 mLs into the lungs Take one to two puffs every four to six hours, red solution ([employer])    Localization-related (focal) (partial) epilepsy and epileptic syndromes with complex partial seizures, with intractable epilepsy, Variants of migraine, not elsewhere classified, without mention of intractable migraine without mention of status migrainosus       MULTIVITAMINS PO      2 Chew a day    Localization-related (focal) (partial) epilepsy and epileptic syndromes with complex partial seizures, with intractable epilepsy       * phenytoin 100 MG CR capsule    DILANTIN    270 capsule     TAKE 1 CAP BY MOUTH DAILY IN THE AM AND 2 CAPS IN THE PM    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 Max 3 tablets/24 hours.    Vision loss of left eye, Migraine variant       venlafaxine 75 MG 24 hr capsule    EFFEXOR XR    270 capsule    Take 3 capsules (225 mg) by mouth every morning    Episode of recurrent major depressive disorder, unspecified depression episode severity (H)       * Notice:  This list has 4 medication(s) that are the same as other medications prescribed for you. Read the directions carefully, and ask your doctor or other care provider to review them with you.

## 2018-10-29 NOTE — PROGRESS NOTES
"Discharge phone call:      Date of Admission:                                   10/23/2018  Date of Discharge::                                  10/26/2018  1:57 PM  Admitting Physician:                                 Rajni Araujo MD  Discharge Physician:                                 ANA Early, Dr. Calvo  Primary Care Provider:                   Bhaskar Morales  Discharge Disposition:                   Discharged to home     Admission Diagnoses:  1. Intractable focal epilepsy  2. Anxiety  3. Depression     Discharge Diagnoses:    1. Intractable focal epilepsy  2. Anxiety  3. Depression      Patient's understanding of the discharge diagnosis: \"They got all the information from the EEg that they need. They will need a WADA test to figure out my language once topamax is out of my system (8 weeks) then hospitalized again with electrodes in my brain.\"    Hospital course:  His topiramate was decreased day 1 and then stopped day 2. He remained on PTA doses of divalproex, phenytoin and gabapentin throughout admission. Four seizures recorded this admission (three on day 2, one on day 3)     His topiramate was not restarted as he will need a wada test in the future. Vimpat was initiated and he was loaded with 400 mg PO the day prior to discharge with plans to discharge at 200-200. He did get nauseous and vomit later that evening (of note he hadn't eaten all day). On day of discharge shortly after getting his morning Vimpat dose of 200 mg (took after eating saltines) he again got nauseous and vomited. We discharged him on 100-200 for a few days and then instructed him to increase to 200-200. If unable to tolerate he should call [facility].      He still will need to get PET and psychiatry consult completed as outpatient. Neuropsychological testing was invalid and would possibly need to be repeated. Patient will need to discuss with his outpatient MD, Dr. Araujo.    Medications confirmed:    Vimpat 100-200 " until Tuesday then 200-200   depakote [phone]  Gabapentin 300-300  Dilantin 160-200      Questions regarding admission or discharge instructions:  None.    Confirmed follow up scheduled:  Dr. Araujo Nov 7th for family education. PET scan not yet scheduled, TBD.

## 2018-11-05 NOTE — PROGRESS NOTES
NEUROLOGY/EPILEPSY ATTENDING SUMMARY NOTE    28 year old with medically intractable seizures presents for evaluation for potential resective epilepsy surgery.    Reviewed outpatient evaluation. 3T epilepsy directed MRI of brain was normal. Outpatient neuropsych testing was invalid because patient did not provide full and consistent effort; not clear whether this was willful or not. Dr Gamez's impression was that he demonstrated capacity for average memory, reasoning and some aspects of executive function but information available not sufficient for evaluation for surgery.    During four days of video monitoring he experienced four focal impaired seizures, two with partial tonic clonic seizure. Seizures exhibiited some responsiveness initially followed by unresponsiveness and then quick postictal recovery. Head to left with left hmiclonic activity prior with partial generalization. EEG wise one seizure with left hemispheric beta followed by right posterior delta. Other seizures with right posterior delta T4 T6 T2 amplitude maximum for four seconds followed by right anterior temporal discharge. Significant right hemispheric attenuation postically seen in all seizures.    Seizures emerged quicklky following discontinuation of topiramate; he remained on phenytoin and valproate throughout. Loaded with lacosamide following fourth seizure and seizures stayed controlled. However reported nausea so lacosamide decreased to 100 in AM and 200 in PM from 200 twice a day which was administered following the iv load. Discharged on phenytoin, valproate, lacosamide. Tho topiramate appeared to be very helpful for seizure control, we elected to replace with lacosamide  in preparation for wada testing and to help optimize repeat neuropsych testing. However, it may need to be resumed if seizures worsen significantly.    Patient still interested in surgery tho his family remained dubious. We discussed need for repeat neruopscyh  testing, PET scan, intracarotid amytal testing, psychiatric evaluation and likely need for intracranial recording. He was not seen by psychiatry during this admission; he had been scheduled to see psychiatry in clinic but this did not occur. We encouraged him to devote serious effort to neuropsych testing on next go around; perhaps psychiatry should see him first.    He was anxious during evaluation and not entirely cooperative with roberts routines. Was taught progressive muscle relaxation and encouraged to practice twice daily.    Tha Calvo MD

## 2018-11-07 ENCOUNTER — OFFICE VISIT (OUTPATIENT)
Dept: NEUROLOGY | Facility: CLINIC | Age: 28
End: 2018-11-07
Payer: COMMERCIAL

## 2018-11-07 VITALS
BODY MASS INDEX: 26.79 KG/M2 | WEIGHT: 181.4 LBS | DIASTOLIC BLOOD PRESSURE: 79 MMHG | RESPIRATION RATE: 16 BRPM | HEART RATE: 108 BPM | SYSTOLIC BLOOD PRESSURE: 126 MMHG

## 2018-11-07 DIAGNOSIS — G40.219 PARTIAL EPILEPSY WITH IMPAIRMENT OF CONSCIOUSNESS, INTRACTABLE (H): ICD-10-CM

## 2018-11-07 RX ORDER — LACOSAMIDE 200 MG/1
200 TABLET ORAL 2 TIMES DAILY
Qty: 60 TABLET | Refills: 5 | Status: SHIPPED | OUTPATIENT
Start: 2018-11-07 | End: 2019-03-05

## 2018-11-07 ASSESSMENT — PAIN SCALES - GENERAL: PAINLEVEL: NO PAIN (0)

## 2018-11-07 NOTE — MR AVS SNAPSHOT
After Visit Summary   2018    Tha Mcghee    MRN: [id number]           Patient Information     Date Of Birth          1990        Visit Information        Provider Department      2018 1:00 PM [facility]        Today's Diagnoses     Partial epilepsy with impairment of consciousness, intractable (H)           Follow-ups after your visit        Your next 10 appointments already scheduled     2018  1:00 PM CST   Telephone Call with [facility] Nurse 1   [facility] ([facility])    [address] [place], [address]  [facility] [id number] [phone]           Note: this is not an onsite visit; there is no need to come to the facility.              Who to contact     Please call your clinic at [phone] to:    Ask questions about your health    Make or cancel appointments    Discuss your medicines    Learn about your test results    Speak to your doctor            Additional Information About Your Visit        MyChart Information     [employer] is an electronic gateway that provides easy, online access to your medical records. With [employer], you can request a clinic appointment, read your test results, renew a prescription or communicate with your care team.     To sign up for [employer] visit the website at [email or web address]   You will be asked to enter the access code listed below, as well as some personal information. Please follow the directions to create your username and password.     Your access code is: [email or web address]  Expires: 2019 11:07 AM     Your access code will  in 90 days. If you need help or a new code, please contact your [facility] or call [phone] for assistance.        Care EveryWhere ID     This is your Care EveryWhere ID. This could be used by other organizations to access your [place] medical records  [id number]        Your Vitals Were     Pulse Respirations  No BMI (Body Mass Index)             108 16 26.79 kg/m2          Blood Pressure from Last 3 Encounters:   11/07/18 126/79   10/26/18 119/75   09/26/18 118/74    Weight from Last 3 Encounters:   11/07/18 181 lb 6.4 oz (82.3 kg)   10/23/18 177 lb (80.3 kg)   09/26/18 173 lb (78.5 kg)              Today, you had the following     No orders found for display         Where to get your medicines      Some of these will need a paper prescription and others can be bought over the counter.  Ask your nurse if you have questions.     Bring a paper prescription for each of these medications     lacosamide 200 MG Tabs tablet          Primary Care Provider Office Phone # Fax #    Bhaskar Morales [phone]       [facility] [address]   [facility] [address]        Equal Access to Services     [facility]: Hadii kyle hernandez hadasho Soximena, waaxda luqadaha, qaybta kaalmada adeegyada, redd sinclair . So [facility] [phone].    ATENCIÓN: Si habla español, tiene a andrade disposición servicios gratuitos de asistencia lingüística. Llame al [phone].    We comply with applicable federal civil rights laws and [place] laws. We do not discriminate on the basis of race, color, national origin, age, disability, sex, sexual orientation, or gender identity.            Thank you!     Thank you for choosing [facility]  for your care. Our goal is always to provide you with excellent care. Hearing back from our patients is one way we can continue to improve our services. Please take a few minutes to complete the written survey that you may receive in the mail after your visit with us. Thank you!             Your Updated Medication List - Protect others around you: Learn how to safely use, store and throw away your medicines at www.disposemymeds.org.          This list is accurate as of 11/7/18  1:51 PM.  Always use your most recent med list.                   Brand Name Dispense  Instructions for use Diagnosis    ALPRAZolam 0.5 MG tablet    XANAX     Take 0.5 mg by mouth as needed.        cholecalciferol 5000 units Tabs tablet    vitamin D3     Take 5,000 Units by mouth        diazepam 5 MG/ML (HIGH CONC) solution    DIAZEPAM INTENSOL    30 mL    For generalized seizures give 5 mg.  May repeat and give 2.5 mg after 10 minutes if needed. 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  Not a prescription)      Inhale 200 mLs into the lungs Take one to two puffs every four to six hours, red solution ([email or web address])    Localization-related (focal) (partial) epilepsy and epileptic syndromes with complex partial seizures, with intractable epilepsy, Variants of migraine, not elsewhere classified, without mention of intractable migraine without mention of status migrainosus       MULTIVITAMINS PO      2 Chew a day    Localization-related (focal)  (partial) epilepsy and epileptic syndromes with complex partial seizures, with intractable epilepsy       * phenytoin 100 MG CR capsule    DILANTIN    270 capsule    TAKE 1 CAP BY MOUTH DAILY IN THE AM AND 2 CAPS IN THE PM    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 Max 3 tablets/24 hours.    Vision loss of left eye, Migraine variant       venlafaxine 75 MG 24 hr capsule    EFFEXOR XR    270 capsule    Take 3 capsules (225 mg) by mouth every morning    Episode of recurrent major depressive disorder, unspecified depression episode severity (H)       * Notice:  This list has 4 medication(s) that are the same as other medications prescribed for you. Read the directions carefully, and ask your doctor or other care provider to review them with you.
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
## 2018-11-07 NOTE — LETTER
2018       RE: Tha Mcghee  : 1990   MRN: [id number]      To Whom It May Concern,       Mr. Mcghee had neuropsychological testing 10/10/2018 and he was not able to participate in testing protocol.  I believe that he had a seizure prior to the testing which resulted in post seizure behavioral changes.  The neuropsychological testing will be used for epilepsy surgery planning and it is an important test.  Therefore we request that the neuropsychological testing be repeated and he have adequate insurance coverage for this test.  Thank you for your cooperation.       Sincerely,       Rajni Araujo MD
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
## 2018-11-12 DIAGNOSIS — R56.9 SEIZURES (H): Primary | ICD-10-CM

## 2018-11-13 ENCOUNTER — OFFICE VISIT (OUTPATIENT)
Dept: NEUROLOGY | Facility: CLINIC | Age: 28
End: 2018-11-13
Payer: COMMERCIAL

## 2018-11-13 DIAGNOSIS — G40.019 LOCALIZATION-RELATED IDIOPATHIC EPILEPSY AND EPILEPTIC SYNDROMES WITH SEIZURES OF LOCALIZED ONSET, INTRACTABLE, WITHOUT STATUS EPILEPTICUS (H): Primary | ICD-10-CM

## 2018-11-13 DIAGNOSIS — F41.9 ANXIETY: ICD-10-CM

## 2018-11-13 DIAGNOSIS — F33.1 MAJOR DEPRESSIVE DISORDER, RECURRENT EPISODE, MODERATE (H): ICD-10-CM

## 2018-11-13 DIAGNOSIS — F09 MENTAL DISORDER DUE TO GENERAL MEDICAL CONDITION: ICD-10-CM

## 2018-11-13 NOTE — LETTER
2018       RE: Tha Mcghee  : 1990   MRN: [id number]      Dear Colleague,    Thank you for referring your patient, Tha Mcghee, to the [facility] EPILEPSY CARE at [facility]. Please see a copy of my visit note below.    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 He was unaccompanied to the evaluation.    BACKGROUND INFORMATION / INTERVIEW FINDINGS    I saw Mr. Mcghee for a neuropsychology exam on 10/10/2018. Unfortunately, there was suggestion of inconsistent engagement with testing throughout the process of the exam. As such, many of the results were felt to be invalid, and firm conclusions were unable to be reached about the data. In the interval, he completed video EEG monitoring at the [facility] from 10/23 - 10/26/2018. This evaluation documented four focal impaired seizures, two with partial tonic clonic seizures. These seizures were felt to arise from the right hemisphere, and the right temporal region in particular. He is now being considered for surgical candidacy for his medically intractable epilepsy. Some concern was expressed that his inconsistent engagement with the prior neuropsychology exam was reflective of a postictal state. Thus, he has been re-referred for a neuropsychology exam by his treating epileptologist, Dr. Rajni Araujo.    The following information is copied from the report of my exam on 10/10/2018.  Records indicate that Mr. Mcghee suffered a first onset seizure in . His seizures are characterized by a flush of anxiety, followed by confusion with lip-smacking,  "and a clicking sound in his throat. These events may progress to a generalized tonic-clonic seizure. . . The most recent MRI of his brain on June 27, 2017 documented  1. Regarding the orbits and globes, there is mildly increased T2 signal and mild enhancement within the left optic nerve. This appears new since 2013, though is age indeterminate, representing optic neuritis versus optic neuropathy. 2. Regarding the remainder of the brain, no abnormalities are demonstrated. No evidence of demyelinating disorder within the brain.  . . . 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  Please see my report from that date for more details and background information about his cognitive concerns at that time, his mental health history, family history, living situation, educational background, and work history.    On interview, Mr. Mcghee reported that he had been in the hospital the weekend before this evaluation. He stated that he had a c-diff infection. He reported that he had been affected by this illness the entire previous week. He stated that he now finally feels back to normal. Regarding his seizures, he stated that he has had no known generalized tonic-clonic seizures since his hospitalization. He estimated that he has felt his auras less than five times since being in the hospital for the video EEG. He stated that his thinking skills seemed normal on the date of the current exam.    Regarding cognition, Mr. Mcghee reported that he has ongoing concerns with his memory, but nothing is different since the time of his evaluation one month ago. He noted that since he was placed on gabapentin, he is more emotional than he used to be. He stated that when he was prescribed Keppra, he was in a really bad mood. He characterized his current mood as fine. He indicated that he \"wants to get this over with and move on in his life\" regarding his seizures and " potentially having a surgical treatment. He noted that he feels as if he is being held back by his seizures. He is prescribed Effexor. He denied suicidal ideation. He stated that he is trying to get in to see a psychiatrist.    With respect to other medical background, Mr. Mcghee denied interval TBI or stroke. He stated that his sleep is the same, and he denied pain at the time of the interview. Per records, his current medications include alprazolam, cholecalciferol, diazepam, divalproex, docusate sodium, gabapentin, lacosamide, medical cannabis inhalation, multivitamin, phenytoin, promethazine, rizatriptan, and venlafaxine. He denied alcohol or tobacco use. He reported that he continues to use marijuana a couple of times per day, with his last use being the day before the exam. He denied other illicit drug use.     Mr. Mcghee continues to live at home with his parents. He manages his own basic and instrumental daily activities. He does not drive due to his seizures. He denied a change in his family situation, living situation, educational background, or work history since his prior evaluation.    BEHAVIORAL OBSERVATIONS  Mr. Mcghee was polite and cooperative with the exam. His speech was normal. Comprehension was normal. His thought processes were unremarkable. His mood was euthymic with congruent affect. The current results are felt to be an accurate reflection of his cognitive functioning.    RESULTS OF EXAM  His performances on measures of neuropsychological functioning were as follows:      He was fully oriented to time, place, and various aspects of personal information. He obtained passing scores on stand-alone and embedded metrics of cognitive performance validity. Auditory attention for digits was average. Visual attention for spatial sequences was average. Mental calculations her high average. Learning of words in a list format was average. Short delayed recall of list words was average. 30 minute delayed  recall of list words was high average. Delayed recognition of list words was average, and performed without error. Immediate memory for simple geometric figures was performed within normal limits. His drawing of a complicated geometric figure was normal. Short delayed recall of the figure was superior. 30 minute delayed recall of the figure was high average. Delayed recognition of the figure s elements was average. Visuoperceptual matching of faces was performed within normal limits. Visual problem solving with blocks was average. Nonverbal reasoning for incomplete matrices was borderline impaired. Comprehension of phrases and short stories was high average. Verbal associative fluency was borderline impaired. Semantic verbal fluency was low average. Naming to confrontation was average. Verbal abstract reasoning was average. Speeded visual sequencing under focused attention was high average. A similar measure with a divided attention component was superior. Speeded word reading was average. Speeded color naming was average. Speeded inhibition of an overlearned response was average. Speeded matching and cancellation was low average. Speeded visuomotor coding was average. Speeded fine motor dexterity was borderline impaired, bilaterally.    He endorsed items consistent with moderate symptoms of depression, and moderate symptoms of anxiety on self-report measures. On a longer measure of personality and emotional functioning, he responded in a manner that is consistent with a tendency to respond true to test items, and interpretation of this profile should be viewed with some caution. Further, there was evidence of potential over-reporting of test items. Interpreted cautiously, clinical scale elevations were consistent with possible thought disorder and acting out behaviors. Other elevations are consistent with demoralization, and somatization tendencies, difficulties getting along with others, persecutory ideation, and  unusual experiences. Wide-ranging somatic concerns were noted, including gastrointestinal complaints, neurologic concerns, and cognitive concerns. Other elevations are consistent with self-doubt, stress, and worry. Those who respond similarly may have had conduct problems in childhood, past substance abuse, and aggressive tendencies. Overall, this profile is consistent with aggression, thought disorder, and disconstraint.    IMPRESSIONS  Mr. Mcghee demonstrated generally normal cognitive functioning across most assessed domains. I do not see strong evidence to suggest that there is relative dysfunction of the right temporal lobe. There is a single low test score on a measure of non-verbal reasoning that has been associated with a function of the right temporal lobe, although other measures that are also felt to be mediated by the right temporal lobe were entirely normal. There is evidence to suggest that he is experiencing mild to moderate medication toxicity. His cognition is otherwise entirely normal, and was performed in keeping with his likely average range cognitive baseline. Measures of verbal and nonverbal anterograde memory were relative strengths. Compared to his exam from one month ago, there are a number of performances that are considerably improved. This finding, as well as his performances on measures of cognitive performance validity, are both suggestive of appropriate engagement in the current exam. In today s exam, there was an isolated low score on a measure of nonverbal reasoning. Slowing was also noted on measures of cognitive and psychomotor speed. He is still reporting moderate symptoms of depression and anxiety, and wide-ranging psychological distress on a measure of personality. It could conceivably be the case that these psychological factors are contributing to some of the variability seen in the current exam, as well as to his concerns about cognitive dysfunction in day-to-day  life.    RECOMMENDATIONS  Preliminary results and feedback were provided to the patient on the date of the appointment, and all questions were answered.    1. I support his plan for obtaining psychiatric care. In spite of treatment, he is reporting ongoing symptoms of depression and anxiety.    2. Along similar lines, referral for psychotherapy services is recommended. One possible referral option would be [facility], with locations throughout the [facility] area. They can be reached by calling [phone]. It is important that his mental health is well managed prior to pursuing a surgical intervention for his seizures.    3. He is encouraged to follow-through with his already scheduled Wada exam in January.    4. If he continues to have difficulties with memory, routine use of a memory notebook or other assistive device could be of benefit.    5.  If he completes surgery for his epilepsy, a follow-up neuropsychological evaluation is recommended six months after this procedure in order to assess and update recommendations as appropriate. However, I would be pleased to evaluate sooner if changes are noted or if clinically indicated.    Chano Gamez, Ph.D., L.P., ABPP-CN   / Licensed Psychologist [id number]  Department of Rehabilitation Medicine  Division of Adult Neuropsychology  [facility]    Time spent:  three hours professional time, including record review, data integration, and report writing (CPT 26006); two hours of testing administered by a psychometrist and interpreted by a neuropsychologist (CPT 43313). Diagnoses: G40.019, F06.8, F33.1, F41.9.     Again, thank you for allowing me to participate in the care of your patient.      Sincerely,    Chano Gamez, PhD LP

## 2018-11-13 NOTE — LETTER
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 He was unaccompanied to the evaluation.    BACKGROUND INFORMATION / INTERVIEW FINDINGS    I saw Mr. Mcghee for a neuropsychology exam on 10/10/2018. Unfortunately, there was suggestion of inconsistent engagement with testing throughout the process of the exam. As such, many of the results were felt to be invalid, and firm conclusions were unable to be reached about the data. In the interval, he completed video EEG monitoring at the [facility] from 10/23 - 10/26/2018. This evaluation documented four focal impaired seizures, two with partial tonic clonic seizures. These seizures were felt to arise from the right hemisphere, and the right temporal region in particular. He is now being considered for surgical candidacy for his medically intractable epilepsy. Some concern was expressed that his inconsistent engagement with the prior neuropsychology exam was reflective of a postictal state. Thus, he has been re-referred for a neuropsychology exam by his treating epileptologist, Dr. Rajni Araujo.    The following information is copied from the report of my exam on 10/10/2018.  Records indicate that Mr. Mcghee suffered a first onset seizure in April, 2011. His seizures are characterized by a flush of anxiety, followed by confusion with lip-smacking, and a clicking sound in his throat. These events may progress to a generalized tonic-clonic seizure. . . The most recent MRI of his brain on June 27, 2017 documented  1. Regarding the orbits and  "globes, there is mildly increased T2 signal and mild enhancement within the left optic nerve. This appears new since 2013, though is age indeterminate, representing optic neuritis versus optic neuropathy. 2. Regarding the remainder of the brain, no abnormalities are demonstrated. No evidence of demyelinating disorder within the brain.  . . . 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  Please see my report from that date for more details and background information about his cognitive concerns at that time, his mental health history, family history, living situation, educational background, and work history.    On interview, Mr. Mcghee reported that he had been in the hospital the weekend before this evaluation. He stated that he had a c-diff infection. He reported that he had been affected by this illness the entire previous week. He stated that he now finally feels back to normal. Regarding his seizures, he stated that he has had no known generalized tonic-clonic seizures since his hospitalization. He estimated that he has felt his auras less than five times since being in the hospital for the video EEG. He stated that his thinking skills seemed normal on the date of the current exam.    Regarding cognition, Mr. Mcghee reported that he has ongoing concerns with his memory, but nothing is different since the time of his evaluation one month ago. He noted that since he was placed on gabapentin, he is more emotional than he used to be. He stated that when he was prescribed Keppra, he was in a really bad mood. He characterized his current mood as fine. He indicated that he \"wants to get this over with and move on in his life\" regarding his seizures and potentially having a surgical treatment. He noted that he feels as if he is being held back by his seizures. He is prescribed Effexor. He denied suicidal ideation. He stated that he is trying to " get in to see a psychiatrist.    With respect to other medical background, Mr. Mcghee denied interval TBI or stroke. He stated that his sleep is the same, and he denied pain at the time of the interview. Per records, his current medications include alprazolam, cholecalciferol, diazepam, divalproex, docusate sodium, gabapentin, lacosamide, medical cannabis inhalation, multivitamin, phenytoin, promethazine, rizatriptan, and venlafaxine. He denied alcohol or tobacco use. He reported that he continues to use marijuana a couple of times per day, with his last use being the day before the exam. He denied other illicit drug use.     Mr. Mcghee continues to live at home with his parents. He manages his own basic and instrumental daily activities. He does not drive due to his seizures. He denied a change in his family situation, living situation, educational background, or work history since his prior evaluation.    BEHAVIORAL OBSERVATIONS  Mr. Mcghee was polite and cooperative with the exam. His speech was normal. Comprehension was normal. His thought processes were unremarkable. His mood was euthymic with congruent affect. The current results are felt to be an accurate reflection of his cognitive functioning.    RESULTS OF EXAM  His performances on measures of neuropsychological functioning were as follows:      He was fully oriented to time, place, and various aspects of personal information. He obtained passing scores on stand-alone and embedded metrics of cognitive performance validity. Auditory attention for digits was average. Visual attention for spatial sequences was average. Mental calculations her high average. Learning of words in a list format was average. Short delayed recall of list words was average. 30 minute delayed recall of list words was high average. Delayed recognition of list words was average, and performed without error. Immediate memory for simple geometric figures was performed within normal limits. His  drawing of a complicated geometric figure was normal. Short delayed recall of the figure was superior. 30 minute delayed recall of the figure was high average. Delayed recognition of the figure s elements was average. Visuoperceptual matching of faces was performed within normal limits. Visual problem solving with blocks was average. Nonverbal reasoning for incomplete matrices was borderline impaired. Comprehension of phrases and short stories was high average. Verbal associative fluency was borderline impaired. Semantic verbal fluency was low average. Naming to confrontation was average. Verbal abstract reasoning was average. Speeded visual sequencing under focused attention was high average. A similar measure with a divided attention component was superior. Speeded word reading was average. Speeded color naming was average. Speeded inhibition of an overlearned response was average. Speeded matching and cancellation was low average. Speeded visuomotor coding was average. Speeded fine motor dexterity was borderline impaired, bilaterally.    He endorsed items consistent with moderate symptoms of depression, and moderate symptoms of anxiety on self-report measures. On a longer measure of personality and emotional functioning, he responded in a manner that is consistent with a tendency to respond true to test items, and interpretation of this profile should be viewed with some caution. Further, there was evidence of potential over-reporting of test items. Interpreted cautiously, clinical scale elevations were consistent with possible thought disorder and acting out behaviors. Other elevations are consistent with demoralization, and somatization tendencies, difficulties getting along with others, persecutory ideation, and unusual experiences. Wide-ranging somatic concerns were noted, including gastrointestinal complaints, neurologic concerns, and cognitive concerns. Other elevations are consistent with self-doubt,  stress, and worry. Those who respond similarly may have had conduct problems in childhood, past substance abuse, and aggressive tendencies. Overall, this profile is consistent with aggression, thought disorder, and disconstraint.    IMPRESSIONS  Mr. Mcghee demonstrated generally normal cognitive functioning across most assessed domains. I do not see strong evidence to suggest that there is relative dysfunction of the right temporal lobe. There is a single low test score on a measure of non-verbal reasoning that has been associated with a function of the right temporal lobe, although other measures that are also felt to be mediated by the right temporal lobe were entirely normal. There is evidence to suggest that he is experiencing mild to moderate medication toxicity. His cognition is otherwise entirely normal, and was performed in keeping with his likely average range cognitive baseline. Measures of verbal and nonverbal anterograde memory were relative strengths. Compared to his exam from one month ago, there are a number of performances that are considerably improved. This finding, as well as his performances on measures of cognitive performance validity, are both suggestive of appropriate engagement in the current exam. In today s exam, there was an isolated low score on a measure of nonverbal reasoning. Slowing was also noted on measures of cognitive and psychomotor speed. He is still reporting moderate symptoms of depression and anxiety, and wide-ranging psychological distress on a measure of personality. It could conceivably be the case that these psychological factors are contributing to some of the variability seen in the current exam, as well as to his concerns about cognitive dysfunction in day-to-day life.    RECOMMENDATIONS  Preliminary results and feedback were provided to the patient on the date of the appointment, and all questions were answered.    1. I support his plan for obtaining psychiatric care.  In spite of treatment, he is reporting ongoing symptoms of depression and anxiety.    2. Along similar lines, referral for psychotherapy services is recommended. One possible referral option would be [facility], with locations throughout the [facility] area. They can be reached by calling [phone]. It is important that his mental health is well managed prior to pursuing a surgical intervention for his seizures.    3. He is encouraged to follow-through with his already scheduled [place] exam in January.    4. If he continues to have difficulties with memory, routine use of a memory notebook or other assistive device could be of benefit.    5.  If he completes surgery for his epilepsy, a follow-up neuropsychological evaluation is recommended six months after this procedure in order to assess and update recommendations as appropriate. However, I would be pleased to evaluate sooner if changes are noted or if clinically indicated.    Chano Gamez, Ph.D., L.P., ABPP-CN   / Licensed Psychologist [id number]  Department of Rehabilitation Medicine  Division of Adult Neuropsychology  [facility]    Time spent:  three hours professional time, including record review, data integration, and report writing (CPT 20483); two hours of testing administered by a psychometrist and interpreted by a neuropsychologist (CPT 55277). Diagnoses: G40.019, F06.8, F33.1, F41.9.

## 2018-11-13 NOTE — MR AVS SNAPSHOT
After Visit Summary   11/13/2018    Tha Mcghee    MRN: [id number]           Patient Information     Date Of Birth          1990        Visit Information        Provider Department      11/13/2018 12:30 PM Chano Gamez, PhD LP [facility]         Follow-ups after your visit        Your next 10 appointments already scheduled     Nov 15, 2018  9:00 AM CST   New Patient Visit with Molly Snow MD   [facility] ([facility])    [address]  [facility] [facility] [address]   [phone]            Nov 28, 2018  1:00 PM CST   Telephone Call with [facility] Nurse 1   [facility])    [address], [address]  [place] [facility] [address]   [phone]           Note: this is not an onsite visit; there is no need to come to the facility.            Dec 17, 2018 11:00 AM CST   Education with [facility] NEUROPSYCH   [facility])    [address], [address]  [place] [facility] [address]   [phone]            Dec 17, 2018 12:30 PM CST   Portable EEG with [facility] PORTABLE EEG   [facility] ([facility])    [address]  [facility] [facility] [address]   [phone]            Dec 18, 2018 12:00 PM CST   PE NPET BRAIN with PPET1   [facility] ([facility])    [address] [facility] [address]   [phone]           How do I prepare for my exam? (Food and drink instructions) Patients should eat a low carb, high protein meal 7 hours (or the last meal you eat) before the scan. Low carb, high protein meals consist of lean meats, seafood, beans, soy, low-fat dairy, eggs, nuts & seeds.  6 hours before your scan: Stop all food and liquids (except water). Do not chew gum or suck on mints. If you need to take medicine with food, you may take it with a few crackers.  How do I prepare for my exam? (Other  instructions) If you have diabetes: Have your exam early in the morning. Your blood glucose will go up as the day goes by. Your glucose level must be 180 or less at the start of the exam. Please take any oral diabetic medication you need to ensure this blood glucose level is below 180, but no insulin 4 hours prior to the exam.  * If you are taking insulin in the morning take with breakfast by 6 am and schedule procedure between 12 and 2:15 pm. * If you are taking insulin at night take nightly dose, fast overnight, schedule procedure before 10 am. * If you take insulin both morning and night take morning dose by 6am and schedule procedure between 12 and 2:15 pm.  24 hours before your scan: Don t do any heavy exercise. (No jogging, aerobics or other workouts.) Exercise will make your pictures less accurate.  What should I wear? Please wear comfortable clothes.  How long does the exam take?  Most scans will take between 2-3 hours.  What should I bring: Please bring a list of your medicines (including vitamins, minerals and over-the-counter drugs). Leave your valuables at home.  Do I need a :  No  is needed.  What do I need to tell my doctor? Tell your doctor: * If there is any chance you may be pregnant or if you are breastfeeding. * If you have problems lying in small spaces (claustrophobia). If you do, your doctor may give you medicine to help you relax.  What should I do after the exam: No restrictions, You may resume normal activities.  What is this test: Your doctor has ordered a PET scan (positron emission tomography). This will take pictures of the cells and organs in your body. Your doctor may have also ordered a CT scan (computed tomography). This is another way to take pictures of your cells and organs. It is done at the same time as the PET scan. The pictures will help us understand your problem so we can make a treatment plan that fits your needs.  Who should I call with questions: Please call  your Imaging Department at your exam site with any questions. Directions, parking instructions, and other information is available on our website, [email or web address].            Jan 03, 2019  7:30 AM CST   [place] Visit with [facility] TECH 2   [facility] ([facility])    [facility]  [address] [address]   [phone]           [place]: Your appointment is scheduled at [facility]. [address] [place] [address]            Jan 03, 2019  8:30 AM CST   Procedure 6.5 hour with U2A ROOM 12   [address] [facility] [place] ([facility])    [address] [place] [address]               Jan 03, 2019 10:00 AM CST   IR CAROTID CEREBRAL ANGIOGRAM BILATERAL with UUIR3   [facility] [place] Interventional Radiology ([facility])    [address] [address]   [phone]           The day before the exam:   You may eat your regular diet.   You are encouraged to drink at least 8 eight ounce glasses of clear liquids.   Drink no alcoholic beverages for 24 hours before or after the exam.  The day of the exam:   Do not eat any solid food or milk products for 6 hours prior to the exam. You may drink clear liquids until 2 hours prior to the exam. Clear liquids include the following: water, Jell-O, clear broth, apple juice or any non-carbonated drink that you can see through (no pop!)   The morning of the exam you may take medications as directed with a sip of water.  You should not have received barium (x-ray contrast) within 48 hours of this exam.  Please wear loose clothing, such as a sweat suit or jogging clothes. Avoid snaps, zippers and other metal. We may ask you to undress and put on a hospital gown.  Please bring any scans or X-rays taken at other hospitals, if similar tests were done. Also bring a list of your  medicines, including vitamins, minerals and over-the-counter drugs. It is safest to leave personal items at home.  Someone will need to drive you to and from the hospital.  Tell your doctor:   If you have allergies to x-ray contrast or iodine.   If you are or may be pregnant.   If you are taking Coumadin (or any other blood thinners) 5 days prior to the exam for any special instructions.   If you are diabetic to determine if your insulin needs have to be adjusted for the exam. Your doctor will:   Need to do a history and physical within 30 days before this procedure.   Determine whether you need a 12 lead EKG   Determine which medications (if any) that should not be taken the morning of the exam.   Obtain necessary laboratory tests prior to the exam (creatinine, Hgb/Hct, platelet count, and INR).  Following the exam:   You will need to remain on complete bedrest for up to 6 hours. The nurse will monitor your vital signs, puncture site, and the pulses and temperature of the arm or leg that was punctured.   If you were given sedation, you cannot drive for 24 hours after the procedure, and an adult must be with you until then.  If you have any questions, please call the Imaging Department where you will have your exam. Directions, parking instructions, and other information are available on our website, [email or web address].            Jan 14, 2019 10:30 AM CST   (Arrive by 10:15 AM)   New Patient Visit with Jason Funes MD   [facility] ([facility] [place])    [address] [place] [address] [phone]              Future tests that were ordered for you today     Open Future Orders        Priority Expected Expires Ordered    IR Carotid Cerebral Angiogram Bilateral Routine  11/12/2019 11/12/2018            Who to contact     Please call your clinic at [phone] to:    Ask questions about your health    Make or cancel appointments    Discuss your  medicines    Learn about your test results    Speak to your doctor            Additional Information About Your Visit        MyChart Information     [employer] is an electronic gateway that provides easy, online access to your medical records. With [employer], you can request a clinic appointment, read your test results, renew a prescription or communicate with your care team.     To sign up for [employer] visit the website at [email or web address]   You will be asked to enter the access code listed below, as well as some personal information. Please follow the directions to create your username and password.     Your access code is: [email or web address]  Expires: 2019 11:07 AM     Your access code will  in 90 days. If you need help or a new code, please contact your [facility] or call [phone] for assistance.        Care EveryWhere ID     This is your Care EveryWhere ID. This could be used by other organizations to access your [place] medical records  [id number]         Blood Pressure from Last 3 Encounters:   18 126/79   10/26/18 119/75   18 118/74    Weight from Last 3 Encounters:   18 181 lb 6.4 oz (82.3 kg)   10/23/18 177 lb (80.3 kg)   18 173 lb (78.5 kg)              Today, you had the following     No orders found for display       Primary Care Provider Office Phone # Fax #    Bhaskar Morales [phone]       [facility] [address]   [facility] [address]        Equal Access to Services     [facility] : Hadii aad ku hadasho Soomaali, waaxda luqadaha, qaybta kaalmada adeegyada, waxay mimiin marta sinclair . So [facility] [phone].    ATENCIÓN: Si habla español, tiene a andrade disposición servicios gratuitos de asistencia lingüística. Llame al [phone].    We comply with applicable federal civil rights laws and [place] laws. We do not discriminate on the basis of race, color, national origin, age,  disability, sex, sexual orientation, or gender identity.            Thank you!     Thank you for choosing [facility]  for your care. Our goal is always to provide you with excellent care. Hearing back from our patients is one way we can continue to improve our services. Please take a few minutes to complete the written survey that you may receive in the mail after your visit with us. Thank you!             Your Updated Medication List - Protect others around you: Learn how to safely use, store and throw away your medicines at www.disposemymeds.org.          This list is accurate as of 11/13/18  3:59 PM.  Always use your most recent med list.                   Brand Name Dispense Instructions for use Diagnosis    ALPRAZolam 0.5 MG tablet    XANAX     Take 0.5 mg by mouth as needed.        cholecalciferol 5000 units Tabs tablet    vitamin D3     Take 5,000 Units by mouth        diazepam 5 MG/ML (HIGH CONC) solution    DIAZEPAM INTENSOL    30 mL    For generalized seizures give 5 mg.  May repeat and give 2.5 mg after 10 minutes if needed. 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  Not a prescription)      Inhale 200 mLs into the lungs Take one to two puffs every four to six hours, red solution ([employer])    Localization-related (focal) (partial) epilepsy and epileptic syndromes with complex partial seizures, with intractable epilepsy, Variants of migraine, not elsewhere classified, without mention of intractable migraine without mention of status migrainosus       MULTIVITAMINS PO      2 Chew a day    Localization-related (focal) (partial) epilepsy and epileptic syndromes with complex partial seizures, with intractable epilepsy       * phenytoin 100 MG CR capsule    DILANTIN    270 capsule    TAKE 1 CAP BY MOUTH DAILY IN THE AM AND 2 CAPS IN THE PM    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 Max 3 tablets/24 hours.    Vision loss of left eye, Migraine variant       venlafaxine 75 MG 24 hr capsule    EFFEXOR XR    270 capsule    Take 3 capsules (225 mg) by mouth every morning    Episode of recurrent major depressive disorder, unspecified depression episode severity (H)       * Notice:  This list has 4 medication(s) that are the same as other medications prescribed for you. Read the directions carefully, and ask your doctor or other care provider to  review them with you.

## 2018-11-15 ENCOUNTER — TELEPHONE (OUTPATIENT)
Dept: PSYCHIATRY | Facility: CLINIC | Age: 28
End: 2018-11-15

## 2018-11-15 NOTE — TELEPHONE ENCOUNTER
-received a call back from patient.  Let him know that home visits is not an offered service by out clinic.  Let him know that he can further discuss this with Dr. Araujo at his next visit or he can make another appointment with Dr. Snow.  Patient reported that he did not want to make these appointments and hung up.

## 2018-11-15 NOTE — TELEPHONE ENCOUNTER
-Writer attempted to call patient back to discuss.  Received voicemail.  Left message asking for a return call.  Clinic number provided.        -Patient was to be seen by Dr. Snow today for a psych clearance for epilepsy surgery.  This clinic does not offer home visits.  Patient can further discuss the need of a psych eval with Dr. Araujo when he next comes in to clinic.

## 2018-11-15 NOTE — TELEPHONE ENCOUNTER
----- Message from Jazmín Valadez sent at 11/15/2018  9:56 AM CST -----  Regarding: brendan  Caller: Tha     Relationship to Patient: pt     Call Back Number: [phone]    Reason for Call: FYI: pt doesn't want to do the psych exam unless someone will come to his house. He doesn't need a call back.

## 2018-11-19 NOTE — PROGRESS NOTES
Name: Tha Mcghee  MR#: [id number]  YOB: 1990  Date of Exam: 11/13/2018      Neuropsychology Laboratory  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 He was unaccompanied to the evaluation.    BACKGROUND INFORMATION / INTERVIEW FINDINGS    I saw Mr. Mcghee for a neuropsychology exam on 10/10/2018. Unfortunately, there was suggestion of inconsistent engagement with testing throughout the process of the exam. As such, many of the results were felt to be invalid, and firm conclusions were unable to be reached about the data. In the interval, he completed video EEG monitoring at the [facility] from 10/23 - 10/26/2018. This evaluation documented four focal impaired seizures, two with partial tonic clonic seizures. These seizures were felt to arise from the right hemisphere, and the right temporal region in particular. He is now being considered for surgical candidacy for his medically intractable epilepsy. Some concern was expressed that his inconsistent engagement with the prior neuropsychology exam was reflective of a postictal state. Thus, he has been re-referred for a neuropsychology exam by his treating epileptologist, Dr. Rajni Araujo.    The following information is copied from the report of my exam on 10/10/2018.  Records indicate that Mr. Mcghee suffered a first onset seizure in April, 2011. His seizures are characterized by a flush of anxiety, followed by confusion with lip-smacking, and a clicking sound in his throat. These events may progress to a generalized tonic-clonic seizure. . . The most recent MRI of his brain on June 27, 2017 documented  1. Regarding the orbits and globes, there is mildly increased T2 signal and mild enhancement within the  "left optic nerve. This appears new since 2013, though is age indeterminate, representing optic neuritis versus optic neuropathy. 2. Regarding the remainder of the brain, no abnormalities are demonstrated. No evidence of demyelinating disorder within the brain.  . . . 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  Please see my report from that date for more details and background information about his cognitive concerns at that time, his mental health history, family history, living situation, educational background, and work history.    On interview, Mr. Mcghee reported that he had been in the hospital the weekend before this evaluation. He stated that he had a c-diff infection. He reported that he had been affected by this illness the entire previous week. He stated that he now finally feels back to normal. Regarding his seizures, he stated that he has had no known generalized tonic-clonic seizures since his hospitalization. He estimated that he has felt his auras less than five times since being in the hospital for the video EEG. He stated that his thinking skills seemed normal on the date of the current exam.    Regarding cognition, Mr. Mcghee reported that he has ongoing concerns with his memory, but nothing is different since the time of his evaluation one month ago. He noted that since he was placed on gabapentin, he is more emotional than he used to be. He stated that when he was prescribed Keppra, he was in a really bad mood. He characterized his current mood as fine. He indicated that he \"wants to get this over with and move on in his life\" regarding his seizures and potentially having a surgical treatment. He noted that he feels as if he is being held back by his seizures. He is prescribed Effexor. He denied suicidal ideation. He stated that he is trying to get in to see a psychiatrist.    With respect to other medical background, " Mr. Mcghee denied interval TBI or stroke. He stated that his sleep is the same, and he denied pain at the time of the interview. Per records, his current medications include alprazolam, cholecalciferol, diazepam, divalproex, docusate sodium, gabapentin, lacosamide, medical cannabis inhalation, multivitamin, phenytoin, promethazine, rizatriptan, and venlafaxine. He denied alcohol or tobacco use. He reported that he continues to use marijuana a couple of times per day, with his last use being the day before the exam. He denied other illicit drug use.     Mr. Mcghee continues to live at home with his parents. He manages his own basic and instrumental daily activities. He does not drive due to his seizures. He denied a change in his family situation, living situation, educational background, or work history since his prior evaluation.    BEHAVIORAL OBSERVATIONS  Mr. Mcghee was polite and cooperative with the exam. His speech was normal. Comprehension was normal. His thought processes were unremarkable. His mood was euthymic with congruent affect. The current results are felt to be an accurate reflection of his cognitive functioning.    RESULTS OF EXAM  His performances on measures of neuropsychological functioning were as follows:      He was fully oriented to time, place, and various aspects of personal information. He obtained passing scores on stand-alone and embedded metrics of cognitive performance validity. Auditory attention for digits was average. Visual attention for spatial sequences was average. Mental calculations her high average. Learning of words in a list format was average. Short delayed recall of list words was average. 30 minute delayed recall of list words was high average. Delayed recognition of list words was average, and performed without error. Immediate memory for simple geometric figures was performed within normal limits. His drawing of a complicated geometric figure was normal. Short delayed recall  of the figure was superior. 30 minute delayed recall of the figure was high average. Delayed recognition of the figure s elements was average. Visuoperceptual matching of faces was performed within normal limits. Visual problem solving with blocks was average. Nonverbal reasoning for incomplete matrices was borderline impaired. Comprehension of phrases and short stories was high average. Verbal associative fluency was borderline impaired. Semantic verbal fluency was low average. Naming to confrontation was average. Verbal abstract reasoning was average. Speeded visual sequencing under focused attention was high average. A similar measure with a divided attention component was superior. Speeded word reading was average. Speeded color naming was average. Speeded inhibition of an overlearned response was average. Speeded matching and cancellation was low average. Speeded visuomotor coding was average. Speeded fine motor dexterity was borderline impaired, bilaterally.    He endorsed items consistent with moderate symptoms of depression, and moderate symptoms of anxiety on self-report measures. On a longer measure of personality and emotional functioning, he responded in a manner that is consistent with a tendency to respond true to test items, and interpretation of this profile should be viewed with some caution. Further, there was evidence of potential over-reporting of test items. Interpreted cautiously, clinical scale elevations were consistent with possible thought disorder and acting out behaviors. Other elevations are consistent with demoralization, and somatization tendencies, difficulties getting along with others, persecutory ideation, and unusual experiences. Wide-ranging somatic concerns were noted, including gastrointestinal complaints, neurologic concerns, and cognitive concerns. Other elevations are consistent with self-doubt, stress, and worry. Those who respond similarly may have had conduct problems in  childhood, past substance abuse, and aggressive tendencies. Overall, this profile is consistent with aggression, thought disorder, and disconstraint.    IMPRESSIONS  Mr. Mcghee demonstrated generally normal cognitive functioning across most assessed domains. I do not see strong evidence to suggest that there is relative dysfunction of the right temporal lobe. There is a single low test score on a measure of non-verbal reasoning that has been associated with a function of the right temporal lobe, although other measures that are also felt to be mediated by the right temporal lobe were entirely normal. There is evidence to suggest that he is experiencing mild to moderate medication toxicity. His cognition is otherwise entirely normal, and was performed in keeping with his likely average range cognitive baseline. Measures of verbal and nonverbal anterograde memory were relative strengths. Compared to his exam from one month ago, there are a number of performances that are considerably improved. This finding, as well as his performances on measures of cognitive performance validity, are both suggestive of appropriate engagement in the current exam. In today s exam, there was an isolated low score on a measure of nonverbal reasoning. Slowing was also noted on measures of cognitive and psychomotor speed. He is still reporting moderate symptoms of depression and anxiety, and wide-ranging psychological distress on a measure of personality. It could conceivably be the case that these psychological factors are contributing to some of the variability seen in the current exam, as well as to his concerns about cognitive dysfunction in day-to-day life.    RECOMMENDATIONS  Preliminary results and feedback were provided to the patient on the date of the appointment, and all questions were answered.    1. I support his plan for obtaining psychiatric care. In spite of treatment, he is reporting ongoing symptoms of depression and  anxiety.    2. Along similar lines, referral for psychotherapy services is recommended. One possible referral option would be [facility], with locations throughout the [facility] area. They can be reached by calling [phone]. It is important that his mental health is well managed prior to pursuing a surgical intervention for his seizures.    3. He is encouraged to follow-through with his already scheduled [place] exam in January.    4. If he continues to have difficulties with memory, routine use of a memory notebook or other assistive device could be of benefit.    5.  If he completes surgery for his epilepsy, a follow-up neuropsychological evaluation is recommended six months after this procedure in order to assess and update recommendations as appropriate. However, I would be pleased to evaluate sooner if changes are noted or if clinically indicated.    Chano Gamez, Ph.D., L.P., ABPP-CN   / Licensed Psychologist [id number]  Department of Rehabilitation Medicine  Division of Adult Neuropsychology  [facility]    Time spent:  three hours professional time, including record review, data integration, and report writing (CPT 71000); two hours of testing administered by a psychometrist and interpreted by a neuropsychologist (CPT 14735). Diagnoses: G40.019, F06.8, F33.1, F41.9.

## 2018-11-28 ENCOUNTER — VIRTUAL VISIT (OUTPATIENT)
Dept: NEUROLOGY | Facility: CLINIC | Age: 28
End: 2018-11-28
Payer: COMMERCIAL

## 2018-11-28 DIAGNOSIS — G40.219 PARTIAL EPILEPSY WITH IMPAIRMENT OF CONSCIOUSNESS, INTRACTABLE (H): Primary | ICD-10-CM

## 2018-11-28 NOTE — PROGRESS NOTES
__ Orientation            Time          ___-0____        Place        ____2____        Personal Info.     ____4____    WAIS-IV   FSIQ____VCI_____PRI_____ WMI_____PSI____     Raw  Age SS  __Similarities  __24___ ___10___  __Vocabulary  _______ _______  __Information  _____          ______  __Comprehension _______ _______  __Block Design  __39___ ___9___  __Matrix Reas.  __10___ __5___  __Visual Puzzles _______ _______  __Picture Comp. _______ _______  __Figure Weights _______ _______  __Digit Span  __ 30___ ___11___  __Arithmetic  __ 17___             ___12___  __L-N Sequencing _______ _______  __Symbol Search __25___ ___7___  __Coding  __74___ ___10___  __Cancellation  _______ _______    __AVLT  Trial   1         2           3           4            5        B          6            _6_    _9_     _12_     _13_     _12_   _8_     _12_      Raw  Zscore  Learning   _52___       -.64  Short Delay   __12__     .22  30 min. delayed recall  __13__     .68  Recognition hits   _15___     .64  Recognition false positives __0___        -    __Fletcher/Jazmín Complex Figure Test    Raw  T-score   %ile  Copy   _35.0__         -                  >16  Imm. Recall _32.0__  ___68___ __16__  30  Delay _29.0_  ___60___ __84__  Recognition _22___   ___53___ ___62_  Time               165 __                      >16    __BVRT  (form C)  Copy E-10 rating ____/4     Raw    Expected  Correct  __7____ _____8____  Incorrect ___4____ _____3____  __COWA   Raw__25___ Tscore__32___   __Semantic Fluency/Animals   Raw = 17   Tscore = 38      __BNT   Raw = 55 Tscore__44___  __Complex Ideational Material   Raw = 12/12 Tscore = 60    __Trail Making Test   A =  18       Errors = 0      Tscore = 57   B =  35       Errors = 0      Tscore = 66  __Stroop                       Raw    +   Felix  =  Total       Tscore        Word = __106_      __-__ = ____   _  49__        Color = __72      _   -__ = ____   __45__        C/W   = __46_     __ -__ =  _  _    __51__    __[place]enton Facial Discrimination   Raw = 49    Interp: WNL        __Grooved Pegboard   RH = 81        Tscore = 33      Drops = 1   LH = 86        TScore = 33      Drops = 1    __BDI-II   Raw__21___ Interp.__Moderate___   __BAI     Raw___17__ Interp. __Moderate______  __MMPI-2RF    __WMS-III   Spatial Span= 17 SS= 10     TOMM   Trial 1= 50

## 2018-11-28 NOTE — MR AVS SNAPSHOT
After Visit Summary   11/28/2018    Tha Mcghee    MRN: [id number]           Patient Information     Date Of Birth          1990        Visit Information        Provider Department      11/28/2018 3:30 PM Rajni Araujo MD [facility]        Today's Diagnoses     Partial epilepsy with impairment of consciousness, intractable (H)    -  1       Follow-ups after your visit        Your next 10 appointments already scheduled     Dec 17, 2018 11:00 AM CST   Education with [facility] NEUROPSYCH   [facility] ([facility])    [address]  [facility] [address]   [phone]            Dec 17, 2018 12:30 PM CST   Portable EEG with [facility] PORTABLE EEG   [facility] ([facility])    [address]  [facility] [address]   [phone]            Dec 18, 2018 12:00 PM CST   [facility]   [facility] ([facility])    [address] [facility] [address]   [phone]           How do I prepare for my exam? (Food and drink instructions) Patients should eat a low carb, high protein meal 7 hours (or the last meal you eat) before the scan. Low carb, high protein meals consist of lean meats, seafood, beans, soy, low-fat dairy, eggs, nuts & seeds.  6 hours before your scan: Stop all food and liquids (except water). Do not chew gum or suck on mints. If you need to take medicine with food, you may take it with a few crackers.  How do I prepare for my exam? (Other instructions) If you have diabetes: Have your exam early in the morning. Your blood glucose will go up as the day goes by. Your glucose level must be 180 or less at the start of the exam. Please take any oral diabetic medication you need to ensure this blood glucose level is below 180, but no insulin 4 hours prior to the exam.  * If you are taking insulin in the morning take with breakfast by 6 am and schedule  procedure between 12 and 2:15 pm. * If you are taking insulin at night take nightly dose, fast overnight, schedule procedure before 10 am. * If you take insulin both morning and night take morning dose by 6am and schedule procedure between 12 and 2:15 pm.  24 hours before your scan: Don t do any heavy exercise. (No jogging, aerobics or other workouts.) Exercise will make your pictures less accurate.  What should I wear? Please wear comfortable clothes.  How long does the exam take?  Most scans will take between 2-3 hours.  What should I bring: Please bring a list of your medicines (including vitamins, minerals and over-the-counter drugs). Leave your valuables at home.  Do I need a :  No  is needed.  What do I need to tell my doctor? Tell your doctor: * If there is any chance you may be pregnant or if you are breastfeeding. * If you have problems lying in small spaces (claustrophobia). If you do, your doctor may give you medicine to help you relax.  What should I do after the exam: No restrictions, You may resume normal activities.  What is this test: Your doctor has ordered a PET scan (positron emission tomography). This will take pictures of the cells and organs in your body. Your doctor may have also ordered a CT scan (computed tomography). This is another way to take pictures of your cells and organs. It is done at the same time as the PET scan. The pictures will help us understand your problem so we can make a treatment plan that fits your needs.  Who should I call with questions: Please call your Imaging Department at your exam site with any questions. Directions, parking instructions, and other information is available on our website, [email or web address].            Jan 03, 2019  7:30 AM CST   Wada Visit with [facility] EEG TECH 2   [facility] EEG ([facility])    [address] [address] [phone]           [place]: Your appointment is scheduled at  [facility]. [address], [place] [address]            Jan 03, 2019  8:30 AM CST   Procedure 6.5 hour with U2A ROOM 12   Unit [address] [facility] [place] ([facility])    [address] [address]               Jan 03, 2019 10:00 AM CST   IR CAROTID CEREBRAL ANGIOGRAM BILATERAL with UUIR3   [facility] [place], Interventional Radiology ([facility])    [address] [facility] [address]   [phone]           The day before the exam:   You may eat your regular diet.   You are encouraged to drink at least 8 eight ounce glasses of clear liquids.   Drink no alcoholic beverages for 24 hours before or after the exam.  The day of the exam:   Do not eat any solid food or milk products for 6 hours prior to the exam. You may drink clear liquids until 2 hours prior to the exam. Clear liquids include the following: water, Jell-O, clear broth, apple juice or any non-carbonated drink that you can see through (no pop!)   The morning of the exam you may take medications as directed with a sip of water.  You should not have received barium (x-ray contrast) within 48 hours of this exam.  Please wear loose clothing, such as a sweat suit or jogging clothes. Avoid snaps, zippers and other metal. We may ask you to undress and put on a hospital gown.  Please bring any scans or X-rays taken at other hospitals, if similar tests were done. Also bring a list of your medicines, including vitamins, minerals and over-the-counter drugs. It is safest to leave personal items at home.  Someone will need to drive you to and from the hospital.  Tell your doctor:   If you have allergies to x-ray contrast or iodine.   If you are or may be pregnant.   If you are taking Coumadin (or any other blood thinners) 5 days prior to the exam for any special instructions.   If you are diabetic to  determine if your insulin needs have to be adjusted for the exam. Your doctor will:   Need to do a history and physical within 30 days before this procedure.   Determine whether you need a 12 lead EKG   Determine which medications (if any) that should not be taken the morning of the exam.   Obtain necessary laboratory tests prior to the exam (creatinine, Hgb/Hct, platelet count, and INR).  Following the exam:   You will need to remain on complete bedrest for up to 6 hours. The nurse will monitor your vital signs, puncture site, and the pulses and temperature of the arm or leg that was punctured.   If you were given sedation, you cannot drive for 24 hours after the procedure, and an adult must be with you until then.  If you have any questions, please call the Imaging Department where you will have your exam. Directions, parking instructions, and other information are available on our website, [email or web address].            Jan 14, 2019 10:30 AM CST   (Arrive by 10:15 AM)   New Patient Visit with Jason Funes MD   [facility] Neurosurgery ([facility])    [address] [place] [address] [phone]              Who to contact     Please call your clinic at [phone] to:    Ask questions about your health    Make or cancel appointments    Discuss your medicines    Learn about your test results    Speak to your doctor            Additional Information About Your Visit        MyChart Information     Bizakt is an electronic gateway that provides easy, online access to your medical records. With Snag[employer], you can request a clinic appointment, read your test results, renew a prescription or communicate with your care team.     To sign up for Bizakt visit the website at [email or web address]   You will be asked to enter the access code listed below, as well as some personal information. Please follow the directions to create your username and  password.     Your access code is: [email or web address]  Expires: 2019 11:07 AM     Your access code will  in 90 days. If you need help or a new code, please contact your [facility] or call [phone] for assistance.        Care EveryWhere ID     This is your Care EveryWhere ID. This could be used by other organizations to access your [place] medical records  [id number]         Blood Pressure from Last 3 Encounters:   11/15/18 123/77   18 126/79   10/26/18 119/75    Weight from Last 3 Encounters:   11/15/18 180 lb 9.6 oz (81.9 kg)   18 181 lb 6.4 oz (82.3 kg)   10/23/18 177 lb (80.3 kg)              Today, you had the following     No orders found for display       Primary Care Provider Office Phone # Fax #    Bhaskar Morales [phone]       [facility] [address]   [facility] [address]        Equal Access to Services     [facility]: Hadii aad ku hadasho Soomaali, waaxda luqadaha, qaybta kaalmada adeegyada, waxesteban sinclair . So [facility] [phone].    ATENCIÓN: Si habla español, tiene a andrade disposición servicios gratuitos de asistencia lingüística. Llame al [phone].    We comply with applicable federal civil rights laws and [place] laws. We do not discriminate on the basis of race, color, national origin, age, disability, sex, sexual orientation, or gender identity.            Thank you!     Thank you for choosing [facility]PI[facility]  for your care. Our goal is always to provide you with excellent care. Hearing back from our patients is one way we can continue to improve our services. Please take a few minutes to complete the written survey that you may receive in the mail after your visit with us. Thank you!             Your Updated Medication List - Protect others around you: Learn how to safely use, store and throw away your medicines at www.disposemymeds.org.          This list is accurate as  of 11/28/18  4:15 PM.  Always use your most recent med list.                   Brand Name Dispense Instructions for use Diagnosis    ALPRAZolam 0.5 MG tablet    XANAX     Take 0.5 mg by mouth as needed.        cholecalciferol 5000 units Tabs tablet    vitamin D3     Take 5,000 Units by mouth        diazepam 5 MG/ML (HIGH CONC) solution    DIAZEPAM INTENSOL    30 mL    For generalized seizures give 5 mg.  May repeat and give 2.5 mg after 10 minutes if needed. 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  Not a prescription)      Inhale 200 mLs into the lungs Take one to two puffs every four to six hours, red solution ([employer] KK)    Localization-related (focal) (partial) epilepsy and epileptic syndromes with complex partial seizures, with intractable epilepsy, Variants of migraine, not elsewhere classified, without mention of intractable migraine without  mention of status migrainosus       MULTIVITAMINS PO      2 Chew a day    Localization-related (focal) (partial) epilepsy and epileptic syndromes with complex partial seizures, with intractable epilepsy       * phenytoin 100 MG capsule    DILANTIN    270 capsule    TAKE 1 CAP BY MOUTH DAILY IN THE AM AND 2 CAPS IN THE PM    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)       * phenytoin 30 MG capsule    DILANTIN    180 capsule    Take two po Q AM    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 Max 3 tablets/24 hours.    Vision loss of left eye, Migraine variant       venlafaxine 75 MG 24 hr capsule    EFFEXOR XR    270 capsule    Take 3 capsules (225 mg) by mouth every morning    Episode of recurrent major depressive disorder, unspecified depression episode severity (H)       * Notice:  This list has 4 medication(s) that are the same as other medications prescribed for you. Read the directions carefully, and ask your doctor or other care provider to review them with you.

## 2018-11-28 NOTE — PROGRESS NOTES
He would like to see his local psychiatrist at [facility]. I asked him to sign SLICK and have psychiatrist send us his note. He did not want to come to [facility] for psychiatrist since 11/15/2018 apportionment was canceled.     Rajni Araujo MD

## 2018-12-03 ENCOUNTER — TELEPHONE (OUTPATIENT)
Dept: NEUROLOGY | Facility: CLINIC | Age: 28
End: 2018-12-03

## 2018-12-03 NOTE — TELEPHONE ENCOUNTER
----- Message from Angela Pemberton RN sent at 12/3/2018  2:48 PM CST -----      ----- Message -----     From: Malinda Hernandez MA     Sent: 12/3/2018   1:30 PM       To: Angela Pemberton RN    Caller: Tha    Relationship to Patient: pt    Call Back Number: [phone]    Reason for Call: Questions about change of care to a physician closer to his home.

## 2018-12-03 NOTE — TELEPHONE ENCOUNTER
"Patient calls the clinic today stating he has re-established care with a therapist he worked with in the past, Kate Sun, with [facility]. He is requesting more information on what Dr. Araujo would like Kate to evaluate specifically, \"otherwise, she'll just to all the regular stuff.\"    Contact information for Kate Sun [phone].     PLAN: Request Dr. Araujo to collaborate with therapist.   "

## 2018-12-04 NOTE — TELEPHONE ENCOUNTER
Writer spoke by telephone with the patient's psychologist.   We clarified the referral indication as stated below by Dr. Araujo.  The provider identifies herself as a doctorate level psychologist; she has the ability to diagnose and treat but does not write prescriptions. She feels that she can meet the needs for this patient and this purpose. If a MD level psychiatrist is required, the provider requests we notify their team or the patient.

## 2018-12-04 NOTE — TELEPHONE ENCOUNTER
Hi. I tired calling, but, was on hold for 5 minutes. I did not have time to hold longer. He has to see a psychiatrist.     This is our pre surgery protocol. We need to make sure his anxiety and depression are adequately treated and managed. Otherwise, we can not proceed with epilepsy surgery. This is a firm protocol for me.     Can you kindly call his therapist and explain, I tired to call and was not able to get a hold of anyone.     Thanks. Rajni

## 2018-12-06 ENCOUNTER — TRANSFERRED RECORDS (OUTPATIENT)
Dept: HEALTH INFORMATION MANAGEMENT | Facility: CLINIC | Age: 28
End: 2018-12-06

## 2018-12-06 NOTE — TELEPHONE ENCOUNTER
Please call this provider on Wednesday, 12/12 and help me connect with her. I tired calling, but, was not able to get a hold of her. Thanks. Rajni Araujo MD

## 2018-12-13 NOTE — TELEPHONE ENCOUNTER
This situation was discussed with Dr. Araujo yesterday at which time I was made aware that the psychologist care needs to be in addition to evaluation by a psychiatrist.     This nurse called the patient to notify him that Dr. Araujo requires the patient to see both a psychologist and psychiatrist. The patient quickly became upset stating he is doing everything that Dr. Araujo wants him to do yet she is still asking for more. He reports concerns regarding payment for these appointments, getting rides to get to the appointments, and the time is taking. The patient hung up the phone during our conversation.

## 2018-12-17 ENCOUNTER — ALLIED HEALTH/NURSE VISIT (OUTPATIENT)
Dept: NEUROLOGY | Facility: CLINIC | Age: 28
End: 2018-12-17
Payer: COMMERCIAL

## 2018-12-17 DIAGNOSIS — R56.9 SEIZURES (H): Primary | ICD-10-CM

## 2018-12-17 DIAGNOSIS — G40.219 PARTIAL EPILEPSY WITH IMPAIRMENT OF CONSCIOUSNESS, INTRACTABLE (H): Primary | ICD-10-CM

## 2018-12-17 NOTE — PROGRESS NOTES
"Wada education completed.  Used the \"Understanding The Wada Test\" form.  All questions answered, patient understands.        Concha GEORGES  "

## 2018-12-18 ENCOUNTER — ANCILLARY PROCEDURE (OUTPATIENT)
Dept: PET IMAGING | Facility: CLINIC | Age: 28
End: 2018-12-18
Attending: PSYCHIATRY & NEUROLOGY
Payer: COMMERCIAL

## 2018-12-18 ENCOUNTER — TELEPHONE (OUTPATIENT)
Dept: NEUROLOGY | Facility: CLINIC | Age: 28
End: 2018-12-18

## 2018-12-18 DIAGNOSIS — G40.909 EPILEPSY (H): Primary | ICD-10-CM

## 2018-12-18 LAB — GLUCOSE SERPL-MCNC: 97 MG/DL (ref 70–99)

## 2018-12-18 NOTE — TELEPHONE ENCOUNTER
Call placed to patient to follow up on a call from last week in which this nurse notified the patient that a psychiatrist evaluation is needed for his presurgical care. The patient was unhappy with this requirement and ended the conversation quickly. When he calls back we will need to talk to him about the rationale of having the support of a psychiatrist already involved in his care.     Patient had a PET scan this afternoon.

## 2018-12-24 ENCOUNTER — TRANSFERRED RECORDS (OUTPATIENT)
Dept: HEALTH INFORMATION MANAGEMENT | Facility: CLINIC | Age: 28
End: 2018-12-24

## 2018-12-24 NOTE — PROCEDURES
Procedure Date: 12/17/2018      EEG #:  [id number].      This is ambulatory EEG day 1 on 12/17/2018      SOURCE FILE DURATION: 12/17-12/18/2018, 25 hours and 37 minutes.      PATIENT INFORMATION:  A 28-year-old male with medically refractory partial epilepsy.  EEG is being done to evaluate for seizures.     TECHNICAL SUMMARY: This video EEG monitoring procedure was performed with 23 scalp electrodes in 10-20 system placements, and additional scalp, precordial and other surface electrodes used for electrical referencing and artifact detection. Video was reviewed intermittently by EEG technologist and physician for electroclinical seizures.      BACKGROUND:  The patient does have a parietooccipital rhythm of 8-9 Hz, which is asymmetric.  There is more slowing noted in the right parietooccipital region.  The patient does have continuous slowing in the right temporal region and at times in the right temporo-occipital parietal region. There are superimposed spike-wave complexes in the right temporal region.  The patient does fall asleep and there are symmetric sleep spindles and vertex waves in the frontocentral region.      ACTIVATION PROCEDURES:  Photic stimulation and hyperventilation not done.      ICTAL:  The patient had 1 electrographic seizure at 08:14:09.  At that time, the patient appears to be drowsy and there is EEG attenuation for 2 seconds diffusely with a lower amplitude beta discharge in the right hemisphere.  The following 5 seconds have a rhythmic delta activity with faster superimposed frequencies in the theta-alpha range with superimposed spike-wave complexes with maximum amplitude in the right temporal lobe.  This continues for approximately 1-1/2 minutes.  Clinically, since it is an ambulatory EEG, we are not sure what happened.      IMPRESSION:  Ambulatory EEG day 1 is abnormal due to the presence of 1 electrographic seizure in the right hemisphere at 08:10, right temporal epileptiform discharges,  right temporal slowing.  These findings are consistent with a diagnosis of partial epilepsy with seizures arising from the right temporal region.  The patient also has increased cortical irritability and dysfunction in the right temporal region which is most likely due to postictal slowing.  The patient did have a PET scan on this day, he had a EEG seizure approximately 3.5 to 4 hours prior to the PET scan in the right hemisphere maximally involving the right temporal lobe.         JORGE GARCIA MD             D: 2018   T: 2018   MT: al      Name:     SANTHOSH HODGES   MRN:      [id number]        Account:        [id number]   :      1990           Procedure Date: 2018      Document: [id number]

## 2018-12-26 ENCOUNTER — TRANSFERRED RECORDS (OUTPATIENT)
Dept: HEALTH INFORMATION MANAGEMENT | Facility: CLINIC | Age: 28
End: 2018-12-26

## 2018-12-31 ENCOUNTER — PATIENT OUTREACH (OUTPATIENT)
Dept: NEUROLOGY | Facility: CLINIC | Age: 28
End: 2018-12-31

## 2018-12-31 NOTE — PROGRESS NOTES
Informed patient of procedure instructions. Confirmed date and time. Attempted to discuss procedure and instructions, and informed that instructions will be mailed. Patient requested RN call another time and ended call abruptly.

## 2019-01-02 NOTE — TELEPHONE ENCOUNTER
This nurse spoke with the patient. He is agreeable to schedule an appointment with a psychiatrist within Kate Sun's, his psychologist, office.

## 2019-01-07 NOTE — PATIENT INSTRUCTIONS
You are scheduled for a cerebral angiogram with Wada testing on 1/17/2019 at 10:00 AM.     Please follow these instructions:    * You should arrive at the Gold waiting room at the [facility] at 7:30 AM. The address is [address] [place] [address]. The phone number is [phone]. A map is enclosed.    * Do not eat after 2:00 AM; you may drink clear liquids (includes water, Jell-O, clear broth, apple juice or any non-carbonated beverage that you can see through) until 8:00 AM.     * Take epilepsy medications as instructed by your [facility] provider. You may take medications with a sip of water the morning of the procedure.     * You will be discharged the same day. You must have a  home and someone that can stay with you through the night.     If you have questions regarding your procedure, please contact me at [phone], option 3.    Thank you,  Deni Mackey RN, CNRN   Stroke & Endovascular Care Coordinator

## 2019-01-07 NOTE — PROGRESS NOTES
Mailed out instructions for Angiogram with Wada on 1/17/2019 with a map of directions.     Zaki BAIRD

## 2019-01-09 ENCOUNTER — TRANSFERRED RECORDS (OUTPATIENT)
Dept: HEALTH INFORMATION MANAGEMENT | Facility: CLINIC | Age: 29
End: 2019-01-09

## 2019-01-10 ENCOUNTER — TELEPHONE (OUTPATIENT)
Dept: NEUROLOGY | Facility: CLINIC | Age: 29
End: 2019-01-10

## 2019-01-10 NOTE — TELEPHONE ENCOUNTER
Call placed to patient.  We discussed that after the Wada test is performed,  will present his case to the team and this will likely happen in the week or two after the Wada.  Once the decision has been made as to the best option to pursue,  will meet with Siva and discuss the surgery, then scheduling can occur.  The exact timing of the surgery will depend on what surgery is offered.  I noted that often it will take 2 months after the surgery to be ready to return to full time work, although some people are able to return to work sooner.    Patient has had a visit with a psychiatrist, and wanted to make sure  has a copy of the note.  I confirmed with Siva that the note is visible using EMR utilities, and the note from Dr.Jennifer Villegas is visible.  Will let  know the note is available    Patient has an appointment to see  (the surgeon) soon. I recommended Siva ask the same questions of him too.    Patient also volunteered that he was just denied disability.  He feels he does not have the income to hire an  to help.  I recommended that he start the process to file for reconsideration, and to contact  if any opinion is needed regarding medical condition      No other questions at this time  Instructed to call if questions or concerns.

## 2019-01-10 NOTE — TELEPHONE ENCOUNTER
----- Message from Lamine White RN sent at 1/10/2019  2:37 PM CST -----  Regarding: FW: brendan      ----- Message -----  From: Payam Pickett LPN  Sent: 1/10/2019  12:57 PM  To: Mandy [facility]menares Rn Pool  Subject: brendan                                             Caller: Tha    Relationship to Patient: self    Call Back Number: [phone]    Reason for Call: Pt has questions about possible surgery after WADA and what the recovery time would be. He would like to go back to work but doesn't know if it is worth trying to find a job now or not. He states he called yesterday as well and did not get a call back. I could not find a message about him.

## 2019-01-11 NOTE — TELEPHONE ENCOUNTER
has reviewed the information, and patient is okay to continue with surgical process. He will be presented at the case management conference.    Call placed to patient to let him know.

## 2019-01-14 ENCOUNTER — OFFICE VISIT (OUTPATIENT)
Dept: NEUROSURGERY | Facility: CLINIC | Age: 29
End: 2019-01-14
Payer: MEDICAID

## 2019-01-14 VITALS
HEART RATE: 100 BPM | DIASTOLIC BLOOD PRESSURE: 83 MMHG | SYSTOLIC BLOOD PRESSURE: 120 MMHG | WEIGHT: 194 LBS | HEIGHT: 69 IN | OXYGEN SATURATION: 97 % | BODY MASS INDEX: 28.73 KG/M2

## 2019-01-14 DIAGNOSIS — G40.219 PARTIAL EPILEPSY WITH IMPAIRMENT OF CONSCIOUSNESS, WITH INTRACTABLE EPILEPSY (H): Primary | ICD-10-CM

## 2019-01-14 ASSESSMENT — ENCOUNTER SYMPTOMS
DYSURIA: 0
NAUSEA: 0
BLOATING: 0
FLANK PAIN: 0
MUSCLE CRAMPS: 0
TASTE DISTURBANCE: 1
MUSCLE WEAKNESS: 1
NECK PAIN: 1
JOINT SWELLING: 1
DIARRHEA: 0
TROUBLE SWALLOWING: 0
FEVER: 0
ABDOMINAL PAIN: 0
HOARSE VOICE: 0
SMELL DISTURBANCE: 1
RECTAL PAIN: 1
COUGH: 1
WEIGHT GAIN: 1
BACK PAIN: 1
POSTURAL DYSPNEA: 1
WEIGHT LOSS: 0
SNORES LOUDLY: 1
HALLUCINATIONS: 0
NIGHT SWEATS: 1
SPUTUM PRODUCTION: 1
STIFFNESS: 1
BLOOD IN STOOL: 0
INCREASED ENERGY: 1
HEARTBURN: 0
SORE THROAT: 1
PANIC: 1
SINUS CONGESTION: 1
HEMOPTYSIS: 0
DECREASED APPETITE: 1
WHEEZING: 1
SHORTNESS OF BREATH: 0
VOMITING: 0
DIFFICULTY URINATING: 1
SINUS PAIN: 1
NERVOUS/ANXIOUS: 1
POLYDIPSIA: 1
CONSTIPATION: 0
NECK MASS: 0
CHILLS: 1
BOWEL INCONTINENCE: 0
DEPRESSION: 1
INSOMNIA: 0
DECREASED CONCENTRATION: 1
MYALGIAS: 1
FATIGUE: 1
POLYPHAGIA: 0
DYSPNEA ON EXERTION: 0
COUGH DISTURBING SLEEP: 1
JAUNDICE: 0
HEMATURIA: 0
ARTHRALGIAS: 1
ALTERED TEMPERATURE REGULATION: 1

## 2019-01-14 ASSESSMENT — PAIN SCALES - GENERAL: PAINLEVEL: NO PAIN (0)

## 2019-01-14 ASSESSMENT — MIFFLIN-ST. JEOR: SCORE: 1835.36

## 2019-01-14 NOTE — NURSING NOTE
Chief Complaint   Patient presents with     RECHECK     EPILEPTIC SURGERY CONSULT       Brie Nicholas MA

## 2019-01-14 NOTE — LETTER
1/14/2019       RE: Tha Mcghee  [address] [address]     Dear Colleague,    Thank you for referring your patient, Tha Mcghee, to the [facility] NEUROSURGERY at [facility]. Please see a copy of my visit note below.    HISTORY AND PHYSICAL EXAM    Chief Complaint   Patient presents with     RECHECK     EPILEPTIC SURGERY CONSULT       HISTORY OF PRESENT ILLNESS  We saw Mr. Tha Mcghee in Neurosurgery Clinic for evaluation of his intractable epilepsy.  In summary, he is a 28-year-old right-handed male with a history of complex partial seizures.  Mr. Mcghee first suffered a seizure in April 2011.  His seizures are consistent for a flush of anxiety followed by confusion with lip-smacking.  These symptoms can progress to a generalized tonic-clonic seizure.  He has a second type of seizure in which he stares off and makes a clicking noise with his mouth.  His seizures are infrequent, occurring 1-3 times per year, with the latest being in 10/2018 during his video EEG monitoring.  Video EEG monitoring showed complex partial seizures that begin in the right temporal region.  EEGs in the past were notable for left temporal epileptiform discharges.  His MRI of the brain was unremarkable.     Of note, he has a medical history of left eye optic neuritis, anxiety and depression, and migraines with aura.  His left eye vision has resolved after work-up in 6/2017 and steroid treatment.  His migraines were stable as he weaned off topiramate.  Furthermore, his depression improved with stopping levetiracetam.    He will have a Wada study performed this Thursday on 1/17/2019.  Per patient, It is important for him to proceed with treatment/evaluation as soon as possible so he can return to work.  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  He notes that the stress and heat associated with the job was a trigger for his seizures but that he is always drawn back to this  particular industry.  His ultimate goal for surgical resection, if necessary, would be reduction of seizure frequency and improvement in quality of ana.       Past Medical History:   Diagnosis Date     Anxiety      Depression      Localization-related (focal) (partial) epilepsy and epileptic syndromes with complex partial seizures, without mention of intractable epilepsy     preliminary       No past surgical history on file.    Family History   Problem Relation Age of Onset     Melanoma Mother      Cancer No family hx of         No family history of skin cancer     Skin Cancer No family hx of        Social History     Socioeconomic History     Marital status: Single     Spouse name: Not on file     Number of children: Not on file     Years of education: Not on file     Highest education level: Not on file   Social Needs     Financial resource strain: Not on file     Food insecurity - worry: Not on file     Food insecurity - inability: Not on file     Transportation needs - medical: Not on file     Transportation needs - non-medical: Not on file   Occupational History     Not on file   Tobacco Use     Smoking status: Former Smoker     Years: 9.00     Types: Cigars     Last attempt to quit: 2013     Years since quittin.4     Smokeless tobacco: Former User     Types: Chew   Substance and Sexual Activity     Alcohol use: No     Alcohol/week: 0.0 oz     Drug use: No     Sexual activity: Not on file   Other Topics Concern     Parent/sibling w/ CABG, MI or angioplasty before 65F 55M? Not Asked   Social History Narrative     Not on file          Allergies   Allergen Reactions     Amoxicillin Hives     Ceclor [Cefaclor Monohydrate] Hives     Hydrocodone-Acetaminophen Nausea     Penicillins Hives     Sulfa Drugs Hives       Current Outpatient Medications   Medication     ALPRAZolam (XANAX) 0.5 MG tablet     cholecalciferol (VITAMIN D3) 5000 units TABS tablet     diazepam (DIAZEPAM INTENSOL) 5 MG/ML (HIGH CONC)  "solution     divalproex sodium extended-release (DEPAKOTE ER) 250 MG 24 hr tablet     divalproex sodium extended-release (DEPAKOTE ER) 500 MG 24 hr tablet     docusate sodium (COLACE) 100 MG capsule     gabapentin (NEURONTIN) 300 MG capsule     lacosamide (VIMPAT) 200 MG TABS tablet     medical cannabis inhalation (Patient's own supply.  Not a prescription)     Multiple Vitamin (MULTIVITAMINS PO)     phenytoin (DILANTIN) 100 MG CR capsule     phenytoin (DILANTIN) 30 MG CR capsule     promethazine (PHENERGAN) 25 MG tablet     rizatriptan (MAXALT) 10 MG tablet     venlafaxine (EFFEXOR XR) 75 MG 24 hr capsule     No current facility-administered medications for this visit.        PHYSICAL EXAM  /83 (BP Location: Left arm)   Pulse 100   Ht 1.753 m (5' 9\")   Wt 88 kg (194 lb)   SpO2 97%   BMI 28.65 kg/m       General: Awake, alert, oriented. Well nourished, well developed, he is not in any acute distress.  HEENT: Head normocephalic, atraumatic. No carotid bruit. Neck supple. Good range of motion. No palpable thyroid mass.  Heart: Regular rhythm and rate. No murmurs.  Lungs: Clear to auscultation and percussion bilaterally. No rhonchi, rales or wheeze.  Abdomen: Soft, non-tender, non-distended. No hepatosplenomegaly.  Extremity: Warm with no clubbing or cyanosis. No lower extremity edema.    Neurological  Awake, alert and oriented to date, time, place and person. Speech fluent.   Pupils equal, round, reactive to light.  Extraocular movement intact.  Visual Fields are full on confrontation.  Hearing is grossly normal to finger rub.   Facial sensation intact.  Face symmetric.  Tongue midline.  Uvula elevates equally.    Motor: full strength throughout.  Sensation: intact to light touch and pinpoint.  Deep tendon reflexes: 1+ throughout. Negative for clonus. Negative for Chaudhry's sign. No dysmetria.      IMAGING  MRI brain without/with contrast 6/28/2017  Left optic nerve findings - mild enhancement within the " left optic nerve.  No brain abnormality demonstrated.      ASSESSMENT  28 year old right-handed male with a history of complex partial seizures that evolve into GTC seizures dating back to 4/2011  S/p video EEG monitoring in 10/2018 - showed complex partial seizures that most likely start in right temporal region  Past EEGs notable for rare left temporal epileptiform discharges  History of optic neuritis - left eye vision improving as of 11/7/2018    Patient was referred for neurosurgical evaluation for his epilepsy.  At this point, no specific decision has been made regarding the next step, as he is still undergoing evaluation.  Once all the data is collected, his case will need to be discussed at [facility] Case Management Conference.  He is scheduled for his WADA and his case is scheduled to be discussed on 1/23/2019.    I explained that WADA study is important for determining hemispheric dominance.    We discussed in general the different possible scenario.      1.  He could be recommended for left temporal lobectomy based on all the available information.  He may not need invasive video EEG monitoring.  2.  He could be recommended for invasive video EEG monitoring with subdural grid, strip and/or depth electrodes.  This would depend on the areas which are needed to be monitored.  Again, this decision has not been made.  It could be unilateral or bilateral.    3.  Based on the invasive video EEG monitoring results, he could have a resective surgery or implantation of response neural stimulator placement.  The treatment could be performed during the same hospital stay at the time of the electrode removal or it could be performed at a later time, during a different hospital admission to allow for the brain to heal and to allow for the risk of infection to diminish.  I could not tell him which would be offered.    He has discussed some of the surgical options already with his epileptologist.  He was somewhat familiar  with the surgery and the various options.  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  I told him that scheduling will depend on my availability as well as invasive EEG monitoring unit availability.  I told him that we also have Dr. Castañeda who is available for the procedure and he was very open to that option as well.    Risks of surgical intervention such as placement of invasive monitoring electrodes via craniotomy or small juan holes were discussed in detail.  Since there was no specific treatment plan formulated, we discussed it in general.  The risks, benefits and alternative therapies were discussed in detail, including but not limited to bleeding, infection, injury to the brain, need for additional surgeries, worsening neurological condition including epilepsy, stroke and death.  We also briefly discussed the risks of general anesthesia.        PLAN   1.  WADA study pending.    2.  His case is scheduled to be discussed at [facility] [place] [place] on 1/23/2019   3.  Neurology recommendations pending.      I, Patricio Webb, am serving as a scribe to document services personally performed by Jason Funes MD, PhD, based upon my observations and the provider's statements to me. All documentation has been reviewed and edited by the aforementioned doctor prior to being entered into the official medical record.    I, Jason Funes, attest that above named individual is acting in scribe capacity, has observed my performance of the services and has documented them in accordance with my direction. The documentation recorded by the scribe accurately reflects the service I personally performed and the decisions made by me. The document was also partially recorded by me and the entire document was edited by me as well.     Again, thank you for allowing me to participate in the care of your patient.      Sincerely,    Jason Funes,  MD

## 2019-01-14 NOTE — PROGRESS NOTES
HISTORY AND PHYSICAL EXAM    Chief Complaint   Patient presents with     RECHECK     EPILEPTIC SURGERY CONSULT       HISTORY OF PRESENT ILLNESS  We saw Mr. Tha Mcghee in Neurosurgery Clinic for evaluation of his intractable epilepsy.  In summary, he is a 28-year-old right-handed male with a history of complex partial seizures.  Mr. Mcghee first suffered a seizure in April 2011.  His seizures are consistent for a flush of anxiety followed by confusion with lip-smacking.  These symptoms can progress to a generalized tonic-clonic seizure.  He has a second type of seizure in which he stares off and makes a clicking noise with his mouth.  His seizures are infrequent, occurring 1-3 times per year, with the latest being in 10/2018 during his video EEG monitoring.  Video EEG monitoring showed complex partial seizures that begin in the right temporal region.  EEGs in the past were notable for left temporal epileptiform discharges.  His MRI of the brain was unremarkable.     Of note, he has a medical history of left eye optic neuritis, anxiety and depression, and migraines with aura.  His left eye vision has resolved after work-up in 6/2017 and steroid treatment.  His migraines were stable as he weaned off topiramate.  Furthermore, his depression improved with stopping levetiracetam.    He will have a Wada study performed this Thursday on 1/17/2019.  Per patient, It is important for him to proceed with treatment/evaluation as soon as possible so he can return to work.  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  He notes that the stress and heat associated with the job was a trigger for his seizures but that he is always drawn back to this particular industry.  His ultimate goal for surgical resection, if necessary, would be reduction of seizure frequency and improvement in quality of ana.       Past Medical History:   Diagnosis Date     Anxiety      Depression      Localization-related (focal) (partial) epilepsy and  epileptic syndromes with complex partial seizures, without mention of intractable epilepsy     preliminary       No past surgical history on file.    Family History   Problem Relation Age of Onset     Melanoma Mother      Cancer No family hx of         No family history of skin cancer     Skin Cancer No family hx of        Social History     Socioeconomic History     Marital status: Single     Spouse name: Not on file     Number of children: Not on file     Years of education: Not on file     Highest education level: Not on file   Social Needs     Financial resource strain: Not on file     Food insecurity - worry: Not on file     Food insecurity - inability: Not on file     Transportation needs - medical: Not on file     Transportation needs - non-medical: Not on file   Occupational History     Not on file   Tobacco Use     Smoking status: Former Smoker     Years: 9.00     Types: Cigars     Last attempt to quit: 2013     Years since quittin.4     Smokeless tobacco: Former User     Types: Chew   Substance and Sexual Activity     Alcohol use: No     Alcohol/week: 0.0 oz     Drug use: No     Sexual activity: Not on file   Other Topics Concern     Parent/sibling w/ CABG, MI or angioplasty before 65F 55M? Not Asked   Social History Narrative     Not on file          Allergies   Allergen Reactions     Amoxicillin Hives     Ceclor [Cefaclor Monohydrate] Hives     Hydrocodone-Acetaminophen Nausea     Penicillins Hives     Sulfa Drugs Hives       Current Outpatient Medications   Medication     ALPRAZolam (XANAX) 0.5 MG tablet     cholecalciferol (VITAMIN D3) 5000 units TABS tablet     diazepam (DIAZEPAM INTENSOL) 5 MG/ML (HIGH CONC) solution     divalproex sodium extended-release (DEPAKOTE ER) 250 MG 24 hr tablet     divalproex sodium extended-release (DEPAKOTE ER) 500 MG 24 hr tablet     docusate sodium (COLACE) 100 MG capsule     gabapentin (NEURONTIN) 300 MG capsule     lacosamide (VIMPAT) 200 MG TABS tablet      medical cannabis inhalation (Patient's own supply.  Not a prescription)     Multiple Vitamin (MULTIVITAMINS PO)     phenytoin (DILANTIN) 100 MG CR capsule     phenytoin (DILANTIN) 30 MG CR capsule     promethazine (PHENERGAN) 25 MG tablet     rizatriptan (MAXALT) 10 MG tablet     venlafaxine (EFFEXOR XR) 75 MG 24 hr capsule     No current facility-administered medications for this visit.        REVIEW OF SYSTEMS  Answers for HPI/ROS submitted by the patient on 1/14/2019   General Symptoms: Yes  Skin Symptoms: No  HENT Symptoms: Yes  EYE SYMPTOMS: No  HEART SYMPTOMS: No  LUNG SYMPTOMS: Yes  INTESTINAL SYMPTOMS: Yes  URINARY SYMPTOMS: Yes  REPRODUCTIVE SYMPTOMS: No  SKELETAL SYMPTOMS: Yes  BLOOD SYMPTOMS: No  NERVOUS SYSTEM SYMPTOMS: No  MENTAL HEALTH SYMPTOMS: Yes  Fever: No  Loss of appetite: Yes  Weight loss: No  Weight gain: Yes  Fatigue: Yes  Night sweats: Yes  Chills: Yes  Increased stress: No  Excessive hunger: No  Excessive thirst: Yes  Feeling hot or cold when others believe the temperature is normal: Yes  Loss of height: No  Post-operative complications: No  Surgical site pain: No  Hallucinations: No  Change in or Loss of Energy: Yes  Hyperactivity: Yes  Confusion: No  Ear pain: No  Ear discharge: No  Hearing loss: No  Tinnitus: No  Nosebleeds: No  Congestion: Yes  Sinus pain: Yes  Trouble swallowing: No   Voice hoarseness: No  Mouth sores: No  Sore throat: Yes  Tooth pain: No  Gum tenderness: No  Bleeding gums: No  Change in taste: Yes  Change in sense of smell: Yes  Dry mouth: Yes  Hearing aid used: No  Neck lump: No  Cough: Yes  Sputum or phlegm: Yes  Coughing up blood: No  Difficulty breating or shortness of breath: No  Snoring: Yes  Wheezing: Yes  Difficulty breathing on exertion: No  Nighttime Cough: Yes  Difficulty breathing when lying flat: Yes  Heart burn or indigestion: No  Nausea: No  Vomiting: No  Abdominal pain: No  Bloating: No  Constipation: No  Diarrhea: No  Blood in stool: No  Black stools:  "No  Rectal or Anal pain: Yes  Fecal incontinence: No  Yellowing of skin or eyes: No  Vomit with blood: No  Change in stools: No  Trouble holding urine or incontinence: Yes  Pain or burning: No  Trouble starting or stopping: No  Increased frequency of urination: No  Blood in urine: No  Decreased frequency of urination: No  Frequent nighttime urination: Yes  Flank pain: No  Difficulty emptying bladder: Yes  Back pain: Yes  Muscle aches: Yes  Neck pain: Yes  Swollen joints: Yes  Joint pain: Yes  Bone pain: No  Muscle cramps: No  Muscle weakness: Yes  Joint stiffness: Yes  Bone fracture: No  Nervous or Anxious: Yes  Depression: Yes  Trouble sleeping: No  Trouble thinking or concentrating: Yes  Mood changes: No  Panic attacks: Yes      PHYSICAL EXAM  /83 (BP Location: Left arm)   Pulse 100   Ht 1.753 m (5' 9\")   Wt 88 kg (194 lb)   SpO2 97%   BMI 28.65 kg/m      General: Awake, alert, oriented. Well nourished, well developed, he is not in any acute distress.  HEENT: Head normocephalic, atraumatic. No carotid bruit. Neck supple. Good range of motion. No palpable thyroid mass.  Heart: Regular rhythm and rate. No murmurs.  Lungs: Clear to auscultation and percussion bilaterally. No rhonchi, rales or wheeze.  Abdomen: Soft, non-tender, non-distended. No hepatosplenomegaly.  Extremity: Warm with no clubbing or cyanosis. No lower extremity edema.    Neurological  Awake, alert and oriented to date, time, place and person. Speech fluent.   Pupils equal, round, reactive to light.  Extraocular movement intact.  Visual Fields are full on confrontation.  Hearing is grossly normal to finger rub.   Facial sensation intact.  Face symmetric.  Tongue midline.  Uvula elevates equally.    Motor: full strength throughout.  Sensation: intact to light touch and pinpoint.  Deep tendon reflexes: 1+ throughout. Negative for clonus. Negative for Chaudhry's sign. No dysmetria.      IMAGING  MRI brain without/with contrast 6/28/2017  Left " optic nerve findings - mild enhancement within the left optic nerve.  No brain abnormality demonstrated.      ASSESSMENT  28 year old right-handed male with a history of complex partial seizures that evolve into GTC seizures dating back to 4/2011  S/p video EEG monitoring in 10/2018 - showed complex partial seizures that most likely start in right temporal region  Past EEGs notable for rare left temporal epileptiform discharges  History of optic neuritis - left eye vision improving as of 11/7/2018    Patient was referred for neurosurgical evaluation for his epilepsy.  At this point, no specific decision has been made regarding the next step, as he is still undergoing evaluation.  Once all the data is collected, his case will need to be discussed at [facility] Case Management Conference.  He is scheduled for his WADA and his case is scheduled to be discussed on 1/23/2019.    I explained that WADA study is important for determining hemispheric dominance.    We discussed in general the different possible scenario.      1.  He could be recommended for left temporal lobectomy based on all the available information.  He may not need invasive video EEG monitoring.  2.  He could be recommended for invasive video EEG monitoring with subdural grid, strip and/or depth electrodes.  This would depend on the areas which are needed to be monitored.  Again, this decision has not been made.  It could be unilateral or bilateral.    3.  Based on the invasive video EEG monitoring results, he could have a resective surgery or implantation of response neural stimulator placement.  The treatment could be performed during the same hospital stay at the time of the electrode removal or it could be performed at a later time, during a different hospital admission to allow for the brain to heal and to allow for the risk of infection to diminish.  I could not tell him which would be offered.    He has discussed some of the surgical options already  with his epileptologist.  He was somewhat familiar with the surgery and the various options.  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  I told him that scheduling will depend on my availability as well as invasive EEG monitoring unit availability.  I told him that we also have Dr. Castañeda who is available for the procedure and he was very open to that option as well.    Risks of surgical intervention such as placement of invasive monitoring electrodes via craniotomy or small juan holes were discussed in detail.  Since there was no specific treatment plan formulated, we discussed it in general.  The risks, benefits and alternative therapies were discussed in detail, including but not limited to bleeding, infection, injury to the brain, need for additional surgeries, worsening neurological condition including epilepsy, stroke and death.  We also briefly discussed the risks of general anesthesia.        PLAN   1.  WADA study pending.    2.  His case is scheduled to be discussed at [facility] Case Management Conference on 1/23/2019   3.  Neurology recommendations pending.      I, Patricio Webb, am serving as a scribe to document services personally performed by Jason Funes MD, PhD, based upon my observations and the provider's statements to me. All documentation has been reviewed and edited by the aforementioned doctor prior to being entered into the official medical record.    I, Jason Funes, attest that above named individual is acting in scribe capacity, has observed my performance of the services and has documented them in accordance with my direction. The documentation recorded by the scribe accurately reflects the service I personally performed and the decisions made by me. The document was also partially recorded by me and the entire document was edited by me as well.

## 2019-01-17 ENCOUNTER — APPOINTMENT (OUTPATIENT)
Dept: MEDSURG UNIT | Facility: CLINIC | Age: 29
End: 2019-01-17
Attending: PSYCHIATRY & NEUROLOGY
Payer: MEDICAID

## 2019-01-17 ENCOUNTER — ALLIED HEALTH/NURSE VISIT (OUTPATIENT)
Dept: NEUROLOGY | Facility: CLINIC | Age: 29
End: 2019-01-17
Attending: PSYCHIATRY & NEUROLOGY
Payer: MEDICAID

## 2019-01-17 ENCOUNTER — APPOINTMENT (OUTPATIENT)
Dept: INTERVENTIONAL RADIOLOGY/VASCULAR | Facility: CLINIC | Age: 29
End: 2019-01-17
Attending: NEUROLOGICAL SURGERY
Payer: MEDICAID

## 2019-01-17 ENCOUNTER — HOSPITAL ENCOUNTER (OUTPATIENT)
Facility: CLINIC | Age: 29
Discharge: HOME | End: 2019-01-17
Attending: NEUROLOGICAL SURGERY | Admitting: NEUROLOGICAL SURGERY
Payer: MEDICAID

## 2019-01-17 VITALS
DIASTOLIC BLOOD PRESSURE: 78 MMHG | RESPIRATION RATE: 24 BRPM | OXYGEN SATURATION: 98 % | SYSTOLIC BLOOD PRESSURE: 118 MMHG | TEMPERATURE: 98.7 F

## 2019-01-17 DIAGNOSIS — G40.219 PARTIAL EPILEPSY WITH IMPAIRMENT OF CONSCIOUSNESS, INTRACTABLE (H): Primary | ICD-10-CM

## 2019-01-17 DIAGNOSIS — R56.9 SEIZURES (H): ICD-10-CM

## 2019-01-17 LAB
APTT PPP: 31 SEC (ref 22–37)
CREAT SERPL-MCNC: 0.72 MG/DL (ref 0.66–1.25)
ERYTHROCYTE [DISTWIDTH] IN BLOOD BY AUTOMATED COUNT: 12.6 % (ref 10–15)
GFR SERPL CREATININE-BSD FRML MDRD: >90 ML/MIN/{1.73_M2}
HCT VFR BLD AUTO: 41.6 % (ref 40–53)
HGB BLD-MCNC: 14 G/DL (ref 13.3–17.7)
INR PPP: 1.07 (ref 0.86–1.14)
MCH RBC QN AUTO: 32.3 PG (ref 26.5–33)
MCHC RBC AUTO-ENTMCNC: 33.7 G/DL (ref 31.5–36.5)
MCV RBC AUTO: 96 FL (ref 78–100)
PLATELET # BLD AUTO: 276 10E9/L (ref 150–450)
RBC # BLD AUTO: 4.34 10E12/L (ref 4.4–5.9)
WBC # BLD AUTO: 5.7 10E9/L (ref 4–11)

## 2019-01-17 PROCEDURE — 25000128 H RX IP 250 OP 636: Performed by: NEUROLOGICAL SURGERY

## 2019-01-17 PROCEDURE — 99153 MOD SED SAME PHYS/QHP EA: CPT

## 2019-01-17 PROCEDURE — C1769 GUIDE WIRE: HCPCS

## 2019-01-17 PROCEDURE — 95951 ZZHC EEG VIDEO < 12 HR: CPT | Mod: 52,ZF

## 2019-01-17 PROCEDURE — 99152 MOD SED SAME PHYS/QHP 5/>YRS: CPT

## 2019-01-17 PROCEDURE — 25500064 ZZH RX 255 OP 636: Performed by: NEUROLOGICAL SURGERY

## 2019-01-17 PROCEDURE — 27210802 ZZH SHEATH CR1

## 2019-01-17 PROCEDURE — 27210905 ZZH KIT CR7

## 2019-01-17 PROCEDURE — C1760 CLOSURE DEV, VASC: HCPCS

## 2019-01-17 PROCEDURE — 82565 ASSAY OF CREATININE: CPT | Performed by: STUDENT IN AN ORGANIZED HEALTH CARE EDUCATION/TRAINING PROGRAM

## 2019-01-17 PROCEDURE — 27210732 ZZH ACCESSORY CR1

## 2019-01-17 PROCEDURE — 85610 PROTHROMBIN TIME: CPT | Performed by: STUDENT IN AN ORGANIZED HEALTH CARE EDUCATION/TRAINING PROGRAM

## 2019-01-17 PROCEDURE — 36224 PLACE CATH CAROTD ART: CPT | Mod: 50

## 2019-01-17 PROCEDURE — 25000128 H RX IP 250 OP 636: Performed by: STUDENT IN AN ORGANIZED HEALTH CARE EDUCATION/TRAINING PROGRAM

## 2019-01-17 PROCEDURE — 27210908 ZZH NEEDLE CR4

## 2019-01-17 PROCEDURE — 25000125 ZZHC RX 250: Performed by: STUDENT IN AN ORGANIZED HEALTH CARE EDUCATION/TRAINING PROGRAM

## 2019-01-17 PROCEDURE — 85730 THROMBOPLASTIN TIME PARTIAL: CPT | Performed by: STUDENT IN AN ORGANIZED HEALTH CARE EDUCATION/TRAINING PROGRAM

## 2019-01-17 PROCEDURE — 85027 COMPLETE CBC AUTOMATED: CPT | Performed by: STUDENT IN AN ORGANIZED HEALTH CARE EDUCATION/TRAINING PROGRAM

## 2019-01-17 PROCEDURE — 40000167 ZZH STATISTIC PP CARE STAGE 2

## 2019-01-17 PROCEDURE — C1887 CATHETER, GUIDING: HCPCS

## 2019-01-17 PROCEDURE — 27210740 ZZH ACCESSORY CR3

## 2019-01-17 RX ORDER — LIDOCAINE 40 MG/G
CREAM TOPICAL
Status: CANCELLED | OUTPATIENT
Start: 2019-01-17

## 2019-01-17 RX ORDER — SODIUM CHLORIDE 9 MG/ML
INJECTION, SOLUTION INTRAVENOUS CONTINUOUS
Status: DISCONTINUED | OUTPATIENT
Start: 2019-01-17 | End: 2019-01-17 | Stop reason: HOSPADM

## 2019-01-17 RX ORDER — ONDANSETRON 4 MG/1
4 TABLET, ORALLY DISINTEGRATING ORAL EVERY 6 HOURS PRN
Status: DISCONTINUED | OUTPATIENT
Start: 2019-01-17 | End: 2019-01-17 | Stop reason: HOSPADM

## 2019-01-17 RX ORDER — DEXTROSE MONOHYDRATE 25 G/50ML
25-50 INJECTION, SOLUTION INTRAVENOUS
Status: CANCELLED | OUTPATIENT
Start: 2019-01-17

## 2019-01-17 RX ORDER — NICOTINE POLACRILEX 4 MG
15-30 LOZENGE BUCCAL
Status: DISCONTINUED | OUTPATIENT
Start: 2019-01-17 | End: 2019-01-17 | Stop reason: HOSPADM

## 2019-01-17 RX ORDER — NICOTINE POLACRILEX 4 MG
15-30 LOZENGE BUCCAL
Status: CANCELLED | OUTPATIENT
Start: 2019-01-17

## 2019-01-17 RX ORDER — ONDANSETRON 2 MG/ML
4 INJECTION INTRAMUSCULAR; INTRAVENOUS EVERY 6 HOURS PRN
Status: DISCONTINUED | OUTPATIENT
Start: 2019-01-17 | End: 2019-01-17 | Stop reason: HOSPADM

## 2019-01-17 RX ORDER — FENTANYL CITRATE 50 UG/ML
25-50 INJECTION, SOLUTION INTRAMUSCULAR; INTRAVENOUS EVERY 5 MIN PRN
Status: DISCONTINUED | OUTPATIENT
Start: 2019-01-17 | End: 2019-01-17 | Stop reason: HOSPADM

## 2019-01-17 RX ORDER — METOCLOPRAMIDE 10 MG/1
10 TABLET ORAL EVERY 6 HOURS PRN
Status: DISCONTINUED | OUTPATIENT
Start: 2019-01-17 | End: 2019-01-17 | Stop reason: HOSPADM

## 2019-01-17 RX ORDER — NALOXONE HYDROCHLORIDE 0.4 MG/ML
.1-.4 INJECTION, SOLUTION INTRAMUSCULAR; INTRAVENOUS; SUBCUTANEOUS
Status: DISCONTINUED | OUTPATIENT
Start: 2019-01-17 | End: 2019-01-17 | Stop reason: HOSPADM

## 2019-01-17 RX ORDER — LIDOCAINE 40 MG/G
CREAM TOPICAL
Status: DISCONTINUED | OUTPATIENT
Start: 2019-01-17 | End: 2019-01-17 | Stop reason: HOSPADM

## 2019-01-17 RX ORDER — DEXTROSE MONOHYDRATE 25 G/50ML
25-50 INJECTION, SOLUTION INTRAVENOUS
Status: DISCONTINUED | OUTPATIENT
Start: 2019-01-17 | End: 2019-01-17 | Stop reason: HOSPADM

## 2019-01-17 RX ORDER — IODIXANOL 320 MG/ML
150 INJECTION, SOLUTION INTRAVASCULAR ONCE
Status: COMPLETED | OUTPATIENT
Start: 2019-01-17 | End: 2019-01-17

## 2019-01-17 RX ORDER — PROCHLORPERAZINE MALEATE 10 MG
10 TABLET ORAL EVERY 6 HOURS PRN
Status: DISCONTINUED | OUTPATIENT
Start: 2019-01-17 | End: 2019-01-17 | Stop reason: HOSPADM

## 2019-01-17 RX ORDER — METOCLOPRAMIDE HYDROCHLORIDE 5 MG/ML
10 INJECTION INTRAMUSCULAR; INTRAVENOUS EVERY 6 HOURS PRN
Status: DISCONTINUED | OUTPATIENT
Start: 2019-01-17 | End: 2019-01-17 | Stop reason: HOSPADM

## 2019-01-17 RX ORDER — PROCHLORPERAZINE 25 MG
25 SUPPOSITORY, RECTAL RECTAL EVERY 12 HOURS PRN
Status: DISCONTINUED | OUTPATIENT
Start: 2019-01-17 | End: 2019-01-17 | Stop reason: HOSPADM

## 2019-01-17 RX ORDER — SODIUM CHLORIDE 9 MG/ML
INJECTION, SOLUTION INTRAVENOUS CONTINUOUS
Status: CANCELLED | OUTPATIENT
Start: 2019-01-17

## 2019-01-17 RX ADMIN — HEPARIN SODIUM 1000 ML: 1000 INJECTION, SOLUTION INTRAVENOUS; SUBCUTANEOUS at 11:33

## 2019-01-17 RX ADMIN — AMOBARBITAL SODIUM 300 MG: 0.5 INJECTION, POWDER, LYOPHILIZED, FOR SOLUTION INTRAMUSCULAR; INTRAVENOUS at 11:34

## 2019-01-17 RX ADMIN — FENTANYL CITRATE 50 MCG: 50 INJECTION, SOLUTION INTRAMUSCULAR; INTRAVENOUS at 11:32

## 2019-01-17 RX ADMIN — LIDOCAINE HYDROCHLORIDE 7 ML: 10 INJECTION, SOLUTION EPIDURAL; INFILTRATION; INTRACAUDAL; PERINEURAL at 11:33

## 2019-01-17 RX ADMIN — IODIXANOL 20 ML: 320 INJECTION, SOLUTION INTRAVASCULAR at 11:36

## 2019-01-17 NOTE — PROGRESS NOTES
Pt up walking with brace and own shoes, steady on his feet; voided; IV discontinued. Right neck site is dry and intact. Tolerated PO, both food and drink. Discharge instructions reviewed with pt and wife, stated understanding. Discharged to home per wheelchair with wife.

## 2019-01-17 NOTE — PROGRESS NOTES
0950 Pt on 2a prepped and ready for WADA. PIV placed and labs sent. H&P current. Family at bedside. Pt consented.

## 2019-01-17 NOTE — PROGRESS NOTES
1150 Pt arrived on 2a post WADA. VSS Ra. Right groin f/d/i. No pain. Neuros unchanged. Family at bedside. 1200 Pt eating and drinking.

## 2019-01-17 NOTE — IR NOTE
Patient Name: Tha Mcghee  Medical Record Number: [id number]  Today's Date: 1/17/2019    Procedure: diagnostic cerebral angiogram with WADA testing, femoral artery closure device  Proceduralist: Coy Prince Waldron    Sedation medications administered: fentanyl 50 mcg.  Sedation Notes: Tolerated procedure well.    Procedure start time: 1030  Puncture time: 1032  Procedure end time: 1140    Report given to: LISA Ureña    Other Notes: Pt arrived to IR room 3 from 2A. . Consent reviewed. Pt denies any questions or concerns regarding procedure. Pt positioned supine  and monitored per protocol. Pt tolerated procedure without any noted complications. Pt transferred back to 2A..

## 2019-01-17 NOTE — H&P
[facility], [place]     Endovascular Surgical Neuroradiology Pre-Procedure Note      HPI:  Tha Mcghee is a 28 year old male with a history of complex partial seizures. He states that his first seizures began in 2011 and he has since been managed with AEDs for them. He has two main type of seizures detailed in notes by the [facility] service, one consisting of lip-smacking that may progress to generalized tonic-clonic seizure and the other noted by episodes of staring off into the distance. On EEG monitoring, his seizures are localized to emanating from the right temporal lobe.     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 No pertinent surgical history.    Family History:  Family History   Problem Relation Age of Onset     Melanoma Mother      Cancer No family hx of         No family history of skin cancer     Skin Cancer No family hx of        Social History:  Social History     Socioeconomic History     Marital status: Single     Spouse name: Not on file     Number of children: Not on file     Years of education: Not on file     Highest education level: Not on file   Social Needs     Financial resource strain: Not on file     Food insecurity - worry: Not on file     Food insecurity - inability: Not on file     Transportation needs - medical: Not on file     Transportation needs - non-medical: Not on file   Occupational History     Not on file   Tobacco Use     Smoking status: Former Smoker     Years: 9.00     Types: Cigars     Last attempt to quit: 2013     Years since quittin.4     Smokeless tobacco: Former User     Types: Chew   Substance and Sexual Activity     Alcohol use: No     Alcohol/week: 0.0 oz     Drug use: No      Sexual activity: Not on file   Other Topics Concern     Parent/sibling w/ CABG, MI or angioplasty before 65F 55M? Not Asked   Social History Narrative     Not on file       Allergies:  Allergies   Allergen Reactions     Amoxicillin Hives     Ceclor [Cefaclor Monohydrate] Hives     Hydrocodone-Acetaminophen Nausea     Penicillins Hives     Sulfa Drugs Hives       Is there a contrast allergy?  No    Medications:  Medications Prior to Admission   Medication Sig Dispense Refill Last Dose     ALPRAZolam (XANAX) 0.5 MG tablet Take 0.5 mg by mouth as needed.   Taking     cholecalciferol (VITAMIN D3) 5000 units TABS tablet Take 5,000 Units by mouth   Taking     diazepam (DIAZEPAM INTENSOL) 5 MG/ML (HIGH CONC) solution For generalized seizures give 5 mg.  May repeat and give 2.5 mg after 10 minutes if needed. Do not exceed 7.5mg 30 mL 0 Taking     divalproex sodium extended-release (DEPAKOTE ER) 250 MG 24 hr tablet Take 1 tab in the morning (along with two 500mg tabs) 90 tablet 3 Taking     divalproex sodium extended-release (DEPAKOTE ER) 500 MG 24 hr tablet Take 2 tabs in the morning and 3 tabs at night. 450 tablet 3 Taking     docusate sodium (COLACE) 100 MG capsule Take 100 mg by mouth 2 times daily    Taking     gabapentin (NEURONTIN) 300 MG capsule Increase as directed to a goal dose of 900mg twice daily 186 capsule 3 Taking     lacosamide (VIMPAT) 200 MG TABS tablet Take 1 tablet (200 mg) by mouth 2 times daily 60 tablet 5 Taking     medical cannabis inhalation (Patient's own supply.  Not a prescription) Inhale 200 mLs into the lungs Take one to two puffs every four to six hours, red solution (MerryMarry)   Taking     Multiple Vitamin (MULTIVITAMINS PO) 2 Chew a day   Taking     phenytoin (DILANTIN) 100 MG CR capsule TAKE 1 CAP BY MOUTH DAILY IN THE AM AND 2 CAPS IN THE  capsule 3 Taking     phenytoin (DILANTIN) 30 MG CR capsule Take two po Q  capsule 3 Taking     promethazine (PHENERGAN)  25 MG tablet Take 25 mg by mouth every 6 hours as needed for nausea (take with maxalt for nausea with migraines)   Taking     rizatriptan (MAXALT) 10 MG tablet Take 1 tablet (10 mg) by mouth at onset of headache for migraine May repeat in 2 hours. Max 3 tablets/24 hours. 18 tablet 3 Taking     venlafaxine (EFFEXOR XR) 75 MG 24 hr capsule Take 3 capsules (225 mg) by mouth every morning 270 capsule 3 Taking   .  PHYSICAL EXAMINATION  Vital Signs:  B/P: 119/80,  T: 98.7,  P: Data Unavailable,  R: 14    Neurologic  Mental Status:  fully alert, attentive and oriented, follows commands, speech clear and fluent  Cranial Nerves:  visual fields intact, PERRL, EOMI with normal smooth pursuit, facial sensation intact and symmetric  Motor:  no abnormal movements, strength 5/5 throughout upper and lower extremities  Sensory:  Intact grossly     LABS  (most recent Cr, BUN, GFR, PLT, INR, PTT within the past 7 days):  Recent Labs   Lab 01/17/19  0825   CR 0.72   GFRESTIMATED >90   GFRESTBLACK >90      INR 1.07   PTT 31        Platelet Function P2Y12 (PRU):  Not applicable    ASSESSMENT: 28 year-old male with persistent seizures     PLAN: Wada testing         PRE-PROCEDURE SEDATION ASSESSMENT     Pre-Procedure Sedation Assessment done at 0900.    Expected Level:  Moderate Sedation    Indication:  Sedation is required to allow for neurointerventional procedure.    Consent obtained from patient after discussing the risks, benefits and alternatives.     PO Intake:  Appropriately NPO for procedure    ASA Class:  Class 2 - MILD SYSTEMIC DISEASE, NO ACUTE PROBLEMS, NO FUNCTIONAL LIMITATIONS.    Mallampati:  Grade 2:  Soft palate, base of uvula, tonsillar pillars, and portion of posterior pharyngeal wall visible    History and physical reviewed and no updates needed. I have reviewed the lab findings, diagnostic data, medications, and the plan for sedation. I have determined this patient to be an appropriate candidate for the  planned sedation and procedure and have reassessed the patient IMMEDIATELY PRIOR to sedation and procedure.    Patient was discussed with the Attending, Dr. Wolfe, who agrees with the plan.    Bert Cespedes   Pager: [phone]

## 2019-01-17 NOTE — PROGRESS NOTES
Pt completed his bedrest. Pt up walking, steady on his feet. Right groin site is dry and intact. Pt tolerated food and drink without problem. IV discontinued. Discharge instructions reviewed with pt and family, stated understanding, copy to pt. Discharge to home per wheelchair with family.

## 2019-01-17 NOTE — DISCHARGE INSTRUCTIONS
[facility]         Interventional Radiology  Discharge Instructions Post Angiogram (NEURO)    AFTER YOU GO HOME  ? DO NOT drive or operate machinery at home or at work for at least 24 hours  ? If you were given sedation; we recommend that an adult stay with you for the first 24 hours  ? DO relax and take it easy for 24 hours  ? DO drink plenty of fluids  ? DO resume your regular diet, unless otherwise instructed by your Primary Physician  ? DO NOT SMOKE FOR AT LEAST 24 HOURS, if you have been given any medications that were to help you relax or sedate you during your procedure  ? DO NOT drink alcoholic beverages the day of your procedure  ? DO NOT do any strenuous exercise or lifting for at least 2 days following your procedure  ? DO NOT take a bath or shower for at least 12 hours following your procedure  ? DO NOT scrub the procedure site vigorously for 5 days  ? DO NOT make any important or legal decisions for 24 hours following your procedure if you were given sedation    CALL THE PHYSICIAN IF:  ? You start bleeding from the procedure site.  If you do start to bleed from that site, lie down flat and hold pressure on the site.  Your physician will tell you if you need to return to the hospital.  It is common to have a small bruise or lump at the site  ? You have numbness, coolness or change in color of the right leg  ? You experience changes in your vision, hearing, balance, coordination, speech, thinking or memory  ? You experience weakness in one or more extremity  ? You experience pain or redness at the puncture site  ? You develop nausea or vomiting  ? You develop hives or a rash or unexplained itching  ? You develop a temperature of 101 degrees F or greater    ADDITIONAL INSTRUCTIONS  ? Support the puncture site for coughing, sneezing, or moving your bowels for the first 48 hours  ? No tub bath, hot tubs, or swimming for 5 days  ? No lotion or powder to the puncture site for 3  days  ? Instruction booklet given: Angioseal device    [facility] INTERVENTIONAL RADIOLOGY DEPARTMENT  Procedure Physician:         Dr. Wolfe/Dr. Cespedes  Date of procedure: January 17, 2019  Telephone Numbers: [phone] Monday-Friday 8:00 am to 4:30 pm  [phone] After 4:30 pm Monday-Friday, Weekends & Holidays.   Ask for the Neuro-Radiologist on call.  Someone is on call 24 hrs/day  [facility] toll free number: [phone] Monday-Friday 8:00 am to 4:30 pm  [facility] Emergency Dept: [phone]

## 2019-01-17 NOTE — IP AVS SNAPSHOT
[address] [address]                                    After Visit Summary   1/17/2019    Tha Mcghee    MRN: [id number]           After Visit Summary Signature Page    I have received my discharge instructions, and my questions have been answered. I have discussed any challenges I see with this plan with the nurse or doctor.    ..........................................................................................................................................  Patient/Patient Representative Signature      ..........................................................................................................................................  Patient Representative Print Name and Relationship to Patient    ..................................................               ................................................  Date                                   Time    ..........................................................................................................................................  Reviewed by Signature/Title    ...................................................              ..............................................  Date                                               Time          22EPIC Rev 08/18

## 2019-01-17 NOTE — PROGRESS NOTES
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 He has medically intractable epilepsy. Records indicate that he began suffering from seizures in April, 2011. He has complex partial seizures, some of which secondarily generalize. He completed video EEG monitoring at the [facility] from 10/23 - 10/26/2018. This evaluation documented four focal impaired seizures, two with partial tonic clonic seizures. These seizures were felt to arise from the right hemisphere, and the right temporal region in particular. MRI of his brain on October 10, 2018 documented  subjective asymmetric mild enlargement and T2 hyperintensity of the right amygdala is not corroborated on the morphometric analysis. Otherwise normal MRI of the brain and intracranial compartment.  FDG PET imaging on October 5, 2018 documented decreased metabolic activity in the right frontal and right temporal lobes. 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 Current anti-seizure medications are divalproex, gabapentin, lacosamide, and phenytoin. He is also on the [facility] s medical cannabis registry.  Mr. Mcghee has completed two neuropsychology exams under my direction. The first exam was completed on October 10, 2018. Unfortunately, the results from this exam were felt to be invalid. He did not provide full and consistent effort on the exam. I speculated that psychological factors were contributing to this variability. It was later hypothesized that he was experiencing postictal confusion during this evaluation. As such, I saw him for a  follow-up exam on November 13, 2018. In the follow-up exam, he demonstrated generally normal cognitive functioning across most assessed domains. There was an isolated low test score on a measure of nonverbal reasoning that has been associated with function of the right temporal lobe. His cognition was otherwise entirely normal, including verbal and nonverbal anterograde memory. He again reported symptoms of depression and anxiety, as well as wide-ranging psychological distress on a measure of personality and emotional functioning.  The Wada evaluation was now requested by the treating neurologist, Dr. Rajni Araujo, to provide updated information about the relative contributions of the two hemispheres to language and memory functioning.  PROCEDURE:  On December 17 2018, Mr. Mcghee was seen for orientation to the Wada procedure. Additionally, all behavioral testing to be done under drug conditions was performed to establish a baseline. The standard procedure was completed, and he had no troubles with any of the language tasks. Following a brief delay, he freely recalled four out of four words that were previously presented. He freely recalled the nursery rhyme phrase. He accurately recognized four out of four previously presented geometric figures. He freely recalled two out of three line drawing items, and recognized the word associated with the remaining item amongst foils. He accurately recognized all three of the drawings amongst foils. He accurately repeated six out of six syntactically complex sentences.     RIGHT HEMISPHERE INJECTION:  Wada testing was initially performed on the right hemisphere, the side of his known seizure focus. He was asked to name four objects and recite the days of the week forward, all of which were done without error.  strength was obtained using a dynamometer. While holding both hands aloft and while counting backwards from 100, 125 mg of sodium amytal were injected into the right  carotid artery. Motor strength in the left arm was completely lost. He remained attentive to the examiner. In the testing following this injection, he was mildly to moderately aggressive, and mildly to moderately disinhibited. He attempted to squeeze my hands and pull my arms, and also required reminders about the importance of paying close attention to the testing protocol. He accurately named all four objects. He was presented with two written words. He accurately read and repeated both words. He was instructed to remember the words. He made multiple jokes during this portion of testing. He was presented with two geometric designs and was told to remember these designs. He was then presented with three line drawings of objects, and he was instructed to remember these items. At 2 51  post injection, left hand  strength was estimated to be 80% of baseline. He responded correctly to each of 5 verbal comprehension items. He accurately repeated an orally presented nursery rhyme phrase, and was then instructed to remember this phrase. He then recited the days of the week when instructed to do so. Two additional printed words were accurately read and subsequently repeated. He was instructed to remember these words. He was presented with two additional geometric designs, and he was instructed to view and remember these items. At 4 58  post-injection, his left-hand  strength was estimated to be at baseline. He then accurately repeated three of three syntactically complex sentences. Upon completion of the protocol, 5 29  had transpired since the injection. His left hand  strength was then measured to be 93% of baseline, per dynamometer.     Approximately 5 additional minutes transpired, during which the EEG normalized. His contralateral  strength was measured to be 95% of baseline, per dynamometer. Motor drift was not detected. The patient s memory for the events that transpired immediately post-injection  was poor. He freely recalled three of the four words that he had read. He then accurately recognized the remaining word when it was presented amongst foils. He freely recalled the nursery rhyme phrase. He accurately recognized four of four geometric designs amongst foils. He was unable to recall any of the line drawing items, but recognized two of the three words for these items amongst foils, and recognized each of the three drawings amongst foils.     LEFT HEMISPHERE INJECTION:  Approximately 25  after the initial injection, the same procedure was repeated for the left hemisphere using an alternative test version. Four new objects were correctly named and the days of the week were recited without error. While he held both hands aloft and while counting backward from 100, 125 mg of sodium amytal were injected into the left internal carotid artery.  Motor strength in the right arm was immediately and completely lost. Counting immediately ceased. He remained attentive to the examiner but became mildly agitated. Four objects were presented and he was unable to name any of the objects. He was not able to read either of two visually presented words, and he did not repeat either of the words when instructed to do so. He was instructed to remember both of these words. Two geometric designs were presented and he was instructed to remember these designs. Three line drawings of objects were presented and he was instructed to remember these items. He made attempts at naming these items, but his responses were poorly articulated and paraphasic. Following presentation of these items, 2 51  post-injection, his contralateral  strength was estimated to be 50% of baseline. He responded accurately to 2   of five verbal comprehension items. He was then provided with and instructed to repeat a nursery rhyme phrase. He was unable to do so, with his response being paraphasic. He was instructed to remember this phrase. He was unable  to state the days of the week when instructed to do so, and stated  September 7, Monday, Tuesday, three, four, five, six, seven.  Two more printed words were presented. For the first word,  door,  he stated  odor.  He accurately repeated this word. He accurately read and repeated the second word. He was instructed to remember both the words. Two more designs were presented with instructions to view and remember these items. At 5 21  post-injection, his contralateral  strength was estimated to be at baseline. He did not accurately repeat any of three syntactically complex sentences. He initially refused to repeat the sentences, but then made attempts after being encouraged to do so. His attempts were characterized by paraphasic errors. Upon completion of the protocol, 6 20  had elapsed since the injection. His right hand  strength was then measured to be 91% of baseline, per dynamometer. His language and speech skills recovered in the minutes thereafter.    An additional approximately five minutes elapsed, during which the EEG normalized. His  strength was measured to be 88% of baseline, per dynamometer. Motor drift was not evident. His memory for the events post-injection was mediocre. He freely recalled zero of the four presented words. He correctly recognized four of the four words when they were presented amongst foils. He did not correctly recall the nursery rhyme phrase, nor was he able to recall this phrase with a cue. He did not accurately identify this phrase when it was presented amongst foils. He accurately recognized three of four geometric designs when they were presented amongst foils. He freely recalled zero of the three line drawing items. He accurately identified two of three verbal labels for the items amongst foils. He correctly recognized three of three of the drawings of these items when they were presented amongst foils.      CONCLUSIONS:   Wada exam results are consistent with left  hemisphere speech and language dominance.     Regarding memory, the patient has excellent capacity for mediating memory with the left hemisphere. The left hemisphere has excellent capacity for mediating verbal memory, and excellent capacity for mediating nonverbal memory. The right hemisphere has adequate capacity for mediating memory overall, with mediocre capacity for mediating verbal memory, and good capacity for mediating nonverbal memory.    The current results suggest that Mr. Mcghee is at little risk for a change in his language if he has a resective surgery to his right temporal lobe. The current results would suggest some possibility of change in both verbal and nonverbal memory capacity following resective surgery to his right mesial temporal lobe, although he likely would not be amnestic for either verbal or nonverbal information following this procedure.    RECOMMENDATIONS:  1. Mr. Mcghee should be counseled about the above findings if he proceeds toward resective surgery.      2. I would be pleased to evaluate in the future, if clinically indicated.      Chano Gamez, Ph.D., L.P., [facility]   / Licensed Psychologist [id number]  Department of Rehabilitation Medicine  [facility]    Time spent:  One unit (60 minutes) of CPT code 47054, neurobehavioral status exam by licensed and board certified neuropsychologist, including face-to-face time with the patient, time interpreting test results, and preparing the report, first hour. Two units (125 minutes) neurobehavioral status exam by licensed and board-certified neuropsychologist, including. face-to-face time, interpreting test results, and preparing the report, subsequent hours. Diagnoses: G40.019, F06.8.

## 2019-01-17 NOTE — IP AVS SNAPSHOT
MRN:[id number]                      After Visit Summary   1/17/2019    Tha Mcghee    MRN: [id number]           Visit Information        Department      1/17/2019  7:50 AM [address] [facility]          Review of your medicines      UNREVIEWED medicines. Ask your doctor about these medicines       Dose / Directions   ALPRAZolam 0.5 MG tablet  Commonly known as:  XANAX      Dose:  0.5 mg  Take 0.5 mg by mouth as needed.  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  May repeat and give 2.5 mg after 10 minutes if needed. 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)      Increase as directed to a goal dose of 900mg twice daily  Quantity:  186 capsule  Refills:  3     lacosamide 200 MG Tabs tablet  Commonly known as:  VIMPAT  Used for:   Partial epilepsy with impairment of consciousness, intractable (H)      Dose:  200 mg  Take 1 tablet (200 mg) by mouth 2 times daily  Quantity:  60 tablet  Refills:  5     MULTIVITAMINS PO  Used for:  Localization-related (focal) (partial) epilepsy and epileptic syndromes with complex partial seizures, with intractable epilepsy      2 Chew a day  Refills:  0     * phenytoin 100 MG capsule  Commonly known as:  DILANTIN  Used for:  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)      TAKE 1 CAP BY MOUTH DAILY IN THE AM AND 2 CAPS IN THE PM  Quantity:  270 capsule  Refills:  3     * phenytoin 30 MG capsule  Commonly known as:  DILANTIN  Used for:  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 Max 3 tablets/24 hours.  Quantity:  18 tablet  Refills:  3     venlafaxine 75 MG 24 hr capsule  Commonly known as:  EFFEXOR XR  Used for:  Episode of recurrent major depressive disorder, unspecified depression episode severity (H)      Dose:  225 mg  Take 3 capsules (225 mg) by mouth every morning  Quantity:  270 capsule  Refills:  3         * This list has 4 medication(s) that are the same as other medications prescribed for you. Read the directions carefully, and ask your doctor or other care provider to review them with you.            CONTINUE these medicines which have NOT CHANGED       Dose / Directions   medical cannabis inhalation (Patient's own supply.  Not a  prescription)  Used for:  Localization-related (focal) (partial) epilepsy and epileptic syndromes with complex partial seizures, with intractable epilepsy, Variants of migraine, not elsewhere classified, without mention of intractable migraine without mention of status migrainosus      Dose:  200 mL  Inhale 200 mLs into the lungs Take one to two puffs every four to six hours, red solution ([employer])  Refills:  0              Protect others around you: Learn how to safely use, store and throw away your medicines at www.disposemymeds.org.       Follow-ups after your visit       Care Instructions       Further instructions from your care team       [facility]         Interventional Radiology  Discharge Instructions Post Angiogram (NEURO)    AFTER YOU GO HOME  ? DO NOT drive or operate machinery at home or at work for at least 24 hours  ? If you were given sedation; we recommend that an adult stay with you for the first 24 hours  ? DO relax and take it easy for 24 hours  ? DO drink plenty of fluids  ? DO resume your regular diet, unless otherwise instructed by your Primary Physician  ? DO NOT SMOKE FOR AT LEAST 24 HOURS, if you have been given any medications that were to help you relax or sedate you during your procedure  ? DO NOT drink alcoholic beverages the day of your procedure  ? DO NOT do any strenuous exercise or lifting for at least 2 days following your procedure  ? DO NOT take a bath or shower for at least 12 hours following your procedure  ? DO NOT scrub the procedure site vigorously for 5 days  ? DO NOT make any important or legal decisions for 24 hours following your procedure if you were given sedation    CALL THE PHYSICIAN IF:  ? You start bleeding from the procedure site.  If you do start to bleed from that site, lie down flat and hold pressure on the site.  Your physician will tell you if you need to return to the hospital.  It is common to have a small bruise or  lump at the site  ? You have numbness, coolness or change in color of the right leg  ? You experience changes in your vision, hearing, balance, coordination, speech, thinking or memory  ? You experience weakness in one or more extremity  ? You experience pain or redness at the puncture site  ? You develop nausea or vomiting  ? You develop hives or a rash or unexplained itching  ? You develop a temperature of 101 degrees F or greater    ADDITIONAL INSTRUCTIONS  ? Support the puncture site for coughing, sneezing, or moving your bowels for the first 48 hours  ? No tub bath, hot tubs, or swimming for 5 days  ? No lotion or powder to the puncture site for 3 days  ? Instruction booklet given: Angioseal device    [facility] INTERVENTIONAL RADIOLOGY DEPARTMENT  Procedure Physician:         Dr. Wolfe/Dr. Cespedes  Date of procedure: January 17, 2019  Telephone Numbers: [phone] Monday-Friday 8:00 am to 4:30 pm  [phone] After 4:30 pm Monday-Friday, Weekends & Holidays.   Ask for the Neuro-Radiologist on call.  Someone is on call 24 hrs/day  [facility] toll free number: [phone] Monday-Friday 8:00 am to 4:30 pm  [facility] Emergency Dept: [phone]        Additional Information About Your Visit       [employer] Information    [employer] gives you secure access to your electronic health record. If you see a primary care provider, you can also send messages to your care team and make appointments. If you have questions, please call your primary care clinic.  If you do not have a primary care provider, please call [phone] and they will assist you.       Care EveryWhere ID    This is your Care EveryWhere ID. This could be used by other organizations to access your [facility] medical records  [id number]       Your Vitals Were     Blood Pressure   111/81    Temperature   98.7  F (37.1  C) (Oral)    Respirations   16    Pulse Oximetry   98%           Primary Care Provider Office Phone # Fax #    Bhaskar Morales [phone]  [phone]      Equal Access to Services    [facility]: Hadii kyle hernandez bernardo Hernandez, waagnesda luqadaha, qarayneta kasanjeevzo nievesjazzyzo, redd liradaltoncarmen silva. So [facility] [phone].    ATENCIÓN: Si habla español, tiene a andrade disposición servicios gratuitos de asistencia lingüística. Rufinoame al [phone].    We comply with applicable federal civil rights laws and [place] laws. We do not discriminate on the basis of race, color, national origin, age, disability, sex, sexual orientation, or gender identity.           Thank you!    Thank you for choosing [facility] for your care. Our goal is always to provide you with excellent care. Hearing back from our patients is one way we can continue to improve our services. Please take a few minutes to complete the written survey that you may receive in the mail after you visit with us. Thank you!            Medication List      Medications          Morning Afternoon Evening Bedtime As Needed    medical cannabis inhalation (Patient's own supply.  Not a prescription)  INSTRUCTIONS:  Inhale 200 mLs into the lungs Take one to two puffs every four to six hours, red solution ([employer])                       ASK your doctor about these medications          Morning Afternoon Evening Bedtime As Needed    ALPRAZolam 0.5 MG tablet  Also known as:  XANAX  INSTRUCTIONS:  Take 0.5 mg by mouth as needed.                     cholecalciferol 5000 units Tabs tablet  Also known as:  vitamin D3  INSTRUCTIONS:  Take 5,000 Units by mouth                     diazepam 5 MG/ML (HIGH CONC) solution  Also known as:  DIAZEPAM INTENSOL  INSTRUCTIONS:  For generalized seizures give 5 mg.  May repeat and give 2.5 mg after 10 minutes if needed. Do not exceed 7.5mg                     * divalproex sodium extended-release 500 MG 24 hr tablet  Also known as:  DEPAKOTE ER  INSTRUCTIONS:  Take 2 tabs in the morning and 3 tabs at night.                     * divalproex sodium  extended-release 250 MG 24 hr tablet  Also known as:  DEPAKOTE ER  INSTRUCTIONS:  Take 1 tab in the morning (along with two 500mg tabs)                     docusate sodium 100 MG capsule  Also known as:  COLACE  INSTRUCTIONS:  Take 100 mg by mouth 2 times daily                     gabapentin 300 MG capsule  Also known as:  NEURONTIN  INSTRUCTIONS:  Increase as directed to a goal dose of 900mg twice daily                     lacosamide 200 MG Tabs tablet  Also known as:  VIMPAT  INSTRUCTIONS:  Take 1 tablet (200 mg) by mouth 2 times daily                     MULTIVITAMINS PO  INSTRUCTIONS:  2 Chew a day                     * phenytoin 100 MG capsule  Also known as:  DILANTIN  INSTRUCTIONS:  TAKE 1 CAP BY MOUTH DAILY IN THE AM AND 2 CAPS IN THE PM                     * phenytoin 30 MG capsule  Also known as:  DILANTIN  INSTRUCTIONS:  Take two po Q AM  Doctor's comments:  Generic is fine.                     promethazine 25 MG tablet  Also known as:  PHENERGAN  INSTRUCTIONS:  Take 25 mg by mouth every 6 hours as needed for nausea (take with maxalt for nausea with migraines)                     rizatriptan 10 MG tablet  Also known as:  MAXALT  INSTRUCTIONS:  Take 1 tablet (10 mg) by mouth at onset of headache for migraine May repeat in 2 hours. Max 3 tablets/24 hours.                     venlafaxine 75 MG 24 hr capsule  Also known as:  EFFEXOR XR  INSTRUCTIONS:  Take 3 capsules (225 mg) by mouth every morning                        * This list has 4 medication(s) that are the same as other medications prescribed for you. Read the directions carefully, and ask your doctor or other care provider to review them with you.

## 2019-01-17 NOTE — PROCEDURES
[facility], [place]     Endovascular Surgical Neuroradiology Post-Procedure Note    Pre-Procedure Diagnosis:  Seizures  Post-Procedure Diagnosis:  Seizures    Procedure(s):   WADA testing    Findings:  No persistent anterior-posterior anastamoses     Plan:    2 hrs bedrest  F/u with primary epilepsy service     Primary Surgeon:  Dr. Milton Wolfe  Fellow:  [facility]    Prior to the start of the procedure and with procedural staff participation, I verbally confirmed: the patient s identity using two indicators, relevant allergies, that the procedure was appropriate and matched the consent or emergent situation, and that the correct equipment/implants were available. Immediately prior to starting the procedure I conducted the Time Out with the procedural staff and re-confirmed the patient s name, procedure, and site/side. (The Joint Commission universal protocol was followed.)  Yes    PRU value: Not applicable    Anesthesia:  Sedation delivered at end   Medications:  Fentanyl 50   Puncture site:  Right Femoral Artery    Fluoroscopy time (minutes):  8.7  Radiation dose (mGy):  187    Contrast amount (mL):  20     Estimated blood loss (mL):  Minimal     Closure:  Device    Disposition:  Home after recovery.        Sedation Post-Procedure Summary    Sedatives: Fentanyl 50     Vital signs and pulse oximetry were monitored and remained stable throughout the procedure, and sedation was maintained until the procedure was complete.  The patient was monitored by staff until sedation discharge criteria were met.    Patient tolerance:  Patient tolerated the procedure well with no immediate complications.    Time of sedation in minutes:  5 minutes from beginning to end of physician one to one monitoring.    Bert Cespedes  Pager:  [phone]

## 2019-01-18 ENCOUNTER — PATIENT OUTREACH (OUTPATIENT)
Dept: NEUROLOGY | Facility: CLINIC | Age: 29
End: 2019-01-18

## 2019-01-18 NOTE — PROGRESS NOTES
[facility]: Post-Discharge Note  SITUATION                                                      Admission:    Admission Date: 01/17/19   Reason for Admission: Wada Test  Discharge:    Discharge Date: 01/17/19   Discharge Diagnosis: Seizures   Discharge Service: Endovascular Surgical Neuroradiology     BACKGROUND                                                      HPI:  Tha Mcghee is a 28 year old male with a history of complex partial seizures. He states that his first seizures began in April 2011 and he has since been managed with AEDs for them. He has two main type of seizures detailed in notes by the [facility] service, one consisting of lip-smacking that may progress to generalized tonic-clonic seizure and the other noted by episodes of staring off into the distance. On EEG monitoring, his seizures are localized to emanating from the right temporal lobe.     ASSESSMENT      Discharge Assessment  Patient reports symptoms are: Unchanged  Does the patient have all of their medications?: Not Applicable  Does patient know what their new medications are for?: Not applicable  Does patient have a follow-up appointment scheduled?: Yes  Does patient have any other questions or concerns?: No    Post-op  Did the patient have surgery or a procedure: Yes  Incision: healing  Drainage: No  Bleeding: none  Redness: No  Warmth: No  Swelling: No  Incision site pain: No  Closure: other(Device right femoral artery)    PLAN                                                      Outpatient Plan: Follow-up with Epilepsy Provider. Patient verbalized understanding. Patient has my contact information and was encouraged to call with questions/concerns.     Future Appointments   Date Time Provider Department Center   1/23/2019  7:15 AM [facility] CASE MANAGEMENT [facility] Owned           Clarisse Mackey RN

## 2019-01-18 NOTE — PROCEDURES
Procedure Date: 01/17/2019      EEG #       DATE OF RECORDING/SERVICE DATE:  01/17/2019       SOURCE FILE DURATION:  51 minutes.        This is an EEG recording during a Wada test.      CLINICAL HISTORY:  This patient is a 28-year-old right-handed male with history of intractable epilepsy.  EEG was requested during a Wada test for presurgical evaluation.      CURRENT MEDICATIONS:  Depakote, gabapentin, Vimpat, Dilantin, Effexor.      TECHNICAL SUMMARY: This EEG monitoring procedure was performed with 23 scalp electrodes in 10-20 electrode system placements, and additional scalp, precordial and other surface electrodes used for electrical referencing and artifact detection.     BACKGROUND ACTIVITIES:  The background activities of this EEG consisted of 9 Hz posterior dominant rhythm, which attenuates with eye opening.  There were intermittent right temporal theta and delta slowing at baseline.  No clear interictal epileptiform activities were observed.      At time 10:50:05, right side injection with 125 mg sodium Amytal was performed.  Following that injection.  There was significant the right hemisphere delta slowing with some mild crossover to the left hemisphere.  Memory and the language were tested by the neuropsychologist at this time.  EEG returned to baseline at 1058.      Left-sided injection with 125 mg of sodium Amytal was performed at 11:15:20,  following the injection.  There was significant left hemisphere delta slowing with mild crossover to the right hemisphere.  EEG returned to baseline at 1123 and the memory and language testing was performed by the neuropsychologist.      IMPRESSION: Wada test was successfully performed with bilateral injection of sodium Amytal independently.  Language and memory testing were performed after the injection on each side.  The language and memory testing will be reported in a separate report by neuropsychologist.         EBENEZER RIVERA MD             D: 01/17/2019    T: 2019   MT: LEONEL      Name:     SANTHOSH HODGES   MRN:      [id number]        Account:        [id number]   :      1990           Procedure Date: 2019      Document: [id number]

## 2019-01-23 ENCOUNTER — TELEPHONE (OUTPATIENT)
Dept: NEUROLOGY | Facility: CLINIC | Age: 29
End: 2019-01-23

## 2019-01-23 NOTE — TELEPHONE ENCOUNTER
Patient was aware that his case was to be discussed at the surgery conference today and wanted an update on the discussion.  I explained that  was unexpectedly absent, and his case would be discussed at a later day (likely in the next few weeks).  Patient expressed understanding of this

## 2019-01-23 NOTE — TELEPHONE ENCOUNTER
----- Message from Malinda Hernandez MA sent at 1/23/2019 11:36 AM CST -----  Regarding: brendan  Caller: Tha    Relationship to Patient: pt    Call Back Number: [phone]     Reason for Call: Wants an update on surgery.

## 2019-01-23 NOTE — ADDENDUM NOTE
Encounter addended by: Milton Wolfe MD on: 1/23/2019 5:23 PM   Actions taken: Cosign clinical note with attestation

## 2019-01-25 ENCOUNTER — TELEPHONE (OUTPATIENT)
Dept: NEUROLOGY | Facility: CLINIC | Age: 29
End: 2019-01-25

## 2019-01-25 NOTE — TELEPHONE ENCOUNTER
Prior Authorization Retail Medication Request    Medication/Dose: lacosamide (VIMPAT) 200 MG TABS tablet  ICD code (if different than what is on RX):    Previously Tried and Failed:    Rationale:      Insurance Name:    Insurance ID:        Pharmacy Information (if different than what is on RX)  Name:    Phone:

## 2019-01-25 NOTE — ADDENDUM NOTE
Encounter addended by: Milton Wolfe MD on: 1/25/2019 5:57 PM   Actions taken: Cosign clinical note with attestation

## 2019-01-27 ENCOUNTER — TELEPHONE (OUTPATIENT)
Dept: NEUROLOGY | Facility: CLINIC | Age: 29
End: 2019-01-27

## 2019-01-28 ENCOUNTER — TELEPHONE (OUTPATIENT)
Dept: NEUROLOGY | Facility: CLINIC | Age: 29
End: 2019-01-28

## 2019-01-28 NOTE — TELEPHONE ENCOUNTER
Malinda Hernandez MA P Me [facility]mad[facility]ason Pool             Caller: Tha     Relationship to Patient: pt     Call Back Number: [phone]     Reason for Call: Is out of Vimpat and hasn't taken it in two days and is waiting for PA to be approved. Wants to know if he can get emergency supply     Tha was provided with a bridge supply of Vimpat (7 day supply)

## 2019-01-28 NOTE — TELEPHONE ENCOUNTER
Patient called tonight to say that he ran out of Vimpat and is awaiting his prior authorization. He wasn't sure if he should resume his old prescription of Topamax. I told him, since he has not had any seizures, to call the [facility] clinic in the morning and ask Araujo this particular question, rather than resuming an old med. I also cc-ed her on this conversation.     Kailee Johns, DO on 1/27/2019 at 7:05 PM

## 2019-01-30 NOTE — TELEPHONE ENCOUNTER
Central Prior Authorization Team   Phone: [phone]    PA Initiation    Medication: lacosamide (VIMPAT) 200 MG TABS tablet  Insurance Company: [place] Medicaid ([facility]) - Phone [phone] Fax [phone]  Pharmacy Filling the Rx: [email or web address] [address] [place] [address] [facility] [place] [facility] [address]   Filling Pharmacy Phone: [phone]  Filling Pharmacy Fax: [phone]  Start Date: 1/30/2019        v

## 2019-01-31 NOTE — TELEPHONE ENCOUNTER
Prior Authorization Approval    Authorization Effective Date: 1/27/2019  Authorization Expiration Date: 1/21/2020  Medication: lacosamide (VIMPAT) 200 MG TABS - P/A APPROVED  Approved Dose/Quantity: 60  Reference #: PA# [id number]   Insurance Company: [place] Medicaid ([facility]) - Phone [phone] Fax [phone]  Expected CoPay:       CoPay Card Available:      Foundation Assistance Needed:    Which Pharmacy is filling the prescription (Not needed for infusion/clinic administered): [facility] [address] [place] [address] [facility] [address]   Pharmacy Notified: Yes - spoke to Nancy, she was able to process rx while on the phone with me  Patient Notified:

## 2019-02-05 ENCOUNTER — TELEPHONE (OUTPATIENT)
Dept: NEUROLOGY | Facility: CLINIC | Age: 29
End: 2019-02-05

## 2019-02-05 DIAGNOSIS — G40.219 PARTIAL EPILEPSY WITH IMPAIRMENT OF CONSCIOUSNESS, INTRACTABLE (H): Primary | ICD-10-CM

## 2019-02-05 RX ORDER — TOPIRAMATE 25 MG/1
50 TABLET, FILM COATED ORAL 2 TIMES DAILY
Qty: 180 TABLET | Refills: 3 | Status: SHIPPED | OUTPATIENT
Start: 2019-02-05 | End: 2019-03-05

## 2019-02-05 NOTE — TELEPHONE ENCOUNTER
Talked about seizure and cmc.     He wants to get on topiramate which helped his seizures. Start topiramate 50 mg per day for one week and then increase to 50 mg twice a day.     Rajni Araujo MD

## 2019-02-06 ENCOUNTER — TEAM CONFERENCE (OUTPATIENT)
Dept: NEUROLOGY | Facility: CLINIC | Age: 29
End: 2019-02-06
Payer: MEDICAID

## 2019-02-06 ENCOUNTER — OFFICE VISIT (OUTPATIENT)
Dept: NEUROLOGY | Facility: CLINIC | Age: 29
End: 2019-02-06
Payer: MEDICAID

## 2019-02-06 DIAGNOSIS — G40.219 PARTIAL EPILEPSY WITH IMPAIRMENT OF CONSCIOUSNESS, INTRACTABLE (H): Primary | ICD-10-CM
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
## 2019-02-07 PROBLEM — G40.219 PARTIAL EPILEPSY WITH IMPAIRMENT OF CONSCIOUSNESS, WITH INTRACTABLE EPILEPSY (H): Status: RESOLVED | Noted: 2018-10-23 | Resolved: 2019-02-07
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
## 2019-02-08 ENCOUNTER — TELEPHONE (OUTPATIENT)
Dept: NEUROLOGY | Facility: CLINIC | Age: 29
End: 2019-02-08

## 2019-02-08 NOTE — TELEPHONE ENCOUNTER
[facility]             Caller: Tha     Relationship to Patient: pt     Call Back Number: [phone]     Reason for Call: Pt tried to get an MRI done at [facility]. Pt states that the MRI wasn't completed because it was pinching the nerves in his neck and back. Pt was looking for some advice on what to do.      Tha was at the [facility] as he was scheduled for a MRI..     He calls today reporting that during his MRI, he experienced a pinched nerve in his neck and back and was unable to complete his MRI. Historically he has never had pain during a MRI.     Tha reports that he no longer is experiencing nerve pain or a pinched nerve sensation.    Tha stated that the person performing the MRI said that Tha shouldn't have any further MRI's at the [facility].     PLAN:     Tha would like to forgo the MRI. Dr. Araujo is agreeable.

## 2019-02-13 ENCOUNTER — TEAM CONFERENCE (OUTPATIENT)
Dept: NEUROLOGY | Facility: CLINIC | Age: 29
End: 2019-02-13

## 2019-02-13 NOTE — TELEPHONE ENCOUNTER
"Case management conference.  DrsMalina present: Dr. Funes, Dr. Shah, Dr. Calvo, Dr. Preston, Dr. Wayne, Dr. Van Funes reviewed concerns raised by Dr. Morales regarding Tha marijuana use.  Additionally other providers raised concern about patient's inappropriate behavior during last several months for presurgical workup.  There are concerns about Gio ability to tolerate invasive video EEG monitoring.    Dr. Morales's states \" I am quite concerned about his use of marijuana.  I believe that it is the marijauna that is biggest detriment to his seizures, health, and employment.  I have tried to challenge Siva that he have a chem dep evaluation and get sober.  I would challenge that Siva should have negative urine screens for THC for 6 months before any invasive procedure. \"     Our group did review the concerns raised by Dr. Morales.  We will ask Tha to focus on sobriety for several months, complete random urine toxicology screening, and engage in mental health care to address concerns reviewed above.      We request that he have 3 consecutive negative urine toxicology screens. Follow-up with psychiatrist and psychologist, see [facility] once a month to review invasive video EEG monitoring expectations and protocol. These measures are to ensure he understands the procedure and his responsibility during invasive Video EEG monitoring. In the last several months he has displayed inappropriate behaviors that have raised concerns for the group about his tolerability and cooperation for this procedure.  Additionally I will request that he seek out continued care with psychiatry and psychology.    In regards to Dr. Morales's concerns about marijuana use, neuropsyhology testing did not reveal toxic encephalopathy from marijuana use.  More so, there is evidence in the literature that marijuana use may help reduce seizure burden. Tha has clearly stated multiple times he uses marijuana for sleep and anxiety. Prolonging " epilepsy surgical intervention  may cause harm from seizure. Proceeding with epilepsy surgery may result in 50-70% chance of seizure freedom (since he is non dominant temporal lobe epilepsy).  Reduction in seizures will ensure increased quality of life, decrease depression/anxiety, increased potential for employability. He is young and I believe the surgery will help his Quality of life. After 3 consecutive negative urine toxicology test and mental health care follow up,  I recommend we proceed with invasive EEG evaluation and then epilepsy surgery.     I did call Tha today 11:50am, he was very upset (yelling, cursing) and then hung up the phone. We will reach out to him at a later time to come in and review expectations in order to proceed.     Rajni Araujo MD     Time spent on case management conference and call was 50 minutes.

## 2019-02-18 DIAGNOSIS — G40.219 LOCALIZATION-RELATED EPILEPSY WITH COMPLEX PARTIAL SEIZURES WITH INTRACTABLE EPILEPSY (H): ICD-10-CM

## 2019-02-19 RX ORDER — DIVALPROEX SODIUM 250 MG/1
TABLET, EXTENDED RELEASE ORAL
Qty: 90 TABLET | Refills: 0 | OUTPATIENT
Start: 2019-02-19

## 2019-02-21 ENCOUNTER — MEDICAL CORRESPONDENCE (OUTPATIENT)
Dept: HEALTH INFORMATION MANAGEMENT | Facility: CLINIC | Age: 29
End: 2019-02-21

## 2019-03-01 DIAGNOSIS — G40.219 LOCALIZATION-RELATED EPILEPSY WITH COMPLEX PARTIAL SEIZURES WITH INTRACTABLE EPILEPSY (H): ICD-10-CM

## 2019-03-01 RX ORDER — PHENYTOIN SODIUM 100 MG/1
CAPSULE, EXTENDED RELEASE ORAL
Qty: 270 CAPSULE | Refills: 0 | OUTPATIENT
Start: 2019-03-01

## 2019-03-05 ENCOUNTER — TELEPHONE (OUTPATIENT)
Dept: NEUROLOGY | Facility: CLINIC | Age: 29
End: 2019-03-05

## 2019-03-05 ENCOUNTER — OFFICE VISIT (OUTPATIENT)
Dept: NEUROLOGY | Facility: CLINIC | Age: 29
End: 2019-03-05
Payer: COMMERCIAL

## 2019-03-05 VITALS
BODY MASS INDEX: 27.76 KG/M2 | DIASTOLIC BLOOD PRESSURE: 85 MMHG | TEMPERATURE: 98.5 F | SYSTOLIC BLOOD PRESSURE: 131 MMHG | WEIGHT: 188 LBS | HEART RATE: 111 BPM

## 2019-03-05 DIAGNOSIS — G43.809 MIGRAINE VARIANT: ICD-10-CM

## 2019-03-05 DIAGNOSIS — G40.219 PARTIAL EPILEPSY WITH IMPAIRMENT OF CONSCIOUSNESS, INTRACTABLE (H): Primary | ICD-10-CM

## 2019-03-05 DIAGNOSIS — G40.209 LOCALIZATION-RELATED FOCAL EPILEPSY WITH COMPLEX PARTIAL SEIZURES (H): ICD-10-CM

## 2019-03-05 DIAGNOSIS — G40.219 LOCALIZATION-RELATED EPILEPSY WITH COMPLEX PARTIAL SEIZURES WITH INTRACTABLE EPILEPSY (H): ICD-10-CM

## 2019-03-05 DIAGNOSIS — H54.62 VISION LOSS OF LEFT EYE: ICD-10-CM

## 2019-03-05 RX ORDER — TOPIRAMATE 25 MG/1
25 TABLET, FILM COATED ORAL 2 TIMES DAILY
Qty: 180 TABLET | Refills: 3 | Status: ON HOLD | OUTPATIENT
Start: 2019-03-05 | End: 2019-09-13

## 2019-03-05 RX ORDER — RIZATRIPTAN BENZOATE 10 MG/1
10 TABLET ORAL
Qty: 18 TABLET | Refills: 3 | Status: SHIPPED | OUTPATIENT
Start: 2019-03-05 | End: 2019-06-18

## 2019-03-05 RX ORDER — DIVALPROEX SODIUM 250 MG/1
TABLET, EXTENDED RELEASE ORAL
Qty: 90 TABLET | Refills: 3 | Status: ON HOLD | OUTPATIENT
Start: 2019-03-05 | End: 2019-09-13

## 2019-03-05 RX ORDER — PROMETHAZINE HYDROCHLORIDE 25 MG/1
25 TABLET ORAL EVERY 6 HOURS PRN
Qty: 30 TABLET | Refills: 3 | Status: SHIPPED | OUTPATIENT
Start: 2019-03-05 | End: 2019-10-09

## 2019-03-05 RX ORDER — DIVALPROEX SODIUM 500 MG/1
TABLET, EXTENDED RELEASE ORAL
Qty: 450 TABLET | Refills: 3 | Status: ON HOLD | OUTPATIENT
Start: 2019-03-05 | End: 2019-09-13

## 2019-03-05 RX ORDER — LACOSAMIDE 200 MG/1
200 TABLET ORAL 2 TIMES DAILY
Qty: 60 TABLET | Refills: 5 | Status: SHIPPED | OUTPATIENT
Start: 2019-03-05 | End: 2019-06-18

## 2019-03-05 RX ORDER — GABAPENTIN 300 MG/1
CAPSULE ORAL
Qty: 180 CAPSULE | Refills: 3 | Status: SHIPPED | OUTPATIENT
Start: 2019-03-05 | End: 2019-06-18

## 2019-03-05 ASSESSMENT — PAIN SCALES - GENERAL: PAINLEVEL: NO PAIN (0)
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
## 2019-03-05 NOTE — PATIENT INSTRUCTIONS
We ask that you complete the followin. Maintain sobriety,we will request that you have three negative urine toxicology screens (if [facility] lab is closed, may complete near home and have results sent to [facility]).   2. Follow-up with psychiatrist and psychologist, we need notes sent to [facility]  3. [facility] nurse once a month to review invasive video EEG monitoring expectations and protocol.       Continue seizure medications  :     Vimpat 200 mg twice a day     depakote 1250 mg morning and night  Gabapentin 300 mg twice a day   Dilantin 160 mg morning and 200 mg pm     Rajni Araujo MD
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
## 2019-03-05 NOTE — TELEPHONE ENCOUNTER
DMV form signed, faxed to [employer] on 3/5/19, sent to scanning, and copy given to patient at check out.

## 2019-03-06 ENCOUNTER — TRANSFERRED RECORDS (OUTPATIENT)
Dept: HEALTH INFORMATION MANAGEMENT | Facility: CLINIC | Age: 29
End: 2019-03-06

## 2019-03-20 DIAGNOSIS — G40.219 LOCALIZATION-RELATED EPILEPSY WITH COMPLEX PARTIAL SEIZURES WITH INTRACTABLE EPILEPSY (H): ICD-10-CM

## 2019-03-22 NOTE — TELEPHONE ENCOUNTER
One year supply was sent by Dr. Araujo to this requesting pharmacy on 7/2018. Pharmacy has requested a refill too soon.

## 2019-03-25 ENCOUNTER — TELEPHONE (OUTPATIENT)
Dept: NEUROLOGY | Facility: CLINIC | Age: 29
End: 2019-03-25

## 2019-03-25 DIAGNOSIS — G40.219 PARTIAL EPILEPSY WITH IMPAIRMENT OF CONSCIOUSNESS, INTRACTABLE (H): Primary | ICD-10-CM

## 2019-03-25 NOTE — TELEPHONE ENCOUNTER
Health Call Center    Phone Message    May a detailed message be left on voicemail: yes    Reason for Call: Other: Tha calling to see why he was referred to a psychiatrist/psychologist.  He had an appointment and felt it was a waste of time.  He has another appointment tomorrow at 3pm and is wanting to be able to give them specific instructions.  Please call him back to discuss as soon as possible     Action Taken: Message routed to:  [facility]

## 2019-03-25 NOTE — TELEPHONE ENCOUNTER
Nurse received In-Basket message as follows:      Payam Pickett LPN sent to [facility] Rn Pool             Caller: James     Relationship to Patient: self     Call Back Number: [phone]     Reason for Call: Would like to have AED levels drawn due to increased hand shaking. Please call him to discuss      Returned call Patient asking where he would get blood draws and he indicated that he would go to the [facility] in [place].  Nurse indicated he would send orders, but indicated also that [facility] most often require an MD signature and that might delay the orders.

## 2019-03-26 ENCOUNTER — VIRTUAL VISIT (OUTPATIENT)
Dept: NEUROLOGY | Facility: CLINIC | Age: 29
End: 2019-03-26
Payer: COMMERCIAL

## 2019-03-26 DIAGNOSIS — G40.219 PARTIAL EPILEPSY WITH IMPAIRMENT OF CONSCIOUSNESS, INTRACTABLE (H): Primary | ICD-10-CM

## 2019-03-26 NOTE — TELEPHONE ENCOUNTER
[facility] Call Center    Phone Message    May a detailed message be left on voicemail: yes    Reason for Call: Order(s): Dr's Signature is needed on any of the four sheets that where faxed over for lab orders     Reason for requested: Follow up from previous conversation:  Fax to [phone]  Date needed: ASAP  Provider name: Dr. Araujo      Action Taken: Message routed to:  [facility]

## 2019-03-26 NOTE — PROGRESS NOTES
Called him and encouraged him to see psychiatrist and psychologist for chemical dependency, depression, and anxiety. He is following through on recommended care.     Rajni Araujo MD

## 2019-03-27 NOTE — TELEPHONE ENCOUNTER
Per chart review, this question has already been answered via Virtual Visit with Dr. Araujo dated 3/26/19.    Closing encounter.

## 2019-04-04 DIAGNOSIS — G40.219 LOCALIZATION-RELATED EPILEPSY WITH COMPLEX PARTIAL SEIZURES WITH INTRACTABLE EPILEPSY (H): ICD-10-CM

## 2019-04-05 NOTE — TELEPHONE ENCOUNTER
Medication request meets [place] Medication Refill Protocol - Seizure Medications (non-controlled) requirements.

## 2019-04-16 ENCOUNTER — TRANSFERRED RECORDS (OUTPATIENT)
Dept: HEALTH INFORMATION MANAGEMENT | Facility: CLINIC | Age: 29
End: 2019-04-16

## 2019-04-22 ENCOUNTER — ALLIED HEALTH/NURSE VISIT (OUTPATIENT)
Dept: NEUROLOGY | Facility: CLINIC | Age: 29
End: 2019-04-22
Payer: COMMERCIAL

## 2019-04-22 VITALS
BODY MASS INDEX: 27.23 KG/M2 | SYSTOLIC BLOOD PRESSURE: 127 MMHG | DIASTOLIC BLOOD PRESSURE: 81 MMHG | HEART RATE: 105 BPM | WEIGHT: 184.4 LBS

## 2019-04-22 DIAGNOSIS — G40.219 LOCALIZATION-RELATED EPILEPSY WITH COMPLEX PARTIAL SEIZURES WITH INTRACTABLE EPILEPSY (H): Primary | ICD-10-CM
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
## 2019-04-22 NOTE — PATIENT INSTRUCTIONS
Continue as per the plan discussed with .  Follow up as scheduled  If you have any questions, please call us at [phone]

## 2019-05-17 ENCOUNTER — ALLIED HEALTH/NURSE VISIT (OUTPATIENT)
Dept: NEUROLOGY | Facility: CLINIC | Age: 29
End: 2019-05-17
Payer: COMMERCIAL

## 2019-05-17 VITALS
SYSTOLIC BLOOD PRESSURE: 121 MMHG | DIASTOLIC BLOOD PRESSURE: 80 MMHG | BODY MASS INDEX: 26.43 KG/M2 | HEART RATE: 97 BPM | WEIGHT: 179 LBS

## 2019-05-17 DIAGNOSIS — G40.219 PARTIAL EPILEPSY WITH IMPAIRMENT OF CONSCIOUSNESS, INTRACTABLE (H): Primary | ICD-10-CM

## 2019-05-17 NOTE — PROGRESS NOTES
Patient returns to the clinic today as ordered by Dr. Araujo for patient education: intracranial monitoring.    Today, we discussed again the process of intracranial EEG monitoring for presurgical planning for epilepsy treatment. Patient previously met with my colleague Lamine GONZALEZ, to discuss this as well. We discussed the hospital stay in the ICU, from lead implantation until 12-24 hours after lead explantation. He seemed to clearly understand his activity restrictions during this time and the lack of privacy. We discussed the need for 2 person assistance when out of bed, the use of commodes, to which he stated he doesn't think he'll be able to have a bowel movement this way. I explained that he will receive bowel medications to counteract the constipatory effects of bedrest, anesthesia, pain medication and that constipation can cause severe medical complications. We also discussed the possible need for restraints if he were to become agitated post ictally. He understands that he will be recorded 24/7 with video, audio and EEG. We discussed potential adverse events, how we reduce the risk of these with pneumoboots, incentive spirometry, infection prevention and avoiding increased intracranial pressure.     Patient has done 2 of 3 urine tox screens, both negative thus far. He states his last one will be next week when he does a blood draw at his PCP office. I asked that he have the results to us before his June appointment with Dr. Araujo. He was agreeable.     PLAN: Patient has yet to watch our videos on intracranial monitoring. I have scheduled him to watch these before he meets with Dr. Araujo next visit.       This service was provided under the supervision of the provider of the day, Dr. Jose C Marr, whom was available if needed.     Tiana England RN

## 2019-05-20 DIAGNOSIS — G40.219 LOCALIZATION-RELATED EPILEPSY WITH COMPLEX PARTIAL SEIZURES WITH INTRACTABLE EPILEPSY (H): ICD-10-CM

## 2019-05-20 RX ORDER — DIAZEPAM ORAL SOLUTION (CONCENTRATE) 5 MG/ML
SOLUTION ORAL
Qty: 30 ML | Refills: 0 | Status: SHIPPED | OUTPATIENT
Start: 2019-05-20 | End: 2019-05-23

## 2019-05-21 DIAGNOSIS — G40.219 LOCALIZATION-RELATED EPILEPSY WITH COMPLEX PARTIAL SEIZURES WITH INTRACTABLE EPILEPSY (H): ICD-10-CM

## 2019-05-21 NOTE — TELEPHONE ENCOUNTER
What is the concern that needs to be addressed by a nurse? Pt is requesting that his diazepam script be transferred to [facility] [id number]  [place], [place] - [address] AT [facility] [address]     May a detailed message be left on voicemail? Not needed    Date of last office visit: 3/5/19    Message routed to: [facility] RN Pool

## 2019-05-23 RX ORDER — DIAZEPAM ORAL SOLUTION (CONCENTRATE) 5 MG/ML
SOLUTION ORAL
Qty: 30 ML | Refills: 0 | Status: SHIPPED | OUTPATIENT
Start: 2019-05-23 | End: 2019-10-09

## 2019-05-23 NOTE — TELEPHONE ENCOUNTER
Pt called and stated he has been waiting for this since Saturday. He would like this done today if possible

## 2019-05-28 ENCOUNTER — TELEPHONE (OUTPATIENT)
Dept: NEUROLOGY | Facility: CLINIC | Age: 29
End: 2019-05-28

## 2019-05-28 NOTE — TELEPHONE ENCOUNTER
What is the concern that needs to be addressed by a nurse? Wants to know the date of surgery is and possibly change it.     May a detailed message be left on voicemail? yes    Date of last office visit:     Message routed to: [facility] rn pool

## 2019-05-30 NOTE — TELEPHONE ENCOUNTER
Call returned to patient after speaking with Dr. Funes's nurse, Apurva. I advised that after his June appointment with Dr. Araujo, the surgery plans will start coming along again; however, this may still mean many months before surgery. I advised that if he does chose to get a puppy that he should have another caretaker planned for when he is undergoing intracranial monitoring which can last 3 weeks. He verbalizes understanding.

## 2019-06-18 ENCOUNTER — ALLIED HEALTH/NURSE VISIT (OUTPATIENT)
Dept: NEUROLOGY | Facility: CLINIC | Age: 29
End: 2019-06-18
Payer: COMMERCIAL

## 2019-06-18 ENCOUNTER — OFFICE VISIT (OUTPATIENT)
Dept: NEUROLOGY | Facility: CLINIC | Age: 29
End: 2019-06-18
Payer: COMMERCIAL

## 2019-06-18 VITALS
BODY MASS INDEX: 25.84 KG/M2 | SYSTOLIC BLOOD PRESSURE: 132 MMHG | DIASTOLIC BLOOD PRESSURE: 81 MMHG | HEART RATE: 90 BPM | WEIGHT: 175 LBS

## 2019-06-18 DIAGNOSIS — G40.219 PARTIAL EPILEPSY WITH IMPAIRMENT OF CONSCIOUSNESS, INTRACTABLE (H): ICD-10-CM

## 2019-06-18 DIAGNOSIS — G40.219 PARTIAL EPILEPSY WITH IMPAIRMENT OF CONSCIOUSNESS, INTRACTABLE (H): Primary | ICD-10-CM

## 2019-06-18 DIAGNOSIS — G40.209 LOCALIZATION-RELATED FOCAL EPILEPSY WITH COMPLEX PARTIAL SEIZURES (H): ICD-10-CM

## 2019-06-18 DIAGNOSIS — H54.62 VISION LOSS OF LEFT EYE: ICD-10-CM

## 2019-06-18 DIAGNOSIS — G43.809 MIGRAINE VARIANT: ICD-10-CM

## 2019-06-18 RX ORDER — GABAPENTIN 300 MG/1
CAPSULE ORAL
Qty: 180 CAPSULE | Refills: 3 | Status: SHIPPED | OUTPATIENT
Start: 2019-06-18 | End: 2019-10-09

## 2019-06-18 RX ORDER — RIZATRIPTAN BENZOATE 10 MG/1
10 TABLET ORAL
Qty: 18 TABLET | Refills: 3 | Status: SHIPPED | OUTPATIENT
Start: 2019-06-18 | End: 2019-10-09

## 2019-06-18 RX ORDER — LACOSAMIDE 200 MG/1
200 TABLET ORAL 2 TIMES DAILY
Qty: 60 TABLET | Refills: 5 | Status: SHIPPED | OUTPATIENT
Start: 2019-06-18 | End: 2019-10-09

## 2019-06-18 ASSESSMENT — PAIN SCALES - GENERAL: PAINLEVEL: NO PAIN (0)

## 2019-06-18 NOTE — PROGRESS NOTES
"SUBDURAL ELECTRODE EDUCATION VISIT      Name:  Tha Mcghee   Date:    2019   :   1990   MRN:  [id number]     Patient Address:  [address] [place] [id number]     Time Spent:  Face-to-face time 50 minutes    I met with the patient for discussion on indwelling subdural electrode placement.   We viewed the depth electrode video, and the preparing for surgery video together, and paused them to answer questions and discuss current procedures as appropriate    Patient-specific information: (why surgery, expectations, etc.)  Excerpts from previous MD notes:-     \"Patient has medically refractory right hemispheric partial epilepsy, etiology unclear.  Patient would like to proceed with epilepsy surgery.  Primary care doctor has raised concern about patient's chronic use of marijuana.  I reviewed expectations with that he is based on [facility] recommendations and Dr. Seo request.  He is willing to complete the followin. Maintain sobriety,we will request that you have three negative urine toxicology screens (if [facility] lab is closed, may complete near home and have results sent to [facility]).   2. Follow-up with psychiatrist and psychologist, we need notes sent to [facility]  3. [facility] nurse once a month to review invasive video EEG monitoring expectations and protocol.\"     \"Invasive epilepsy evaluation will focus on the following:   Right depth electrode in the amygdala   Right depth electrode in the anterior hippocampus  Right depth electrode in the posterior hippocampus, with the lateral contacts going through the posterior temporal gyral thickening.  This is best seen on MRI of the brain 2018 series 11 image 106.  Right  posterior temporal strips, 3 strips 1x4 strips covering the right posterior temporal region  Right frontal lobe strips, two 1 x 4 strips covering the right lateral frontal and premotor cortex region if possible\"        The process of surgical decision was discussed with the " patient, including case management conference, presurgical testing, and how the information will be delivered to him.     Discussed the potential adverse events including stroke and infection, and that these should be discussed with the surgeon prior to the surgery.  I emphasized that stroke may leave transient deficits or permanent deficits, and that any of the complications may result in death.      The inpatient hospitalization was discussed, including the process of going through surgery, ICU stay, and the epilepsy monitoring unit, including  seizure onset mapping.  We discussed the process for a surgical decision.  We discussed activity and privacy issues which would be faced during hospitalization, including the need to be constantly belted into the bed or chair, the need to walk with two members of hospital staff if ever he  is not in the bed or the chair, and that he  would be accompanied to the bathroom by staff to ensure his  safety.  We discussed problems associated with immobility and the importance of following hospital orders for activity.  We covered the risk of embolism due to immobility and the potential risks that may create. We talked about the hospital process lasting two weeks or longer, and that sometimes after the initial grid placement further electrodes may be needed, which may extend his  hospital stay.    Post-operative recovery was discussed, both on the unit and after discharge to home.      Part of the surgical clearance process is that [facility] needed to show three consecutive urine tox screens negative for substances of abuse.  Care everywhere was checked, and there are now three clear urine tox screens on file. (3/6/2019, 4/16/2019, 5/24/2019)    Patient reports he will do what is needed to undergo surgery.    Tha realizes there are no guarantees for placement or for outcome from epilepsy surgery.  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  Previously patient has  mentioned getting a puppy so that his current dog will be able to help train it, however he now will wait until after the surgery process, and when he is no loner living at his parents home    Patient will follow up with  today.    Tha Mcghee comes into clinic today at the request of Dr.Sima Araujo Ordering Provider for Pt Teaching indwelling electrode placement.      This service provided today was under the supervising provider of the day Dr.Sima GRABIEL Araujo, who was available if needed.    Lamine White
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
## 2019-06-20 ENCOUNTER — TELEPHONE (OUTPATIENT)
Dept: NEUROSURGERY | Facility: CLINIC | Age: 29
End: 2019-06-20

## 2019-06-20 NOTE — TELEPHONE ENCOUNTER
Received VM from pt. Pt would like to know when surgery can be scheduled. Dr. Araujo notified our clinic yesterday that pt is cleared to be scheduled for surgery. Dr. Funes will enter the surgery orders and at that point, the surgery schedulers will look for a surgery date. Pt wanted an estimate of when surgery would take place. I let him know that Dr. Funes is booked for the next few months, but that we would look for a date as soon as possible. Pt expressed understanding. No further questions at this time.

## 2019-07-11 DIAGNOSIS — G40.219 PARTIAL EPILEPSY WITH IMPAIRMENT OF CONSCIOUSNESS, WITH INTRACTABLE EPILEPSY (H): Primary | ICD-10-CM

## 2019-07-12 ENCOUNTER — TELEPHONE (OUTPATIENT)
Dept: NEUROLOGY | Facility: CLINIC | Age: 29
End: 2019-07-12

## 2019-07-12 NOTE — TELEPHONE ENCOUNTER
Call placed to patient.  I reassured him that the process for scheduling the surgery is in progress (based on review of medical records)  Zoran is hoping the surgery can be done as soon as possible.    No other questions at this time

## 2019-07-12 NOTE — TELEPHONE ENCOUNTER
What is the concern that needs to be addressed by a nurse? Patient state he was suppose to have epilepsy surgery done and he has not heard anything from anyone to schedule that and he would like  to know this and how to proceed.Please call patient back.    May a detailed message be left on voicemail? Yes     Date of last office visit: 06/18/2019    Message routed to: [facility] RN POOL.

## 2019-07-19 ENCOUNTER — TELEPHONE (OUTPATIENT)
Dept: NEUROLOGY | Facility: CLINIC | Age: 29
End: 2019-07-19

## 2019-07-19 NOTE — TELEPHONE ENCOUNTER
[facility] Form placed in nurses folder.

## 2019-07-23 DIAGNOSIS — Z01.818 PREOPERATIVE EVALUATION TO RULE OUT SURGICAL CONTRAINDICATION: Primary | ICD-10-CM

## 2019-07-23 DIAGNOSIS — G40.219 PARTIAL EPILEPSY WITH IMPAIRMENT OF CONSCIOUSNESS, WITH INTRACTABLE EPILEPSY (H): Primary | ICD-10-CM

## 2019-07-23 DIAGNOSIS — Z01.812 ENCOUNTER FOR PRE-OPERATIVE LABORATORY TESTING: ICD-10-CM

## 2019-07-23 NOTE — TELEPHONE ENCOUNTER
Form preparation is in process. This nurse called the patient to get a few more details regarding his situation. This nurse will call Zoran back tomorrow as his is unavailable to discuss his forms now.

## 2019-07-24 ENCOUNTER — PRE VISIT (OUTPATIENT)
Dept: SURGERY | Facility: CLINIC | Age: 29
End: 2019-07-24

## 2019-07-24 NOTE — TELEPHONE ENCOUNTER
FUTURE VISIT INFORMATION      SURGERY INFORMATION:    Date: 8/27/19    Location: [facility] OR    Surgeon:  Jason Mckeon    Anesthesia Type:  General    RECORDS REQUESTED FROM:       Primary Care Provider: Bhaskar Bailon    Most recent EKG+ Tracing: 10/23/18

## 2019-07-26 ENCOUNTER — DOCUMENTATION ONLY (OUTPATIENT)
Dept: NEUROSURGERY | Facility: CLINIC | Age: 29
End: 2019-07-26

## 2019-07-26 NOTE — PROGRESS NOTES
Preop packet sent with instructions to call upon receipt. PAC, head CT and brain MRI appt info also included.

## 2019-07-31 NOTE — TELEPHONE ENCOUNTER
Form faxed back to [facility] [employer] [facility]. Copy sent to scanning.

## 2019-07-31 NOTE — TELEPHONE ENCOUNTER
Patient returned my call.  Upon interview, he was able to provide supplemental information for the forms. Form prepared and placed on Dr. Araujo's folder for review.

## 2019-08-03 DIAGNOSIS — G40.219 LOCALIZATION-RELATED EPILEPSY WITH COMPLEX PARTIAL SEIZURES WITH INTRACTABLE EPILEPSY (H): Primary | ICD-10-CM

## 2019-08-05 RX ORDER — PHENYTOIN SODIUM 100 MG/1
CAPSULE, EXTENDED RELEASE ORAL
Qty: 270 CAPSULE | Refills: 0 | Status: ON HOLD | OUTPATIENT
Start: 2019-08-05 | End: 2019-09-13

## 2019-08-19 ENCOUNTER — OFFICE VISIT (OUTPATIENT)
Dept: SURGERY | Facility: CLINIC | Age: 29
End: 2019-08-19
Payer: COMMERCIAL

## 2019-08-19 ENCOUNTER — ANESTHESIA EVENT (OUTPATIENT)
Dept: SURGERY | Facility: CLINIC | Age: 29
End: 2019-08-19
Payer: COMMERCIAL

## 2019-08-19 VITALS
TEMPERATURE: 98.2 F | OXYGEN SATURATION: 97 % | DIASTOLIC BLOOD PRESSURE: 80 MMHG | BODY MASS INDEX: 26.19 KG/M2 | HEIGHT: 69 IN | HEART RATE: 120 BPM | RESPIRATION RATE: 19 BRPM | SYSTOLIC BLOOD PRESSURE: 115 MMHG | WEIGHT: 176.8 LBS

## 2019-08-19 DIAGNOSIS — G40.219 PARTIAL SYMPTOMATIC EPILEPSY WITH COMPLEX PARTIAL SEIZURES, INTRACTABLE, WITHOUT STATUS EPILEPTICUS (H): ICD-10-CM

## 2019-08-19 DIAGNOSIS — Z01.818 PREOP EXAMINATION: Primary | ICD-10-CM

## 2019-08-19 DIAGNOSIS — Z01.812 ENCOUNTER FOR PRE-OPERATIVE LABORATORY TESTING: ICD-10-CM

## 2019-08-19 DIAGNOSIS — Z01.818 PREOPERATIVE EVALUATION TO RULE OUT SURGICAL CONTRAINDICATION: ICD-10-CM

## 2019-08-19 DIAGNOSIS — Z01.818 PREOP EXAMINATION: ICD-10-CM

## 2019-08-19 LAB
ANION GAP SERPL CALCULATED.3IONS-SCNC: 4 MMOL/L (ref 3–14)
APTT PPP: 32 SEC (ref 22–37)
BUN SERPL-MCNC: 11 MG/DL (ref 7–30)
CALCIUM SERPL-MCNC: 9.1 MG/DL (ref 8.5–10.1)
CHLORIDE SERPL-SCNC: 107 MMOL/L (ref 94–109)
CO2 SERPL-SCNC: 28 MMOL/L (ref 20–32)
CREAT SERPL-MCNC: 0.76 MG/DL (ref 0.66–1.25)
ERYTHROCYTE [DISTWIDTH] IN BLOOD BY AUTOMATED COUNT: 12.7 % (ref 10–15)
GFR SERPL CREATININE-BSD FRML MDRD: >90 ML/MIN/{1.73_M2}
GLUCOSE SERPL-MCNC: 85 MG/DL (ref 70–99)
HCT VFR BLD AUTO: 44.4 % (ref 40–53)
HGB BLD-MCNC: 14.4 G/DL (ref 13.3–17.7)
INR PPP: 1.17 (ref 0.86–1.14)
MCH RBC QN AUTO: 31.5 PG (ref 26.5–33)
MCHC RBC AUTO-ENTMCNC: 32.4 G/DL (ref 31.5–36.5)
MCV RBC AUTO: 97 FL (ref 78–100)
PLATELET # BLD AUTO: 336 10E9/L (ref 150–450)
POTASSIUM SERPL-SCNC: 4.7 MMOL/L (ref 3.4–5.3)
RBC # BLD AUTO: 4.57 10E12/L (ref 4.4–5.9)
SODIUM SERPL-SCNC: 138 MMOL/L (ref 133–144)
WBC # BLD AUTO: 6.5 10E9/L (ref 4–11)

## 2019-08-19 RX ORDER — ALBUTEROL SULFATE 90 UG/1
2 AEROSOL, METERED RESPIRATORY (INHALATION) PRN
COMMUNITY
Start: 2018-12-24 | End: 2023-05-05

## 2019-08-19 RX ORDER — OMEPRAZOLE 10 MG/1
20 CAPSULE, DELAYED RELEASE ORAL EVERY MORNING
COMMUNITY

## 2019-08-19 RX ORDER — CICLOPIROX OLAMINE 7.7 MG/G
CREAM TOPICAL PRN
COMMUNITY
Start: 2019-05-24 | End: 2020-07-28

## 2019-08-19 ASSESSMENT — ENCOUNTER SYMPTOMS: SEIZURES: 1

## 2019-08-19 ASSESSMENT — LIFESTYLE VARIABLES: TOBACCO_USE: 1

## 2019-08-19 ASSESSMENT — MIFFLIN-ST. JEOR: SCORE: 1757.34

## 2019-08-19 ASSESSMENT — PAIN SCALES - GENERAL: PAINLEVEL: NO PAIN (0)

## 2019-08-19 NOTE — ANESTHESIA PREPROCEDURE EVALUATION
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 Pt has had prior anesthetic. Type: MAC    History of anesthetic complications   - PONV  mild      ROS/MED HX    ENT/Pulmonary: Comment: Has CPAP but doesn't use    (+)sleep apnea, tobacco use, Past use 4.5 pk yr hx, quit  packs/day  doesn't use CPAP , . .    Neurologic: Comment: Also has partial sz, last one approx 2 weeks ago (lip smacking, altered mental status)    (+)seizures last seizure: 10/2018 (when meds were weaned) features: GTC, other neuro Hx optic neuritis 2017    Cardiovascular:  - neg cardiovascular ROS   (+) ----. : . . . :. . Previous cardiac testing       METS/Exercise Tolerance:  >4 METS   Hematologic:  - neg hematologic  ROS       Musculoskeletal: Comment: S/P right should dislocation during sz.        GI/Hepatic: Comment: Still symptomatic on prilosec    (+) GERD Symptomatic,       Renal/Genitourinary:  - ROS Renal section negative       Endo:  - neg endo ROS       Psychiatric:     (+) psychiatric history anxiety and depression      Infectious Disease:  - neg infectious disease ROS       Malignancy:      - no malignancy   Other:    (+) No chance of pregnancy C-spine  cleared: N/A, no H/O Chronic Pain,                       PHYSICAL EXAM:   Mental Status/Neuro: A/A/O; Age Appropriate   Airway: Facies: Feasible ([place])  Mallampati: I  Mouth/Opening: Full  TM distance: > 6 cm  Neck ROM: Full   Respiratory: Auscultation: CTAB     Resp. Rate: Normal     Resp. Effort: Normal      CV: Rhythm: Regular  Rate: Age appropriate  Heart: Normal Sounds  Edema: None   Comments:      Dental: Normal Dentition                LABS:  CBC:   Lab Results   Component Value Date    WBC 5.7 01/17/2019    WBC 5.9 10/23/2018    HGB 14.0 01/17/2019    HGB 12.9 (L) 10/23/2018    HCT 41.6 01/17/2019    HCT 38.3 (L) 10/23/2018     01/17/2019     10/23/2018     BMP:   Lab Results   Component Value Date     10/23/2018     06/29/2017    POTASSIUM 4.2 10/23/2018    POTASSIUM 3.4 06/29/2017    CHLORIDE 106 10/23/2018    CHLORIDE 111 (H) 06/29/2017    CO2 25 10/23/2018    CO2 22 06/29/2017    BUN 15 10/23/2018    BUN 18 06/29/2017    CR 0.72 01/17/2019    CR 0.84 10/23/2018    GLC 97 12/18/2018    GLC 92 10/23/2018     COAGS:   Lab Results   Component Value Date    PTT 31 01/17/2019    INR 1.07 01/17/2019     POC:   Lab Results   Component Value Date     (H) 06/29/2017     OTHER:   Lab Results   Component Value Date    LACT 1.3 06/29/2017    ROSA 8.5 10/23/2018    ALBUMIN 3.7 10/23/2018    PROTTOTAL 7.0 10/23/2018    ALT 22 10/23/2018    AST 16 10/23/2018    ALKPHOS 67 10/23/2018    BILITOTAL 0.1 (L) 10/23/2018    TSH 3.46 06/27/2017    CRP <2.9 06/28/2017    SED 2 06/28/2017        Preop Vitals    BP Readings from Last 3 Encounters:   08/19/19 115/80   06/18/19 132/81   05/17/19 121/80    Pulse Readings from Last 3 Encounters:   08/19/19 120   06/18/19 90   05/17/19 97      Resp Readings from Last 3 Encounters:   08/19/19 19   01/17/19 24   11/15/18 16    SpO2 Readings from Last 3 Encounters:   08/19/19 97%   01/17/19 98%   01/14/19 97%      Temp Readings from Last 1 Encounters:  "  08/19/19 98.2  F (36.8  C) (Oral)    Ht Readings from Last 1 Encounters:   08/19/19 1.753 m (5' 9\")      Wt Readings from Last 1 Encounters:   08/19/19 80.2 kg (176 lb 12.8 oz)    Estimated body mass index is 26.11 kg/m  as calculated from the following:    Height as of this encounter: 1.753 m (5' 9\").    Weight as of this encounter: 80.2 kg (176 lb 12.8 oz).     LDA:  Peripheral IV 06/27/17 Right;Anterior Lower forearm (Active)   Number of days: 783       Peripheral IV 10/23/18 Right;Anterior Lower forearm (Active)   Number of days: 300       Right Groin Interventional Procedure Access (Active)   Number of days: 214        Assessment:   ASA SCORE: 3    H&P: History and physical reviewed and following examination; no interval change.   Smoking Status:  Non-Smoker/Unknown   NPO Status: NPO Appropriate     Plan:   Anes. Type:  General   Pre-Medication: None   Induction:  IV (Standard)   Airway: ETT; Oral   Access/Monitoring: PIV; A-Line; 2nd PIV        Postop Plan:   Postop Pain: Opioids  Postop Sedation/Airway: Not planned     PONV Management:   Adult Risk Factors:, Non-Smoker, Postop Opioids   Prevention: Ondansetron, Dexamethasone     CONSENT: Direct conversation   Plan and risks discussed with: Patient   Blood Products: Consented (ALL Blood Products)                PAC Discussion and Assessment    ASA Classification: 2 and 3  Case is suitable for: [place]  Anesthetic techniques and relevant risks discussed: GA  Invasive monitoring and risk discussed: Yes  Types: Arterial line, CVL  Possibility and Risk of blood transfusion discussed: No  NPO instructions given:   Additional anesthetic preparation and risks discussed:   Needs early admission to pre-op area:   Other:     PAC Resident/NP Anesthesia Assessment:  Tha Mcghee is a 28 year old male scheduled to undergo MAGAN Assisted Stereotactic Placement Of Right Sided Depth Electrodes, MAGAN Assisted Right Craniotomy For Right Sided Strip Electrodes on 8/27/19 " with Jason Funes MD at [facility] for treatment of intractable epilepsy.     He has the following specific operative considerations:     Pt has had prior anesthetic. Type: MAC    History of anesthetic complications  - PONV, mild  - Anesthesia considerations:  Refer to PAC assessment in anesthesia records  - Risk of PONV score = 2.  If > 2, anti-emetic intervention recommended.    Increased risk of postoperative nausea/vomiting: Recommend use of antiemetic agents in the perioperative period.      CARDIAC: METS >4       - RCRI : No serious cardiac risks.  0.4% risk of major adverse cardiac event.    PULMONARY:     - KURT, has CPAP but doesn't use    - Former smoker, 4.5 pk yr hx, quit 2013    - No asthma      GI:   GERD, on prilosec, still symptomatic     ENDO: BMI 29     - No DM    HEME: VTE risk: 0.5%    ORTHO: full neck ROM, no TMJ    NEURO/PSYCH:     - Intractable partial epilepsy, sz present as lip smacking w/ altered mental status. Last sz approx 3 wks ago. Had GTC last fall as inpatient when meds were weaned.     Patient was discussed with Dr Thurston. Patient is optimized and is acceptable candidate for the proposed procedure, provided labs today are within acceptable range. No further diagnostic evaluation is needed.        Reviewed and Signed by PAC Mid-Level Provider/Resident  Mid-Level Provider/Resident: Marjorie Duran PA-C  Date: 8/19/19  Time: 1409    Attending Anesthesiologist Anesthesia Assessment:        Anesthesiologist:   Date:   Time:   Pass/Fail:   Disposition:     PAC Pharmacist Assessment:        Pharmacist:   Date:   Time:    Marjorie Duran PA-C

## 2019-08-19 NOTE — H&P
Pre-Operative H & P     CC:  Preoperative exam to assess for increased cardiopulmonary risk while undergoing surgery and anesthesia.    Date of Encounter: 8/19/2019  Primary Care Physician:  Bhaskar Morales  Reason for Visit: Partial epilepsy with impairment of consciousness, with intractable epilepsy (H) [G40.219]  - Primary    HPI  Tha Mcghee is a 27 y/o male who presents for pre-operative H&P in preparation for MAGAN Assisted Stereotactic Placement Of Right Sided Depth Electrodes, MAGAN Assisted Right Craniotomy For Right Sided Strip Electrodes on 8/27/19 with Jason Funes MD at [facility] for treatment of intractable epilepsy.    PMH is also significant for anxiety/depression (stable on Effexor), GERD (stable on Prilosec), chronic Has (stable w/ OTC meds).    History was obtained from patient & chart review.     Past Medical History  Past Medical History:   Diagnosis Date     Anxiety      Depression      Localization-related (focal) (partial) epilepsy and epileptic syndromes with complex partial seizures, without mention of intractable epilepsy     preliminary       Past Surgical History  Past Surgical History:   Procedure Laterality Date     DENTAL SURGERY      wisdom teeth extraction     IR CAROTID CEREBRAL ANGIOGRAM BILATERAL  1/17/2019    WADA test       Hx of Blood transfusions/reactions: no     Hx of abnormal bleeding or anti-platelet use: no    Menstrual history: No LMP for male patient.: N/A    Steroid use in the last year: no    Personal or FH with difficulty with Anesthesia:  Mild PONV    Prior to Admission Medications  Current Outpatient Medications   Medication Sig Dispense Refill     albuterol (PROAIR HFA/PROVENTIL HFA/VENTOLIN HFA) 108 (90 Base) MCG/ACT inhaler Inhale 2 puffs into the lungs as needed       cholecalciferol (VITAMIN D3) 5000 units TABS tablet Take 5,000 Units by mouth At Bedtime        ciclopirox (LOPROX) 0.77 % cream Apply topically as  needed Pt. Last used 08/18/2019       divalproex sodium extended-release (DEPAKOTE ER) 250 MG 24 hr tablet Take 1 tab in the morning (along with two 500mg tabs) 90 tablet 3     divalproex sodium extended-release (DEPAKOTE ER) 500 MG 24 hr tablet Take 2 tabs in the morning and 3 tabs at night. 450 tablet 3     docusate sodium (COLACE) 100 MG capsule Take 100 mg by mouth 2 times daily        gabapentin (NEURONTIN) 300 MG capsule 300mg twice daily 180 capsule 3     lacosamide (VIMPAT) 200 MG TABS tablet Take 1 tablet (200 mg) by mouth 2 times daily 60 tablet 5     phenytoin (DILANTIN) 100 MG capsule TAKE 1 CAPSULE BY MOUTH DAILY IN THE MORNING AND 2 CAPSULES AT NIGHT 270 capsule 0     phenytoin (DILANTIN) 30 MG capsule TAKE 2 CAPSULES BY MOUTH EVERY MORNING 180 capsule 1     topiramate (TOPAMAX) 25 MG tablet Take 1 tablet (25 mg) by mouth 2 times daily 25 mg twice a day (Patient taking differently: Take 50 mg by mouth 2 times daily ) 180 tablet 3     venlafaxine (EFFEXOR XR) 75 MG 24 hr capsule Take 3 capsules (225 mg) by mouth every morning 270 capsule 3     ALPRAZolam (XANAX) 0.5 MG tablet Take 0.5 mg by mouth as needed.       diazepam (DIAZEPAM INTENSOL) 5 MG/ML (HIGH CONC) solution For generalized seizures give 5 mg.  May repeat and give 2.5 mg after 10 minutes if needed. Do not exceed 7.5mg 30 mL 0     omeprazole (PRILOSEC) 10 MG DR capsule Take 20 mg by mouth every morning       promethazine (PHENERGAN) 25 MG tablet Take 1 tablet (25 mg) by mouth every 6 hours as needed for nausea (take with maxalt for nausea with migraines) 30 tablet 3     Lsrlujjrvtg-Pdkgsjbu-PX-GG (ACCUHIST DM OR) Take 200 mg by mouth At Bedtime       rizatriptan (MAXALT) 10 MG tablet Take 1 tablet (10 mg) by mouth at onset of headache for migraine May repeat in 2 hours. Max 3 tablets/24 hours. 18 tablet 3       Allergies  Allergies   Allergen Reactions     Amoxicillin Hives     Ceclor [Cefaclor Monohydrate] Hives     Hydrocodone-Acetaminophen  Nausea     Penicillins Hives     Sulfa Drugs Hives       Social History  Social History     Socioeconomic History     Marital status: Single     Spouse name: Not on file     Number of children: Not on file     Years of education: Not on file     Highest education level: Not on file   Occupational History     Not on file   Social Needs     Financial resource strain: Not on file     Food insecurity:     Worry: Not on file     Inability: Not on file     Transportation needs:     Medical: Not on file     Non-medical: Not on file   Tobacco Use     Smoking status: Former Smoker     Packs/day: 0.50     Years: 9.00     Pack years: 4.50     Types: Cigars     Last attempt to quit: 2013     Years since quittin.0     Smokeless tobacco: Former User     Types: Chew   Substance and Sexual Activity     Alcohol use: No     Alcohol/week: 0.0 oz     Drug use: No     Sexual activity: Not on file   Lifestyle     Physical activity:     Days per week: Not on file     Minutes per session: Not on file     Stress: Not on file   Relationships     Social connections:     Talks on phone: Not on file     Gets together: Not on file     Attends Islam service: Not on file     Active member of club or organization: Not on file     Attends meetings of clubs or organizations: Not on file     Relationship status: Not on file     Intimate partner violence:     Fear of current or ex partner: Not on file     Emotionally abused: Not on file     Physically abused: Not on file     Forced sexual activity: Not on file   Other Topics Concern     Parent/sibling w/ CABG, MI or angioplasty before 65F 55M? Not Asked   Social History Narrative     Not on file       Family History  Family History   Problem Relation Age of Onset     Melanoma Mother      Myocardial Infarction Maternal Grandfather 78     Atrial fibrillation Paternal Uncle      Atrial fibrillation Paternal Aunt      Cancer No family hx of         No family history of skin cancer     Skin  Cancer No family hx of      Anesthesia Reaction No family hx of        ROS/MED HX  The complete review of systems is negative other than noted in the HPI or here.  Patient denies recent illness, fever and respiratory infection during past month.  Pt denies steroid use during past year.    ENT/Pulmonary: Comment: Has CPAP but doesn't use    (+)sleep apnea, tobacco use, Past use 4.5 pk yr hx, quit 2013 packs/day  doesn't use CPAP , . .    Neurologic: Comment: Also has partial sz, last one approx 2 weeks ago (lip smacking, altered mental status)    (+)seizures last seizure: 10/2018 (when meds were weaned) features: GTC, other neuro Hx optic neuritis 2017    Cardiovascular:  - neg cardiovascular ROS   (+) ----. : . . . :. . Previous cardiac testing       METS/Exercise Tolerance:  >4 METS   Hematologic:  - neg hematologic  ROS       Musculoskeletal: Comment: S/P right should dislocation during sz.        GI/Hepatic: Comment: Still symptomatic on prilosec    (+) GERD Symptomatic,       Renal/Genitourinary:  - ROS Renal section negative       Endo:  - neg endo ROS       Psychiatric:     (+) psychiatric history anxiety and depression      Infectious Disease:  - neg infectious disease ROS       Malignancy:      - no malignancy   Other:    (+) No chance of pregnancy C-spine cleared: N/A, no H/O Chronic Pain,             PHYSICAL EXAM:   Mental Status/Neuro: A/A/O; Age Appropriate   Airway: Facies: Feasible ([place])  Mallampati: I  Mouth/Opening: Full  TM distance: > 6 cm  Neck ROM: Full   Respiratory: Auscultation: CTAB     Resp. Rate: Normal     Resp. Effort: Normal      CV: Rhythm: Regular  Rate: Age appropriate  Heart: Normal Sounds  Edema: None   Comments:      Dental: Normal Dentition              Preop Vitals    BP Readings from Last 3 Encounters:   08/19/19 115/80   06/18/19 132/81   05/17/19 121/80    Pulse Readings from Last 3 Encounters:   08/19/19 120   06/18/19 90   05/17/19 97      Resp Readings from Last 3  "Encounters:   08/19/19 19   01/17/19 24   11/15/18 16    SpO2 Readings from Last 3 Encounters:   08/19/19 97%   01/17/19 98%   01/14/19 97%      Temp Readings from Last 1 Encounters:   08/19/19 98.2  F (36.8  C) (Oral)    Ht Readings from Last 1 Encounters:   08/19/19 1.753 m (5' 9\")      Wt Readings from Last 1 Encounters:   08/19/19 80.2 kg (176 lb 12.8 oz)    Estimated body mass index is 26.11 kg/m  as calculated from the following:    Height as of this encounter: 1.753 m (5' 9\").    Weight as of this encounter: 80.2 kg (176 lb 12.8 oz).     Temp: 98.2  F (36.8  C) Temp src: Oral BP: 115/80 Pulse: 120   Resp: 19 SpO2: 97 %         176 lbs 12.8 oz  5' 9\"   Body mass index is 26.11 kg/m .    Physical Exam  Constitutional: Awake, alert, cooperative, no apparent distress, and appears stated age.  Eyes: Pupils equal, round and reactive to light, extra ocular muscles intact, sclera clear, conjunctiva normal.  HENT: Normocephalic, oral pharynx with moist mucus membranes, good dentition. No goiter appreciated. No removable dental hardware.  Respiratory: Clear to auscultation bilaterally, no crackles or wheezing. No SOB when supine.  Cardiovascular: Regular rate and rhythm, normal S1 and S2, and no murmur noted.  Carotids +2, no bruits. No edema. Palpable pulses to radial, DP and PT arteries.   GI: Normal bowel sounds, soft, non-distended, non-tender, no masses palpated, no hepatomegaly.    Lymph/Hematologic: No cervical lymphadenopathy and no supraclavicular lymphadenopathy.  Genitourinary:  deferred  Skin: Warm and dry.  No rashes at anticipated surgical site.   Musculoskeletal: full ROM of neck. There is no redness, warmth, or swelling of the joints. Gross motor strength is normal.    Neurologic: Awake, alert, oriented to name, place and time. Cranial nerves II-XII are grossly intact. Gait is normal. Ambulates from chair to exam table, seats self, lies supine and sits back up w/o assistance.  Neuropsychiatric: Calm, " cooperative. Normal affect. Answers questions appropriately, follows commands w/o difficulty.    PRIOR LABS/DIAGNOSTIC STUDIES:  All labs and imaging personally reviewed    EKG 10/24/18:  Sinus rhythm with sinus arrhythmia  Normal ECG  When compared with ECG of 27-JUN-2017 20:50,  No significant change was found  Ventricular rate 92 bpm    Labs today: (personally reviewed)  BMP, CBC, INR, UA, PT, T&S      ASSESSMENT and PLAN  Tha Mcghee is a 28 year old male scheduled to undergo MAGAN Assisted Stereotactic Placement Of Right Sided Depth Electrodes, MAGAN Assisted Right Craniotomy For Right Sided Strip Electrodes on 8/27/19 with Jason Funes MD at [facility] for treatment of intractable epilepsy.     He has the following specific operative considerations:     Pt has had prior anesthetic. Type: MAC    History of anesthetic complications  - PONV, mild  - Anesthesia considerations:  Refer to PAC assessment in anesthesia records  - Risk of PONV score = 2.  If > 2, anti-emetic intervention recommended.    Increased risk of postoperative nausea/vomiting: Recommend use of antiemetic agents in the perioperative period.      CARDIAC: METS >4       - RCRI : No serious cardiac risks.  0.4% risk of major adverse cardiac event.    PULMONARY:     - KURT, has CPAP but doesn't use    - Former smoker, 4.5 pk yr hx, quit 2013    - No asthma      GI:   GERD, on prilosec, still symptomatic     ENDO: BMI 29     - No DM    HEME: VTE risk: 0.5%    ORTHO: full neck ROM, no TMJ    NEURO/PSYCH:     - Intractable partial epilepsy, sz present as lip smacking w/ altered mental status. Last sz approx 3 wks ago. Had GTC last fall as inpatient when meds were weaned.     Patient was discussed with Dr Thurston. Patient is optimized and is acceptable candidate for the proposed procedure, provided labs today are within acceptable range. No further diagnostic evaluation is needed.    Arrival time, NPO, shower and  medication instructions provided by nursing staff today.  Preparing For Your Surgery handout given.    Marjorie Duran PA-C  Preoperative Assessment Center  [facility]  Clinic and Surgery Center  Phone: [phone]  Fax: [phone]

## 2019-08-19 NOTE — PATIENT INSTRUCTIONS
Preparing for Your Surgery      Name:  Tha Mcghee   MRN:  [id number]   :  1990   Today's Date:  2019     Arriving for surgery:  Surgery date:  19  Arrival time:  6:45 am  Please come to:       [facility] [address]  [address] [place]  [address]    - ? parking is available in front of the hospital      -    Please proceed to [address] on the 3rd floor. [phone]?     - ?If you are in need of directions, wheelchair or escort please stop at the Information Desk in the lobby.  Inform the information person that you are here for surgery; a wheelchair and escort to [address] will be provided.?     What can I eat or drink?  -  You may have solid food or milk products until 8 hours prior to your surgery (midnight).  -  You may have water, apple juice or 7up/Sprite until 2 hours prior to your surgery (6:45 am).    Which medicines can I take?  Stop Aspirin, vitamins and supplements one week prior to surgery.  Hold Ibuprofen for 24 hours and/or Naproxen for 48 hours prior to surgery.   Hold Rizatriptan (Maxalt) 24 hours prior to surgery.  -  Please take these medications the day of surgery:  Albuterol (Proair) Inhaler      Venlafaxine (Effexor)  Alprazolam (Xanax)      Divalproex (Depakote)  Diazepam Solution       Gabapentin (Neurontin)   Omeprazole (Prilosec)      Lacosamide (Vimpat)  Promethazine (Phenergan)     Phenytoin (Dilantin)    Topiramate (Topamax)    How do I prepare myself?  -  Take two showers: one the night before surgery; and one the morning of surgery.         Use Scrubcare or Hibiclens to wash from neck down.  You may use your own shampoo and conditioner. No other hair products.   -  Do NOT use lotion, powder, deodorant, or antiperspirant the day of your surgery.  -  Do NOT wear any jewelry.  -  Do not bring your own medications to the hospital, except for inhalers.  -  Bring your ID and insurance card.    Questions or Concerns:  -If you  have questions or concerns regarding the day of surgery, please call [phone].       -For questions after surgery please call your surgeons office.           AFTER YOUR SURGERY  Breathing exercises   Breathing exercises help you recover faster. Take deep breaths and let the air out slowly. This will:     Help you wake up after surgery.    Help prevent complications like pneumonia.  Preventing complications will help you go home sooner.   We may give you a breathing device (incentive spirometer) to encourage you to breathe deeply.   Nausea and vomiting   You may feel sick to your stomach after surgery; if so, let your nurse know.    Pain control:  After surgery, you may have pain. Our goal is to help you manage your pain. Pain medicine will help you feel comfortable enough to do activities that will help you heal.  These activities may include breathing exercises, walking and physical therapy.   To help your health care team treat your pain we will ask: 1) If you have pain  2) where it is located 3) describe your pain in your words  Methods of pain control include medications given by mouth, vein or by nerve block for some surgeries.  Sequential Compression Device (SCD) or Pneumo Boots:  You may need to wear SCD S on your legs or feet. These are wraps connected to a machine that pumps in air and releases it. The repeated pumping helps prevent blood clots from forming.

## 2019-08-20 ENCOUNTER — HOSPITAL ENCOUNTER (OUTPATIENT)
Dept: CT IMAGING | Facility: CLINIC | Age: 29
Discharge: HOME | End: 2019-08-20
Attending: NEUROLOGICAL SURGERY | Admitting: NEUROLOGICAL SURGERY
Payer: COMMERCIAL

## 2019-08-20 ENCOUNTER — HOSPITAL ENCOUNTER (OUTPATIENT)
Dept: MRI IMAGING | Facility: CLINIC | Age: 29
End: 2019-08-20
Attending: NEUROLOGICAL SURGERY
Payer: COMMERCIAL

## 2019-08-20 DIAGNOSIS — G40.219 PARTIAL EPILEPSY WITH IMPAIRMENT OF CONSCIOUSNESS, WITH INTRACTABLE EPILEPSY (H): ICD-10-CM

## 2019-08-20 DIAGNOSIS — Z01.818 PREOPERATIVE EVALUATION TO RULE OUT SURGICAL CONTRAINDICATION: ICD-10-CM

## 2019-08-20 DIAGNOSIS — Z01.812 ENCOUNTER FOR PRE-OPERATIVE LABORATORY TESTING: ICD-10-CM

## 2019-08-20 LAB
ALBUMIN UR-MCNC: NEGATIVE MG/DL
APPEARANCE UR: CLEAR
BILIRUB UR QL STRIP: NEGATIVE
COLOR UR AUTO: YELLOW
GLUCOSE UR STRIP-MCNC: NEGATIVE MG/DL
HGB UR QL STRIP: NEGATIVE
KETONES UR STRIP-MCNC: NEGATIVE MG/DL
LEUKOCYTE ESTERASE UR QL STRIP: NEGATIVE
MUCOUS THREADS #/AREA URNS LPF: PRESENT /LPF
NITRATE UR QL: POSITIVE
PH UR STRIP: 7.5 PH (ref 5–7)
RBC #/AREA URNS AUTO: <1 /HPF (ref 0–2)
SOURCE: ABNORMAL
SP GR UR STRIP: 1.01 (ref 1–1.03)
UROBILINOGEN UR STRIP-MCNC: NORMAL MG/DL (ref 0–2)
WBC #/AREA URNS AUTO: <1 /HPF (ref 0–5)

## 2019-08-20 PROCEDURE — 70450 CT HEAD/BRAIN W/O DYE: CPT

## 2019-08-20 PROCEDURE — 81001 URINALYSIS AUTO W/SCOPE: CPT | Performed by: NEUROLOGICAL SURGERY

## 2019-08-20 PROCEDURE — 70553 MRI BRAIN STEM W/O & W/DYE: CPT

## 2019-08-20 PROCEDURE — A9585 GADOBUTROL INJECTION: HCPCS | Performed by: NEUROLOGICAL SURGERY

## 2019-08-20 PROCEDURE — 25500064 ZZH RX 255 OP 636: Performed by: NEUROLOGICAL SURGERY

## 2019-08-20 PROCEDURE — 87086 URINE CULTURE/COLONY COUNT: CPT | Performed by: NEUROLOGICAL SURGERY

## 2019-08-20 RX ORDER — GADOBUTROL 604.72 MG/ML
7.5 INJECTION INTRAVENOUS ONCE
Status: COMPLETED | OUTPATIENT
Start: 2019-08-20 | End: 2019-08-20

## 2019-08-20 RX ADMIN — GADOBUTROL 7.5 ML: 604.72 INJECTION INTRAVENOUS at 15:39

## 2019-08-21 LAB
BACTERIA SPEC CULT: NO GROWTH
Lab: NORMAL
SPECIMEN SOURCE: NORMAL

## 2019-08-27 ENCOUNTER — APPOINTMENT (OUTPATIENT)
Dept: GENERAL RADIOLOGY | Facility: CLINIC | Age: 29
End: 2019-08-27
Attending: STUDENT IN AN ORGANIZED HEALTH CARE EDUCATION/TRAINING PROGRAM
Payer: COMMERCIAL

## 2019-08-27 ENCOUNTER — ANESTHESIA (OUTPATIENT)
Dept: SURGERY | Facility: CLINIC | Age: 29
End: 2019-08-27
Payer: COMMERCIAL

## 2019-08-27 ENCOUNTER — ALLIED HEALTH/NURSE VISIT (OUTPATIENT)
Dept: NEUROLOGY | Facility: CLINIC | Age: 29
End: 2019-08-27
Attending: PSYCHIATRY & NEUROLOGY
Payer: COMMERCIAL

## 2019-08-27 ENCOUNTER — APPOINTMENT (OUTPATIENT)
Dept: MRI IMAGING | Facility: CLINIC | Age: 29
End: 2019-08-27
Attending: STUDENT IN AN ORGANIZED HEALTH CARE EDUCATION/TRAINING PROGRAM
Payer: COMMERCIAL

## 2019-08-27 ENCOUNTER — HOSPITAL ENCOUNTER (INPATIENT)
Facility: CLINIC | Age: 29
LOS: 17 days | Discharge: HOME | End: 2019-09-13
Attending: NEUROLOGICAL SURGERY | Admitting: NEUROLOGICAL SURGERY
Payer: COMMERCIAL

## 2019-08-27 ENCOUNTER — APPOINTMENT (OUTPATIENT)
Dept: GENERAL RADIOLOGY | Facility: CLINIC | Age: 29
End: 2019-08-27
Attending: NEUROLOGICAL SURGERY
Payer: COMMERCIAL

## 2019-08-27 DIAGNOSIS — I82.A12 ACUTE DEEP VEIN THROMBOSIS (DVT) OF AXILLARY VEIN OF LEFT UPPER EXTREMITY (H): ICD-10-CM

## 2019-08-27 DIAGNOSIS — G40.919 INTRACTABLE EPILEPSY WITHOUT STATUS EPILEPTICUS, UNSPECIFIED EPILEPSY TYPE (H): Primary | ICD-10-CM

## 2019-08-27 DIAGNOSIS — G40.219 LOCALIZATION-RELATED EPILEPSY WITH COMPLEX PARTIAL SEIZURES WITH INTRACTABLE EPILEPSY (H): Primary | ICD-10-CM

## 2019-08-27 DIAGNOSIS — S93.402A: ICD-10-CM

## 2019-08-27 PROBLEM — G40.909 EPILEPSY (H): Status: ACTIVE | Noted: 2019-08-27

## 2019-08-27 LAB
ABO + RH BLD: NORMAL
ABO + RH BLD: NORMAL
BLD GP AB SCN SERPL QL: NORMAL
BLOOD BANK CMNT PATIENT-IMP: NORMAL
BLOOD BANK CMNT PATIENT-IMP: NORMAL
GLUCOSE BLDC GLUCOMTR-MCNC: 140 MG/DL (ref 70–99)
GLUCOSE BLDC GLUCOMTR-MCNC: 89 MG/DL (ref 70–99)
MRSA DNA SPEC QL NAA+PROBE: NEGATIVE
RADIOLOGIST FLAGS: ABNORMAL
SPECIMEN EXP DATE BLD: NORMAL
SPECIMEN SOURCE: NORMAL

## 2019-08-27 PROCEDURE — 36000090 ZZH SURGERY LEVEL 8 W FLUORO 1ST 30 MIN - UMMC: Performed by: NEUROLOGICAL SURGERY

## 2019-08-27 PROCEDURE — 25000128 H RX IP 250 OP 636: Performed by: NURSE ANESTHETIST, CERTIFIED REGISTERED

## 2019-08-27 PROCEDURE — 00H032Z INSERTION OF MONITORING DEVICE INTO BRAIN, PERCUTANEOUS APPROACH: ICD-10-PCS | Performed by: NEUROLOGICAL SURGERY

## 2019-08-27 PROCEDURE — 87641 MR-STAPH DNA AMP PROBE: CPT | Performed by: NEUROLOGICAL SURGERY

## 2019-08-27 PROCEDURE — 40000170 ZZH STATISTIC PRE-PROCEDURE ASSESSMENT II: Performed by: NEUROLOGICAL SURGERY

## 2019-08-27 PROCEDURE — 25000132 ZZH RX MED GY IP 250 OP 250 PS 637: Performed by: STUDENT IN AN ORGANIZED HEALTH CARE EDUCATION/TRAINING PROGRAM

## 2019-08-27 PROCEDURE — 40000277 XR SURGERY CARM FLUORO LESS THAN 5 MIN W STILLS: Mod: TC

## 2019-08-27 PROCEDURE — 25800030 ZZH RX IP 258 OP 636: Performed by: NURSE ANESTHETIST, CERTIFIED REGISTERED

## 2019-08-27 PROCEDURE — C1713 ANCHOR/SCREW BN/BN,TIS/BN: HCPCS | Performed by: NEUROLOGICAL SURGERY

## 2019-08-27 PROCEDURE — 27210995 ZZH RX 272: Performed by: NEUROLOGICAL SURGERY

## 2019-08-27 PROCEDURE — 20000004 ZZH R&B ICU UMMC

## 2019-08-27 PROCEDURE — 37000008 ZZH ANESTHESIA TECHNICAL FEE, 1ST 30 MIN: Performed by: NEUROLOGICAL SURGERY

## 2019-08-27 PROCEDURE — 71000016 ZZH RECOVERY PHASE 1 LEVEL 3 FIRST HR: Performed by: NEUROLOGICAL SURGERY

## 2019-08-27 PROCEDURE — 25800030 ZZH RX IP 258 OP 636: Performed by: STUDENT IN AN ORGANIZED HEALTH CARE EDUCATION/TRAINING PROGRAM

## 2019-08-27 PROCEDURE — 27210794 ZZH OR GENERAL SUPPLY STERILE: Performed by: NEUROLOGICAL SURGERY

## 2019-08-27 PROCEDURE — 25000125 ZZHC RX 250: Performed by: STUDENT IN AN ORGANIZED HEALTH CARE EDUCATION/TRAINING PROGRAM

## 2019-08-27 PROCEDURE — 25000566 ZZH SEVOFLURANE, EA 15 MIN: Performed by: NEUROLOGICAL SURGERY

## 2019-08-27 PROCEDURE — 95829 SURGERY ELECTROCORTICOGRAM: CPT | Mod: ZF

## 2019-08-27 PROCEDURE — 25000128 H RX IP 250 OP 636: Performed by: STUDENT IN AN ORGANIZED HEALTH CARE EDUCATION/TRAINING PROGRAM

## 2019-08-27 PROCEDURE — 25000128 H RX IP 250 OP 636: Performed by: ANESTHESIOLOGY

## 2019-08-27 PROCEDURE — 25000125 ZZHC RX 250: Performed by: NEUROLOGICAL SURGERY

## 2019-08-27 PROCEDURE — 71000017 ZZH RECOVERY PHASE 1 LEVEL 3 EA ADDTL HR: Performed by: NEUROLOGICAL SURGERY

## 2019-08-27 PROCEDURE — 25000125 ZZHC RX 250: Performed by: NURSE ANESTHETIST, CERTIFIED REGISTERED

## 2019-08-27 PROCEDURE — 27211024 ZZHC OR SUPPLY OTHER OPNP: Performed by: NEUROLOGICAL SURGERY

## 2019-08-27 PROCEDURE — 70551 MRI BRAIN STEM W/O DYE: CPT

## 2019-08-27 PROCEDURE — 8E09XBH COMPUTER ASSISTED PROCEDURE OF HEAD AND NECK REGION, WITH MAGNETIC RESONANCE IMAGING: ICD-10-PCS | Performed by: NEUROLOGICAL SURGERY

## 2019-08-27 PROCEDURE — 36000088 ZZH SURGERY LEVEL 8 EA 15 ADDTL MIN - UMMC: Performed by: NEUROLOGICAL SURGERY

## 2019-08-27 PROCEDURE — 40000014 ZZH STATISTIC ARTERIAL MONITORING DAILY

## 2019-08-27 PROCEDURE — 8E09XBG COMPUTER ASSISTED PROCEDURE OF HEAD AND NECK REGION, WITH COMPUTERIZED TOMOGRAPHY: ICD-10-PCS | Performed by: NEUROLOGICAL SURGERY

## 2019-08-27 PROCEDURE — 37000009 ZZH ANESTHESIA TECHNICAL FEE, EACH ADDTL 15 MIN: Performed by: NEUROLOGICAL SURGERY

## 2019-08-27 PROCEDURE — 25800030 ZZH RX IP 258 OP 636: Performed by: ANESTHESIOLOGY

## 2019-08-27 PROCEDURE — 25000128 H RX IP 250 OP 636: Performed by: NEUROLOGICAL SURGERY

## 2019-08-27 PROCEDURE — 00000146 ZZHCL STATISTIC GLUCOSE BY METER IP

## 2019-08-27 PROCEDURE — 40000986 XR SKULL PORT 1/3 VW

## 2019-08-27 PROCEDURE — 27810169 ZZH OR IMPLANT GENERAL: Performed by: NEUROLOGICAL SURGERY

## 2019-08-27 PROCEDURE — 4A1074G MONITORING OF CENTRAL NERVOUS ELECTRICAL ACTIVITY, INTRAOPERATIVE, VIA NATURAL OR ARTIFICIAL OPENING: ICD-10-PCS | Performed by: NEUROLOGICAL SURGERY

## 2019-08-27 PROCEDURE — 87640 STAPH A DNA AMP PROBE: CPT | Performed by: NEUROLOGICAL SURGERY

## 2019-08-27 PROCEDURE — 40000275 ZZH STATISTIC RCP TIME EA 10 MIN

## 2019-08-27 DEVICE — IMPLANTABLE DEVICE
Type: IMPLANTABLE DEVICE | Site: BRAIN | Status: NON-FUNCTIONAL
Removed: 2019-09-10

## 2019-08-27 DEVICE — IMP SCR SYN MATRIX LOW PRO 1.5X03MM SELF DRILL 04.503.103.01: Type: IMPLANTABLE DEVICE | Site: SKULL | Status: FUNCTIONAL

## 2019-08-27 RX ORDER — RIZATRIPTAN BENZOATE 10 MG/1
10 TABLET ORAL
Status: DISCONTINUED | OUTPATIENT
Start: 2019-08-27 | End: 2019-09-13 | Stop reason: HOSPADM

## 2019-08-27 RX ORDER — ALBUTEROL SULFATE 90 UG/1
2 AEROSOL, METERED RESPIRATORY (INHALATION) EVERY 4 HOURS PRN
Status: DISCONTINUED | OUTPATIENT
Start: 2019-08-27 | End: 2019-09-13 | Stop reason: HOSPADM

## 2019-08-27 RX ORDER — ONDANSETRON 2 MG/ML
4 INJECTION INTRAMUSCULAR; INTRAVENOUS EVERY 30 MIN PRN
Status: DISCONTINUED | OUTPATIENT
Start: 2019-08-27 | End: 2019-08-27 | Stop reason: HOSPADM

## 2019-08-27 RX ORDER — LACOSAMIDE 200 MG/1
200 TABLET ORAL 2 TIMES DAILY
Status: DISCONTINUED | OUTPATIENT
Start: 2019-08-27 | End: 2019-08-31

## 2019-08-27 RX ORDER — ONDANSETRON 4 MG/1
4 TABLET, ORALLY DISINTEGRATING ORAL EVERY 6 HOURS PRN
Status: DISCONTINUED | OUTPATIENT
Start: 2019-08-27 | End: 2019-09-12

## 2019-08-27 RX ORDER — CLINDAMYCIN PHOSPHATE 600 MG/50ML
600 INJECTION, SOLUTION INTRAVENOUS EVERY 8 HOURS
Status: DISCONTINUED | OUTPATIENT
Start: 2019-08-27 | End: 2019-08-28

## 2019-08-27 RX ORDER — LIDOCAINE HYDROCHLORIDE 20 MG/ML
INJECTION, SOLUTION INFILTRATION; PERINEURAL PRN
Status: DISCONTINUED | OUTPATIENT
Start: 2019-08-27 | End: 2019-08-27

## 2019-08-27 RX ORDER — LIDOCAINE 40 MG/G
CREAM TOPICAL
Status: DISCONTINUED | OUTPATIENT
Start: 2019-08-27 | End: 2019-08-27 | Stop reason: HOSPADM

## 2019-08-27 RX ORDER — LORAZEPAM 2 MG/ML
1 INJECTION INTRAMUSCULAR ONCE
Status: COMPLETED | OUTPATIENT
Start: 2019-08-27 | End: 2019-08-27

## 2019-08-27 RX ORDER — DEXAMETHASONE SODIUM PHOSPHATE 4 MG/ML
INJECTION, SOLUTION INTRA-ARTICULAR; INTRALESIONAL; INTRAMUSCULAR; INTRAVENOUS; SOFT TISSUE PRN
Status: DISCONTINUED | OUTPATIENT
Start: 2019-08-27 | End: 2019-08-27

## 2019-08-27 RX ORDER — POLYETHYLENE GLYCOL 3350 17 G/17G
17 POWDER, FOR SOLUTION ORAL DAILY PRN
Status: DISCONTINUED | OUTPATIENT
Start: 2019-08-27 | End: 2019-09-01

## 2019-08-27 RX ORDER — AMOXICILLIN 250 MG
1 CAPSULE ORAL 2 TIMES DAILY PRN
Status: DISCONTINUED | OUTPATIENT
Start: 2019-08-27 | End: 2019-08-28

## 2019-08-27 RX ORDER — HYDRALAZINE HYDROCHLORIDE 20 MG/ML
10-20 INJECTION INTRAMUSCULAR; INTRAVENOUS
Status: DISCONTINUED | OUTPATIENT
Start: 2019-08-27 | End: 2019-09-13 | Stop reason: HOSPADM

## 2019-08-27 RX ORDER — CLINDAMYCIN PHOSPHATE 900 MG/50ML
900 INJECTION, SOLUTION INTRAVENOUS
Status: COMPLETED | OUTPATIENT
Start: 2019-08-27 | End: 2019-08-27

## 2019-08-27 RX ORDER — SODIUM CHLORIDE 9 MG/ML
INJECTION, SOLUTION INTRAVENOUS CONTINUOUS
Status: DISCONTINUED | OUTPATIENT
Start: 2019-08-27 | End: 2019-08-28

## 2019-08-27 RX ORDER — ONDANSETRON 2 MG/ML
INJECTION INTRAMUSCULAR; INTRAVENOUS PRN
Status: DISCONTINUED | OUTPATIENT
Start: 2019-08-27 | End: 2019-08-27

## 2019-08-27 RX ORDER — BUPIVACAINE HYDROCHLORIDE AND EPINEPHRINE 5; 5 MG/ML; UG/ML
INJECTION, SOLUTION PERINEURAL PRN
Status: DISCONTINUED | OUTPATIENT
Start: 2019-08-27 | End: 2019-08-27 | Stop reason: HOSPADM

## 2019-08-27 RX ORDER — FENTANYL CITRATE 50 UG/ML
25-50 INJECTION, SOLUTION INTRAMUSCULAR; INTRAVENOUS
Status: DISCONTINUED | OUTPATIENT
Start: 2019-08-27 | End: 2019-08-27 | Stop reason: HOSPADM

## 2019-08-27 RX ORDER — HYDRALAZINE HYDROCHLORIDE 20 MG/ML
2.5-5 INJECTION INTRAMUSCULAR; INTRAVENOUS EVERY 10 MIN PRN
Status: DISCONTINUED | OUTPATIENT
Start: 2019-08-27 | End: 2019-08-27 | Stop reason: HOSPADM

## 2019-08-27 RX ORDER — LORAZEPAM 2 MG/ML
2 INJECTION INTRAMUSCULAR EVERY 4 HOURS PRN
Status: DISCONTINUED | OUTPATIENT
Start: 2019-08-27 | End: 2019-09-13 | Stop reason: HOSPADM

## 2019-08-27 RX ORDER — POTASSIUM CHLORIDE 7.45 MG/ML
10 INJECTION INTRAVENOUS
Status: DISCONTINUED | OUTPATIENT
Start: 2019-08-27 | End: 2019-09-13 | Stop reason: HOSPADM

## 2019-08-27 RX ORDER — LABETALOL 20 MG/4 ML (5 MG/ML) INTRAVENOUS SYRINGE
PRN
Status: DISCONTINUED | OUTPATIENT
Start: 2019-08-27 | End: 2019-08-27

## 2019-08-27 RX ORDER — DIVALPROEX SODIUM 500 MG/1
1500 TABLET, EXTENDED RELEASE ORAL EVERY EVENING
Status: DISCONTINUED | OUTPATIENT
Start: 2019-08-27 | End: 2019-08-28

## 2019-08-27 RX ORDER — OXYCODONE HYDROCHLORIDE 5 MG/1
5-10 TABLET ORAL
Status: DISCONTINUED | OUTPATIENT
Start: 2019-08-27 | End: 2019-08-29

## 2019-08-27 RX ORDER — HYDROMORPHONE HYDROCHLORIDE 1 MG/ML
.3-.5 INJECTION, SOLUTION INTRAMUSCULAR; INTRAVENOUS; SUBCUTANEOUS EVERY 5 MIN PRN
Status: DISCONTINUED | OUTPATIENT
Start: 2019-08-27 | End: 2019-08-27 | Stop reason: HOSPADM

## 2019-08-27 RX ORDER — MEPERIDINE HYDROCHLORIDE 25 MG/ML
12.5 INJECTION INTRAMUSCULAR; INTRAVENOUS; SUBCUTANEOUS EVERY 5 MIN PRN
Status: DISCONTINUED | OUTPATIENT
Start: 2019-08-27 | End: 2019-08-27 | Stop reason: HOSPADM

## 2019-08-27 RX ORDER — TOPIRAMATE 50 MG/1
50 TABLET, FILM COATED ORAL 2 TIMES DAILY
Status: DISCONTINUED | OUTPATIENT
Start: 2019-08-27 | End: 2019-08-29

## 2019-08-27 RX ORDER — POTASSIUM CHLORIDE 29.8 MG/ML
20 INJECTION INTRAVENOUS
Status: DISCONTINUED | OUTPATIENT
Start: 2019-08-27 | End: 2019-09-13 | Stop reason: HOSPADM

## 2019-08-27 RX ORDER — ONDANSETRON 4 MG/1
4 TABLET, ORALLY DISINTEGRATING ORAL EVERY 30 MIN PRN
Status: DISCONTINUED | OUTPATIENT
Start: 2019-08-27 | End: 2019-08-27 | Stop reason: HOSPADM

## 2019-08-27 RX ORDER — POTASSIUM CHLORIDE 750 MG/1
20-40 TABLET, EXTENDED RELEASE ORAL
Status: DISCONTINUED | OUTPATIENT
Start: 2019-08-27 | End: 2019-09-13 | Stop reason: HOSPADM

## 2019-08-27 RX ORDER — LABETALOL 20 MG/4 ML (5 MG/ML) INTRAVENOUS SYRINGE
10 EVERY 10 MIN PRN
Status: DISCONTINUED | OUTPATIENT
Start: 2019-08-27 | End: 2019-09-13 | Stop reason: HOSPADM

## 2019-08-27 RX ORDER — LABETALOL 20 MG/4 ML (5 MG/ML) INTRAVENOUS SYRINGE
10
Status: DISCONTINUED | OUTPATIENT
Start: 2019-08-27 | End: 2019-08-27 | Stop reason: HOSPADM

## 2019-08-27 RX ORDER — GABAPENTIN 100 MG/1
300 CAPSULE ORAL 2 TIMES DAILY
Status: DISCONTINUED | OUTPATIENT
Start: 2019-08-27 | End: 2019-09-02

## 2019-08-27 RX ORDER — POTASSIUM CHLORIDE 1.5 G/1.58G
20-40 POWDER, FOR SOLUTION ORAL
Status: DISCONTINUED | OUTPATIENT
Start: 2019-08-27 | End: 2019-09-13 | Stop reason: HOSPADM

## 2019-08-27 RX ORDER — POTASSIUM CL/LIDO/0.9 % NACL 10MEQ/0.1L
10 INTRAVENOUS SOLUTION, PIGGYBACK (ML) INTRAVENOUS
Status: DISCONTINUED | OUTPATIENT
Start: 2019-08-27 | End: 2019-09-13 | Stop reason: HOSPADM

## 2019-08-27 RX ORDER — ONDANSETRON 2 MG/ML
4 INJECTION INTRAMUSCULAR; INTRAVENOUS EVERY 6 HOURS PRN
Status: DISCONTINUED | OUTPATIENT
Start: 2019-08-27 | End: 2019-09-12

## 2019-08-27 RX ORDER — NALOXONE HYDROCHLORIDE 0.4 MG/ML
.1-.4 INJECTION, SOLUTION INTRAMUSCULAR; INTRAVENOUS; SUBCUTANEOUS
Status: DISCONTINUED | OUTPATIENT
Start: 2019-08-27 | End: 2019-09-13 | Stop reason: HOSPADM

## 2019-08-27 RX ORDER — SODIUM CHLORIDE, SODIUM LACTATE, POTASSIUM CHLORIDE, CALCIUM CHLORIDE 600; 310; 30; 20 MG/100ML; MG/100ML; MG/100ML; MG/100ML
INJECTION, SOLUTION INTRAVENOUS CONTINUOUS PRN
Status: DISCONTINUED | OUTPATIENT
Start: 2019-08-27 | End: 2019-08-27

## 2019-08-27 RX ORDER — PROPOFOL 10 MG/ML
INJECTION, EMULSION INTRAVENOUS PRN
Status: DISCONTINUED | OUTPATIENT
Start: 2019-08-27 | End: 2019-08-27

## 2019-08-27 RX ORDER — SODIUM CHLORIDE, SODIUM LACTATE, POTASSIUM CHLORIDE, CALCIUM CHLORIDE 600; 310; 30; 20 MG/100ML; MG/100ML; MG/100ML; MG/100ML
INJECTION, SOLUTION INTRAVENOUS CONTINUOUS
Status: DISCONTINUED | OUTPATIENT
Start: 2019-08-27 | End: 2019-08-27 | Stop reason: HOSPADM

## 2019-08-27 RX ORDER — PHENYTOIN SODIUM 100 MG/1
200 CAPSULE, EXTENDED RELEASE ORAL EVERY EVENING
Status: DISCONTINUED | OUTPATIENT
Start: 2019-08-27 | End: 2019-09-02

## 2019-08-27 RX ORDER — MAGNESIUM SULFATE HEPTAHYDRATE 40 MG/ML
4 INJECTION, SOLUTION INTRAVENOUS EVERY 4 HOURS PRN
Status: DISCONTINUED | OUTPATIENT
Start: 2019-08-27 | End: 2019-09-13 | Stop reason: HOSPADM

## 2019-08-27 RX ORDER — PROCHLORPERAZINE MALEATE 5 MG
10 TABLET ORAL EVERY 6 HOURS PRN
Status: DISCONTINUED | OUTPATIENT
Start: 2019-08-27 | End: 2019-08-29

## 2019-08-27 RX ORDER — AMOXICILLIN 250 MG
2 CAPSULE ORAL 2 TIMES DAILY PRN
Status: DISCONTINUED | OUTPATIENT
Start: 2019-08-27 | End: 2019-08-28

## 2019-08-27 RX ORDER — HYDROMORPHONE HCL/0.9% NACL/PF 0.2MG/0.2
0.2 SYRINGE (ML) INTRAVENOUS
Status: DISCONTINUED | OUTPATIENT
Start: 2019-08-27 | End: 2019-08-28

## 2019-08-27 RX ORDER — CLINDAMYCIN PHOSPHATE 900 MG/50ML
900 INJECTION, SOLUTION INTRAVENOUS SEE ADMIN INSTRUCTIONS
Status: DISCONTINUED | OUTPATIENT
Start: 2019-08-27 | End: 2019-08-27 | Stop reason: HOSPADM

## 2019-08-27 RX ORDER — LIDOCAINE 40 MG/G
CREAM TOPICAL
Status: DISCONTINUED | OUTPATIENT
Start: 2019-08-27 | End: 2019-09-05

## 2019-08-27 RX ORDER — MULTIVITAMIN,THERAPEUTIC
1 TABLET ORAL DAILY
Status: DISCONTINUED | OUTPATIENT
Start: 2019-08-28 | End: 2019-09-13 | Stop reason: HOSPADM

## 2019-08-27 RX ORDER — LIDOCAINE HYDROCHLORIDE 20 MG/ML
5 SOLUTION OROPHARYNGEAL EVERY 6 HOURS PRN
Status: DISCONTINUED | OUTPATIENT
Start: 2019-08-27 | End: 2019-09-13 | Stop reason: HOSPADM

## 2019-08-27 RX ORDER — FENTANYL CITRATE 50 UG/ML
INJECTION, SOLUTION INTRAMUSCULAR; INTRAVENOUS PRN
Status: DISCONTINUED | OUTPATIENT
Start: 2019-08-27 | End: 2019-08-27

## 2019-08-27 RX ADMIN — ROCURONIUM BROMIDE 10 MG: 10 INJECTION INTRAVENOUS at 10:15

## 2019-08-27 RX ADMIN — TOPIRAMATE 50 MG: 50 TABLET ORAL at 23:52

## 2019-08-27 RX ADMIN — Medication 0.2 MG: at 20:44

## 2019-08-27 RX ADMIN — ROCURONIUM BROMIDE 50 MG: 10 INJECTION INTRAVENOUS at 09:06

## 2019-08-27 RX ADMIN — FENTANYL CITRATE 50 MCG: 50 INJECTION, SOLUTION INTRAMUSCULAR; INTRAVENOUS at 10:58

## 2019-08-27 RX ADMIN — PROPOFOL 80 MG: 10 INJECTION, EMULSION INTRAVENOUS at 09:28

## 2019-08-27 RX ADMIN — LABETALOL 20 MG/4 ML (5 MG/ML) INTRAVENOUS SYRINGE 2.5 MG: at 14:06

## 2019-08-27 RX ADMIN — FENTANYL CITRATE 100 MCG: 50 INJECTION, SOLUTION INTRAMUSCULAR; INTRAVENOUS at 09:03

## 2019-08-27 RX ADMIN — FENTANYL CITRATE 50 MCG: 50 INJECTION, SOLUTION INTRAMUSCULAR; INTRAVENOUS at 10:27

## 2019-08-27 RX ADMIN — ONDANSETRON 4 MG: 2 INJECTION INTRAMUSCULAR; INTRAVENOUS at 14:15

## 2019-08-27 RX ADMIN — Medication 8 MCG/KG/MIN: at 10:05

## 2019-08-27 RX ADMIN — LABETALOL 20 MG/4 ML (5 MG/ML) INTRAVENOUS SYRINGE 2.5 MG: at 13:15

## 2019-08-27 RX ADMIN — DEXAMETHASONE SODIUM PHOSPHATE 8 MG: 4 INJECTION, SOLUTION INTRA-ARTICULAR; INTRALESIONAL; INTRAMUSCULAR; INTRAVENOUS; SOFT TISSUE at 09:20

## 2019-08-27 RX ADMIN — SODIUM CHLORIDE, POTASSIUM CHLORIDE, SODIUM LACTATE AND CALCIUM CHLORIDE: 600; 310; 30; 20 INJECTION, SOLUTION INTRAVENOUS at 09:30

## 2019-08-27 RX ADMIN — ROCURONIUM BROMIDE 10 MG: 10 INJECTION INTRAVENOUS at 10:45

## 2019-08-27 RX ADMIN — ONDANSETRON 4 MG: 2 INJECTION INTRAMUSCULAR; INTRAVENOUS at 14:59

## 2019-08-27 RX ADMIN — PHENYTOIN SODIUM 200 MG: 100 CAPSULE ORAL at 23:53

## 2019-08-27 RX ADMIN — FENTANYL CITRATE 50 MCG: 50 INJECTION, SOLUTION INTRAMUSCULAR; INTRAVENOUS at 13:15

## 2019-08-27 RX ADMIN — FENTANYL CITRATE 50 MCG: 50 INJECTION, SOLUTION INTRAMUSCULAR; INTRAVENOUS at 12:25

## 2019-08-27 RX ADMIN — LABETALOL 20 MG/4 ML (5 MG/ML) INTRAVENOUS SYRINGE 2.5 MG: at 13:40

## 2019-08-27 RX ADMIN — ROCURONIUM BROMIDE 10 MG: 10 INJECTION INTRAVENOUS at 13:40

## 2019-08-27 RX ADMIN — LORAZEPAM 1 MG: 2 INJECTION INTRAMUSCULAR; INTRAVENOUS at 19:20

## 2019-08-27 RX ADMIN — LABETALOL 20 MG/4 ML (5 MG/ML) INTRAVENOUS SYRINGE 10 MG: at 21:32

## 2019-08-27 RX ADMIN — Medication 0.2 MG: at 18:56

## 2019-08-27 RX ADMIN — Medication 0.2 MG: at 17:11

## 2019-08-27 RX ADMIN — ONDANSETRON HYDROCHLORIDE 4 MG: 2 INJECTION, SOLUTION INTRAMUSCULAR; INTRAVENOUS at 23:10

## 2019-08-27 RX ADMIN — SODIUM CHLORIDE, POTASSIUM CHLORIDE, SODIUM LACTATE AND CALCIUM CHLORIDE: 600; 310; 30; 20 INJECTION, SOLUTION INTRAVENOUS at 08:46

## 2019-08-27 RX ADMIN — MIDAZOLAM 2 MG: 1 INJECTION INTRAMUSCULAR; INTRAVENOUS at 08:46

## 2019-08-27 RX ADMIN — PROPOFOL 200 MG: 10 INJECTION, EMULSION INTRAVENOUS at 09:05

## 2019-08-27 RX ADMIN — CLINDAMYCIN PHOSPHATE 900 MG: 18 INJECTION, SOLUTION INTRAVENOUS at 09:30

## 2019-08-27 RX ADMIN — Medication 0.2 MG: at 23:59

## 2019-08-27 RX ADMIN — SUGAMMADEX 200 MG: 100 INJECTION, SOLUTION INTRAVENOUS at 14:48

## 2019-08-27 RX ADMIN — PROCHLORPERAZINE EDISYLATE 5 MG: 5 INJECTION INTRAMUSCULAR; INTRAVENOUS at 15:48

## 2019-08-27 RX ADMIN — ROCURONIUM BROMIDE 10 MG: 10 INJECTION INTRAVENOUS at 14:10

## 2019-08-27 RX ADMIN — DIVALPROEX SODIUM 1500 MG: 500 TABLET, EXTENDED RELEASE ORAL at 23:54

## 2019-08-27 RX ADMIN — Medication: at 13:45

## 2019-08-27 RX ADMIN — LIDOCAINE HYDROCHLORIDE 100 MG: 20 INJECTION, SOLUTION INFILTRATION; PERINEURAL at 09:04

## 2019-08-27 RX ADMIN — CLINDAMYCIN IN 5 PERCENT DEXTROSE 600 MG: 12 INJECTION, SOLUTION INTRAVENOUS at 18:05

## 2019-08-27 RX ADMIN — LABETALOL 20 MG/4 ML (5 MG/ML) INTRAVENOUS SYRINGE 10 MG: at 20:48

## 2019-08-27 RX ADMIN — SODIUM CHLORIDE: 9 INJECTION, SOLUTION INTRAVENOUS at 15:31

## 2019-08-27 RX ADMIN — ROCURONIUM BROMIDE 30 MG: 10 INJECTION INTRAVENOUS at 09:46

## 2019-08-27 RX ADMIN — LABETALOL 20 MG/4 ML (5 MG/ML) INTRAVENOUS SYRINGE 10 MG: at 22:39

## 2019-08-27 RX ADMIN — Medication 0.2 MG: at 22:44

## 2019-08-27 RX ADMIN — GABAPENTIN 300 MG: 100 CAPSULE ORAL at 23:51

## 2019-08-27 RX ADMIN — LACOSAMIDE 200 MG: 200 TABLET, FILM COATED ORAL at 23:51

## 2019-08-27 RX ADMIN — FENTANYL CITRATE 25 MCG: 50 INJECTION INTRAMUSCULAR; INTRAVENOUS at 16:13

## 2019-08-27 ASSESSMENT — VISUAL ACUITY
OU: BASELINE

## 2019-08-27 ASSESSMENT — ACTIVITIES OF DAILY LIVING (ADL): ADLS_ACUITY_SCORE: 15

## 2019-08-27 ASSESSMENT — PAIN DESCRIPTION - DESCRIPTORS
DESCRIPTORS: CONSTANT
DESCRIPTORS: CONSTANT;CRUSHING
DESCRIPTORS: CRUSHING
DESCRIPTORS: CRUSHING

## 2019-08-27 ASSESSMENT — MIFFLIN-ST. JEOR: SCORE: 1755.5

## 2019-08-27 NOTE — ANESTHESIA CARE TRANSFER NOTE
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    Anesthesia Type:   No value filed.     Note:  Airway :Face Mask  Patient transferred to:PACU  Comments: To PACU, VSS, pt exchanging well, report to RN, care accepted.Handoff Report: Identifed the Patient, Identified the Reponsible Provider, Reviewed the pertinent medical history, Discussed the surgical course, Reviewed Intra-OP anesthesia mangement and issues during anesthesia, Set expectations for post-procedure period and Allowed opportunity for questions and acknowledgement of understanding      Vitals: (Last set prior to Anesthesia Care Transfer)    CRNA VITALS  8/27/2019 1433 - 8/27/2019 1508      8/27/2019             Pulse:  95    SpO2:  98 %                Electronically Signed By: CHEYANNE Carney CRNA  August 27, 2019  3:08 PM

## 2019-08-27 NOTE — PROGRESS NOTES
Pt injured L ankle on Sunday. Foot and ankle swollen, bruised. He is unable to bear weight on it. Paged Dr Goldstein to see if xray's can be done in the OR.

## 2019-08-27 NOTE — ANESTHESIA PROCEDURE NOTES
Arterial Line Procedure Note  Staff:     Anesthesiologist:  Jason Acevedo MD    Resident/CRNA:  Lisset Kam APRN CRNA    Arterial line performed by resident/CRNA in presence of a teaching physician    Location: In OR After Induction  Procedure Start/Stop Times:     patient identified, IV checked, risks and benefits discussed, informed consent, monitors and equipment checked and at physician/surgeon's request      Correct Patient: Yes      Correct Position: Yes      Correct Site: Yes      Correct Procedure: Yes      Correct Laterality:  N/A    Site Marked:  N/A  Line Placement:     Procedure:  Arterial Line    Insertion Site:  Radial    Insertion laterality:  Right    Skin Prep: Chloraprep      Patient Prep: patient draped, mask, sterile gloves, hat and hand hygiene      Local skin infiltration:  None    Ultrasound Guided?: No      Catheter size:  20 gauge, 12 cm    Cath secured with: suture      Dressing:  Tegaderm    Complications:  None obvious    Arterial waveform: Yes      IBP within 10% of NIBP: Yes

## 2019-08-27 NOTE — OR NURSING
The following page sent to Dr. Ni: Tha T-PACU: FYI XR complete. pt still lethargic and not following all commands for neuro assessment. will be heading to 4A shortly. thanks. [phone]

## 2019-08-27 NOTE — ANESTHESIA POSTPROCEDURE EVALUATION
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       Anesthesia Type:  Not documented  General    Reportable Event: NO     PAIN: Uncomplicated   Sign Out status: Comfortable, Well controlled pain     Neuro/Psych: Uneventful perioperative course   Sign Out Status: Preoperative baseline; Age appropriate mentation     Airway/Resp.: Uneventful perioperative course   Sign Out Status: Non labored breathing, age appropriate RR; Resp. Status within EXPECTED Parameters     CV: Uneventful perioperative course   Sign Out status: Appropriate BP and perfusion indices; Appropriate HR/Rhythm     Disposition:   Sign Out in:  PACU  Disposition:  Phase II; Home  Recovery Course: Uneventful  Follow-Up: Not required     Comments/Narrative:  One episode of emesis on emergence, after extubation, while awake and responsive.  Continued nausea for initial period in PACU, Rx added ondansetron then compazine.  Now sleepy but not nauseated.   Neuro intact, briskly following commands all 4 ext.             Last Anesthesia Record Vitals:  CRNA VITALS  2019 1433 - 2019 1533      2019             SpO2:  97 %    EKG:  Sinus rhythm          Last PACU Vitals:  Vitals Value Taken Time   /69 2019  4:30 PM   Temp 36.4  C (97.5  F) 2019  3:45 PM   Pulse 66 2019  4:30 PM   Resp 24 2019  4:15 PM   SpO2 98 % 2019  4:31 PM   Temp src     NIBP 129/79 2019  3:08 PM   Pulse     SpO2 97 % 2019  3:09 PM   Resp     Temp     Ht Rate 92 2019  3:08 PM   Temp 2     Vitals shown include unvalidated device  data.      Electronically Signed By: Jason Acevedo MD, August 27, 2019, 4:32 PM

## 2019-08-27 NOTE — BRIEF OP NOTE
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  Complications: None.  Implants:    Implant Name Type Inv. Item Serial No.  Lot No. LRB No. Used   Platinum 14 contact mini connector   [id number] [employer]  Right 1   Platinum 14 contact mini connector   [id number] [employer]  Right 1   Platinum 14 contact mini connector   [id number] [employer]  Right 1   [employer] [place]     [id number] Right 2   EEG [place]    [employer] [id number] Right 1   Subdural Strip electrode, Platinum 4 Contact     [id number] Right 3   Subdural strip electrode, platinum    [employer] [id number] Right 1   Grid electrode with 1mm contacts    [employer] [id number] Right 1   Subdural Strip Electrode, platinum, 4.5mm contact diam    [employer] [id number] Right 1   Subdural Strip Electrode, Platinum, 4.5mm contact diam    [employer] [id number] Right 1   Subdural Strip Electrode, Platinum, 4.5mm contact diam    [employer] [id number] Right 1   Subdural Strip Electrode, Platinum, 4.5 Contact Diam    [employer] [id number] Right 1   IMP SCR SYN MATRIX LOW PRO 1.5X03MM SELF DRILL [phone] [employer]/[place] IMP SCR SYN MATRIX LOW PRO 1.5X03MM SELF DRILL [phone]  NONE SYNTHES-STRATEC NONE Right 4   synthes low profile 7 hole straight plate   NONE  NONE Right 1        Erich John MD  Neurosurgery Resident PGY2    Please contact neurosurgery resident on call with questions.    Dial * * [phone], enter [phone] when prompted.

## 2019-08-27 NOTE — PROCEDURES
"  INTRAOPERATIVE ELECTRO-CORTICOGRAPHIC RECORDING STUDY       EEG #      DATE OF RECORDING/SERVICE DATE:  08/27/2019      DURATION OF ATTENDANCE:  Physician attendance in the ER started at approximately 12:35 p.m. and ended at 1:50 p.m.   Time spent in the ER was related to interpretation of corticography, interaction with Anesthesia and Neurosurgery, interaction with technicians, etc., so as to direct recording so as to direct recording, placement of electrodes, modification of anesthesia, etc.      TECHNICAL SUMMARY:  Electrocorticographic recordings were performed from electrodes placed by Neurosurgery in the OR.  Anesthesia was modified as necessary to obtain optimal recordings. Video was not recorded.     HISTORY:  A 28-year-old with medically intractable focal impaired seizures.  The patient is being evaluated for resective epilepsy surgery.  Corticography was performed in the OR to optimize placement of electrodes and to ensure that all electrodes were recording properly.      FINDINGS:  Neurosurgery placed a 15 depth electrodes in the right amygdala, right anterior hippocampus, and right posterior hippocampus.  Neurosurgery placed a 1 x 6 electrode in the right inferior frontal region and the right premotor region.  A 2/6 \"mini grid\" was placed in the frontal region between these 2 electrodes.  Four electrode strips were placed in the right superior posterior temporal regions, right middle posterior temporal regions and right inferior posterior temporal regions.  Finally, a 6-electrode strip was placed with electrode 1 over the right middle temporal gyrus, immediately superior to the posterior hippocampal electrode and electrode 6 in the inferior posterior dorsal frontal cortex.  Electrode 1 of this right temporal electrode was placed close to the small region of focal cortical dysplasia mentioned in the case management conference.  Neurosurgery reported that electrode 1 of this right temporal strip " was superior to the exit point of the right posterior hippocampal electrode and this, in fact, was confirmed by x-rays performed in or following placement of electrodes.      Recording from the right amygdala electrode found active epileptiform spikes in points 1-4.  A mixture of theta and delta was seen in the remaining electrodes.  Recordings from the right anterior hippocampal electrode found active epileptiform activity in points 1-4 of the depth electrode.  A mixture of theta and delta was seen in the remaining points of this electrode.      Recordings from the posterior hippocampal depth electrode found less frequent epileptiform spikes in points 1-6 at the posterior hippocampal electrode.  Lateral points of the posterior hippocampal electrode showed a persistent polymorphic delta.  Rare runs of low amplitude beta that had an epileptiform appearance were seen in this region.  Recordings from the superior, middle, and inferior posterior temporal strips found a mixture of theta and delta with no clear epileptiform activity.  Recording from the temporal strip found irregular polymorphic delta from points 1, 2 and 3.  Less persistent delta and theta, similar to baseline recordings elsewhere, was seen from points 4, 5 and 6.      Recordings from the right premotor 6-electrode strip and the right inferior frontal 6-electrode strip found the usual mixture of theta and delta.  Recordings from the 2 x 16 frontal grid found the usual mixture of theta.  Low amplitude spikes were seen in points 14, 15 and 16 and points 30, 31 and 32 of the grid.      Seizures were not seen at any point.      The points 15 and 16 of the amygdala electrode were not functioning and were thought to be outside of the brain.  Point 1 of the inferior frontal electrode was also not functioning.      IMPRESSION:  A good recording was obtained from the great majority of the electrodes with the rare exceptions that were noted above.  Active  epileptiform activity was seen from the medial points of the amygdala and anterior hippocampal electrode and, to a lesser extent, the medial points of the posterior hippocampal electrode.  More persistent slowing was seen from the lateral points of the posterior hippocampal electrode as well as the adjacent points 1, 2 and 3 of the temporal electrode.  These indicated an area of focal cerebral dysfunction that may correspond to the structural abnormality found in this area.  Finally, low amplitude epileptiform activity was seen from the inferior points of the frontal grid suggesting an independent focus here.  Seizures were not recorded at any point.    Results were discussed in iterative fashion with neurosurgery during the procedure.        SANTHOSH BORJA MD             D: 2019   T: 2019   MT: SHANAE      Name:     SANTHOSH HODGES   MRN:      [id number]        Account:        [id number]   :      1990           Procedure Date: 2019      Document: [id number]

## 2019-08-27 NOTE — PROGRESS NOTES
IP [facility] /OR  [facility]  Dr Calvo  [facility] [id number]

## 2019-08-28 ENCOUNTER — APPOINTMENT (OUTPATIENT)
Dept: GENERAL RADIOLOGY | Facility: CLINIC | Age: 29
End: 2019-08-28
Attending: STUDENT IN AN ORGANIZED HEALTH CARE EDUCATION/TRAINING PROGRAM
Payer: COMMERCIAL

## 2019-08-28 ENCOUNTER — ALLIED HEALTH/NURSE VISIT (OUTPATIENT)
Dept: NEUROLOGY | Facility: CLINIC | Age: 29
End: 2019-08-28
Attending: PSYCHIATRY & NEUROLOGY
Payer: COMMERCIAL

## 2019-08-28 ENCOUNTER — APPOINTMENT (OUTPATIENT)
Dept: CT IMAGING | Facility: CLINIC | Age: 29
End: 2019-08-28
Attending: STUDENT IN AN ORGANIZED HEALTH CARE EDUCATION/TRAINING PROGRAM
Payer: COMMERCIAL

## 2019-08-28 DIAGNOSIS — G40.219 LOCALIZATION-RELATED EPILEPSY WITH COMPLEX PARTIAL SEIZURES WITH INTRACTABLE EPILEPSY (H): Primary | ICD-10-CM

## 2019-08-28 LAB
ANION GAP SERPL CALCULATED.3IONS-SCNC: 8 MMOL/L (ref 3–14)
BASOPHILS # BLD AUTO: 0 10E9/L (ref 0–0.2)
BASOPHILS NFR BLD AUTO: 0.1 %
BUN SERPL-MCNC: 6 MG/DL (ref 7–30)
CALCIUM SERPL-MCNC: 8.4 MG/DL (ref 8.5–10.1)
CHLORIDE SERPL-SCNC: 109 MMOL/L (ref 94–109)
CO2 SERPL-SCNC: 21 MMOL/L (ref 20–32)
CREAT SERPL-MCNC: 0.52 MG/DL (ref 0.66–1.25)
DIFFERENTIAL METHOD BLD: ABNORMAL
EOSINOPHIL # BLD AUTO: 0 10E9/L (ref 0–0.7)
EOSINOPHIL NFR BLD AUTO: 0 %
ERYTHROCYTE [DISTWIDTH] IN BLOOD BY AUTOMATED COUNT: 12.5 % (ref 10–15)
GFR SERPL CREATININE-BSD FRML MDRD: >90 ML/MIN/{1.73_M2}
GLUCOSE SERPL-MCNC: 141 MG/DL (ref 70–99)
HCT VFR BLD AUTO: 37.5 % (ref 40–53)
HGB BLD-MCNC: 12.2 G/DL (ref 13.3–17.7)
IMM GRANULOCYTES # BLD: 0.1 10E9/L (ref 0–0.4)
IMM GRANULOCYTES NFR BLD: 0.4 %
LYMPHOCYTES # BLD AUTO: 1.2 10E9/L (ref 0.8–5.3)
LYMPHOCYTES NFR BLD AUTO: 5.9 %
MCH RBC QN AUTO: 30.6 PG (ref 26.5–33)
MCHC RBC AUTO-ENTMCNC: 32.5 G/DL (ref 31.5–36.5)
MCV RBC AUTO: 94 FL (ref 78–100)
MONOCYTES # BLD AUTO: 2.4 10E9/L (ref 0–1.3)
MONOCYTES NFR BLD AUTO: 12.1 %
NEUTROPHILS # BLD AUTO: 16.4 10E9/L (ref 1.6–8.3)
NEUTROPHILS NFR BLD AUTO: 81.5 %
PHENYTOIN SERPL-MCNC: 14.6 MG/L (ref 10–20)
PLATELET # BLD AUTO: 280 10E9/L (ref 150–450)
POTASSIUM SERPL-SCNC: 3.2 MMOL/L (ref 3.4–5.3)
RBC # BLD AUTO: 3.99 10E12/L (ref 4.4–5.9)
SODIUM SERPL-SCNC: 138 MMOL/L (ref 133–144)
VALPROATE SERPL-MCNC: 71 MG/L (ref 50–100)
WBC # BLD AUTO: 19.7 10E9/L (ref 4–11)

## 2019-08-28 PROCEDURE — 70450 CT HEAD/BRAIN W/O DYE: CPT

## 2019-08-28 PROCEDURE — 25000128 H RX IP 250 OP 636: Performed by: STUDENT IN AN ORGANIZED HEALTH CARE EDUCATION/TRAINING PROGRAM

## 2019-08-28 PROCEDURE — 80186 ASSAY OF PHENYTOIN FREE: CPT | Performed by: NURSE PRACTITIONER

## 2019-08-28 PROCEDURE — 80048 BASIC METABOLIC PNL TOTAL CA: CPT | Performed by: STUDENT IN AN ORGANIZED HEALTH CARE EDUCATION/TRAINING PROGRAM

## 2019-08-28 PROCEDURE — 80164 ASSAY DIPROPYLACETIC ACD TOT: CPT | Performed by: NURSE PRACTITIONER

## 2019-08-28 PROCEDURE — 25000132 ZZH RX MED GY IP 250 OP 250 PS 637: Performed by: NURSE PRACTITIONER

## 2019-08-28 PROCEDURE — 25000125 ZZHC RX 250: Performed by: STUDENT IN AN ORGANIZED HEALTH CARE EDUCATION/TRAINING PROGRAM

## 2019-08-28 PROCEDURE — 25800030 ZZH RX IP 258 OP 636: Performed by: STUDENT IN AN ORGANIZED HEALTH CARE EDUCATION/TRAINING PROGRAM

## 2019-08-28 PROCEDURE — 80185 ASSAY OF PHENYTOIN TOTAL: CPT | Performed by: NURSE PRACTITIONER

## 2019-08-28 PROCEDURE — 80299 QUANTITATIVE ASSAY DRUG: CPT | Performed by: NURSE PRACTITIONER

## 2019-08-28 PROCEDURE — 20000004 ZZH R&B ICU UMMC

## 2019-08-28 PROCEDURE — 73600 X-RAY EXAM OF ANKLE: CPT | Mod: LT

## 2019-08-28 PROCEDURE — 85027 COMPLETE CBC AUTOMATED: CPT | Performed by: STUDENT IN AN ORGANIZED HEALTH CARE EDUCATION/TRAINING PROGRAM

## 2019-08-28 PROCEDURE — 25000132 ZZH RX MED GY IP 250 OP 250 PS 637: Performed by: STUDENT IN AN ORGANIZED HEALTH CARE EDUCATION/TRAINING PROGRAM

## 2019-08-28 PROCEDURE — 80165 DIPROPYLACETIC ACID FREE: CPT | Performed by: NURSE PRACTITIONER

## 2019-08-28 PROCEDURE — 40000556 ZZH STATISTIC PERIPHERAL IV START W US GUIDANCE

## 2019-08-28 PROCEDURE — 95951 ZZHC EEG VIDEO EACH 24 HR: CPT | Mod: ZF

## 2019-08-28 PROCEDURE — 80171 DRUG SCREEN QUANT GABAPENTIN: CPT | Performed by: NURSE PRACTITIONER

## 2019-08-28 PROCEDURE — 85004 AUTOMATED DIFF WBC COUNT: CPT | Performed by: STUDENT IN AN ORGANIZED HEALTH CARE EDUCATION/TRAINING PROGRAM

## 2019-08-28 PROCEDURE — 80201 ASSAY OF TOPIRAMATE: CPT | Performed by: NURSE PRACTITIONER

## 2019-08-28 RX ORDER — DIVALPROEX SODIUM 500 MG/1
500 TABLET, EXTENDED RELEASE ORAL EVERY EVENING
Status: COMPLETED | OUTPATIENT
Start: 2019-08-28 | End: 2019-08-28

## 2019-08-28 RX ORDER — CEFTRIAXONE 2 G/1
2 INJECTION, POWDER, FOR SOLUTION INTRAMUSCULAR; INTRAVENOUS EVERY 24 HOURS
Status: DISCONTINUED | OUTPATIENT
Start: 2019-08-28 | End: 2019-09-02

## 2019-08-28 RX ORDER — AMOXICILLIN 250 MG
1 CAPSULE ORAL 2 TIMES DAILY
Status: DISCONTINUED | OUTPATIENT
Start: 2019-08-28 | End: 2019-09-01

## 2019-08-28 RX ORDER — HYDROMORPHONE HYDROCHLORIDE 1 MG/ML
.3-.5 INJECTION, SOLUTION INTRAMUSCULAR; INTRAVENOUS; SUBCUTANEOUS
Status: DISCONTINUED | OUTPATIENT
Start: 2019-08-28 | End: 2019-08-29

## 2019-08-28 RX ORDER — HYDROMORPHONE HYDROCHLORIDE 1 MG/ML
INJECTION, SOLUTION INTRAMUSCULAR; INTRAVENOUS; SUBCUTANEOUS
Status: DISCONTINUED
Start: 2019-08-28 | End: 2019-08-28 | Stop reason: HOSPADM

## 2019-08-28 RX ADMIN — OXYCODONE HYDROCHLORIDE 10 MG: 5 TABLET ORAL at 22:17

## 2019-08-28 RX ADMIN — PHENYTOIN SODIUM 160 MG: 100 CAPSULE ORAL at 09:45

## 2019-08-28 RX ADMIN — OXYCODONE HYDROCHLORIDE 10 MG: 5 TABLET ORAL at 18:59

## 2019-08-28 RX ADMIN — CEFTRIAXONE SODIUM 2 G: 2 INJECTION, POWDER, FOR SOLUTION INTRAMUSCULAR; INTRAVENOUS at 08:28

## 2019-08-28 RX ADMIN — Medication 0.2 MG: at 04:20

## 2019-08-28 RX ADMIN — PHENYTOIN SODIUM 200 MG: 100 CAPSULE ORAL at 20:36

## 2019-08-28 RX ADMIN — LABETALOL 20 MG/4 ML (5 MG/ML) INTRAVENOUS SYRINGE 10 MG: at 02:46

## 2019-08-28 RX ADMIN — DIVALPROEX SODIUM 500 MG: 500 TABLET, EXTENDED RELEASE ORAL at 20:36

## 2019-08-28 RX ADMIN — POTASSIUM CHLORIDE 40 MEQ: 10 TABLET, EXTENDED RELEASE ORAL at 05:54

## 2019-08-28 RX ADMIN — SODIUM CHLORIDE: 9 INJECTION, SOLUTION INTRAVENOUS at 04:17

## 2019-08-28 RX ADMIN — GABAPENTIN 300 MG: 100 CAPSULE ORAL at 09:44

## 2019-08-28 RX ADMIN — LACOSAMIDE 200 MG: 200 TABLET, FILM COATED ORAL at 20:36

## 2019-08-28 RX ADMIN — HYDRALAZINE HYDROCHLORIDE 10 MG: 20 INJECTION INTRAMUSCULAR; INTRAVENOUS at 03:16

## 2019-08-28 RX ADMIN — Medication 0.2 MG: at 01:52

## 2019-08-28 RX ADMIN — DIVALPROEX SODIUM 1250 MG: 250 TABLET, FILM COATED, EXTENDED RELEASE ORAL at 09:44

## 2019-08-28 RX ADMIN — LABETALOL 20 MG/4 ML (5 MG/ML) INTRAVENOUS SYRINGE 10 MG: at 20:31

## 2019-08-28 RX ADMIN — HYDROMORPHONE HYDROCHLORIDE 0.5 MG: 1 INJECTION, SOLUTION INTRAMUSCULAR; INTRAVENOUS; SUBCUTANEOUS at 08:00

## 2019-08-28 RX ADMIN — HYDROMORPHONE HYDROCHLORIDE 0.3 MG: 1 INJECTION, SOLUTION INTRAMUSCULAR; INTRAVENOUS; SUBCUTANEOUS at 10:56

## 2019-08-28 RX ADMIN — OMEPRAZOLE 20 MG: 20 CAPSULE, DELAYED RELEASE ORAL at 09:44

## 2019-08-28 RX ADMIN — LABETALOL 20 MG/4 ML (5 MG/ML) INTRAVENOUS SYRINGE 10 MG: at 03:30

## 2019-08-28 RX ADMIN — TOPIRAMATE 50 MG: 50 TABLET ORAL at 09:45

## 2019-08-28 RX ADMIN — CLINDAMYCIN IN 5 PERCENT DEXTROSE 600 MG: 12 INJECTION, SOLUTION INTRAVENOUS at 01:15

## 2019-08-28 RX ADMIN — TOPIRAMATE 50 MG: 50 TABLET ORAL at 20:36

## 2019-08-28 RX ADMIN — OXYCODONE HYDROCHLORIDE 10 MG: 5 TABLET ORAL at 12:32

## 2019-08-28 RX ADMIN — OXYCODONE HYDROCHLORIDE 10 MG: 5 TABLET ORAL at 02:50

## 2019-08-28 RX ADMIN — HYDROMORPHONE HYDROCHLORIDE 0.5 MG: 1 INJECTION, SOLUTION INTRAMUSCULAR; INTRAVENOUS; SUBCUTANEOUS at 17:02

## 2019-08-28 RX ADMIN — GABAPENTIN 300 MG: 100 CAPSULE ORAL at 20:36

## 2019-08-28 RX ADMIN — MELATONIN 5000 UNITS: at 23:11

## 2019-08-28 RX ADMIN — SENNOSIDES AND DOCUSATE SODIUM 1 TABLET: 8.6; 5 TABLET ORAL at 20:35

## 2019-08-28 RX ADMIN — OXYCODONE HYDROCHLORIDE 10 MG: 5 TABLET ORAL at 05:58

## 2019-08-28 RX ADMIN — OXYCODONE HYDROCHLORIDE 10 MG: 5 TABLET ORAL at 15:21

## 2019-08-28 RX ADMIN — LABETALOL 20 MG/4 ML (5 MG/ML) INTRAVENOUS SYRINGE 10 MG: at 00:00

## 2019-08-28 RX ADMIN — OXYCODONE HYDROCHLORIDE 10 MG: 5 TABLET ORAL at 09:52

## 2019-08-28 RX ADMIN — THERA TABS 1 TABLET: TAB at 09:43

## 2019-08-28 RX ADMIN — VENLAFAXINE HYDROCHLORIDE 225 MG: 150 CAPSULE, EXTENDED RELEASE ORAL at 09:44

## 2019-08-28 RX ADMIN — LACOSAMIDE 200 MG: 200 TABLET, FILM COATED ORAL at 09:45

## 2019-08-28 RX ADMIN — HYDROMORPHONE HYDROCHLORIDE 0.5 MG: 1 INJECTION, SOLUTION INTRAMUSCULAR; INTRAVENOUS; SUBCUTANEOUS at 20:29

## 2019-08-28 RX ADMIN — Medication 0.2 MG: at 07:25

## 2019-08-28 RX ADMIN — HYDROMORPHONE HYDROCHLORIDE 0.5 MG: 1 INJECTION, SOLUTION INTRAMUSCULAR; INTRAVENOUS; SUBCUTANEOUS at 13:44

## 2019-08-28 RX ADMIN — HYDROMORPHONE HYDROCHLORIDE 0.5 MG: 1 INJECTION, SOLUTION INTRAMUSCULAR; INTRAVENOUS; SUBCUTANEOUS at 23:04

## 2019-08-28 RX ADMIN — SENNOSIDES AND DOCUSATE SODIUM 1 TABLET: 8.6; 5 TABLET ORAL at 12:32

## 2019-08-28 ASSESSMENT — PAIN DESCRIPTION - DESCRIPTORS
DESCRIPTORS: ACHING;THROBBING
DESCRIPTORS: ACHING;THROBBING
DESCRIPTORS: THROBBING
DESCRIPTORS: ACHING;THROBBING
DESCRIPTORS: THROBBING
DESCRIPTORS: ACHING

## 2019-08-28 ASSESSMENT — ACTIVITIES OF DAILY LIVING (ADL)
ADLS_ACUITY_SCORE: 17
ADLS_ACUITY_SCORE: 15
ADLS_ACUITY_SCORE: 17
ADLS_ACUITY_SCORE: 15

## 2019-08-28 ASSESSMENT — VISUAL ACUITY
OU: BASELINE

## 2019-08-28 ASSESSMENT — MIFFLIN-ST. JEOR: SCORE: 1751.5

## 2019-08-28 NOTE — PROGRESS NOTES
"[facility] - Epilepsy Service Daily Note    HPI: 28 year old right handed male with a h/o depression, anxiety and seizure disorder since 2011. Patient underwent stereotactic placement of R depth electrodes and R craniotomy for R sided strip electrodes on 8/27. He is here for intracranial EEG monitoring.     Seizure Types: Per prior notes,  Type I: Focal seizures. Patient describes a warning of \"flash of heat and anxiety\". He will stare off, will make a clicking sound with his tongue/ lip movement, he will lose awareness and will be unable to respond to questions. Sometimes he will have bilateral hand automatisms. Usually last for 15- 20 seconds. Unsure about the frequency, but at least 1-2 times a month.    Type II: GTCs. First GTC in 4/2011 out of sleep. This was witnessed by his ex fiance. With majority of his grand mal seizures, he has a warning of \"flash of heat throughout his body\". Then he will lose consciousness and will have shaking and stiffening movements. He had bitten his tongue with some of his seizures. Denies bowel or bladder incontinence. With his first seizure, he dislocated his shoulder. No other major injuries since then. Post ictally, he is tired and confused and has \"really bad headache\".     Prior Epilepsy Workup:  1. 10/2018 Inpatient vEEG monitoring:  Normal posterior dominant rhythm. Occasional runs of right temporal slowing.  A single right anterior temporal epileptiform discharge was seen following the first 3 seizures.  Otherwise, epileptiform discharges were not seen.      Four seizures were recorded during the 4-day monitoring session.  Seizures emerged very quickly following discontinuation of topiramate.  Seizures stopped for more than 24 hours following substitution of lacosamide for topiramate.  Phenytoin and valproate were continued throughout the evaluation.      Clinically, seizures consisted of arrest of ongoing activity and lip smacking.  Initially, " there was some responsiveness during a seizure including ability to read the test sentence and follow commands.  Responsiveness, however, disappeared as seizure continued.  More overt lip smacking was noted as seizure continued.  Postictally, there was rapid speech recovery and patient rubbed face with right hand.  With the second and third seizure, there was forceful movement of the head to the left.  With the third seizure, left facial twitching was noted, there was eye deviation to the left, and left arm and leg jerking.  Complete secondary generalization with bilateral involvement, however, did not occur.  Electrographically, first seizure showed right hemispheric onset with subsequent right posterior temporal and then right anterior temporal discharge.  Ictal onset in the other 3 seizures consisted of rhythmic delta that was consistently best developed at T4, T6 and T2, with lesser development with lower amplitudes at F8 and the frontal electrodes.  Rhythmic delta with this distribution continued for about 3 seconds prior to the development of a more typical anterior to mid temporal seizure discharge.  Postictally, there was a consistent transient loss of the posterior dominant rhythm over the right hemisphere.      Study confirms that patient suffers from a focal epilepsy.  The clinical features are consistent with nondominant temporal lobe activation.  Ictal EEG confirms right temporal onset with some suggestion of a mid to posterior temporal rather than an anterior temporal onset.       2. 8/20/2019  MRI Brain:  Impression:  1. No overt cause of seizures identified intracranially on limited  stereotactic evaluation images.  2. Prior MRI better demonstrates the left optic nerve signal thought  to represent sequela of optic neuropathy.  3. Developmental venous anomaly left parietal lobe of doubtful  significance but unchanged.    3. 12/2018 PET scan:  IMPRESSION:    1. Decreased metabolic activity in the right  frontal (-2.7 SD) and  right temporal (-2.1 SD) lobes.  2. Mucosal thickening in both maxillary sinuses, which can be seen  with sinusitis.    4. 6/2017 MRI Brain:  Impression:    1. Regarding the orbits and globes, there is mildly increased T2  signal and mild enhancement within the left optic nerve. This appears  new since 2013, though is age indeterminate, representing optic  neuritis versus optic neuropathy.  2. Regarding the remainder of the brain, no abnormalities are  demonstrated. No evidence of demyelinating disorder within the brain.    5. 11/2018 Neuropsychological Testing:  Mr. Mcghee demonstrated generally normal cognitive functioning across most assessed domains. I do not see strong evidence to suggest that there is relative dysfunction of the right temporal lobe. There is a single low test score on a measure of non-verbal reasoning that has been associated with a function of the right temporal lobe, although other measures that are also felt to be mediated by the right temporal lobe were entirely normal. There is evidence to suggest that he is experiencing mild to moderate medication toxicity. His cognition is otherwise entirely normal, and was performed in keeping with his likely average range cognitive baseline. Measures of verbal and nonverbal anterograde memory were relative strengths. Compared to his exam from one month ago, there are a number of performances that are considerably improved. This finding, as well as his performances on measures of cognitive performance validity, are both suggestive of appropriate engagement in the current exam. In today s exam, there was an isolated low score on a measure of nonverbal reasoning. Slowing was also noted on measures of cognitive and psychomotor speed. He is still reporting moderate symptoms of depression and anxiety, and wide-ranging psychological distress on a measure of personality. It could conceivably be the case that these psychological factors  "are contributing to some of the variability seen in the current exam, as well as to his concerns about cognitive dysfunction in day-to-day life.    6. 2019 WADA Testin2019: Per Dr. Gamez note: \"Wada exam results are consistent with left hemisphere speech and language dominance. Regarding memory, the patient has excellent capacity for mediating memory with the left hemisphere. The left hemisphere has excellent capacity for mediating verbal memory, and excellent capacity for mediating nonverbal memory. The right hemisphere has adequate capacity for mediating memory overall, with mediocre capacity for mediating verbal memory, and good capacity for mediating nonverbal memory.    The current results suggest that Mr. Mcghee is at little risk for a change in his language if he has a resective surgery to his right temporal lobe. The current results would suggest some possibility of change in both verbal and nonverbal memory capacity following resective surgery to his right mesial temporal lobe, although he likely would not be amnestic for either verbal or nonverbal information following this procedure.      Interval History:   Patient had a witnessed seizure this morning, but he was disconnected from EEG for a head CT. C/o incisional pain this morning. Also reports pain in his left ankle from a fall prior to admission. Left foot X-ray completed which did not show any fractures.     Review of System:   Denies nausea, vomitting, abdominal pain, dizziness and chest pain.     Medications:   AEDs on admission with most recent levels:  1. Vimpat 200-200  (5.6 in 2019)  2. Depakote 0457-6122 (110.8 in 2019)  3. Gabapentin 300-300 (3.5 in 2019)  4. Dilantin 160-200(14.3 in 2019)  5. Topiramate 50 - 50(1.5 in 2019)    Antiepileptic Medications Current Doses:   1. Vimpat 200-200   2. Depakote 4941-4035   3. Gabapentin 300-300   4. Dilantin 160-200  5. Topiramate 50 - 50    Exam: Blood pressure 130/89, pulse 75, " "temperature 98.2  F (36.8  C), temperature source Oral, resp. rate (!) 36, height 1.727 m (5' 8\"), weight 81.2 kg (179 lb 0.2 oz), SpO2 97 %.  General appearance: Resting with eyes closed. Awakens to speech. O x 4.   Neuro: Pupils are equal, round, and reactive to light, face symmetric, no pronator drift, equal  strength, normal to light touch with no sensory deficits noted, finger to nose normal, no focal deficits noted.    Video EE2019: IMPRESSION:  Abnormal.  An active medial anterior hippocampal focus was noted occasionally engaging the posterior hippocampal electrode (which was inserted below the hippocampus).  Epileptiform activity was also seen in the medial points of the amygdala electrode, which, however, was not always synchronous with the anterior hippocampal discharges.  This indicates focal cortical irritability in these regions.  No seizures were recorded.     Assessment/ Plan: Patient seen and discussed with attending physician Dr. Calvo.   1. 28 year old right handed male with a h/o depression, anxiety and seizure disorder since  is being admitted for intracranial vEEG monitoring. Patient underwent stereotactic placement of R depth electrodes and R craniotomy for R sided strip electrodes on .     - Continue video EGG monitoring  - Seizure precautions  - SCDs while in bed for DVT prevention  - Lorazepam prn as ordered   - AED level today  - Reduce VPA to 500 mg tonight, then stop  - No other medication changes          Total time: 25 minute was spent in the care of this patient. The patient agrees with the above mentioned plan of care. I answered all the patient's questions and addressed immediate concerns. More than 50% of time spent consisted of counseling and coordinating care, including discussion of the diagnostic significance of EEG findings, anti-seizure medication management, and planning for discharge home.     Deborah Webb, CNP  Neurology    "

## 2019-08-28 NOTE — PLAN OF CARE
Problem: Seizure, Active Management  Goal: Absence of Seizure/Seizure-Related Injury  8/28/2019 1825 by Ede Pack, RN  Outcome: No Change  8/28/2019 0536 by Mari Penn RN  Outcome: No Change   D/I: Patient on unit 4A Surgical/Neuro ICU   Neuro- follows commands, no neuro deficits. Pupils equal and reactive. Clonic seizure observed during shift. Continuous EEG.  CV- Normal sinus rhythm, hemo dynamically stable. Hypertensive MAP of 100-110's.  Pulm- Room air, lung sounds clear bilaterally. Productive cough  GI- Pt. Has not wanted to eat anything throughout day. Audible, active bowel sounds.  - Barton catheter discontinued. Pt. Unable to void, had to be straight cathed.  Gtts- Normal saline at 75/h  Skin- Bruising and swelling noted on LLE.  Pain- Headache, constant.  Lines and drains- Right art line, PIV in place.  See flow sheets for further interventions and assessments.   A: Stable   P: Continue to monitor pt closely. Notify MD of significant changes

## 2019-08-28 NOTE — PLAN OF CARE
Admitted/transferred from: PACU  Reason for admission/transfer: Neuro monitoring post grid placrment  Patient status upon admission/transfer: Lethargic, c/o headache. Arousing to voice, participating in some aspects of neuro exam  Interventions: Oriented to unit, prepared for post op MRI  Plan:  Neuro exams q 1 hr for POD 1, sitter at bedside at all times, EEG monitoring with implanted electrodes  2 RN skin assessment: completed by Darren Espinoza and Holly Correa  Result of skin assessment and interventions/actions: Patient had sprained left ankle over the weekend and has swollen and bruised extremity. Sensation and movement intact  Height, weight, drug calc weight: done  Patient belongings: at bedside, cell phone at bedside.

## 2019-08-28 NOTE — PROGRESS NOTES
"Neurosurgery Progress Note    S: Complains of incisional pain this morning    O:  /89   Pulse 75   Temp 97.1  F (36.2  C) (Oral)   Resp (!) 40   Ht 1.727 m (5' 8\")   Wt 81.2 kg (179 lb 0.2 oz)   SpO2 97%   BMI 27.22 kg/m    Exam:  General: Awake;  Alert, In No Acute Distress  Pulm: Breathing Comfortably on room air  Mental status: Oriented x 3  Cranial Nerves: Cranial Nerves II-XII Intact Bilaterally  Strength:      Del Tr Bi WE WF Gr  R 5 5 5 5 5 5  L 5 5 5 5 5 5     HF KE KF DF PF   R 5 5 5 5 5   L 5 5 5 5 5     Pronator Drift: Absent  Sensory: Intact to Light Touch  Reflexes: No Hyperreflexia, Chaudhry s or Clonus Present; Toes Down-Going Bilaterally   INCISION: covered with head wrap    Assessment:   28 year old male with history of epilepsy s/p  MAGAN Assisted Stereotactic Placement Of Right Sided Depth Electrodes      Plan:     Neuro:   Anti-epileptics:continue home Depakote, Dilantin, Vimpat, Neurontin, Topiramate   Required imaging: postoperative head CT, skull x-rays completed   Appreciate assistance of Epilepsy team     Cardiovascular: blood pressure goals: SBP < 140    Pulmonary:   Incentive spirometry     Gastrointestinal:  advance diet     Renal: discontinue goridllo POD#1    Heme:   Maintain INR < 1.4; Platelet > 100K; Fibrinogen > 200, Hemoglobin > 8    Endocrine: No issues    Infectious: IV Clindamycin while leads are in place     PT/OT: pending     DVT prophylaxis: Sequential compression devices      Ulcer prophylaxis: none indicated     DISPO:  4A     Barriers:  Seizure localization       CHEYANNE Ruano, CNP  Department of Neurosurgery  Pager: [phone]    "

## 2019-08-28 NOTE — CONSULTS
[facility]  Neurology Consultation    Patient Name:  Tha Mcghee  MRN:  [id number]    :  1990  Date of Service:  2019  Primary care provider:  Bhaskar Morales      Neurology consultation service was asked to see Tha Mcghee by Dr. Ni for seizure management.    History of Present Illness:   28 year old male with a history of partial epilepsy who was admitted on  for stereotactic placement of R depth electrodes and R craniotomy for R sided strip electrodes. His procedure was completed with no complications. Admitted to neurosurgery service in ICU for observation afterwards. Neurology consulted for seizure management. Per chart review, last seizure was 3-4 weeks ago.    ROS  A 10-point ROS was performed as per HPI.     PMH  Past Medical History:   Diagnosis Date     Anxiety      Depression      Localization-related (focal) (partial) epilepsy and epileptic syndromes with complex partial seizures, without mention of intractable epilepsy     preliminary     Past Surgical History:   Procedure Laterality Date     DENTAL SURGERY      wisdom teeth extraction     IR CAROTID CEREBRAL ANGIOGRAM BILATERAL  2019    WADA test       Medications   Medications Prior to Admission   Medication Sig Dispense Refill Last Dose     cholecalciferol (VITAMIN D3) 5000 units TABS tablet Take 5,000 Units by mouth At Bedtime    Past Week at Unknown time     ciclopirox (LOPROX) 0.77 % cream Apply topically as needed Pt. Last used 2019   Past Week at Unknown time     divalproex sodium extended-release (DEPAKOTE ER) 250 MG 24 hr tablet Take 1 tab in the morning (along with two 500mg tabs) 90 tablet 3 2019 at 0800     divalproex sodium extended-release (DEPAKOTE ER) 500 MG 24 hr tablet Take 2 tabs in the morning and 3 tabs at night. 450 tablet 3 2019 at 2130     docusate sodium (COLACE) 100 MG capsule Take 100 mg by mouth 2 times daily    2019 at 2100      gabapentin (NEURONTIN) 300 MG capsule 300mg twice daily 180 capsule 3 8/26/2019 at 2100     lacosamide (VIMPAT) 200 MG TABS tablet Take 1 tablet (200 mg) by mouth 2 times daily 60 tablet 5 8/27/2019 at 0800     omeprazole (PRILOSEC) 10 MG DR capsule Take 20 mg by mouth every morning   8/27/2019 at 0800     phenytoin (DILANTIN) 100 MG capsule TAKE 1 CAPSULE BY MOUTH DAILY IN THE MORNING AND 2 CAPSULES AT NIGHT 270 capsule 0 8/27/2019 at 0800     phenytoin (DILANTIN) 30 MG capsule TAKE 2 CAPSULES BY MOUTH EVERY MORNING 180 capsule 1 8/27/2019 at 0800     topiramate (TOPAMAX) 25 MG tablet Take 1 tablet (25 mg) by mouth 2 times daily 25 mg twice a day (Patient taking differently: Take 50 mg by mouth 2 times daily ) 180 tablet 3 8/27/2019 at 0800     venlafaxine (EFFEXOR XR) 75 MG 24 hr capsule Take 3 capsules (225 mg) by mouth every morning 270 capsule 3 8/27/2019 at 0800     albuterol (PROAIR HFA/PROVENTIL HFA/VENTOLIN HFA) 108 (90 Base) MCG/ACT inhaler Inhale 2 puffs into the lungs as needed   More than a month at Unknown time     ALPRAZolam (XANAX) 0.5 MG tablet Take 0.5 mg by mouth as needed.   More than a month at Unknown time     diazepam (DIAZEPAM INTENSOL) 5 MG/ML (HIGH CONC) solution For generalized seizures give 5 mg.  May repeat and give 2.5 mg after 10 minutes if needed. Do not exceed 7.5mg 30 mL 0 More than a month at Unknown time     promethazine (PHENERGAN) 25 MG tablet Take 1 tablet (25 mg) by mouth every 6 hours as needed for nausea (take with maxalt for nausea with migraines) 30 tablet 3 More than a month at Unknown time     Uknnqaiyixh-[id number] (ACCUHIST DM OR) Take 200 mg by mouth At Bedtime   More than a month at Unknown time     rizatriptan (MAXALT) 10 MG tablet Take 1 tablet (10 mg) by mouth at onset of headache for migraine May repeat in 2 hours. Max 3 tablets/24 hours. 18 tablet 3 More than a month at Unknown time       Allergies  Allergies   Allergen Reactions     Amoxicillin Hives      "Ceclor [Cefaclor Monohydrate] Hives     Hydrocodone-Acetaminophen Nausea     Penicillins Hives     Sulfa Drugs Hives       Social History  Social History     Tobacco Use     Smoking status: Former Smoker     Packs/day: 0.50     Years: 9.00     Pack years: 4.50     Types: Cigars     Last attempt to quit: 2013     Years since quittin.1     Smokeless tobacco: Former User     Types: Chew   Substance Use Topics     Alcohol use: No     Alcohol/week: 0.0 oz       Family History    This patient has no significant family history      Physical Examination   Vitals: BP (P) 122/81   Pulse 66   Temp 97.6  F (36.4  C) (Axillary)   Resp 16   Ht 1.727 m (5' 8\")   Wt 81.6 kg (179 lb 14.3 oz)   SpO2 (P) 98%   BMI 27.35 kg/m    General: Adult patient, lying in bed, NAD  HEENT: Head wrapped at surgical site  Cardiac: RRR  Chest: symmetric respirations  Abdomen: S/NT/ND  Extremities: Warm, no edema  Skin: No rash or lesion     Neuro: Sleepy, oriented to self, place and situation (not oriented to time), follows commands, responds to questions though intermittently falls asleep, EOMI, gaze conjugate, face symmetric, no abnormal movements, moving all 4 extremities, sensation intact to light touch in all 4 extremities.    Investigations:  MRI Brain - pending results    Assessment/Plan:  28 year old male with a history of partial epilepsy who was admitted on  for stereotactic placement of R depth electrodes and R craniotomy for R sided strip electrodes. Neurology consulted for seizure management.    Neuro:  #Partial epilepsy with impairment of consciousness with intractable epilepsy s/p surgical electrode placement  First seizure in . Typically has aura followed by GTC. EEGs have revealed L temporal epileptiform discharges. Home regimen includes phenytoin, VPA and topiramate.  - Continue PTA Vimpat 200-200  - Continue PTA Depakote 1250 - 1500  - Continue PTA Gabapentin 300 - 300  - Continue PTA Dilantin 160-200  - " Continue PTA Topiramate 50 mg BID (for seizures and HAs)  - Continuous EEG  - MRI brain, pending  - pain management per NSG    #Headaches  - Continue PTA Topiramate 50 mg BID (for seizures and HAs)    #Hx of substance abuse  - Monitor for signs of alcohol withdrawal    #Depression  - Continue PTA venlafaxine    Resp:  #Asthma  - Albuterol PRN    CV:  No acute issues.    GI:  - Antiemetics PRN  - GI ppx with Omeprazole 20 daily  - Advance diet as tolerated    Renal:  - Continuous NS x 75 ml/hr    Endo:  No acute issues.    Heme:  No acute issues.    ID:  No acute issues.    Patient was discussed with neurocritical care fellow, Dr. Cagle. The attending is Dr. Dozier.    Lay Willams MD  Neurology PGY-2

## 2019-08-28 NOTE — CONSULTS
8/28/2019:    Social Work Services Progress Note    Hospital Day: 2  Date of Initial Social Work Evaluation:  TBD  Collaborated with:  Bedside RN    Data:  Tha Mcghee is a 28 year oldmale patient admitted to [facility] on 8/27/19 due to partial epilepsy with impairment of consciousness with  Intractable epilepsy.      Intervention:    SW consulted with bedside nurse; it is not yet appropriate to address discharge planning due to patient's critical condition and additional testing needed.    SW to remain available and will provide ongoing coordination with team for discharge needs when appropriate.    Assessment:  Patient critically ill, SW following.     Plan:    Anticipated Disposition:  TBD    Barriers to d/c plan:  Medical clearance, discharge planning.    Follow Up:  SW following.    MALENA Lara, LGSW  ICU 4A & 4C   Ph: [phone]  Pager: [phone]

## 2019-08-28 NOTE — PROCEDURES
TYPE OF STUDY:  Inpatient video EEG monitoring (intracranial electrodes).      EEG #-1       DATE OF RECORDING/SERVICE DATE:  08/27/2019.       SOURCE FILE DURATION:  2 hours, 25 minutes, 42 seconds.      HISTORY:  Day 1 of video-EEG monitoring in Tha Mcghee, a 28-year-old with medically intractable focal impaired seizures, occasionally leading to bilateral tonic-clonic seizures.  Scalp video EEG monitoring has shown a right hemispheric seizure, probably right temporal onset.  There have been some suggestions of posterior temporal onset.  He is now being evaluated for resective epilepsy surgery with intracranial electrodes.  During this study, he was being treated with divalproex, gabapentin, lacosamide, phenytoin and topiramate      TECHNICAL SUMMARY:  EEG was recorded from a series of intracranial electrodes.  Fourteen-point depth electrodes were placed in the right amygdala, right anterior hippocampus, and under the right posterior hippocampus.  Six electrode strips were placed in the right prefrontal and the right inferior frontal cortex.  A 2 x 16 frontal grid was placed in between these two.  Four electrode superior posterior temporal, middle posterior temporal, and inferior posterior temporal strips were placed.  Finally, a 6 electrode frontotemporal strip was placed with a point one in the posterior temporal regions close to an area of probable focal dysplasia and a point 6 over frontal cortex.  That is, this last electrode traversed the sylvian fissure.  Video was continuously recorded.  Video was reviewed for clinical correlation and to assist with EEG interpretation.      FINDINGS:  Alpha and theta activities predominated over the frontal electrodes and the superior posterior temporal electrode.  Slow activity predominated over the middle and inferior posterior temporal electrode.  Slow activity was also prominent in the medial points of the anterior hippocampal and amygdala electrodes.  An  active epileptiform focus was seen mostly encompassing the medial 3 points of the anterior hippocampal and the amygdala electrode.  The discharges in the medial amygdala electrode in the anterior hippocampal electrode were not always synchronous and in part may have been independent.  Posterior hippocampal discharges were also noted and were almost always synchronous with the anterior hippocampal discharges.  These often involved electrodes 2, 3 and 6 and appeared to have a transverse dipole extra across electrode too.      ICTAL:  No electrographic seizures.      IMPRESSION:  Abnormal.  An active right medial anterior hippocampal focus was noted occasionally engaging the right posterior hippocampal electrode (which was inserted below the hippocampus).  Epileptiform activity was also seen in the medial points of the amygdala electrode, which, however, was not always synchronous with the anterior hippocampal discharges.  This indicates focal cortical irritability in these regions.  No seizures were recorded.         SANTHOSH BORJA MD             D: 2019   T: 2019   MT:       Name:     SANTHOSH HODGES   MRN:      [id number]        Account:        [id number]   :      1990           Procedure Date: 2019      Document: [id number]

## 2019-08-28 NOTE — PLAN OF CARE
D: Tad is irritable and frustrated with q 1 hour neuro checks. C/O pain constant throbbing to R posterior head, Dilaudid 0.2 mg every 2 hours, oxycodone x 1, pt appears to sleep, repeated requests for neuro checks, decreased EtCO2, monitoring pain med tolerance closely.     Neuro: Drowsy, oriented x 4 on awakening.Pupils 3/brisk/tracks- blurry vision without glasses, somewhat decreased from normal.  SIMENTAL L decreased strength with bruised ankle. All warm/pale/normal sensation.   VS: Afeb, HR 67-96 , Bp /73-89,maps , RR 6-31.  CV: SR, rare pause.   Pulm: LS clear, IS to 1700. 2 L NC with ETCO2 29-34, IPI 6-10.   GI: BS hypo, nausea decreased, able to tolerate his medications.   : Gordillo with good amounts clear thelma urine.   Lines/Gtts:  R radial art line with positional waveform. R piv with NS 75 ml/hour.   Skin:   Head turban clean and intact, rewrapped after EEG attached.   Drains: Gordillo   Labs: WBC to 19.7, others pending.   P: Maintain Grid safety, sitter at bedside, monitor for seizure, side rails padded, neuro checks as  ordered. Maintain safety with increased activity. discontinue gordillo when more awake. Continue to attempt to relieve pain. Encourage nutrition when tolerated.

## 2019-08-28 NOTE — PROGRESS NOTES
"NEUROSCIENCE INTENSIVE CARE PROGRESS NOTE    2019    Tha Mcghee is a 28 year old year old male admitted on 2019 for stereotactic placement of R depth electrodes and R craniotomy for R sided strip electrodes.     24 HOUR EVENTS:  This morning, he reports post-op pain. Per RN, pt had 1 seizure episode this morning.    24 HOUR VITAL SIGNS SUMMARY:   Temperatures:  Current - Temp: 97.1  F (36.2  C); Max - Temp  Av.6  F (36.4  C)  Min: 97.1  F (36.2  C)  Max: 98.1  F (36.7  C)  Respiration range: Resp  Av.5  Min: 8  Max: 33  Pulse range: Pulse  Av  Min: 66  Max: 87  Blood pressure range: Systolic (24hrs), Av , Min:120 , Max:140   ; Diastolic (24hrs), Av, Min:66, Max:89    Pulse oximetry range: SpO2  Av.4 %  Min: 95 %  Max: 100 %    Arterial Line BP: ()/(70-94) 124/92  MAP:  [90 mmHg-119 mmHg] 106 mmHg  BP - Mean:  [] 105    PHYSICAL EXAMINATION:   /89   Pulse 75   Temp 97.1  F (36.2  C) (Oral)   Resp 26   Ht 1.727 m (5' 8\")   Wt 81.2 kg (179 lb 0.2 oz)   SpO2 98%   BMI 27.22 kg/m      General: Pt laying comfortably in bed, not in acute distress  HEENT: Incision wrapped, No oral ulcers, MMM  Chest: No respiratory distress  Heart: Regular rate and rhythm  Abdomen: Soft, non-tender  Extremities: Warm and well perfused    Neuro: Sleepy but arouses to voice, oriented x 3, follows commands, responds to questions. PERRL, EOMI, conjugate gaze, face symmetric, no abnormal movements, moving all 4 extremities, sensation intact to light touch in all 4 extremities.    CURRENT MEDICATIONS:    cefTRIAXone  2 g Intravenous Q24H     divalproex sodium extended-release  1,250 mg Oral QAM     divalproex sodium extended-release  1,500 mg Oral QPM     gabapentin  300 mg Oral BID     HYDROmorphone (PF)         lacosamide  200 mg Oral BID     multivitamin, therapeutic  1 tablet Oral or Feeding Tube Daily     omeprazole  20 mg Oral QAM     phenytoin  160 mg Oral QAM     " phenytoin  200 mg Oral QPM     sodium chloride (PF)  3 mL Intracatheter Q8H     topiramate  50 mg Oral BID     venlafaxine  225 mg Oral QAM     cholecalciferol  5,000 Units Oral or Feeding Tube At Bedtime       PRN MEDICATIONS:  albuterol, hydrALAZINE, HYDROmorphone, labetalol, lidocaine 4%, lidocaine, lidocaine (buffered or not buffered), LORazepam, magnesium sulfate, magnesium sulfate, naloxone, ondansetron **OR** ondansetron, oxyCODONE, polyethylene glycol, potassium chloride, potassium chloride with lidocaine, potassium chloride, potassium chloride, potassium chloride, prochlorperazine **OR** prochlorperazine, rizatriptan, senna-docusate **OR** senna-docusate, sodium chloride (PF), sodium phosphate, sodium phosphate, sodium phosphate, sodium phosphate    ALLERGIES:  Allergies   Allergen Reactions     Amoxicillin Hives     Ceclor [Cefaclor Monohydrate] Hives     Hydrocodone-Acetaminophen Nausea     Penicillins Hives     Sulfa Drugs Hives         LABS/STUDIES:  Recent Labs   Lab Test 08/28/19  0441 08/19/19  1201 01/17/19  0825 12/18/18  1201 10/23/18  1346    138  --   --  140   POTASSIUM 3.2* 4.7  --   --  4.2   CHLORIDE 109 107  --   --  106   CO2 21 28  --   --  25   ANIONGAP 8 4  --   --  9   * 85  --  97 92   BUN 6* 11  --   --  15   CR 0.52* 0.76 0.72  --  0.84   ROSA 8.4* 9.1  --   --  8.5   WBC 19.7* 6.5 5.7  --  5.9   RBC 3.99* 4.57 4.34*  --  4.03*   HGB 12.2* 14.4 14.0  --  12.9*    336 276  --  256       Recent Labs   Lab Test 08/19/19  1201 01/17/19  0825   INR 1.17* 1.07   PTT 32 31       IMAGING:  MRI brain:  1. Limited imaging primarily for the purposes of stereotactic  localization demonstrating postoperative changes of subdural grid and  lead placement including 3 leads in the right temporal lobe.  2. Small volume perioperative pneumocephalus and extraaxial fluid.    ASSESSMENT/PLAN:  Tha Mcghee is a 28 year old with a history of partial epilepsy who was admitted on 8/27  for stereotactic placement of R depth electrodes and R craniotomy for R sided strip electrodes. Neurology consulted for seizure management.    Neuro:  #Partial epilepsy with impairment of consciousness with intractable epilepsy s/p surgical electrode placement  - Continue PTA Vimpat 200-200  - Continue PTA Depakote 1250 - 1500  - Continue PTA Gabapentin 300 - 300  - Continue PTA Dilantin 160-200  - Continue PTA Topiramate 50 mg BID (for seizures and HAs)  - Continuous EEG  - pain management per NSG  - AED management per epilepsy team  - Ativan PRN for GTCs    #Post operative pain  - Dilaudid 0.3 - 0.5 PRN     #Headaches  - Continue PTA Topiramate 50 mg BID (for seizures and HAs)     #Hx of substance abuse     #Depression  - Continue PTA venlafaxine    Respiratory:  #Asthma  - Albuterol PRN    Cardiovascular:  No acute issues.    Renal:  - Electrolyte protocol    Endocrine:  No acute issues.    Heme:  #Normocytic anemia  Hgb 12.2. Likely post operative  - Monitor CBC    GI:  #GERD  - continue PTA Omeprazole 20 daily    - Antiemetics PRN  - Bowel regimen: Miralax PRN    ID:  #Leukocytosis  WBC 19.2. Likely post-operative and seizure related. Afebrile. No signs of infection  - monitor CBC  - pan-culture if spikes fever    #Post-op prophylaxis   - Ceftriaxone per NSG    MSK:  #L ankle injury  - XR L ankle     FEN: Advance as tolerated to regular diet  DVT prophylaxis: SCDs  Lines: A-line, PIV  Code Status: Full    Dispo: Likely 2-3 weeks    Patient discussed with Dr. Dozier.    Lay Willams MD  Neurology PGY2  P: [phone]

## 2019-08-29 ENCOUNTER — ALLIED HEALTH/NURSE VISIT (OUTPATIENT)
Dept: NEUROLOGY | Facility: CLINIC | Age: 29
End: 2019-08-29
Attending: PSYCHIATRY & NEUROLOGY
Payer: COMMERCIAL

## 2019-08-29 DIAGNOSIS — G40.219 LOCALIZATION-RELATED EPILEPSY WITH COMPLEX PARTIAL SEIZURES WITH INTRACTABLE EPILEPSY (H): Primary | ICD-10-CM

## 2019-08-29 LAB
ALBUMIN UR-MCNC: 10 MG/DL
ANION GAP SERPL CALCULATED.3IONS-SCNC: 8 MMOL/L (ref 3–14)
APPEARANCE UR: CLEAR
BILIRUB UR QL STRIP: NEGATIVE
BUN SERPL-MCNC: 6 MG/DL (ref 7–30)
CALCIUM SERPL-MCNC: 8.8 MG/DL (ref 8.5–10.1)
CHLORIDE SERPL-SCNC: 106 MMOL/L (ref 94–109)
CO2 SERPL-SCNC: 22 MMOL/L (ref 20–32)
COLOR UR AUTO: YELLOW
CREAT SERPL-MCNC: 0.48 MG/DL (ref 0.66–1.25)
ERYTHROCYTE [DISTWIDTH] IN BLOOD BY AUTOMATED COUNT: 13 % (ref 10–15)
GABAPENTIN SERPLBLD-MCNC: 1 UG/ML (ref 4–16)
GFR SERPL CREATININE-BSD FRML MDRD: >90 ML/MIN/{1.73_M2}
GLUCOSE SERPL-MCNC: 79 MG/DL (ref 70–99)
GLUCOSE UR STRIP-MCNC: NEGATIVE MG/DL
HCT VFR BLD AUTO: 37.5 % (ref 40–53)
HGB BLD-MCNC: 12 G/DL (ref 13.3–17.7)
HGB UR QL STRIP: NEGATIVE
KETONES UR STRIP-MCNC: 40 MG/DL
LEUKOCYTE ESTERASE UR QL STRIP: ABNORMAL
MCH RBC QN AUTO: 31 PG (ref 26.5–33)
MCHC RBC AUTO-ENTMCNC: 32 G/DL (ref 31.5–36.5)
MCV RBC AUTO: 97 FL (ref 78–100)
MUCOUS THREADS #/AREA URNS LPF: PRESENT /LPF
NITRATE UR QL: NEGATIVE
PH UR STRIP: 7.5 PH (ref 5–7)
PHENYTOIN FREE SERPL-MCNC: 1.75 UG/ML (ref 1–2)
PLATELET # BLD AUTO: 280 10E9/L (ref 150–450)
POTASSIUM SERPL-SCNC: 3.8 MMOL/L (ref 3.4–5.3)
RBC # BLD AUTO: 3.87 10E12/L (ref 4.4–5.9)
RBC #/AREA URNS AUTO: 1 /HPF (ref 0–2)
SODIUM SERPL-SCNC: 137 MMOL/L (ref 133–144)
SOURCE: ABNORMAL
SP GR UR STRIP: 1.02 (ref 1–1.03)
TOPIRAMATE SERPL-MCNC: <1.5 UG/ML (ref 5–20)
UROBILINOGEN UR STRIP-MCNC: 2 MG/DL (ref 0–2)
VALPROATE FREE SERPL-MCNC: 8.8 UG/ML (ref 6–20)
WBC # BLD AUTO: 19.9 10E9/L (ref 4–11)
WBC #/AREA URNS AUTO: 4 /HPF (ref 0–5)

## 2019-08-29 PROCEDURE — 25000132 ZZH RX MED GY IP 250 OP 250 PS 637: Performed by: STUDENT IN AN ORGANIZED HEALTH CARE EDUCATION/TRAINING PROGRAM

## 2019-08-29 PROCEDURE — 80048 BASIC METABOLIC PNL TOTAL CA: CPT | Performed by: NEUROLOGICAL SURGERY

## 2019-08-29 PROCEDURE — 25000128 H RX IP 250 OP 636: Performed by: STUDENT IN AN ORGANIZED HEALTH CARE EDUCATION/TRAINING PROGRAM

## 2019-08-29 PROCEDURE — 85027 COMPLETE CBC AUTOMATED: CPT | Performed by: NEUROLOGICAL SURGERY

## 2019-08-29 PROCEDURE — 20000004 ZZH R&B ICU UMMC

## 2019-08-29 PROCEDURE — 40000141 ZZH STATISTIC PERIPHERAL IV START W/O US GUIDANCE

## 2019-08-29 PROCEDURE — 95951 ZZHC EEG VIDEO EACH 24 HR: CPT | Mod: ZF

## 2019-08-29 PROCEDURE — 25000132 ZZH RX MED GY IP 250 OP 250 PS 637: Performed by: NURSE PRACTITIONER

## 2019-08-29 PROCEDURE — 81001 URINALYSIS AUTO W/SCOPE: CPT | Performed by: STUDENT IN AN ORGANIZED HEALTH CARE EDUCATION/TRAINING PROGRAM

## 2019-08-29 RX ORDER — TOPIRAMATE 50 MG/1
50 TABLET, FILM COATED ORAL 2 TIMES DAILY
Status: DISCONTINUED | OUTPATIENT
Start: 2019-08-29 | End: 2019-08-30

## 2019-08-29 RX ORDER — HYDROMORPHONE HCL/0.9% NACL/PF 0.2MG/0.2
0.2 SYRINGE (ML) INTRAVENOUS EVERY 4 HOURS PRN
Status: DISCONTINUED | OUTPATIENT
Start: 2019-08-29 | End: 2019-09-10

## 2019-08-29 RX ORDER — HYDROMORPHONE HYDROCHLORIDE 1 MG/ML
.3-.5 INJECTION, SOLUTION INTRAMUSCULAR; INTRAVENOUS; SUBCUTANEOUS
Status: DISCONTINUED | OUTPATIENT
Start: 2019-08-29 | End: 2019-08-29

## 2019-08-29 RX ORDER — HYDROMORPHONE HCL/0.9% NACL/PF 0.2MG/0.2
0.2 SYRINGE (ML) INTRAVENOUS
Status: DISCONTINUED | OUTPATIENT
Start: 2019-08-29 | End: 2019-08-29

## 2019-08-29 RX ORDER — OXYCODONE HYDROCHLORIDE 5 MG/1
5-10 TABLET ORAL
Status: DISCONTINUED | OUTPATIENT
Start: 2019-08-29 | End: 2019-09-11

## 2019-08-29 RX ORDER — IBUPROFEN 600 MG/1
600 TABLET, FILM COATED ORAL EVERY 6 HOURS PRN
Status: DISPENSED | OUTPATIENT
Start: 2019-08-29 | End: 2019-09-03

## 2019-08-29 RX ORDER — OXYCODONE HYDROCHLORIDE 10 MG/1
10-20 TABLET ORAL EVERY 4 HOURS PRN
Status: DISCONTINUED | OUTPATIENT
Start: 2019-08-29 | End: 2019-08-29

## 2019-08-29 RX ORDER — CYCLOBENZAPRINE HCL 10 MG
10 TABLET ORAL 3 TIMES DAILY
Status: DISCONTINUED | OUTPATIENT
Start: 2019-08-29 | End: 2019-09-13 | Stop reason: HOSPADM

## 2019-08-29 RX ORDER — TOPIRAMATE 25 MG/1
25 TABLET, FILM COATED ORAL 2 TIMES DAILY
Status: DISCONTINUED | OUTPATIENT
Start: 2019-08-29 | End: 2019-08-29

## 2019-08-29 RX ORDER — NALOXONE HYDROCHLORIDE 0.4 MG/ML
.1-.4 INJECTION, SOLUTION INTRAMUSCULAR; INTRAVENOUS; SUBCUTANEOUS
Status: ACTIVE | OUTPATIENT
Start: 2019-08-29 | End: 2019-08-30

## 2019-08-29 RX ADMIN — HYDROMORPHONE HYDROCHLORIDE 0.5 MG: 1 INJECTION, SOLUTION INTRAMUSCULAR; INTRAVENOUS; SUBCUTANEOUS at 05:01

## 2019-08-29 RX ADMIN — OXYCODONE HYDROCHLORIDE 10 MG: 5 TABLET ORAL at 21:46

## 2019-08-29 RX ADMIN — PHENYTOIN SODIUM 200 MG: 100 CAPSULE ORAL at 20:00

## 2019-08-29 RX ADMIN — LABETALOL 20 MG/4 ML (5 MG/ML) INTRAVENOUS SYRINGE 10 MG: at 00:47

## 2019-08-29 RX ADMIN — SENNOSIDES AND DOCUSATE SODIUM 1 TABLET: 8.6; 5 TABLET ORAL at 19:59

## 2019-08-29 RX ADMIN — OMEPRAZOLE 20 MG: 20 CAPSULE, DELAYED RELEASE ORAL at 12:45

## 2019-08-29 RX ADMIN — PROCHLORPERAZINE EDISYLATE 10 MG: 5 INJECTION INTRAMUSCULAR; INTRAVENOUS at 21:38

## 2019-08-29 RX ADMIN — VENLAFAXINE HYDROCHLORIDE 225 MG: 150 CAPSULE, EXTENDED RELEASE ORAL at 08:25

## 2019-08-29 RX ADMIN — CYCLOBENZAPRINE 10 MG: 10 TABLET, FILM COATED ORAL at 20:00

## 2019-08-29 RX ADMIN — LACOSAMIDE 200 MG: 200 TABLET, FILM COATED ORAL at 19:59

## 2019-08-29 RX ADMIN — OXYCODONE HYDROCHLORIDE 10 MG: 5 TABLET ORAL at 03:10

## 2019-08-29 RX ADMIN — TOPIRAMATE 50 MG: 50 TABLET ORAL at 19:59

## 2019-08-29 RX ADMIN — CYCLOBENZAPRINE 10 MG: 10 TABLET, FILM COATED ORAL at 10:06

## 2019-08-29 RX ADMIN — PHENYTOIN SODIUM 160 MG: 100 CAPSULE ORAL at 08:24

## 2019-08-29 RX ADMIN — OXYCODONE HYDROCHLORIDE 10 MG: 5 TABLET ORAL at 12:45

## 2019-08-29 RX ADMIN — GABAPENTIN 300 MG: 100 CAPSULE ORAL at 19:59

## 2019-08-29 RX ADMIN — POTASSIUM CHLORIDE 20 MEQ: 10 TABLET, EXTENDED RELEASE ORAL at 12:45

## 2019-08-29 RX ADMIN — GABAPENTIN 300 MG: 100 CAPSULE ORAL at 08:26

## 2019-08-29 RX ADMIN — Medication 0.2 MG: at 18:47

## 2019-08-29 RX ADMIN — CYCLOBENZAPRINE 10 MG: 10 TABLET, FILM COATED ORAL at 14:49

## 2019-08-29 RX ADMIN — LABETALOL 20 MG/4 ML (5 MG/ML) INTRAVENOUS SYRINGE 10 MG: at 08:40

## 2019-08-29 RX ADMIN — Medication 0.2 MG: at 22:49

## 2019-08-29 RX ADMIN — Medication 0.2 MG: at 14:49

## 2019-08-29 RX ADMIN — SENNOSIDES AND DOCUSATE SODIUM 1 TABLET: 8.6; 5 TABLET ORAL at 08:26

## 2019-08-29 RX ADMIN — OXYCODONE HYDROCHLORIDE 5 MG: 5 TABLET ORAL at 18:46

## 2019-08-29 RX ADMIN — HYDROMORPHONE HYDROCHLORIDE 0.5 MG: 1 INJECTION, SOLUTION INTRAMUSCULAR; INTRAVENOUS; SUBCUTANEOUS at 08:06

## 2019-08-29 RX ADMIN — LACOSAMIDE 200 MG: 200 TABLET, FILM COATED ORAL at 08:25

## 2019-08-29 RX ADMIN — HYDROMORPHONE HYDROCHLORIDE 0.5 MG: 1 INJECTION, SOLUTION INTRAMUSCULAR; INTRAVENOUS; SUBCUTANEOUS at 01:58

## 2019-08-29 RX ADMIN — RIZATRIPTAN BENZOATE 10 MG: 10 TABLET ORAL at 16:17

## 2019-08-29 RX ADMIN — TOPIRAMATE 50 MG: 50 TABLET ORAL at 08:26

## 2019-08-29 RX ADMIN — MELATONIN 5000 UNITS: at 21:44

## 2019-08-29 RX ADMIN — HYDROMORPHONE HYDROCHLORIDE 0.5 MG: 1 INJECTION, SOLUTION INTRAMUSCULAR; INTRAVENOUS; SUBCUTANEOUS at 10:55

## 2019-08-29 RX ADMIN — CEFTRIAXONE SODIUM 2 G: 2 INJECTION, POWDER, FOR SOLUTION INTRAMUSCULAR; INTRAVENOUS at 08:28

## 2019-08-29 RX ADMIN — OXYCODONE HYDROCHLORIDE 10 MG: 5 TABLET ORAL at 06:52

## 2019-08-29 ASSESSMENT — PAIN DESCRIPTION - DESCRIPTORS
DESCRIPTORS: CONSTANT;HEADACHE
DESCRIPTORS: THROBBING
DESCRIPTORS: THROBBING;HEADACHE
DESCRIPTORS: THROBBING

## 2019-08-29 ASSESSMENT — VISUAL ACUITY
OU: BASELINE

## 2019-08-29 ASSESSMENT — ACTIVITIES OF DAILY LIVING (ADL)
ADLS_ACUITY_SCORE: 10
ADLS_ACUITY_SCORE: 10
ADLS_ACUITY_SCORE: 14
ADLS_ACUITY_SCORE: 10

## 2019-08-29 NOTE — PROGRESS NOTES
"NEUROSCIENCE INTENSIVE CARE PROGRESS NOTE    2019    Tha Mcghee is a 28 year old year old male admitted on 2019 for stereotactic placement of R depth electrodes and R craniotomy for R sided strip electrodes.     24 HOUR EVENTS:  Pt had 1 seizure this morning. Continued headaches.    24 HOUR VITAL SIGNS SUMMARY:   Temperatures:  Current - Temp: 98  F (36.7  C); Max - Temp  Av.2  F (36.8  C)  Min: 98  F (36.7  C)  Max: 98.4  F (36.9  C)  Respiration range: Resp  Av.7  Min: 10  Max: 37  Pulse range: Pulse  Av  Min: 91  Max: 91  Blood pressure range: Systolic (24hrs), Av , Min:134 , Max:134   ; Diastolic (24hrs), Av, Min:86, Max:86    Pulse oximetry range: SpO2  Av.5 %  Min: 88 %  Max: 99 %    Arterial Line BP: ()/() 131/90  MAP:  [90 mmHg-130 mmHg] 108 mmHg    PHYSICAL EXAMINATION:   /86   Pulse 91   Temp 98  F (36.7  C) (Oral)   Resp 22   Ht 1.727 m (5' 8\")   Wt 81.2 kg (179 lb 0.2 oz)   SpO2 93%   BMI 27.22 kg/m    General: Pt laying comfortably in bed, not in acute distress  HEENT: Incision wrapped, No oral ulcers, MMM  Chest: No respiratory distress  Heart: Regular rate and rhythm  Abdomen: Soft, non-tender  Extremities: Warm and well perfused     Neuro: Awake, oriented x 3, follows commands, responds to questions. PERRL, EOMI, conjugate gaze, face symmetric, no abnormal movements, moving all 4 extremities, sensation intact to light touch in all 4 extremities.    CURRENT MEDICATIONS:    cefTRIAXone  2 g Intravenous Q24H     cyclobenzaprine  10 mg Oral TID     gabapentin  300 mg Oral BID     lacosamide  200 mg Oral BID     multivitamin, therapeutic  1 tablet Oral or Feeding Tube Daily     omeprazole  20 mg Oral QAM     phenytoin  160 mg Oral QAM     phenytoin  200 mg Oral QPM     senna-docusate  1 tablet Oral or Feeding Tube BID     sodium chloride (PF)  3 mL Intracatheter Q8H     topiramate  50 mg Oral BID     venlafaxine  225 mg Oral QAM     " cholecalciferol  5,000 Units Oral or Feeding Tube At Bedtime       PRN MEDICATIONS:  albuterol, hydrALAZINE, HYDROmorphone, labetalol, lidocaine 4%, lidocaine, lidocaine (buffered or not buffered), LORazepam, magnesium sulfate, magnesium sulfate, naloxone, ondansetron **OR** ondansetron, oxyCODONE, polyethylene glycol, potassium chloride, potassium chloride with lidocaine, potassium chloride, potassium chloride, potassium chloride, prochlorperazine **OR** prochlorperazine, rizatriptan, sodium chloride (PF), sodium phosphate, sodium phosphate, sodium phosphate, sodium phosphate    ALLERGIES:  Allergies   Allergen Reactions     Amoxicillin Hives     Ceclor [Cefaclor Monohydrate] Hives     Hydrocodone-Acetaminophen Nausea     Penicillins Hives     Sulfa Drugs Hives         LABS/STUDIES:  Recent Labs   Lab Test 08/29/19  0423 08/28/19  0441 08/19/19  1201    138 138   POTASSIUM 3.8 3.2* 4.7   CHLORIDE 106 109 107   CO2 22 21 28   ANIONGAP 8 8 4   GLC 79 141* 85   BUN 6* 6* 11   CR 0.48* 0.52* 0.76   ROSA 8.8 8.4* 9.1   WBC 19.9* 19.7* 6.5   RBC 3.87* 3.99* 4.57   HGB 12.0* 12.2* 14.4    280 336       Gabapentin 1.0  Phenytoin free 1.75  Phenytoin level 14.6  VPA free 8.8  VPA 71  lacosamide level pending  topiramate level pending      IMAGING:  XR ankle: No displaced fractures in the left ankle.    ASSESSMENT/PLAN:  Tha Mcghee is a 28 year old with a history of partial epilepsy who was admitted on 8/27 for stereotactic placement of R depth electrodes and R craniotomy for R sided strip electrodes. Neurology consulted for seizure management.     Neuro:  #Partial epilepsy with impairment of consciousness with intractable epilepsy s/p surgical electrode placement  - Continue PTA Vimpat 200-200  - Continue PTA Gabapentin 300 - 300  - Continue PTA Dilantin 160-200  - Continue PTA Topiramate 50 mg BID (for seizures and HAs)  - Continuous EEG  - pain management per NSG  - AED management per epilepsy team  -   Stopped PTA depakote      #Post operative pain  #Headaches  - Dilaudid 0.3 - 0.5 PRN --> decrease to 0.2 - 0.4 PRN  - Continue PTA Topiramate 50 mg BID (for seizures and HAs)  - Start compazine IV PRN  - Tylenol PRN     #Hx of substance abuse     #Depression  - Continue PTA venlafaxine     Respiratory:  #Asthma  - Albuterol PRN     Cardiovascular:  No acute issues.     Renal:  - Electrolyte protocol    Endocrine:  No acute issues.     Heme:  #Normocytic anemia  Hgb 12.2. Likely post operative  - Monitor CBC     GI:  #GERD  - continue PTA Omeprazole 20 daily     - Antiemetics PRN  - Bowel regimen: Miralax PRN     ID:  #Leukocytosis  WBC 19.2. Likely post-operative and seizure related. Afebrile. No signs of infection  - monitor CBC  - pan-culture if spikes fever     #Post-op prophylaxis   - Ceftriaxone per NSG     MSK:  #L ankle sprain  - XR L ankle with no fracture    :  #Urinary retention  - Likely due to dilaudid, will lower dose and better manage pain with non-opiates   - Barton placement     FEN: Advance as tolerated to regular diet  DVT prophylaxis: SCDs  Lines: PIV, remove A-line  Code Status: Full     Dispo: Likely 2-3 weeks     Patient discussed with Dr. Dozier.     Lay Willams MD  Neurology PGY2  P: [phone]

## 2019-08-29 NOTE — PROGRESS NOTES
"[facility] - Epilepsy Service Daily Note      Interval History:   Patient had a seizure this morning. He denied any of his typical warning of \"feeling hot/ anxious\" and was amnestic about the seizure. Per nurse's notes, 2 episodes of lip smacking at 02.50 am and 05.30 am which did not have any EEG correlate. No major complaints. Overall doing well except migraine headache.     Review of System:   Denies vomitting, abdominal pain, dizziness and chest pain.     Medications:   Antiepileptic Medications Home Doses with levels on admission:  AEDs on admission with most recent levels:  1. Vimpat 200-200 (pending)  2. Depakote 0143-0712 (71, 8.8)  3. Gabapentin 300-300 (pending)  4. Dilantin 160-200 (14.6, 1.75)  5. Topiramate 50 - 50 (pending)     Antiepileptic Medications Current Doses:   1. Vimpat 200-200   2. Gabapentin 300-300   3. Dilantin 160-200  4. Topiramate 50 - 50      Exam: Blood pressure 134/86, pulse 91, temperature 98  F (36.7  C), temperature source Oral, resp. rate 22, height 1.727 m (5' 8\"), weight 81.2 kg (179 lb 0.2 oz), SpO2 95 %.  General appearance: No acute distress, awake  Neuro: Pupils are equal, round, and reactive to light, face symmetric, no pronator drift, equal  strength, normal to light touch with no sensory deficits noted, finger to nose normal, no focal deficits noted. Left ankle exam limited due to pain from a recent fall PTA.     Video EE2019: Pending.     2019: IMPRESSION:  Abnormal.  An active medial anterior hippocampal focus was noted occasionally engaging the posterior hippocampal electrode (which was inserted below the hippocampus).  Epileptiform activity was also seen in the medial points of the amygdala electrode, which, however, was not always synchronous with the anterior hippocampal discharges.  This indicates focal cortical irritability in these regions.  No seizures were recorded.          Assessment/ Plan: Patient seen and " discussed with attending physician Dr. Calvo.   1. 28 year old right handed male with a h/o depression, anxiety and seizure disorder since 2011 is being admitted for intracranial vEEG monitoring. Patient underwent stereotactic placement of R depth electrodes and R craniotomy for R sided strip electrodes on 8/27. One seizure recorded this morning. No medication changes today.      - Continue  EGG monitoring  - Seizure precautions  - SCDs while in bed for DVT prevention  - Ativan as ordered prn for seizures  - No medication changes today  - Compazine for migraine headaches    Total time: 25 minute was spent in the care of this patient. The patient agrees with the above mentioned plan of care. I answered all the patient's questions and addressed immediate concerns. More than 50% of time spent consisted of counseling and coordinating care, including discussion of the diagnostic significance of EEG findings, anti-seizure medication management, and planning for discharge home.     Deborah Webb, CNP  Neurology

## 2019-08-29 NOTE — PROGRESS NOTES
"SPIRITUAL HEALTH SERVICES  SPIRITUAL ASSESSMENT Progress Note  [facility] ([place] 4A     REFERRAL SOURCE: Making rounds    Pt's nurse said the pt just had a seizure. I visited with his mother, offering her supportive listening as she readily shared her son's medical/personal history. She noted, \"It's good he just had a seizure. They are trying to track them.\"    She asked that we talk outside his room in order to allow him to rest. She told me, \"He doesn't always get along with his siblings. He struggles with the fact that he has his medical issues, but I don't let him get away with anything.\" She went on to say, \"You can come by again and visit but he like to be in charge when anyone visits him.\"    She asked that we pray together which we did, at the end of which she reached out and gave me a hug.    PLAN: Will visit again by Sunday.    Manav Bowling M.Div.     Pager [phone]    "
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
## 2019-08-29 NOTE — PLAN OF CARE
D: Tad appeared to sleep very little tonight. Pain continuously, increases sharply with coughing. He seems to find comfort with ice over R eye/forehead. 5 second seizure activity at 0250, 0530 noted by sitter, lip smacking.     Neuro: Grossly intact with L foot weak because of injury to ankle.   VS: Afeb, HR , -165/76-91 map , RR 6-36.   CV: SR no ectopy.   Pulm: LS clear, IS to 2250, on room air O2 sats > 93%. Strong productive cough, sputum not seen.   GI: BS present each quadrant, intake water only. States he had a large meal of chinese food before hospitalization.   : Unable to void up to commode or with urinal, straight cathed  for 650 clear yellow, 550 clear thelma urine.   Lines/Gtts:  r radial arterial line with good waveform. L PIV SL.   Skin:   Head turban clean and dry.   Drains: 0  Labs: 0  P: continue to monitor seizure activity, maintain safety with activity.  Continue to attempt to relieve pain. Continue to attempt to find something Tad with eat. Possible urology consult if inability to void continues. Tad would also like something to help him relax/sleep.

## 2019-08-29 NOTE — PROCEDURES
INPATIENT VIDEO EEG MONITORING FROM INTRACRANIAL ELECTRODES     EEG #-2       DATE OF RECORDING/SERVICE DATE:  08/28/2019      DURATION OF RECORDING: Video-EEG recorded for 22 hours, 27 minutes, and 33 seconds.    HISTORY:  This is day #2 of video EEG monitoring in patient Tha Mcghee, a 28 year old with medically intractable epilepsy who is being evaluated for potential resective surgery.  Scalp video monitoring has shown right hemispheric seizures that are probably of right temporal onset.  During this study, he was being treated with gabapentin, lacosamide, phenytoin and topiramate.  Divalproex had been discontinued to facilitate recording of seizures.      TECHNICAL SUMMARY:  EEG was recorded from a series of intracranial electrodes.  A 14-point depth electrode was placed in the right amygdala, right anterior hippocampus, and under the right posterior hippocampus.  Six electrode strips were placed in the right prefrontal and the right inferior frontal cortex.  A 2 x 16 frontal grid was placed in between these 2, and superior-posterior temporal, middle posterior temporal, and inferior-posterior temporal strips were also placed.  Finally, a 6-electrode frontotemporal strip was placed with point 1 in the posterior temporal regions close to an area of probable focal cortical dysplasia and point 6 in the frontal cortex.  Video was continuously recorded.  Video was reviewed for clinical correlation and to assist with EEG interpretation.      FINDINGS:  A mixture of alpha and theta were seen in the frontal electrodes.  Delta range slowing was noted in the posterior temporal electrodes.  Delta range slowing was also seen in the deeper points of the anterior hippocampal and amygdala electrodes      OTHER INTERICTAL ABNORMALITIES:  An active epileptiform focus was seen from points 1, 2 and 3 of the amygdala and anterior hippocampal electrode.  Often discharges here were synchronous but that was not always the  case.  Sometimes discharges were seen asynchronously in the amygdala and the anterior hippocampal electrode.  Epileptiform discharges in the posterior hippocampal electrode were sparse and were always synchronous with discharges in the anterior hippocampal electrode.  Again, discharges in the posterior hippocampal electrode were confined to the medial 4 points.  Epileptiform activity was not seen in the neocortical strips.      ICTAL:  None.      IMPRESSION:  Abnormal.  Slowing was seen from the right temporal neocortical and the medial depth electrodes.  An active epileptiform focus was seen from the medial points of the right amygdala and anterior hippocampal electrode.  There was some asynchrony in the discharges between the amygdala and the anterior hippocampal electrodes.  Epileptiform activity, however, was more persistent in the anterior hippocampal electrodes.  The epileptiform activity was confined to the medial points, suggesting that the epileptogenic zone was in the hippocampus rather than more laterally in the neocortex.  Seizures were not recorded.         SANTHOSH BORJA MD             D: 2019   T: 2019   MT: SHANAE      Name:     MEKASANTHOSH BRIZUELA   MRN:      [id number]        Account:        [id number]   :      1990           Procedure Date: 2019      Document: [id number]

## 2019-08-30 ENCOUNTER — ALLIED HEALTH/NURSE VISIT (OUTPATIENT)
Dept: NEUROLOGY | Facility: CLINIC | Age: 29
End: 2019-08-30
Attending: PSYCHIATRY & NEUROLOGY
Payer: COMMERCIAL

## 2019-08-30 ENCOUNTER — APPOINTMENT (OUTPATIENT)
Dept: PHYSICAL THERAPY | Facility: CLINIC | Age: 29
End: 2019-08-30
Attending: STUDENT IN AN ORGANIZED HEALTH CARE EDUCATION/TRAINING PROGRAM
Payer: COMMERCIAL

## 2019-08-30 DIAGNOSIS — G40.219 LOCALIZATION-RELATED EPILEPSY WITH COMPLEX PARTIAL SEIZURES WITH INTRACTABLE EPILEPSY (H): Primary | ICD-10-CM

## 2019-08-30 LAB
ANION GAP SERPL CALCULATED.3IONS-SCNC: 9 MMOL/L (ref 3–14)
BUN SERPL-MCNC: 11 MG/DL (ref 7–30)
CALCIUM SERPL-MCNC: 9.6 MG/DL (ref 8.5–10.1)
CHLORIDE SERPL-SCNC: 105 MMOL/L (ref 94–109)
CO2 SERPL-SCNC: 23 MMOL/L (ref 20–32)
CREAT SERPL-MCNC: 0.67 MG/DL (ref 0.66–1.25)
ERYTHROCYTE [DISTWIDTH] IN BLOOD BY AUTOMATED COUNT: 12.7 % (ref 10–15)
GFR SERPL CREATININE-BSD FRML MDRD: >90 ML/MIN/{1.73_M2}
GLUCOSE BLDC GLUCOMTR-MCNC: 85 MG/DL (ref 70–99)
GLUCOSE SERPL-MCNC: 89 MG/DL (ref 70–99)
HCT VFR BLD AUTO: 37.1 % (ref 40–53)
HGB BLD-MCNC: 12.3 G/DL (ref 13.3–17.7)
LACOSAMIDE SERPL-MCNC: 2.6 UG/ML (ref 5–10)
MCH RBC QN AUTO: 31.2 PG (ref 26.5–33)
MCHC RBC AUTO-ENTMCNC: 33.2 G/DL (ref 31.5–36.5)
MCV RBC AUTO: 94 FL (ref 78–100)
PLATELET # BLD AUTO: 313 10E9/L (ref 150–450)
POTASSIUM SERPL-SCNC: 3.6 MMOL/L (ref 3.4–5.3)
RBC # BLD AUTO: 3.94 10E12/L (ref 4.4–5.9)
SODIUM SERPL-SCNC: 137 MMOL/L (ref 133–144)
WBC # BLD AUTO: 12.2 10E9/L (ref 4–11)

## 2019-08-30 PROCEDURE — 25000132 ZZH RX MED GY IP 250 OP 250 PS 637: Performed by: STUDENT IN AN ORGANIZED HEALTH CARE EDUCATION/TRAINING PROGRAM

## 2019-08-30 PROCEDURE — 25000132 ZZH RX MED GY IP 250 OP 250 PS 637: Performed by: NURSE PRACTITIONER

## 2019-08-30 PROCEDURE — 25000128 H RX IP 250 OP 636: Performed by: STUDENT IN AN ORGANIZED HEALTH CARE EDUCATION/TRAINING PROGRAM

## 2019-08-30 PROCEDURE — 00000146 ZZHCL STATISTIC GLUCOSE BY METER IP

## 2019-08-30 PROCEDURE — 80048 BASIC METABOLIC PNL TOTAL CA: CPT | Performed by: STUDENT IN AN ORGANIZED HEALTH CARE EDUCATION/TRAINING PROGRAM

## 2019-08-30 PROCEDURE — 85027 COMPLETE CBC AUTOMATED: CPT | Performed by: STUDENT IN AN ORGANIZED HEALTH CARE EDUCATION/TRAINING PROGRAM

## 2019-08-30 PROCEDURE — 36415 COLL VENOUS BLD VENIPUNCTURE: CPT | Performed by: STUDENT IN AN ORGANIZED HEALTH CARE EDUCATION/TRAINING PROGRAM

## 2019-08-30 PROCEDURE — 95951 ZZHC EEG VIDEO EACH 24 HR: CPT | Mod: ZF

## 2019-08-30 PROCEDURE — 97162 PT EVAL MOD COMPLEX 30 MIN: CPT | Mod: GP | Performed by: STUDENT IN AN ORGANIZED HEALTH CARE EDUCATION/TRAINING PROGRAM

## 2019-08-30 PROCEDURE — 97530 THERAPEUTIC ACTIVITIES: CPT | Mod: GP | Performed by: STUDENT IN AN ORGANIZED HEALTH CARE EDUCATION/TRAINING PROGRAM

## 2019-08-30 PROCEDURE — 20000004 ZZH R&B ICU UMMC

## 2019-08-30 PROCEDURE — 97110 THERAPEUTIC EXERCISES: CPT | Mod: GP | Performed by: STUDENT IN AN ORGANIZED HEALTH CARE EDUCATION/TRAINING PROGRAM

## 2019-08-30 RX ADMIN — PHENYTOIN SODIUM 160 MG: 100 CAPSULE ORAL at 08:17

## 2019-08-30 RX ADMIN — LACOSAMIDE 200 MG: 200 TABLET, FILM COATED ORAL at 08:16

## 2019-08-30 RX ADMIN — CYCLOBENZAPRINE 10 MG: 10 TABLET, FILM COATED ORAL at 20:08

## 2019-08-30 RX ADMIN — Medication 0.2 MG: at 16:48

## 2019-08-30 RX ADMIN — Medication 0.2 MG: at 21:17

## 2019-08-30 RX ADMIN — TOPIRAMATE 50 MG: 50 TABLET ORAL at 08:16

## 2019-08-30 RX ADMIN — PROCHLORPERAZINE EDISYLATE 10 MG: 5 INJECTION INTRAMUSCULAR; INTRAVENOUS at 20:08

## 2019-08-30 RX ADMIN — CYCLOBENZAPRINE 10 MG: 10 TABLET, FILM COATED ORAL at 08:16

## 2019-08-30 RX ADMIN — VENLAFAXINE HYDROCHLORIDE 225 MG: 150 CAPSULE, EXTENDED RELEASE ORAL at 08:16

## 2019-08-30 RX ADMIN — SENNOSIDES AND DOCUSATE SODIUM 1 TABLET: 8.6; 5 TABLET ORAL at 08:16

## 2019-08-30 RX ADMIN — Medication 0.2 MG: at 08:39

## 2019-08-30 RX ADMIN — OXYCODONE HYDROCHLORIDE 10 MG: 5 TABLET ORAL at 04:25

## 2019-08-30 RX ADMIN — CEFTRIAXONE SODIUM 2 G: 2 INJECTION, POWDER, FOR SOLUTION INTRAMUSCULAR; INTRAVENOUS at 08:23

## 2019-08-30 RX ADMIN — PROCHLORPERAZINE EDISYLATE 10 MG: 5 INJECTION INTRAMUSCULAR; INTRAVENOUS at 12:33

## 2019-08-30 RX ADMIN — THERA TABS 1 TABLET: TAB at 08:17

## 2019-08-30 RX ADMIN — PHENYTOIN SODIUM 200 MG: 100 CAPSULE ORAL at 20:08

## 2019-08-30 RX ADMIN — MELATONIN 5000 UNITS: at 21:17

## 2019-08-30 RX ADMIN — OMEPRAZOLE 20 MG: 20 CAPSULE, DELAYED RELEASE ORAL at 08:16

## 2019-08-30 RX ADMIN — IBUPROFEN 600 MG: 600 TABLET ORAL at 00:43

## 2019-08-30 RX ADMIN — GABAPENTIN 300 MG: 100 CAPSULE ORAL at 08:16

## 2019-08-30 RX ADMIN — IBUPROFEN 600 MG: 600 TABLET ORAL at 21:17

## 2019-08-30 RX ADMIN — SENNOSIDES AND DOCUSATE SODIUM 1 TABLET: 8.6; 5 TABLET ORAL at 20:08

## 2019-08-30 RX ADMIN — GABAPENTIN 300 MG: 100 CAPSULE ORAL at 20:08

## 2019-08-30 RX ADMIN — IBUPROFEN 600 MG: 600 TABLET ORAL at 08:39

## 2019-08-30 RX ADMIN — IBUPROFEN 600 MG: 600 TABLET ORAL at 16:47

## 2019-08-30 RX ADMIN — LACOSAMIDE 200 MG: 200 TABLET, FILM COATED ORAL at 20:08

## 2019-08-30 ASSESSMENT — VISUAL ACUITY
OU: BASELINE

## 2019-08-30 ASSESSMENT — PAIN DESCRIPTION - DESCRIPTORS
DESCRIPTORS: CONSTANT;HEADACHE

## 2019-08-30 ASSESSMENT — ACTIVITIES OF DAILY LIVING (ADL)
ADLS_ACUITY_SCORE: 10
ADLS_ACUITY_SCORE: 14
ADLS_ACUITY_SCORE: 10
ADLS_ACUITY_SCORE: 14

## 2019-08-30 ASSESSMENT — MIFFLIN-ST. JEOR: SCORE: 1754.5

## 2019-08-30 NOTE — PROGRESS NOTES
"NEUROSCIENCE INTENSIVE CARE PROGRESS NOTE    2019    Tha Mcghee is a 28 year old year old male admitted on 2019 for stereotactic placement of R depth electrodes and R craniotomy for R sided strip electrodes.      24 HOUR EVENTS:  No seizures overnight. Continued headaches up to 10/10 severity.    24 HOUR VITAL SIGNS SUMMARY:   Temperatures:  Current - Temp: 98.1  F (36.7  C); Max - Temp  Av.4  F (36.9  C)  Min: 98.1  F (36.7  C)  Max: 99.1  F (37.3  C)  Respiration range: Resp  Av.6  Min: 10  Max: 26  Pulse range: Pulse  Av  Min: 90  Max: 90  Blood pressure range: Systolic (24hrs), Av , Min:102 , Max:148   ; Diastolic (24hrs), Av, Min:62, Max:103    Pulse oximetry range: SpO2  Av.9 %  Min: 95 %  Max: 98 %    PHYSICAL EXAMINATION:   /83   Pulse 90   Temp 98.1  F (36.7  C) (Oral)   Resp 18   Ht 1.727 m (5' 8\")   Wt 81.5 kg (179 lb 10.8 oz)   SpO2 95%   BMI 27.32 kg/m    General: Pt laying comfortably in bed, not in acute distress  HEENT: head wrapped, No oral ulcers, MMM  Chest: No respiratory distress  Heart: Regular rate and rhythm  Abdomen: Soft, non-tender  Extremities: Warm and well perfused     Neuro: Awake, oriented x 3, follows commands, responds to questions. PERRL. EOMI, gaze conjugate, face symmetric, no abnormal movements, moving all 4 extremities, sensation intact to light touch in all 4 extremities.    CURRENT MEDICATIONS:    cefTRIAXone  2 g Intravenous Q24H     cyclobenzaprine  10 mg Oral or Feeding Tube TID     gabapentin  300 mg Oral BID     lacosamide  200 mg Oral BID     multivitamin, therapeutic  1 tablet Oral or Feeding Tube Daily     omeprazole  20 mg Oral QAM     phenytoin  160 mg Oral QAM     phenytoin  200 mg Oral QPM     senna-docusate  1 tablet Oral or Feeding Tube BID     sodium chloride (PF)  3 mL Intracatheter Q8H     topiramate  50 mg Oral or Feeding Tube BID     venlafaxine  225 mg Oral QAM     cholecalciferol  5,000 Units Oral or " Feeding Tube At Bedtime       PRN MEDICATIONS:  albuterol, hydrALAZINE, HYDROmorphone, ibuprofen, labetalol, lidocaine 4%, lidocaine, lidocaine (buffered or not buffered), LORazepam, magnesium sulfate, magnesium sulfate, naloxone, naloxone, ondansetron **OR** ondansetron, oxyCODONE, polyethylene glycol, potassium chloride, potassium chloride with lidocaine, potassium chloride, potassium chloride, potassium chloride, prochlorperazine, rizatriptan, sodium chloride (PF), sodium phosphate, sodium phosphate, sodium phosphate, sodium phosphate    ALLERGIES:  Allergies   Allergen Reactions     Amoxicillin Hives     Ceclor [Cefaclor Monohydrate] Hives     Hydrocodone-Acetaminophen Nausea     Penicillins Hives     Sulfa Drugs Hives       LABS/STUDIES:  Recent Labs   Lab Test 08/30/19  0633 08/29/19  0423 08/28/19  0441    137 138   POTASSIUM 3.6 3.8 3.2*   CHLORIDE 105 106 109   CO2 23 22 21   ANIONGAP 9 8 8   GLC 89 79 141*   BUN 11 6* 6*   CR 0.67 0.48* 0.52*   ROSA 9.6 8.8 8.4*   WBC 12.2* 19.9* 19.7*   RBC 3.94* 3.87* 3.99*   HGB 12.3* 12.0* 12.2*    280 280       IMAGING:  No new imaging.    ASSESSMENT/PLAN:  Tha Mcghee is a 28 year old with a history of partial epilepsy who was admitted on 8/27 for stereotactic placement of R depth electrodes and R craniotomy for R sided strip electrodes. Neurology consulted for seizure management.     Neuro:  #Partial epilepsy with impairment of consciousness with intractable epilepsy s/p surgical electrode placement  - Continue PTA Vimpat 200-200  - Continue PTA Gabapentin 300 - 300  - Continue PTA Dilantin 160-200  - Continue PTA Topiramate 50 mg BID (for seizures and HAs)  - Continuous EEG  - pain management per NSG  - AED management per epilepsy team  - Stopped PTA depakote      #Post operative pain  #Headaches  - Dilaudid 0.3 - 0.5 PRN --> decrease to 0.2 - 0.4 PRN  - Continue PTA Topiramate 50 mg BID (for seizures and HAs)  - Start compazine IV PRN  - Tylenol  PRN     #Hx of substance abuse     #Depression  - Continue PTA venlafaxine     Respiratory:  #Asthma  - Albuterol PRN     Cardiovascular:  No acute issues.     Renal:  - Electrolyte protocol     Endocrine:  No acute issues.     Heme:  #Normocytic anemia  Hgb 12.2. Likely post operative  - Monitor CBC     GI:  #GERD  - continue PTA Omeprazole 20 daily     - Antiemetics PRN  - Bowel regimen: Miralax PRN     ID:  #Leukocytosis, improving  Likely post-operative and seizure related. Afebrile. No signs of infection  - monitor CBC  - pan-culture if spikes fever     #Irish-op prophylaxis   - Ceftriaxone per NSG     MSK:  #L ankle sprain  - XR L ankle with no fracture     :  #Urinary retention  - Likely due to dilaudid, will lower dose and better manage pain with non-opiates   - Barton placement     FEN: Advance as tolerated to regular diet  DVT prophylaxis: SCDs  Lines: PIV  Code Status: Full     Dispo: Likely 2-3 weeks     Patient discussed with Dr. Dozier.     Lay Willams MD  Neurology PGY2  P: [phone]

## 2019-08-30 NOTE — PLAN OF CARE
D/I: No seizure activity this shift. Neuro intact. Heart rate sinus tachycardia, BP WNL. Sats 95% on RA. Still has a poor appetite. Adequate urine output. Ibuprofen, compazine, dilaudid for head ache.   A: Pt on EEG no seizure activity. VSS.   P: Continue to monitor neuro status and seizure activity. Treat pain with prn meds. Update MD with concerns.
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
## 2019-08-30 NOTE — PLAN OF CARE
4A: PT:  Discharge Planner PT   Patient plan for discharge: Not addressed   Current status: Patient currently with grid limiting mobility to bed to chair. Assessed and gave home exercise program for recent L ankle sprain and discussed need to prevent deconditioning while on prolonged hospital stay. Transfers with SBA throughout session with assistance for lines. Tachycardic with activity up to high 120s to low 130s with all movements.   Barriers to return to prior living situation: medical status   Recommendations for discharge: Home with assistance and possible OP PT to address ankle however this recommendation is pending removal of grid and assessment of mobility and stairs   Rationale for recommendations: Currently is on bedrest and thus mobility cannot be meaningfully assessed. Also unclear how recent L ankle sprain will effect mobility and safety        Entered by: Tatyana Elizabeth 08/30/2019 12:46 PM

## 2019-08-30 NOTE — PROGRESS NOTES
"[facility] - Epilepsy Service Daily Note      Interval History:   Patient had a seizure this morning. Official EEG report pending. No major complaints today. He is up in a chair. HA manageable with prn Compazine.     Review of System:   Denies nausea, vomitting, abdominal pain, dizziness and chest pain.     Medications:   AEDs on admission with most recent levels:  1. Vimpat 200-200 (pending)  2. Depakote [id number] (71, 8.8)  3. Gabapentin 300-300 (1.0)  4. Dilantin 160-200 (14.6, 1.75)  5. Topiramate 50 - 50 (<1.5)      Antiepileptic Medications Current Doses:   1. Vimpat 200-200   2. Gabapentin 300-300   3. Dilantin 160-200  4. Topiramate 50 - 50    Exam: Blood pressure 115/83, pulse 90, temperature 98.1  F (36.7  C), temperature source Oral, resp. rate 18, height 1.727 m (5' 8\"), weight 81.5 kg (179 lb 10.8 oz), SpO2 95 %.  General appearance: No acute distress. Awake, alert and oriented x 4.   Neuro: Pupils are equal, round, and reactive to light, face symmetric, no pronator drift, equal  strength, normal to light touch with no sensory deficits noted, finger to nose normal, no focal deficits noted. Left ankle exam limited due to pain from a recent fall PTA.    Video EE2019: Pending.     2019: IMPRESSION:  Abnormal.  Slowing was seen from the temporal neocortical and the medial depth electrodes.  An active epileptiform focus was seen from the medial points of the amygdala and anterior hippocampal electrode.  There was some asynchrony in the discharges between the amygdala and the anterior hippocampal electrodes.  Epileptiform activity, however, was more persistent in the anterior hippocampal electrodes.  The epileptiform activity was confined to the medial points, suggesting that the epileptogenic zone was in the hippocampus rather than more laterally in the neocortex.  Seizures were not recorded.      Assessment/ Plan: Patient seen and discussed with attending " physician Dr. Calvo.   1. 28 year old right handed male with a h/o depression, anxiety and seizure disorder since 2011 is being admitted for intracranial vEEG monitoring. Patient underwent stereotactic placement of R depth electrodes and R craniotomy for R sided strip electrodes on 8/27. So far we recorded 3 seizures.     - Continue video EGG monitoring  - Seizure precautions  - SCDs while in bed for DVT prevention  - Ativan prn as ordered for seizures  - Discontinue Topamax for seizure induction    Total time: 25 minute was spent in the care of this patient. The patient agrees with the above mentioned plan of care. I answered all the patient's questions and addressed immediate concerns. More than 50% of time spent consisted of counseling and coordinating care, including discussion of the diagnostic significance of EEG findings, anti-seizure medication management, and planning for discharge home.     Deborah Webb, CNP  Neurology

## 2019-08-30 NOTE — PLAN OF CARE
Problem: Adult Inpatient Plan of Care  Goal: Plan of Care Review  Outcome: No Change     D/I: Patient Tad Thon on unit 4A Surgical/Neuro ICU     Neuro: intact, A&Ox4.  Left ankle weaker d/t sprain.  Follows commands and can make needs known.  GRID in place.  No seizures over night.  Attendant at bedside.  CV:Sinus rhythm- sinus tach low 100's.  SBP goal <140 met without PRNs.  Pulm:LS clear throughout.  >95% SpO2 on RA.  GI:Regular diet, but poor appetite.  Nees encouragement for PO fluid intake as well.  Endo:N/A  :Barton catheter in place for urine retention. Adequate UOP.  Skin:GRID and turban in place.   Pain: Pt complains of a constant headache.  Improved throughout shift.   Access:2 PIVs, saline locked  Drains:N/A  Gtt:N/A  Labs/Replacement: N/A      A: Stable    P: Will continue to monitor Pt closely and notify MD of any significant changes.

## 2019-08-31 ENCOUNTER — ALLIED HEALTH/NURSE VISIT (OUTPATIENT)
Dept: NEUROLOGY | Facility: CLINIC | Age: 29
End: 2019-08-31
Attending: PSYCHIATRY & NEUROLOGY
Payer: COMMERCIAL

## 2019-08-31 DIAGNOSIS — G40.219 PARTIAL EPILEPSY WITH IMPAIRMENT OF CONSCIOUSNESS, INTRACTABLE (H): Primary | ICD-10-CM

## 2019-08-31 LAB
ABO + RH BLD: NORMAL
ABO + RH BLD: NORMAL
BLD GP AB SCN SERPL QL: NORMAL
BLOOD BANK CMNT PATIENT-IMP: NORMAL
SPECIMEN EXP DATE BLD: NORMAL

## 2019-08-31 PROCEDURE — 25000132 ZZH RX MED GY IP 250 OP 250 PS 637: Performed by: STUDENT IN AN ORGANIZED HEALTH CARE EDUCATION/TRAINING PROGRAM

## 2019-08-31 PROCEDURE — 25000128 H RX IP 250 OP 636: Performed by: STUDENT IN AN ORGANIZED HEALTH CARE EDUCATION/TRAINING PROGRAM

## 2019-08-31 PROCEDURE — 25000132 ZZH RX MED GY IP 250 OP 250 PS 637: Performed by: NURSE PRACTITIONER

## 2019-08-31 PROCEDURE — 95951 ZZHC EEG VIDEO EACH 24 HR: CPT | Mod: ZF

## 2019-08-31 PROCEDURE — 25000132 ZZH RX MED GY IP 250 OP 250 PS 637: Performed by: PSYCHIATRY & NEUROLOGY

## 2019-08-31 PROCEDURE — 86901 BLOOD TYPING SEROLOGIC RH(D): CPT | Performed by: STUDENT IN AN ORGANIZED HEALTH CARE EDUCATION/TRAINING PROGRAM

## 2019-08-31 PROCEDURE — 86900 BLOOD TYPING SEROLOGIC ABO: CPT | Performed by: STUDENT IN AN ORGANIZED HEALTH CARE EDUCATION/TRAINING PROGRAM

## 2019-08-31 PROCEDURE — 86850 RBC ANTIBODY SCREEN: CPT | Performed by: STUDENT IN AN ORGANIZED HEALTH CARE EDUCATION/TRAINING PROGRAM

## 2019-08-31 PROCEDURE — 20000004 ZZH R&B ICU UMMC

## 2019-08-31 PROCEDURE — 36415 COLL VENOUS BLD VENIPUNCTURE: CPT | Performed by: STUDENT IN AN ORGANIZED HEALTH CARE EDUCATION/TRAINING PROGRAM

## 2019-08-31 PROCEDURE — 40000141 ZZH STATISTIC PERIPHERAL IV START W/O US GUIDANCE

## 2019-08-31 RX ORDER — LACOSAMIDE 100 MG/1
100 TABLET ORAL 2 TIMES DAILY
Status: DISCONTINUED | OUTPATIENT
Start: 2019-08-31 | End: 2019-09-01

## 2019-08-31 RX ADMIN — GABAPENTIN 300 MG: 100 CAPSULE ORAL at 08:25

## 2019-08-31 RX ADMIN — CYCLOBENZAPRINE 10 MG: 10 TABLET, FILM COATED ORAL at 20:49

## 2019-08-31 RX ADMIN — PHENYTOIN SODIUM 200 MG: 100 CAPSULE ORAL at 20:49

## 2019-08-31 RX ADMIN — SENNOSIDES AND DOCUSATE SODIUM 1 TABLET: 8.6; 5 TABLET ORAL at 08:26

## 2019-08-31 RX ADMIN — IBUPROFEN 600 MG: 600 TABLET ORAL at 14:33

## 2019-08-31 RX ADMIN — RIZATRIPTAN BENZOATE 10 MG: 10 TABLET ORAL at 22:57

## 2019-08-31 RX ADMIN — SENNOSIDES AND DOCUSATE SODIUM 1 TABLET: 8.6; 5 TABLET ORAL at 20:49

## 2019-08-31 RX ADMIN — PROCHLORPERAZINE EDISYLATE 10 MG: 5 INJECTION INTRAMUSCULAR; INTRAVENOUS at 18:28

## 2019-08-31 RX ADMIN — VENLAFAXINE HYDROCHLORIDE 225 MG: 150 CAPSULE, EXTENDED RELEASE ORAL at 08:26

## 2019-08-31 RX ADMIN — Medication 0.2 MG: at 20:49

## 2019-08-31 RX ADMIN — LACOSAMIDE 200 MG: 200 TABLET, FILM COATED ORAL at 08:26

## 2019-08-31 RX ADMIN — MELATONIN 5000 UNITS: at 21:07

## 2019-08-31 RX ADMIN — PROCHLORPERAZINE EDISYLATE 10 MG: 5 INJECTION INTRAMUSCULAR; INTRAVENOUS at 09:57

## 2019-08-31 RX ADMIN — PROCHLORPERAZINE EDISYLATE 10 MG: 5 INJECTION INTRAMUSCULAR; INTRAVENOUS at 04:02

## 2019-08-31 RX ADMIN — LACOSAMIDE 100 MG: 100 TABLET, FILM COATED ORAL at 20:49

## 2019-08-31 RX ADMIN — GABAPENTIN 300 MG: 100 CAPSULE ORAL at 20:49

## 2019-08-31 RX ADMIN — IBUPROFEN 600 MG: 600 TABLET ORAL at 08:26

## 2019-08-31 RX ADMIN — THERA TABS 1 TABLET: TAB at 08:26

## 2019-08-31 RX ADMIN — SODIUM CHLORIDE 500 ML: 9 INJECTION, SOLUTION INTRAVENOUS at 12:53

## 2019-08-31 RX ADMIN — Medication 0.2 MG: at 08:23

## 2019-08-31 RX ADMIN — OXYCODONE HYDROCHLORIDE 10 MG: 5 TABLET ORAL at 23:08

## 2019-08-31 RX ADMIN — CYCLOBENZAPRINE 10 MG: 10 TABLET, FILM COATED ORAL at 08:26

## 2019-08-31 RX ADMIN — PHENYTOIN SODIUM 160 MG: 100 CAPSULE ORAL at 08:25

## 2019-08-31 RX ADMIN — CYCLOBENZAPRINE 10 MG: 10 TABLET, FILM COATED ORAL at 14:31

## 2019-08-31 RX ADMIN — OMEPRAZOLE 20 MG: 20 CAPSULE, DELAYED RELEASE ORAL at 08:26

## 2019-08-31 RX ADMIN — CEFTRIAXONE SODIUM 2 G: 2 INJECTION, POWDER, FOR SOLUTION INTRAMUSCULAR; INTRAVENOUS at 08:32

## 2019-08-31 ASSESSMENT — VISUAL ACUITY
OU: BASELINE

## 2019-08-31 ASSESSMENT — ACTIVITIES OF DAILY LIVING (ADL)
ADLS_ACUITY_SCORE: 10.5
ADLS_ACUITY_SCORE: 15
ADLS_ACUITY_SCORE: 15
ADLS_ACUITY_SCORE: 10.5
ADLS_ACUITY_SCORE: 10.5
ADLS_ACUITY_SCORE: 15

## 2019-08-31 ASSESSMENT — PAIN DESCRIPTION - DESCRIPTORS
DESCRIPTORS: HEADACHE

## 2019-08-31 NOTE — PLAN OF CARE
No seizures overnight.    Pt neurologically intact, no issues. SIMENTAL, LL ankle sprained. Independently repositions. Headache/pain managed with compazine x2, dilaudid x1, and ibuprofen x1.  Bedside sitter for safety. EEG monitoring continuous. Seizure pad on. VSS. Sinus rhythm/sinus tach. HR 70-100s. Afebrile. LS clear. Low urine output d/t poor oral intake, MDs paged, no interventions placed. Head incision, covered-CDI, 2 PIVs        Continue to monitor pt closely. Notify MD of significant changes.

## 2019-08-31 NOTE — PROCEDURES
"TYPE OF STUDY:  Inpatient video EEG monitoring (intracranial electrodes).      EEG #-3       DATE OF RECORDING/SERVICE DATE:  08/29/2019     SOURCE FILE DURATION:  23 hours, 51 minutes, 28 seconds      HISTORY:  Day #3 of video-EEG monitoring in Tha Mcghee, a 28-year-old with medically intractable epilepsy being evaluated for potential resective surgery.  Scalp video EEG monitoring has shown right hemispheric seizures that are probably right temporal onset.  During this study, he was being treated with gabapentin, lacosamide, phenytoin and topiramate.  Divalproex had been discontinued to facilitate recording of seizures.      TECHNICAL SUMMARY:  EEG was recorded from a series of intracranial electrodes, all placed in the right hemisphere. Fourteen-point depth electrodes were placed in the right amygdala, right anterior hippocampus, and under the right posterior hippocampus.  Six electrode strips were placed in the right prefrontal and right inferior frontal cortex.  A 2 x 16 frontal \"mini grid\" was placed between the 2 frontal electrodes.  Four-electrode, superior posterior temporal, middle posterior temporal, and inferior posterior temporal strips were placed.  Finally, a 6-electrode frontotemporal strip was placed with point 1 in the posterior temporal regions close to an area of focal cortical dysplasia and point 6 in the inferior posterior frontal cortex.  Video was continuously recorded.  Video was reviewed for clinical correlation and to assist with EEG interpretation.      FINDINGS:  A mixture of alpha and theta is seen in the interictal record.  Delta range slowing was noted in the deeper points of the amygdala, anterior hippocampal and posterior hippocampal electrodes.  Delta range slowing also was noted in points 3 and 4 of the inferior posterior temporal and middle posterior temporal strip electrodes      OTHER INTERICTAL ABNORMALITIES:  An active epileptiform focus was noted from the deep " points of the anterior hippocampal electrode, points 1-4.  Occasionally, the deep 3 points of the amygdala electrode and the deep 5 points of the posterior hippocampal electrode were engaged as well.  Epileptiform discharges were not seen in other recordings, at least in the available samples.      ICTAL:  A focal impaired seizure was noted starting clinically, possibly as early as 08:54:56 a.m., when patient coughed.  Seizure clearly started at 08:55:11 with extension of the right index finger and then mild tonic movements of both arms.  The patient was amnestic.  He relaxed at the end of the seizure and then was quite agitated, trying to get out of bed.  EEG onset at 08:54:49.  Repetitive epileptiform discharges and then polyspikes were noted at anterior hippocampal electrodes 1-4, posterior hippocampal electrodes 1-4, and amygdala electrode points 1-4.  At 08:55L02, seizure discharge moved to frontotemporal electrode 1-4, and all points of the superior posterior temporal and middle and posterior temporal electrode as well as the inferior electrodes of the grid and the inferior frontal strip.  Initially these discharges were poorly formed sharp waves.  Better defined sharp waves were noted at around 08:55:19.  By this point, the polyspike discharge in the hippocampal electrodes had given way to repeated sharp waves.  Sharp waves in hippocampus in other portions of the brain were asynchronous.  Seizure spread to most electrodes recording, but did not involve the premotor frontal electrodes.  Seizure terminated at 8:55:43 a.m.      A subtle focal seizure was recorded with possible clinical onset at 02:03:38 p.m. with some head movement.  Some irregular leg movement was noted short time afterwards.  There were no other obvious movements or automatisms, and patient was not examined.  EEG-wise, onset at 02:03:30 p.m.  Repeated polyspikes were noted, engaging points 1-6 of the anterior hippocampal electrode.  Within 200  milliseconds or so, well-defined theta discharge was also seen in points 1-7 of the posterior hippocampal electrode and 1-7 of the anterior hippocampal electrode.  This gave way to rhythmic spikes.  These persisted in the medial temporal electrodes, but lateral temporal discharge was seen in the posterior hippocampal electrode 13 and 14, starting at 02:04:00.  Seizure also spread to the frontotemporal electrodes 1-4 and the prefrontal electrode #5.  Rhythmic spikes and polyspikes were noted.  Seizure discharge also spread to the inferior frontal electrode #6 and the most inferior electrodes of the grid.  Seizure terminated at 02:04:15.      IMPRESSION:  Abnormal.  An active epileptiform focus is seen from the medial points of the right anterior and posterior hippocampus with variable engagement of the right amygdala.  A focal impaired seizure and a subtle focal seizure are recorded, both originating from the medial points of the anterior hippocampal, posterior hippocampal and amygdala electrodes.  They spread to lateral temporal regions and inferior frontal regions.  The EEG onset precedes clinical onset by at least 8 seconds and possibly more in both cases.      Patient's father was in attendance with the first seizure and confirmed that this was a target seizure.         SANTHOSH BORJA MD             D: 2019   T: 2019   MT: KERRY      Name:     SANTHOSH HODGES   MRN:      [id number]        Account:        [id number]   :      1990           Procedure Date: 2019      Document: [id number]

## 2019-08-31 NOTE — PLAN OF CARE
D/I: Pt neuro intact. No signs of seizures. Vital signs stable. Afebrile. Sats 100% on room air. Eating small amounts of food. Not much of an appetite. NS 500cc bolus for low UO.   A: Pt on continuous EEG monitoring. No seizures this shift.   P: Seizure meds being weaned down. Monitor for neuro changes. Update MD with concerns.

## 2019-08-31 NOTE — PROGRESS NOTES
"NEUROSCIENCE INTENSIVE CARE PROGRESS NOTE    2019    Tha Mcghee is a 28 year old year old male admitted on 2019 for stereotactic placement of R depth electrodes and R craniotomy for R sided strip electrodes.      24 HOUR EVENTS:  No seizures overnight. Headaches seem well controlled. Pt reports pain in jaw with eating, thus has been eating less.     24 HOUR VITAL SIGNS SUMMARY:   Temperatures:  Current - Temp: 97.8  F (36.6  C); Max - Temp  Av.9  F (36.6  C)  Min: 97.5  F (36.4  C)  Max: 98.4  F (36.9  C)  Respiration range: Resp  Av.5  Min: 15  Max: 18  Pulse range: No data recorded  Blood pressure range: Systolic (24hrs), Av , Min:109 , Max:145   ; Diastolic (24hrs), Av, Min:65, Max:99    Pulse oximetry range: SpO2  Av.6 %  Min: 93 %  Max: 99 %    PHYSICAL EXAMINATION:   /69   Pulse 90   Temp 97.8  F (36.6  C) (Axillary)   Resp 18   Ht 1.727 m (5' 8\")   Wt 81.5 kg (179 lb 10.8 oz)   SpO2 96%   BMI 27.32 kg/m    General: Pt laying comfortably in bed, not in acute distress  HEENT: head wrapped, No oral ulcers, MMM  Chest: No respiratory distress  Heart: Regular rate and rhythm  Abdomen: Soft, non-tender  Extremities: Warm and well perfused, L foot slightly swollen/bruised from injury prior to admission     Neuro: Remains unchanged. Awake, oriented x 3, follows commands, responds to questions. Speech is clear. PERRL. EOMI, gaze conjugate, face symmetric, no abnormal movements, moving all 4 extremities, sensation intact to light touch in all 4 extremities.    CURRENT MEDICATIONS:    cefTRIAXone  2 g Intravenous Q24H     cyclobenzaprine  10 mg Oral or Feeding Tube TID     gabapentin  300 mg Oral BID     lacosamide  200 mg Oral BID     multivitamin, therapeutic  1 tablet Oral or Feeding Tube Daily     omeprazole  20 mg Oral QAM     phenytoin  160 mg Oral QAM     phenytoin  200 mg Oral QPM     senna-docusate  1 tablet Oral or Feeding Tube BID     sodium chloride (PF)  3 " mL Intracatheter Q8H     venlafaxine  225 mg Oral QAM     cholecalciferol  5,000 Units Oral or Feeding Tube At Bedtime       PRN MEDICATIONS:  albuterol, hydrALAZINE, HYDROmorphone, ibuprofen, labetalol, lidocaine 4%, lidocaine, lidocaine (buffered or not buffered), LORazepam, magnesium sulfate, magnesium sulfate, naloxone, ondansetron **OR** ondansetron, oxyCODONE, polyethylene glycol, potassium chloride, potassium chloride with lidocaine, potassium chloride, potassium chloride, potassium chloride, prochlorperazine, rizatriptan, sodium chloride (PF), sodium phosphate, sodium phosphate, sodium phosphate, sodium phosphate    INFUSIONS:      ALLERGIES:  Allergies   Allergen Reactions     Amoxicillin Hives     Ceclor [Cefaclor Monohydrate] Hives     Hydrocodone-Acetaminophen Nausea     Penicillins Hives     Sulfa Drugs Hives       LABS/STUDIES:  Recent Labs   Lab Test 08/30/19  0633 08/29/19  0423 08/28/19  0441    137 138   POTASSIUM 3.6 3.8 3.2*   CHLORIDE 105 106 109   CO2 23 22 21   ANIONGAP 9 8 8   GLC 89 79 141*   BUN 11 6* 6*   CR 0.67 0.48* 0.52*   ROSA 9.6 8.8 8.4*   WBC 12.2* 19.9* 19.7*   RBC 3.94* 3.87* 3.99*   HGB 12.3* 12.0* 12.2*    280 280       IMAGING:  No new imaging.     ASSESSMENT/PLAN:  Tha Mcghee is a 28 year old with a history of partial epilepsy who was admitted on 8/27 for stereotactic placement of R depth electrodes and R craniotomy for R sided strip electrodes. Neurology consulted for seizure management.     Neuro:  #Partial epilepsy with impairment of consciousness with intractable epilepsy s/p surgical electrode placement  - Continue PTA Vimpat 200-200  - Continue PTA Gabapentin 300 - 300  - Continue PTA Dilantin 160-200  - Continuous EEG  - AED management per epilepsy team  - Stopped PTA depakote   - Stopped PTA Topiramate (seems to be for seizures and HAs)     #Post operative pain  #Headaches  - Stopped PTA Topiramate (seems to be for seizures and HAs)  - Dilaudid 0.2 -  0.4 PRN  - Compazine IV PRN  - Tylenol PRN     #Hx of substance abuse     #Depression  - Continue PTA venlafaxine     Respiratory:  #Asthma  - Albuterol PRN     Cardiovascular:  No acute issues.     Renal:  - Electrolyte protocol     Endocrine:  No acute issues.     Heme:  #Normocytic anemia, stable  - Monitor CBC     GI:  #GERD  - continue PTA Omeprazole 20 daily    #Diet  - Jaw pain with eating so decreased PO  -  ml bolus x 1    - Antiemetics PRN  - Bowel regimen: Miralax PRN     ID:  #Leukocytosis, improving  Likely post-operative and seizure related. Afebrile. No signs of infection  - monitor CBC  - pan-culture if spikes fever     #Irish-op prophylaxis   - Ceftriaxone per NSG     MSK:  #L ankle sprain  - XR L ankle with no fracture     :  #Urinary retention  - May be due to dilaudid vs poor fluid intake   - Barton  - Monitor I/O     FEN: Advance as tolerated to regular diet  DVT prophylaxis: SCDs  Lines: PIV  Code Status: Full     Dispo: Likely 2-3 weeks     Patient discussed with Dr. Dozier.     Lay Willams MD  Neurology PGY2  P: [phone]

## 2019-08-31 NOTE — PROGRESS NOTES
"S: eeg read from 8/29 concerning for seizure. Decreased on topamax yesterday.    O:  Temp:  [97.5  F (36.4  C)-98.4  F (36.9  C)] 97.5  F (36.4  C)  Heart Rate:  [] 100  Resp:  [15-18] 18  BP: (109-139)/(62-99) 114/83  SpO2:  [93 %-99 %] 98 %    Exam:  General: Awake;  Alert, In No Acute Distress  Pulm: Breathing Comfortably on room air  Mental status: Oriented x 3  Cranial Nerves: Cranial Nerves II-XII Intact Bilaterally  Strength:      Del Tr Bi WE WF Gr  R 5 5 5 5 5 5  L 5 5 5 5 5 5     HF KE KF DF PF   R 5 5 5 5 5   L 5 5 5 5 5     Pronator Drift: Absent  Sensory: Intact to Light Touch  INCISION: Head Wrap Intact; No Drainage Present.     Assessment:   28 year old male with history of epilepsy s/p  MAGAN Assisted Stereotactic Placement Of Right Sided Depth Electrodes. Awaiting seizure localization.     Plan:     Neuro:   Anti-epileptics:continue home Depakote, Dilantin, Vimpat, Neurontin, Topiramate   Appreciate assistance of Epilepsy team    Cardiovascular: blood pressure goals: SBP < 140    Pulmonary:   Incentive spirometry     Gastrointestinal: Regular Diet    Renal:Voiding      Heme:   Maintain INR < 1.4; Platelet > 100K; Fibrinogen > 200, Hemoglobin > 8    Endocrine: No issues    Infectious: IV Ceftriaxone 2G Q 24 HR while leads are in place; Leukocytosis Improving; Continue to Monitor    DVT prophylaxis: Sequential compression devices to Bilateral Lower Extremities     Ulcer prophylaxis: Not Indicated    DISPO:  4A     Barriers:  Seizure localization     Doan \"Jose Rafael\" MD [place]   Neurosurgery, PGY-3    Please contact neurosurgery resident on call with questions.    Dial * * [phone], enter [phone] when prompted.     "

## 2019-08-31 NOTE — PROCEDURES
"EEG #-4     TYPE OF STUDY: Inpatient video-EEG monitoring (intracranial electrodes)     DATE OF RECORDING/SERVICE DATE:  08/30/2019       SOURCE FILE DURATION:  23 hours 56 minutes and 52 seconds.      HISTORY:  Day #4 of video-EEG monitoring, inpatient (infracranial electrodes).  Tha Mcghee, a 28-year-old with medically intractable epilepsy being evaluated for potential resective surgery.  Scalp video EEG monitoring has shown right hemispheric seizures that are probably right temporal in onset.  During this study, he was being treated with gabapentin, lacosamide and phenytoin.  Topiramate and divalproex had been discontinued to facilitate recording of seizures.      TECHNICAL SUMMARY:  EEG was recorded from series of intracranial electrodes all placed in the right hemisphere. A 14-point depth electrode was placed in the right amygdala, right anterior hippocampus, and under the right posterior hippocampus.  Six electrode strips were placed in the right prefrontal and right inferior frontal cortex.  A 2 x 16 frontal \"mini grid\" was placed between the 2 frontal electrodes.  Four electrode strips were placed in the superior posterior temporal, middle to posterior temporal, and inferior posterior temporal regions.  Finally, a 6-electrode frontotemporal strip was placed with 0.1 in the posterior temporal regions close to an area of focal cortical dysplasia and 0.6 in the inferior posterior frontal cortex.  Video was continuously recorded.  Video was reviewed for clinical correlation and to assist with EEG interpretation.      FINDINGS:  A mixture of alpha and theta noted.  Slow activity predominates in the inferior posterior temporal electrode and the lateral points of the posterior hippocampal electrode      OTHER INTERICTAL ABNORMALITIES:  An active epileptiform focus continues from anterior hippocampal points 1, 2, 3 and 4.  Lower amplitude reflections are seen at times from posterior hippocampal depth " electrode points 3, 4 and 5 and amygdala electrodes point 1 and 2.  Epileptiform activity or is not seen in other points in the available samples.      ICTAL:  A subtle focal seizure is recorded with clinical onset at 07:27:14 a.m.  The patient had been resting prior to this.  There is head movement from midline to right together with elevation of the left leg.  The elevation of the left leg continues until termination of the organized discharge at which point it stops.  EEG onset is at 07:27:09 with a polyspike and then rhythmic spiky beta at anterior hippocampal points 1 through 5, posterior hippocampal points 1 through 6 and possibly amygdala points 1 through 5.  Seizure discharge is largely confined to these regions and takes the form of rhythmic spiky beta which then gives way to rhythmic polyspikes with interruptions and then spikes with subsequent brief beta bursts which become less and less persistent.  Persistent organized discharge ceases at around 07:27:28, as the patient's leg comes down.  However, periodic spike followed by rhythmic alpha bursts lasting up to 2 seconds occur every 4 seconds or so until 07:28:33 a.m.  Clinically, following determine about 20 seconds following the termination of these, patient arouses and fidgets with blankets and oximeter.  There is no organized spread to other portions of the brain.  At 07:27:26, semi-rhythmic sharp activity is noted.  Semi-rhythmic sharp theta in is noted from the temporal points 2 through 4, superior posterior temporal points 1 through 4, middle to posterior temporal points 1 through 4, inferior frontotemporal point 6 and portions of the inferior grid.  These are not really synchronous with the periodic sharp activity that is occurring in the hippocampal electrodes at this point and gradually tez at 7:27:42 a.m. while intermittent rhythmic discharges continue in the hippocampal electrodes.      IMPRESSION:  Abnormal.  Active epileptiform focus  recorded in the right medial hippocampal and to a lesser extent amygdala depth electrodes.  No epileptiform discharges in other recording electrodes.  A subtle focal seizure is recorded with onset in the medial points of the right anterior and posterior hippocampal electrodes and minimal involvement of other portions of the brain.  EEG onset precedes clinical onset by 5 seconds in this seizure.         SANTHOSH BORJA MD             D: 2019   T: 2019   MT:       Name:     MEKAON, SANTHOSH   MRN:      [id number]        Account:        [id number]   :      1990           Procedure Date: 2019      Document: [id number]

## 2019-09-01 ENCOUNTER — ALLIED HEALTH/NURSE VISIT (OUTPATIENT)
Dept: NEUROLOGY | Facility: CLINIC | Age: 29
End: 2019-09-01
Attending: PSYCHIATRY & NEUROLOGY
Payer: COMMERCIAL

## 2019-09-01 DIAGNOSIS — G40.209 LOCALIZATION-RELATED FOCAL EPILEPSY WITH COMPLEX PARTIAL SEIZURES (H): Primary | ICD-10-CM

## 2019-09-01 LAB
ANION GAP SERPL CALCULATED.3IONS-SCNC: 8 MMOL/L (ref 3–14)
BUN SERPL-MCNC: 12 MG/DL (ref 7–30)
CALCIUM SERPL-MCNC: 9 MG/DL (ref 8.5–10.1)
CHLORIDE SERPL-SCNC: 106 MMOL/L (ref 94–109)
CO2 SERPL-SCNC: 23 MMOL/L (ref 20–32)
CREAT SERPL-MCNC: 0.58 MG/DL (ref 0.66–1.25)
ERYTHROCYTE [DISTWIDTH] IN BLOOD BY AUTOMATED COUNT: 12.4 % (ref 10–15)
GFR SERPL CREATININE-BSD FRML MDRD: >90 ML/MIN/{1.73_M2}
GLUCOSE SERPL-MCNC: 91 MG/DL (ref 70–99)
HCT VFR BLD AUTO: 36.8 % (ref 40–53)
HGB BLD-MCNC: 12.2 G/DL (ref 13.3–17.7)
MAGNESIUM SERPL-MCNC: 2 MG/DL (ref 1.6–2.3)
MCH RBC QN AUTO: 31.2 PG (ref 26.5–33)
MCHC RBC AUTO-ENTMCNC: 33.2 G/DL (ref 31.5–36.5)
MCV RBC AUTO: 94 FL (ref 78–100)
PHOSPHATE SERPL-MCNC: 3.9 MG/DL (ref 2.5–4.5)
PLATELET # BLD AUTO: 347 10E9/L (ref 150–450)
POTASSIUM SERPL-SCNC: 3.8 MMOL/L (ref 3.4–5.3)
RBC # BLD AUTO: 3.91 10E12/L (ref 4.4–5.9)
SODIUM SERPL-SCNC: 137 MMOL/L (ref 133–144)
WBC # BLD AUTO: 7.6 10E9/L (ref 4–11)

## 2019-09-01 PROCEDURE — 20000004 ZZH R&B ICU UMMC

## 2019-09-01 PROCEDURE — 25000132 ZZH RX MED GY IP 250 OP 250 PS 637: Performed by: STUDENT IN AN ORGANIZED HEALTH CARE EDUCATION/TRAINING PROGRAM

## 2019-09-01 PROCEDURE — 25000128 H RX IP 250 OP 636: Performed by: STUDENT IN AN ORGANIZED HEALTH CARE EDUCATION/TRAINING PROGRAM

## 2019-09-01 PROCEDURE — 83735 ASSAY OF MAGNESIUM: CPT | Performed by: STUDENT IN AN ORGANIZED HEALTH CARE EDUCATION/TRAINING PROGRAM

## 2019-09-01 PROCEDURE — 25000132 ZZH RX MED GY IP 250 OP 250 PS 637: Performed by: PSYCHIATRY & NEUROLOGY

## 2019-09-01 PROCEDURE — 80048 BASIC METABOLIC PNL TOTAL CA: CPT | Performed by: STUDENT IN AN ORGANIZED HEALTH CARE EDUCATION/TRAINING PROGRAM

## 2019-09-01 PROCEDURE — 25000132 ZZH RX MED GY IP 250 OP 250 PS 637: Performed by: NURSE PRACTITIONER

## 2019-09-01 PROCEDURE — 36415 COLL VENOUS BLD VENIPUNCTURE: CPT | Performed by: STUDENT IN AN ORGANIZED HEALTH CARE EDUCATION/TRAINING PROGRAM

## 2019-09-01 PROCEDURE — 85027 COMPLETE CBC AUTOMATED: CPT | Performed by: STUDENT IN AN ORGANIZED HEALTH CARE EDUCATION/TRAINING PROGRAM

## 2019-09-01 PROCEDURE — 95951 ZZHC EEG VIDEO EACH 24 HR: CPT | Mod: ZF

## 2019-09-01 PROCEDURE — 84100 ASSAY OF PHOSPHORUS: CPT | Performed by: STUDENT IN AN ORGANIZED HEALTH CARE EDUCATION/TRAINING PROGRAM

## 2019-09-01 RX ORDER — AMOXICILLIN 250 MG
2 CAPSULE ORAL 2 TIMES DAILY
Status: DISCONTINUED | OUTPATIENT
Start: 2019-09-01 | End: 2019-09-13 | Stop reason: HOSPADM

## 2019-09-01 RX ORDER — BISACODYL 10 MG
10 SUPPOSITORY, RECTAL RECTAL DAILY PRN
Status: DISCONTINUED | OUTPATIENT
Start: 2019-09-01 | End: 2019-09-13 | Stop reason: HOSPADM

## 2019-09-01 RX ORDER — POLYETHYLENE GLYCOL 3350 17 G/17G
17 POWDER, FOR SOLUTION ORAL 3 TIMES DAILY
Status: DISCONTINUED | OUTPATIENT
Start: 2019-09-01 | End: 2019-09-13 | Stop reason: HOSPADM

## 2019-09-01 RX ADMIN — OXYCODONE HYDROCHLORIDE 10 MG: 5 TABLET ORAL at 13:53

## 2019-09-01 RX ADMIN — Medication 0.2 MG: at 23:08

## 2019-09-01 RX ADMIN — MELATONIN 5000 UNITS: at 21:13

## 2019-09-01 RX ADMIN — PHENYTOIN SODIUM 200 MG: 100 CAPSULE ORAL at 21:13

## 2019-09-01 RX ADMIN — THERA TABS 1 TABLET: TAB at 07:32

## 2019-09-01 RX ADMIN — CYCLOBENZAPRINE 10 MG: 10 TABLET, FILM COATED ORAL at 13:53

## 2019-09-01 RX ADMIN — GABAPENTIN 300 MG: 100 CAPSULE ORAL at 07:31

## 2019-09-01 RX ADMIN — GABAPENTIN 300 MG: 100 CAPSULE ORAL at 21:12

## 2019-09-01 RX ADMIN — CYCLOBENZAPRINE 10 MG: 10 TABLET, FILM COATED ORAL at 07:32

## 2019-09-01 RX ADMIN — CEFTRIAXONE SODIUM 2 G: 2 INJECTION, POWDER, FOR SOLUTION INTRAMUSCULAR; INTRAVENOUS at 07:44

## 2019-09-01 RX ADMIN — IBUPROFEN 600 MG: 600 TABLET ORAL at 19:00

## 2019-09-01 RX ADMIN — PROCHLORPERAZINE EDISYLATE 10 MG: 5 INJECTION INTRAMUSCULAR; INTRAVENOUS at 02:15

## 2019-09-01 RX ADMIN — SENNOSIDES AND DOCUSATE SODIUM 1 TABLET: 8.6; 5 TABLET ORAL at 07:32

## 2019-09-01 RX ADMIN — CYCLOBENZAPRINE 10 MG: 10 TABLET, FILM COATED ORAL at 21:12

## 2019-09-01 RX ADMIN — OMEPRAZOLE 20 MG: 20 CAPSULE, DELAYED RELEASE ORAL at 07:33

## 2019-09-01 RX ADMIN — MAGNESIUM HYDROXIDE 30 ML: 400 SUSPENSION ORAL at 14:46

## 2019-09-01 RX ADMIN — Medication 0.2 MG: at 07:35

## 2019-09-01 RX ADMIN — SENNOSIDES AND DOCUSATE SODIUM 2 TABLET: 8.6; 5 TABLET ORAL at 21:13

## 2019-09-01 RX ADMIN — PROCHLORPERAZINE EDISYLATE 10 MG: 5 INJECTION INTRAMUSCULAR; INTRAVENOUS at 21:13

## 2019-09-01 RX ADMIN — LACOSAMIDE 100 MG: 100 TABLET, FILM COATED ORAL at 07:32

## 2019-09-01 RX ADMIN — POLYETHYLENE GLYCOL 3350 17 G: 17 POWDER, FOR SOLUTION ORAL at 14:46

## 2019-09-01 RX ADMIN — PHENYTOIN SODIUM 160 MG: 100 CAPSULE ORAL at 07:32

## 2019-09-01 RX ADMIN — OXYCODONE HYDROCHLORIDE 10 MG: 5 TABLET ORAL at 07:32

## 2019-09-01 RX ADMIN — POLYETHYLENE GLYCOL 3350 17 G: 17 POWDER, FOR SOLUTION ORAL at 21:12

## 2019-09-01 RX ADMIN — IBUPROFEN 600 MG: 600 TABLET ORAL at 07:32

## 2019-09-01 RX ADMIN — VENLAFAXINE HYDROCHLORIDE 225 MG: 150 CAPSULE, EXTENDED RELEASE ORAL at 07:33

## 2019-09-01 RX ADMIN — OXYCODONE HYDROCHLORIDE 10 MG: 5 TABLET ORAL at 23:08

## 2019-09-01 RX ADMIN — OXYCODONE HYDROCHLORIDE 10 MG: 5 TABLET ORAL at 19:00

## 2019-09-01 RX ADMIN — IBUPROFEN 600 MG: 600 TABLET ORAL at 13:53

## 2019-09-01 ASSESSMENT — PAIN DESCRIPTION - DESCRIPTORS
DESCRIPTORS: HEADACHE

## 2019-09-01 ASSESSMENT — ACTIVITIES OF DAILY LIVING (ADL)
ADLS_ACUITY_SCORE: 10.5

## 2019-09-01 NOTE — PLAN OF CARE
No observed seizures overnight.     Pt neurologically intact, no issues. SIMENTAL, LL ankle sprained. Independently repositions. Headache/pain managed with compazine x 1, dilaudid x1, and ibuprofen x1. Pt does twitch and move spontaneously when sleeping, but does not appear to be having a seizure episode/ Bedside sitter for safety. EEG monitoring continuous.Seizure pads on. Seizure. VSS. Sinus rhythm/sinus tach. HR 70-100s. Afebrile. LS clear. Improved urine output. Both PIVs removed d/t infiltration, replaced with 1 PIV. Old PIV sites hard to the touch and slightly reddened.      Will continue to monitor pt closely. Notify MD of significant changes.

## 2019-09-01 NOTE — PROGRESS NOTES
NEUROLOGY/EPILEPSY ATTENDING NOTE    No seizures. Last seizure approximately 730 AM 8/30/2019. Head pain under reasonable control.  Still not voiding spontaneously, has catheter in place.    AEDs: Phenytoin 360 mg/d  (no change since admission); lacosamide 200 mg/d  (down from 400 mg/d on admission); topiramate 0 (100 mg/d on admission); divalproex 0 (2500 gm/d on admission).  Levels on admission: phenytoin 14.6/1.75, valproic acid 71/8.8, topiramate  <1.5.    EXAM:  BP fluctuate between nl and mild hypertensin. Afebrile.    Alert, fluent language. VFF. EOMI without nystagmus. Smile symmetrical. No drift, pronation, or tremor. Rapid fine movements done well. Distal foot strength good. Neck is supple. No calf swelling.    EEG: Continues with active right medial anterior and posterior hippocampal epileptiform activity. Occasoinal epileptiform discharges noted from lateral points of posterior hippocampal electrode mid day yesterday but not seen overnite. Thus far has three seizures; last about 48 hours ago. One was clear focal impaired seizure with obvious automatisms and impairment. Two were focal subtle seizures in which seizure discharge was associated with arousal or other subtle movements but which were not detected clinically at the time and so impairment was not demonstrated. All seizures emerge from medial five points of atherior and posterior hippocampal electrode. EEG onset precedes clinical onset by 5 to 22 seconds.    IMPRESSION:  1) Focal epilepsy; focal seizures of medial temporal onset. Thus far all seizures emerging from right hippocampus. 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 Epileptiform activity from lateral posterior hippocampal point may be related to this region but this appears infrequent.Would like at least two more seizures with obvious impairment to confirm that seizure onset area is consistent.   2) Small right subdural. No clinical consequences.  Appears to be tolerating electrodes well.  3) Head pain does not appear any worse off topiramate.     PLAN:  1) Continue video-EEG monitoring. Would like at least two further seizures with obvious impairment.  2) Stop lacosamide.    Discussed with family. They had multiple questions today focusing on where memory was distributed in his brain, disability issues and how soon he would be able to hunt following his surgery. These were answered to the best of our ability; referred to neurosurgery for discussion of the last issue.    Tha Calvo MD

## 2019-09-01 NOTE — PROCEDURES
"EEG #-5       DATE OF RECORDING/SERVICE DATE:  08/31/2019.       TYPE OF STUDY:  Inpatient video EEG monitoring (intracranial electrodes).      SOURCE FILE DURATION:  23 hours, 54 minutes, 25 seconds.      HISTORY:  Day 5 of video EEG monitoring in Tha Mcghee, a 28-year-old with medically intractable epilepsy being evaluated for potential resective surgery.  Scalp video EEG monitoring has shown right hemispheric seizures that are probably right temporal in onset.  During this study, he was being treated with his home doses of gabapentin and phenytoin; lacosamide had been reduced to 200 mg per day.  Topiramate and valproate had been discontinued to facilitate recording of seizures.      TECHNICAL SUMMARY:  EEG was recorded from a series of intracranial electrodes, all placed in the right hemisphere.  A 14-point depth electrodes were placed in the right amygdala, right anterior hippocampus, and under the right posterior hippocampus.  Six electrode strips were placed in the right prefrontal and right inferior frontal cortex.  A 2 x 16 frontal \"mini grid\" was placed between the 2 frontal electrodes.  Four electrode strips were placed in the superior posterior temporal, middle posterior temporal, and inferior posterior temporal regions.  Finally, a 6-electrode frontotemporal strip was placed with 0.1 in the posterior temporal regions close to an area of focal cortical dysplasia and point 6  in the inferior posterior frontal cortex.  Video was continuously recorded.  Video was reviewed for clinical correlation and to assist with EEG interpretation.      FINDINGS:  A mixture of theta, alpha and delta rhythms are noted.  Delta predominates in the medial temporal regions, the lateral points of the posterior hippocampal electrode, and the first several points of the frontotemporal electrode.      An active epileptiform focus continues to be noted at anterior hippocampal depth electrode points 1 through 4, " occasionally engaging posterior hippocampal electrode points 1 through 7, and amygdala electrodes 1 through 3.  Towards the later portion of the study, runs of spiky activity are noted in the posterior hippocampal electrodes 12, 13, and 14.  These take the form of polyspikes and low amplitude 6 Hz spike wave.  Corresponding theta is seen in the lateral points of the anterior hippocampal electrode.  This activity is not seen in the frontotemporal electrodes.      ICTAL:  No seizures.      IMPRESSION:  Abnormal.  Active epileptiform focus continues from the right medial hippocampal electrodes.  There is tentative evidence of epileptiform discharges in the lateral posterior hippocampal and anterior hippocampal electrodes as well.  Further recording will be necessary to determine whether this is a persisting phenomenon.  No seizures were recorded in this study.         SANTHOSH BORJA MD             D: 2019   T: 2019   MT: JANET      Name:     SANTHOSH HODGES   MRN:      [id number]        Account:        [id number]   :      1990           Procedure Date: 2019      Document: [id number]

## 2019-09-01 NOTE — PROGRESS NOTES
"S: no complaints today     O:  Temp:  [97.9  F (36.6  C)-99  F (37.2  C)] 97.9  F (36.6  C)  Heart Rate:  [] 103  Resp:  [16-24] 18  BP: (115-147)/() 142/102  SpO2:  [96 %-100 %] 99 %    Exam:  General: Awake;  Alert, In No Acute Distress  Pulm: Breathing Comfortably on room air  Mental status: Oriented x 3  Cranial Nerves: Cranial Nerves II-XII Intact Bilaterally  Strength:      Del Tr Bi WE WF Gr  R 5 5 5 5 5 5  L 5 5 5 5 5 5     HF KE KF DF PF   R 5 5 5 5 5   L 5 5 5 5 5     Pronator Drift: Absent  Sensory: Intact to Light Touch  INCISION: Head Wrap Intact; No Drainage Present.     Assessment:   28 year old male with history of epilepsy s/p  MAGAN Assisted Stereotactic Placement Of Right Sided Depth Electrodes. Awaiting seizure localization.     Plan:     Neuro:   Anti-epileptics: per epilepsy team   Appreciate assistance of Epilepsy team    Cardiovascular: blood pressure goals: SBP < 140    Pulmonary:   Incentive spirometry     Gastrointestinal: Regular Diet    Renal:Voiding      Heme:   Maintain INR < 1.4; Platelet > 100K; Fibrinogen > 200, Hemoglobin > 8    Endocrine: No issues    Infectious: IV Ceftriaxone 2G Q 24 HR  while leads are in place; Leukocytosis Improving; Continue to Monitor    DVT prophylaxis: Sequential compression devices to Bilateral Lower Extremities     Ulcer prophylaxis: Not Indicated    DISPO:  4A     Barriers:  Seizure localization    Doan \"Jose Rafael\" MD [facility]   Neurosurgery, PGY-3    Please contact neurosurgery resident on call with questions.    Dial * * [phone], enter [phone] when prompted.        "

## 2019-09-01 NOTE — PROGRESS NOTES
"NEUROSCIENCE INTENSIVE CARE PROGRESS NOTE    2019    Tha Mcghee is a 28 year old year old male admitted on 2019 for stereotactic placement of R depth electrodes and R craniotomy for R sided strip electrodes.      24 HOUR EVENTS:  No seizures overnight. Headaches managed with dilaudi and ibuprofen.       24 HOUR VITAL SIGNS SUMMARY:   Temperatures:  Current - Temp: 97.8  F (36.6  C); Max - Temp  Av.9  F (36.6  C)  Min: 97.5  F (36.4  C)  Max: 98.4  F (36.9  C)  Respiration range: Resp  Av.5  Min: 15  Max: 18  Pulse range: No data recorded  Blood pressure range: Systolic (24hrs), Av , Min:109 , Max:145   ; Diastolic (24hrs), Av, Min:65, Max:99    Pulse oximetry range: SpO2  Av.6 %  Min: 93 %  Max: 99 %    PHYSICAL EXAMINATION:   BP (!) 142/102   Pulse 90   Temp 97.9  F (36.6  C) (Oral)   Resp 18   Ht 1.727 m (5' 8\")   Wt 81.5 kg (179 lb 10.8 oz)   SpO2 99%   BMI 27.32 kg/m    General: Pt laying comfortably in bed, not in acute distress  HEENT: head wrapped, No oral ulcers, MMM  Chest: No respiratory distress  Heart: Regular rate and rhythm  Abdomen: Soft, non-tender  Extremities: Warm and well perfused   Neuro: Remains unchanged. Awake, oriented x 3, follows commands, responds to questions. Speech is clear. PERRL. EOMI, gaze conjugate, face symmetric,  Motor: 5/5 in all 4 ext   sensation intact to light touch in all 4 extremities.    CURRENT MEDICATIONS:    cefTRIAXone  2 g Intravenous Q24H     cyclobenzaprine  10 mg Oral or Feeding Tube TID     gabapentin  300 mg Oral BID     lacosamide  100 mg Oral BID     multivitamin, therapeutic  1 tablet Oral or Feeding Tube Daily     omeprazole  20 mg Oral QAM     phenytoin  160 mg Oral QAM     phenytoin  200 mg Oral QPM     senna-docusate  1 tablet Oral or Feeding Tube BID     sodium chloride (PF)  3 mL Intracatheter Q8H     venlafaxine  225 mg Oral QAM     cholecalciferol  5,000 Units Oral or Feeding Tube At Bedtime       PRN " MEDICATIONS:  albuterol, hydrALAZINE, HYDROmorphone, ibuprofen, labetalol, lidocaine 4%, lidocaine, lidocaine (buffered or not buffered), LORazepam, magnesium sulfate, magnesium sulfate, naloxone, ondansetron **OR** ondansetron, oxyCODONE, polyethylene glycol, potassium chloride, potassium chloride with lidocaine, potassium chloride, potassium chloride, potassium chloride, prochlorperazine, rizatriptan, sodium chloride (PF), sodium phosphate, sodium phosphate, sodium phosphate, sodium phosphate    INFUSIONS:      ALLERGIES:  Allergies   Allergen Reactions     Amoxicillin Hives     Tolerated ceftriaxone Aug 2019 without a reaction     Ceclor [Cefaclor Monohydrate] Hives     Tolerated ceftriaxone Aug 2019 without a reaction       Hydrocodone-Acetaminophen Nausea     Penicillins Hives     Tolerated ceftriaxone Aug 2019 without a reaction       Sulfa Drugs Hives       LABS/STUDIES:  Recent Labs   Lab Test 08/30/19  0633 08/29/19  0423 08/28/19  0441    137 138   POTASSIUM 3.6 3.8 3.2*   CHLORIDE 105 106 109   CO2 23 22 21   ANIONGAP 9 8 8   GLC 89 79 141*   BUN 11 6* 6*   CR 0.67 0.48* 0.52*   ROSA 9.6 8.8 8.4*   WBC 12.2* 19.9* 19.7*   RBC 3.94* 3.87* 3.99*   HGB 12.3* 12.0* 12.2*    280 280       IMAGING:  No new imaging.     ASSESSMENT/PLAN:  Tha Mcghee is a 28 year old with a history of partial epilepsy who was admitted on 8/27 for stereotactic placement of R depth electrodes and R craniotomy for R sided strip electrodes. Neurology consulted for seizure management.     Neuro:  #Partial epilepsy with impairment of consciousness with intractable epilepsy s/p surgical electrode placement  - Decrease vimpat to 100 mg q12h 8/31/19  - Continue PTA Gabapentin 300 - 300  - Continue PTA Dilantin 160-200  - Continuous EEG  - AED management per epilepsy team  - Stopped PTA depakote   - Stopped PTA Topiramate (seems to be for seizures and HAs)    Home AEDs:  Vimpat 200 bid  Depakote 1250/1500  Dilantin  160/200  Gabapentin 300 bid     #Post operative pain  #Headaches  - Stopped PTA Topiramate (seems to be for seizures and HAs)  - Dilaudid 0.2 - 0.4 PRN  - Compazine IV PRN  - Tylenol PRN     #Hx of substance abuse     #Depression  - Continue PTA venlafaxine     Respiratory:  #Asthma  - Albuterol PRN     Cardiovascular:  No acute issues.     Renal:  - Electrolyte protocol     Endocrine:  No acute issues.     Heme:  #Normocytic anemia, stable  - Monitor CBC     GI:  #GERD  - continue PTA Omeprazole 20 daily    #Diet  - Jaw pain with eating so decreased PO  -  ml bolus x 1    - Antiemetics PRN  - Bowel regimen: Miralax PRN     ID:  #Leukocytosis, improving  Likely post-operative and seizure related. Afebrile. No signs of infection  - monitor CBC  - pan-culture if spikes fever     #Irish-op prophylaxis   - Ceftriaxone per NSG     MSK:  #L ankle sprain  - XR L ankle with no fracture     :  #Urinary retention  - May be due to dilaudid vs poor fluid intake   - Barton  - Monitor I/O     FEN: Advance as tolerated to regular diet  DVT prophylaxis: SCDs  Lines: PIV  Code Status: Full

## 2019-09-01 NOTE — PROGRESS NOTES
"SPIRITUAL HEALTH SERVICES  SPIRITUAL ASSESSMENT Progress Note  [facility] 4A     I offered a visit to the pt, his mother and family who were playing cards.  I offered supportive listening to the pt as he shared his struggles of wanting to go home and back to work--he works at [employer] [place].    He was smiling when he mentioned, \"they may bring my dog to see me in the hospital.\"    His mother asked that we pray for the pt which we did.      PLAN: Will visit weekly while on 4th fl.    Manav Bowling M.Div.     Pager [phone]     "

## 2019-09-01 NOTE — PLAN OF CARE
D/I: Pt neurologically intact. No seizure activity this shift. Vitals stable. Sats 98% on RA. Appetite improved today. Adequate UO. Ibuprofen, oxycodone, and dilaudid for pain.   A: Neuro intact. No seizures.   P: Wean antiepileptics. Monitor for seizures. Update MD with concerns.

## 2019-09-02 ENCOUNTER — ALLIED HEALTH/NURSE VISIT (OUTPATIENT)
Dept: NEUROLOGY | Facility: CLINIC | Age: 29
End: 2019-09-02
Attending: PSYCHIATRY & NEUROLOGY
Payer: COMMERCIAL

## 2019-09-02 DIAGNOSIS — G40.209 LOCALIZATION-RELATED FOCAL EPILEPSY WITH COMPLEX PARTIAL SEIZURES (H): Primary | ICD-10-CM

## 2019-09-02 PROCEDURE — 36569 INSJ PICC 5 YR+ W/O IMAGING: CPT

## 2019-09-02 PROCEDURE — 25000132 ZZH RX MED GY IP 250 OP 250 PS 637: Performed by: STUDENT IN AN ORGANIZED HEALTH CARE EDUCATION/TRAINING PROGRAM

## 2019-09-02 PROCEDURE — 25000132 ZZH RX MED GY IP 250 OP 250 PS 637: Performed by: PSYCHIATRY & NEUROLOGY

## 2019-09-02 PROCEDURE — 25000132 ZZH RX MED GY IP 250 OP 250 PS 637: Performed by: NURSE PRACTITIONER

## 2019-09-02 PROCEDURE — 20000004 ZZH R&B ICU UMMC

## 2019-09-02 PROCEDURE — 27210185 ZZH KIT OPEN ENDED DOUBLE LUMEN

## 2019-09-02 PROCEDURE — 95951 ZZHC EEG VIDEO < 12 HR: CPT | Mod: 52,ZF

## 2019-09-02 PROCEDURE — 25000128 H RX IP 250 OP 636: Performed by: STUDENT IN AN ORGANIZED HEALTH CARE EDUCATION/TRAINING PROGRAM

## 2019-09-02 RX ORDER — PHENYTOIN SODIUM 100 MG/1
100 CAPSULE, EXTENDED RELEASE ORAL EVERY EVENING
Status: DISCONTINUED | OUTPATIENT
Start: 2019-09-02 | End: 2019-09-03

## 2019-09-02 RX ORDER — CEFAZOLIN SODIUM 1 G/3ML
1 INJECTION, POWDER, FOR SOLUTION INTRAMUSCULAR; INTRAVENOUS ONCE
Status: COMPLETED | OUTPATIENT
Start: 2019-09-02 | End: 2019-09-02

## 2019-09-02 RX ORDER — CEFTRIAXONE 2 G/1
2 INJECTION, POWDER, FOR SOLUTION INTRAMUSCULAR; INTRAVENOUS EVERY 24 HOURS
Status: DISCONTINUED | OUTPATIENT
Start: 2019-09-03 | End: 2019-09-10

## 2019-09-02 RX ORDER — LIDOCAINE 40 MG/G
CREAM TOPICAL
Status: DISCONTINUED | OUTPATIENT
Start: 2019-09-02 | End: 2019-09-13 | Stop reason: HOSPADM

## 2019-09-02 RX ORDER — HEPARIN SODIUM,PORCINE 10 UNIT/ML
5 VIAL (ML) INTRAVENOUS
Status: DISCONTINUED | OUTPATIENT
Start: 2019-09-02 | End: 2019-09-13 | Stop reason: HOSPADM

## 2019-09-02 RX ORDER — HEPARIN SODIUM,PORCINE 10 UNIT/ML
5-15 VIAL (ML) INTRAVENOUS EVERY 24 HOURS
Status: DISCONTINUED | OUTPATIENT
Start: 2019-09-02 | End: 2019-09-12

## 2019-09-02 RX ORDER — CALCIUM CARBONATE 500 MG/1
500 TABLET, CHEWABLE ORAL DAILY PRN
Status: DISCONTINUED | OUTPATIENT
Start: 2019-09-02 | End: 2019-09-13 | Stop reason: HOSPADM

## 2019-09-02 RX ORDER — CEFAZOLIN SODIUM 1 G/3ML
1 INJECTION, POWDER, FOR SOLUTION INTRAMUSCULAR; INTRAVENOUS EVERY 8 HOURS
Status: DISCONTINUED | OUTPATIENT
Start: 2019-09-02 | End: 2019-09-02

## 2019-09-02 RX ADMIN — CYCLOBENZAPRINE 10 MG: 10 TABLET, FILM COATED ORAL at 19:41

## 2019-09-02 RX ADMIN — MAGNESIUM HYDROXIDE 30 ML: 400 SUSPENSION ORAL at 08:45

## 2019-09-02 RX ADMIN — CYCLOBENZAPRINE 10 MG: 10 TABLET, FILM COATED ORAL at 14:12

## 2019-09-02 RX ADMIN — VENLAFAXINE HYDROCHLORIDE 225 MG: 150 CAPSULE, EXTENDED RELEASE ORAL at 08:44

## 2019-09-02 RX ADMIN — IBUPROFEN 600 MG: 600 TABLET ORAL at 05:08

## 2019-09-02 RX ADMIN — SENNOSIDES AND DOCUSATE SODIUM 2 TABLET: 8.6; 5 TABLET ORAL at 19:41

## 2019-09-02 RX ADMIN — Medication 0.2 MG: at 20:12

## 2019-09-02 RX ADMIN — OMEPRAZOLE 20 MG: 20 CAPSULE, DELAYED RELEASE ORAL at 08:44

## 2019-09-02 RX ADMIN — IBUPROFEN 600 MG: 600 TABLET ORAL at 16:31

## 2019-09-02 RX ADMIN — OXYCODONE HYDROCHLORIDE 10 MG: 5 TABLET ORAL at 21:44

## 2019-09-02 RX ADMIN — CALCIUM CARBONATE (ANTACID) CHEW TAB 500 MG 500 MG: 500 CHEW TAB at 22:15

## 2019-09-02 RX ADMIN — Medication 0.2 MG: at 05:08

## 2019-09-02 RX ADMIN — PHENYTOIN SODIUM 160 MG: 100 CAPSULE ORAL at 08:44

## 2019-09-02 RX ADMIN — GABAPENTIN 300 MG: 100 CAPSULE ORAL at 08:44

## 2019-09-02 RX ADMIN — PHENYTOIN SODIUM 100 MG: 100 CAPSULE ORAL at 19:41

## 2019-09-02 RX ADMIN — CEFAZOLIN 1 G: 1 INJECTION, POWDER, FOR SOLUTION INTRAMUSCULAR; INTRAVENOUS at 19:41

## 2019-09-02 RX ADMIN — Medication 0.2 MG: at 15:17

## 2019-09-02 RX ADMIN — CYCLOBENZAPRINE 10 MG: 10 TABLET, FILM COATED ORAL at 08:44

## 2019-09-02 RX ADMIN — POLYETHYLENE GLYCOL 3350 17 G: 17 POWDER, FOR SOLUTION ORAL at 08:47

## 2019-09-02 ASSESSMENT — ACTIVITIES OF DAILY LIVING (ADL)
ADLS_ACUITY_SCORE: 10.5
ADLS_ACUITY_SCORE: 10.5
ADLS_ACUITY_SCORE: 15
ADLS_ACUITY_SCORE: 10.5
ADLS_ACUITY_SCORE: 15
ADLS_ACUITY_SCORE: 15

## 2019-09-02 ASSESSMENT — PAIN DESCRIPTION - DESCRIPTORS
DESCRIPTORS: HEADACHE
DESCRIPTORS: DULL
DESCRIPTORS: HEADACHE
DESCRIPTORS: HEADACHE
DESCRIPTORS: DULL;HEADACHE
DESCRIPTORS: ACHING
DESCRIPTORS: INTERMITTENT

## 2019-09-02 ASSESSMENT — VISUAL ACUITY
OU: BASELINE
OU: BASELINE

## 2019-09-02 NOTE — PROGRESS NOTES
"Neuroscience Intensive Care Progress Note  09/02/2019    24 hour events: prns for headache. No complaints this AM. No seizures. Barton discontinued today.    Current Medications:    [START ON 9/3/2019] cefTRIAXone  2 g Intravenous Q24H     cyclobenzaprine  10 mg Oral or Feeding Tube TID     gabapentin  300 mg Oral BID     magnesium hydroxide  30 mL Oral Daily     multivitamin, therapeutic  1 tablet Oral or Feeding Tube Daily     omeprazole  20 mg Oral QAM     phenytoin  160 mg Oral QAM     phenytoin  200 mg Oral QPM     polyethylene glycol  17 g Oral TID     senna-docusate  2 tablet Oral or Feeding Tube BID     sodium chloride (PF)  3 mL Intracatheter Q8H     venlafaxine  225 mg Oral QAM     cholecalciferol  5,000 Units Oral or Feeding Tube At Bedtime       PRN Medications:  albuterol, bisacodyl, hydrALAZINE, HYDROmorphone, ibuprofen, labetalol, lidocaine 4%, lidocaine, lidocaine (buffered or not buffered), LORazepam, magnesium sulfate, magnesium sulfate, naloxone, ondansetron **OR** ondansetron, oxyCODONE, potassium chloride, potassium chloride with lidocaine, potassium chloride, potassium chloride, potassium chloride, prochlorperazine, rizatriptan, sodium chloride (PF), sodium phosphate, sodium phosphate, sodium phosphate, sodium phosphate    Infusions:      Objective findings:  BP (!) 140/111 (BP Location: Right arm)   Pulse 90   Temp 98.3  F (36.8  C) (Oral)   Resp 18   Ht 1.727 m (5' 8\")   Wt 81.5 kg (179 lb 10.8 oz)   SpO2 100%   BMI 27.32 kg/m      Ventilator Settings:  Resp: 18      Intake/Output:    Intake/Output Summary (Last 24 hours) at 9/2/2019 1144  Last data filed at 9/2/2019 0901  Gross per 24 hour   Intake 2460 ml   Output 1900 ml   Net 560 ml       Physical Examination:   Vitals:  B/P: 140/111, T: 98.3, P: 90, R: 18  General:  Sitting up in chair  HEENT:  NC/AT, no icterus, op pink and moist, no ear or nose drainage.   Cardiac:  RRR, no m/r/g  Chest:  CTAB  Abdomen:  S/NT/ND  Extremities:  No " LE swelling.    Skin:  No rash or lesion.      Neuro Exam:  Mental status: AOx4, speech fluent, follows all commands  Cranial nerves: EOMI, face symmetric, equal to LT  Motor: antigravity in all limbs  Sensory: equal to LT    Labs/Studies:  Recent Labs   Lab Test 09/01/19  0358 08/30/19  0633 08/29/19  0423    137 137   POTASSIUM 3.8 3.6 3.8   CHLORIDE 106 105 106   CO2 23 23 22   ANIONGAP 8 9 8   GLC 91 89 79   BUN 12 11 6*   CR 0.58* 0.67 0.48*   ROSA 9.0 9.6 8.8   WBC 7.6 12.2* 19.9*   RBC 3.91* 3.94* 3.87*   HGB 12.2* 12.3* 12.0*    313 280       Recent Labs   Lab Test 08/19/19  1201 01/17/19  0825   INR 1.17* 1.07   PTT 32 31       No lab results found in last 7 days.    ==========================================================    ASSESSMENT/PLAN: Tha Mcghee is a 28 year old with a history of partial epilepsy who was admitted on 8/27 for stereotactic placement of R depth electrodes and R craniotomy for R sided strip electrodes. Neurology consulted for seizure management.     Neuro:  #Partial epilepsy with impairment of consciousness with intractable epilepsy s/p surgical electrode placement  - Decreased vimpat to 100 mg q12h 8/31/19  - Continue PTA Gabapentin 300 - 300  - Continue PTA Dilantin 160-200  - Continuous EEG  - AED management per epilepsy team  - Stopped PTA depakote   - Stopped PTA Topiramate (for seizures and HAs)     Home AEDs:  Vimpat 200 bid  Depakote 1250/1500  Dilantin 160/200  Gabapentin 300 bid     #Post operative pain  #Headaches. Stopped PTA Topiramate (seems to be for seizures and HAs)  - Dilaudid 0.2 - 0.4 PRN  - Compazine IV PRN  - Tylenol PRN     #Hx of substance abuse     #Depression  - Continue PTA venlafaxine     Respiratory:  #Asthma  - Albuterol PRN     Cardiovascular:  No acute issues.     Renal:  - Electrolyte protocol     Endocrine:  No acute issues.     Heme:  #Normocytic anemia, stable  - Monitor CBC     GI:  #GERD  - continue PTA Omeprazole 20  daily     #Diet  - Jaw pain with eating so decreased PO  -  ml bolus x 1     - Antiemetics PRN  - Bowel regimen: Miralax PRN     ID:  #Leukocytosis, improving  Likely post-operative related. Afebrile. No signs of infection  - monitor CBC  - pan-culture if spikes fever     #Irish-op prophylaxis   - Ceftriaxone per NSG     MSK:  #L ankle sprain  - XR L ankle with no fracture     :  #Urinary retention  - May be due to dilaudid vs poor fluid intake   - Barton discontinued per NSG 9/2  - Monitor I/O     FEN: Advance as tolerated to regular diet  DVT prophylaxis: SCDs  Lines: PIV  Code Status: Full    =========================================================    This patient was seen & discussed with my attending, Dr. Ureña, who agrees with my assessment and plan.     Zaki Edwards  Vascular Neurology Fellow  Pager: [phone]

## 2019-09-02 NOTE — PROGRESS NOTES
NEUROLOGY/EPILEPSY ATTENDING NOTE    No seizures. Last seizure approximately 730 AM 8/30/2019. Head pain under reasonable control.  He believes that gabapentin has been perscribed for epilepsy rather than anxiety tho doses are much lower than those typically perscribed for seizures. Does carry diagnosis of anixety but treated with venlafaxine.    AEDs: Phenytoin 360 mg/d  (no change since admission); lacosamide 0  (down from 400 mg/d on admission); topiramate 0 (100 mg/d on admission); divalproex 0 (2500 gm/d on admission), gabapentin 600 mg/d  (no change since admission)  Levels on admission: phenytoin 14.6/1.75, valproic acid 71/8.8, topiramate  <1.5.    EXAM:  BP fluctuate between nl and mild hypertensin. Afebrile.    Alert, fluent language. VFF. EOMI without nystagmus. Smile symmetrical. No drift, pronation, or tremor. Rapid fine movements done well. Distal foot strength good. Neck is supple. No calf swelling.    EEG: Continues with active right medial anterior and posterior hippocampal epileptiform activity. No further lateral temporal epileptiform discharges.  Thus far has three seizures; last about 72 hours ago (720 AM 8/30). One was clear focal impaired seizure with obvious automatisms and impairment. Two were focal subtle seizures in which seizure discharge was associated with arousal or other subtle movements but which were not detected clinically at the time and so impairment was not demonstrated. All seizures emerge from medial five points of atherior and posterior hippocampal electrode. EEG onset precedes clinical onset by 5 to 22 seconds.    IMPRESSION:  1) Focal epilepsy; focal seizures of medial temporal onset. Thus far all seizures emerging from right hippocampus. 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 Epileptiform activity from lateral posterior hippocampal point may be related to this region but this appears infrequent and has not been seen for more than  48 hours. Would like at least two more seizures with obvious impairment to confirm that seizure onset area is consistent.   2) Small right subdural. No clinical consequences. Appears to be tolerating electrodes well.  3) Head pain does not appear any worse off topiramate.     PLAN:  1) Continue video-EEG monitoring. Would like at least two further seizures with obvious impairment.  2) Stop gabapentin. Reduce phenytoin to 160 in AM and 100 in PM. Warned about possibility of worsening anxiety; if this occurs can resume gabapentin because 600 mg per day not likely to have much antiepileptic effect.      Tha Calvo MD
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
## 2019-09-02 NOTE — PROGRESS NOTES
"S: no clinical seizures overnight. No events.     O:  Temp:  [97.6  F (36.4  C)-98.5  F (36.9  C)] 98.3  F (36.8  C)  Heart Rate:  [] 124  Resp:  [14-26] 18  BP: (115-141)/() 140/111  SpO2:  [91 %-100 %] 100 %    Exam:  General: Awake;  Alert, In No Acute Distress  Pulm: Breathing Comfortably on room air  Mental status: Oriented x 3  Cranial Nerves: Cranial Nerves II-XII Intact Bilaterally  Strength:      Del Tr Bi WE WF Gr  R 5 5 5 5 5 5  L 5 5 5 5 5 5     HF KE KF DF PF   R 5 5 5 5 5   L 5 5 5 5 5     Pronator Drift: Absent  Sensory: Intact to Light Touch  INCISION: Head Wrap Intact; No Drainage Present.     Assessment:   28 year old male with history of epilepsy s/p  MAGAN Assisted Stereotactic Placement Of Right Sided Depth Electrodes. Awaiting seizure localization.     Plan:     Neuro:   Anti-epileptics: per epilepsy team   Appreciate assistance of Epilepsy team    Cardiovascular: blood pressure goals: SBP < 140    Pulmonary:   Incentive spirometry     Gastrointestinal: Regular Diet    Renal: voiding trial today; discontinue gordillo     Heme:   Maintain INR < 1.4; Platelet > 100K; Fibrinogen > 200, Hemoglobin > 8    Endocrine: No issues    Infectious: IV ancef while leads are in place; Continue to Monitor    DVT prophylaxis: Sequential compression devices to Bilateral Lower Extremities     Ulcer prophylaxis: Not Indicated    DISPO: 4A     Barriers: Seizure localization     Doan \"Jose Rafael\" MD [facility]   Neurosurgery, PGY-3    Please contact neurosurgery resident on call with questions.    Dial * * [phone], enter [phone] when prompted.       "
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
## 2019-09-03 ENCOUNTER — APPOINTMENT (OUTPATIENT)
Dept: PHYSICAL THERAPY | Facility: CLINIC | Age: 29
End: 2019-09-03
Attending: NEUROLOGICAL SURGERY
Payer: COMMERCIAL

## 2019-09-03 ENCOUNTER — ALLIED HEALTH/NURSE VISIT (OUTPATIENT)
Dept: NEUROLOGY | Facility: CLINIC | Age: 29
End: 2019-09-03
Attending: PSYCHIATRY & NEUROLOGY
Payer: COMMERCIAL

## 2019-09-03 DIAGNOSIS — G40.219 PARTIAL EPILEPSY WITH IMPAIRMENT OF CONSCIOUSNESS, INTRACTABLE (H): Primary | ICD-10-CM

## 2019-09-03 LAB
ABO + RH BLD: NORMAL
ABO + RH BLD: NORMAL
ANION GAP SERPL CALCULATED.3IONS-SCNC: 10 MMOL/L (ref 3–14)
BLD GP AB SCN SERPL QL: NORMAL
BLOOD BANK CMNT PATIENT-IMP: NORMAL
BUN SERPL-MCNC: 11 MG/DL (ref 7–30)
CALCIUM SERPL-MCNC: 9.3 MG/DL (ref 8.5–10.1)
CHLORIDE SERPL-SCNC: 105 MMOL/L (ref 94–109)
CO2 SERPL-SCNC: 24 MMOL/L (ref 20–32)
CREAT SERPL-MCNC: 0.59 MG/DL (ref 0.66–1.25)
ERYTHROCYTE [DISTWIDTH] IN BLOOD BY AUTOMATED COUNT: 12 % (ref 10–15)
GFR SERPL CREATININE-BSD FRML MDRD: >90 ML/MIN/{1.73_M2}
GLUCOSE SERPL-MCNC: 91 MG/DL (ref 70–99)
HCT VFR BLD AUTO: 33.6 % (ref 40–53)
HGB BLD-MCNC: 11.1 G/DL (ref 13.3–17.7)
MAGNESIUM SERPL-MCNC: 2.1 MG/DL (ref 1.6–2.3)
MCH RBC QN AUTO: 31.1 PG (ref 26.5–33)
MCHC RBC AUTO-ENTMCNC: 33 G/DL (ref 31.5–36.5)
MCV RBC AUTO: 94 FL (ref 78–100)
PHOSPHATE SERPL-MCNC: 5.1 MG/DL (ref 2.5–4.5)
PLATELET # BLD AUTO: 368 10E9/L (ref 150–450)
POTASSIUM SERPL-SCNC: 4 MMOL/L (ref 3.4–5.3)
RBC # BLD AUTO: 3.57 10E12/L (ref 4.4–5.9)
SODIUM SERPL-SCNC: 139 MMOL/L (ref 133–144)
SPECIMEN EXP DATE BLD: NORMAL
WBC # BLD AUTO: 7.1 10E9/L (ref 4–11)

## 2019-09-03 PROCEDURE — 86900 BLOOD TYPING SEROLOGIC ABO: CPT | Performed by: STUDENT IN AN ORGANIZED HEALTH CARE EDUCATION/TRAINING PROGRAM

## 2019-09-03 PROCEDURE — 84100 ASSAY OF PHOSPHORUS: CPT | Performed by: NEUROLOGICAL SURGERY

## 2019-09-03 PROCEDURE — 95951 ZZHC EEG VIDEO EACH 24 HR: CPT | Mod: ZF

## 2019-09-03 PROCEDURE — 97110 THERAPEUTIC EXERCISES: CPT | Mod: GP | Performed by: REHABILITATION PRACTITIONER

## 2019-09-03 PROCEDURE — 25000128 H RX IP 250 OP 636: Performed by: NURSE PRACTITIONER

## 2019-09-03 PROCEDURE — 86850 RBC ANTIBODY SCREEN: CPT | Performed by: STUDENT IN AN ORGANIZED HEALTH CARE EDUCATION/TRAINING PROGRAM

## 2019-09-03 PROCEDURE — 85027 COMPLETE CBC AUTOMATED: CPT | Performed by: NEUROLOGICAL SURGERY

## 2019-09-03 PROCEDURE — 86901 BLOOD TYPING SEROLOGIC RH(D): CPT | Performed by: STUDENT IN AN ORGANIZED HEALTH CARE EDUCATION/TRAINING PROGRAM

## 2019-09-03 PROCEDURE — 25000128 H RX IP 250 OP 636: Performed by: STUDENT IN AN ORGANIZED HEALTH CARE EDUCATION/TRAINING PROGRAM

## 2019-09-03 PROCEDURE — 80048 BASIC METABOLIC PNL TOTAL CA: CPT | Performed by: NEUROLOGICAL SURGERY

## 2019-09-03 PROCEDURE — 36415 COLL VENOUS BLD VENIPUNCTURE: CPT | Performed by: NEUROLOGICAL SURGERY

## 2019-09-03 PROCEDURE — 25000132 ZZH RX MED GY IP 250 OP 250 PS 637: Performed by: NURSE PRACTITIONER

## 2019-09-03 PROCEDURE — 20000004 ZZH R&B ICU UMMC

## 2019-09-03 PROCEDURE — 25000132 ZZH RX MED GY IP 250 OP 250 PS 637: Performed by: STUDENT IN AN ORGANIZED HEALTH CARE EDUCATION/TRAINING PROGRAM

## 2019-09-03 PROCEDURE — 83735 ASSAY OF MAGNESIUM: CPT | Performed by: NEUROLOGICAL SURGERY

## 2019-09-03 RX ORDER — PHENYTOIN SODIUM 100 MG/1
100 CAPSULE, EXTENDED RELEASE ORAL 2 TIMES DAILY
Status: DISCONTINUED | OUTPATIENT
Start: 2019-09-03 | End: 2019-09-04

## 2019-09-03 RX ORDER — LANOLIN ALCOHOL/MO/W.PET/CERES
6 CREAM (GRAM) TOPICAL AT BEDTIME
Status: DISCONTINUED | OUTPATIENT
Start: 2019-09-03 | End: 2019-09-04

## 2019-09-03 RX ORDER — PHENYTOIN SODIUM 100 MG/1
100 CAPSULE, EXTENDED RELEASE ORAL EVERY MORNING
Status: DISCONTINUED | OUTPATIENT
Start: 2019-09-04 | End: 2019-09-03

## 2019-09-03 RX ORDER — GABAPENTIN 300 MG/1
300 CAPSULE ORAL 2 TIMES DAILY
Status: DISCONTINUED | OUTPATIENT
Start: 2019-09-03 | End: 2019-09-03

## 2019-09-03 RX ADMIN — THERA TABS 1 TABLET: TAB at 08:06

## 2019-09-03 RX ADMIN — IBUPROFEN 600 MG: 600 TABLET ORAL at 11:03

## 2019-09-03 RX ADMIN — PROCHLORPERAZINE EDISYLATE 10 MG: 5 INJECTION INTRAMUSCULAR; INTRAVENOUS at 04:02

## 2019-09-03 RX ADMIN — ALTEPLASE 2 MG: 2.2 INJECTION, POWDER, LYOPHILIZED, FOR SOLUTION INTRAVENOUS at 17:49

## 2019-09-03 RX ADMIN — MELATONIN 5000 UNITS: at 21:19

## 2019-09-03 RX ADMIN — VENLAFAXINE HYDROCHLORIDE 225 MG: 150 CAPSULE, EXTENDED RELEASE ORAL at 08:05

## 2019-09-03 RX ADMIN — CEFTRIAXONE SODIUM 2 G: 2 INJECTION, POWDER, FOR SOLUTION INTRAMUSCULAR; INTRAVENOUS at 09:37

## 2019-09-03 RX ADMIN — CYCLOBENZAPRINE 10 MG: 10 TABLET, FILM COATED ORAL at 20:36

## 2019-09-03 RX ADMIN — SENNOSIDES AND DOCUSATE SODIUM 2 TABLET: 8.6; 5 TABLET ORAL at 20:36

## 2019-09-03 RX ADMIN — Medication 0.2 MG: at 04:02

## 2019-09-03 RX ADMIN — CYCLOBENZAPRINE 10 MG: 10 TABLET, FILM COATED ORAL at 14:25

## 2019-09-03 RX ADMIN — Medication 0.2 MG: at 16:21

## 2019-09-03 RX ADMIN — Medication 0.2 MG: at 08:36

## 2019-09-03 RX ADMIN — PHENYTOIN SODIUM 100 MG: 100 CAPSULE ORAL at 20:36

## 2019-09-03 RX ADMIN — ALTEPLASE 2 MG: 2.2 INJECTION, POWDER, LYOPHILIZED, FOR SOLUTION INTRAVENOUS at 18:56

## 2019-09-03 RX ADMIN — Medication 0.2 MG: at 20:36

## 2019-09-03 RX ADMIN — OXYCODONE HYDROCHLORIDE 5 MG: 5 TABLET ORAL at 23:31

## 2019-09-03 RX ADMIN — PHENYTOIN SODIUM 160 MG: 100 CAPSULE ORAL at 08:04

## 2019-09-03 RX ADMIN — IBUPROFEN 600 MG: 600 TABLET ORAL at 20:36

## 2019-09-03 RX ADMIN — IBUPROFEN 600 MG: 600 TABLET ORAL at 00:32

## 2019-09-03 RX ADMIN — OXYCODONE HYDROCHLORIDE 5 MG: 5 TABLET ORAL at 23:27

## 2019-09-03 RX ADMIN — MELATONIN TAB 3 MG 6 MG: 3 TAB at 20:36

## 2019-09-03 RX ADMIN — OXYCODONE HYDROCHLORIDE 10 MG: 5 TABLET ORAL at 11:03

## 2019-09-03 RX ADMIN — CYCLOBENZAPRINE 10 MG: 10 TABLET, FILM COATED ORAL at 08:06

## 2019-09-03 RX ADMIN — OMEPRAZOLE 20 MG: 20 CAPSULE, DELAYED RELEASE ORAL at 08:06

## 2019-09-03 ASSESSMENT — PAIN DESCRIPTION - DESCRIPTORS
DESCRIPTORS: ACHING
DESCRIPTORS: DULL
DESCRIPTORS: DULL;ACHING
DESCRIPTORS: DULL

## 2019-09-03 ASSESSMENT — ACTIVITIES OF DAILY LIVING (ADL)
ADLS_ACUITY_SCORE: 10.5
ADLS_ACUITY_SCORE: 15

## 2019-09-03 NOTE — PROVIDER NOTIFICATION
Neurosurg MD called because patient's midline is not drawing back enough blood for morning labs. MD gave ok to make patient lab collect; ICU  called and peripheral stick was done. MD did not want to give TPA at this time. Will continue to monitor and assess.

## 2019-09-03 NOTE — PLAN OF CARE
Discharge Planner PT  4AB  Patient plan for discharge: Home  Current status: Pt participated in seated and standing LE Therex x 10 reps, LE ergometer x 10 minutes.  Barriers to return to prior living situation: craniotomy precautions  Recommendations for discharge: Home  Rationale for recommendations: Anticipate pt will be ready for home discharge when medically ready for discharge.        Entered by: Soraida Luna 09/03/2019 4:42 PM

## 2019-09-03 NOTE — PLAN OF CARE
"Status: patient having trouble falling asleep and restless.  D/I: Patient on unit 4A Surgical/Neuro ICU s/p: GRID placement and right sided crani.  Neuro- intact; alert and oriented x4. Intermittently anxious overnight. Continuous eeg. Moves all extremities spontaneously and to command. Kept switching between the chair and bed overnight due to having trouble sleeping. Offered to call MD about adding a sleep medication; NSG MD restarted gabapentin and patient declined to take it.   CV- VSS, sinus rhythm/sinus marco. Keep systolic blood pressure less than 140. Afebrile. Edema in left ankle from sprain.  Pulm- on room air; lung sounds clear.  GI- regular diet; little to no appetite. Patient kept stating he was \"swallowing throw up.\" tums given for heartburn and then compazine given for nausea. No bm overnight.   - ambulates to bathroom.   Skin- bilateral infiltration sites in upper extremities. EEG leads with turban.  Gtts- saline locked  ID- ancef  Pain- yes; capa. Dilaudid, oxycodone, ibuprofen. Gabapentin readded overnight.  Electrolytes- replaced per protocol.  See flow sheets for further interventions and assessments.  P: Continue to monitor pt closely, Notify MD of changes/concerns.     "

## 2019-09-03 NOTE — PROCEDURES
"EEG #-6      DATE OF RECORDING/SERVICE DATE:  09/01/2019      TYPE OF STUDY:  Inpatient video EEG monitoring (intracranial electrodes).      SOURCE FILE DURATION:  23 hours, 49 minutes, 40 seconds.      HISTORY:  Day #6 of video-EEG monitoring in patient, Tha Mcghee, a 28-year-old with medically intractable epilepsy being evaluated for potential resective epilepsy surgery.  Scalp video monitoring has shown right hemispheric seizures that are probably right temporal in onset.  During this study, he was being treated with his home doses of gabapentin and phenytoin.  Lacosamide, topiramate and valproate had been discontinued to facilitate for recording of seizures.      TECHNICAL SUMMARY:  EEG was recorded from multiple intracranial electrodes, all placed in the right hemisphere.   14-point depth electrodes were placed in the right amygdala, right anterior hippocampus, and under the right posterior hippocampus.  Six electrode strips were placed into the right prefrontal and right inferior frontal cortex.  A 2 x 16 frontal \"mini grid\" was placed between the 2 frontal electrodes.  Four electrode strips were placed in the superior posterior temporal, middle posterior temporal, and inferior posterior temporal regions.  Finally, a 6 electrode frontotemporal strip was placed point 1 in the posterior temporal regions close to an area of focal cortical dysplasia and point 6 in the inferior posterior frontal cortex.  Video was continuously recorded.  Video was reviewed for clinical correlation and to assist with EEG interpretation.      FINDINGS:  A mixture of theta, alpha and delta are noted.  Delta predominates in the medial temporal regions.  Delta also is seen in the lateral points of the posterior hippocampal electrode and in the first several points in the frontotemporal electrode.      An active epileptiform focus continues in the anterior hippocampal electrode points 1 through 4, occasionally engaging " posterior hippocampal electrode points 1 through 7 and amygdala electrode points 1 through 3.  Much less frequent runs of polyspike and spike-wave are seen in posterior hippocampal electrodes 12 through 13 (e.g. 06:33:54 a.m., 06:35:02 a.m., 06:35:30 a.m., 11:16:36 a.m.).       ICTAL:  No seizures.      IMPRESSION:  Abnormal.  An active epileptiform focus continues from the right medial hippocampal electrodes.  Infrequent epileptiform discharges are also noted in the lateral posterior hippocampal electrodes.  Seizures are not recorded in this study.         SANTHOSH BORJA MD             D: 2019   T: 2019   MT: MOHIT      Name:     SANTHOSH HODGES   MRN:      [id number]        Account:        [id number]   :      1990           Procedure Date: 2019      Document: [id number]

## 2019-09-03 NOTE — PROGRESS NOTES
"Neuroscience Intensive Care Progress Note  09/03/2019    24 hour events: declined GBP overnight for sleep. Did not sleep much.     Current Medications:    cefTRIAXone  2 g Intravenous Q24H     cyclobenzaprine  10 mg Oral or Feeding Tube TID     gabapentin  300 mg Oral BID     heparin lock flush  5-15 mL Intracatheter Q24H     magnesium hydroxide  30 mL Oral Daily     melatonin  6 mg Oral At Bedtime     multivitamin, therapeutic  1 tablet Oral or Feeding Tube Daily     omeprazole  20 mg Oral QAM     phenytoin  100 mg Oral QPM     phenytoin  160 mg Oral QAM     polyethylene glycol  17 g Oral TID     senna-docusate  2 tablet Oral or Feeding Tube BID     sodium chloride (PF)  10 mL Intracatheter Q8H     sodium chloride (PF)  10 mL Intracatheter Q8H     sodium chloride (PF)  3 mL Intracatheter Q8H     venlafaxine  225 mg Oral QAM     cholecalciferol  5,000 Units Oral or Feeding Tube At Bedtime       PRN Medications:  albuterol, bisacodyl, calcium carbonate, heparin lock flush, hydrALAZINE, HYDROmorphone, ibuprofen, labetalol, lidocaine 4%, lidocaine 4%, lidocaine, lidocaine (buffered or not buffered), lidocaine (buffered or not buffered), lidocaine (buffered or not buffered), LORazepam, magnesium sulfate, magnesium sulfate, naloxone, ondansetron **OR** ondansetron, oxyCODONE, potassium chloride, potassium chloride with lidocaine, potassium chloride, potassium chloride, potassium chloride, prochlorperazine, rizatriptan, sodium chloride (PF), sodium chloride (PF), sodium chloride (PF), sodium phosphate, sodium phosphate, sodium phosphate, sodium phosphate    Infusions:      Objective findings:  /77 (BP Location: Right arm)   Pulse 107   Temp 98  F (36.7  C) (Oral)   Resp 16   Ht 1.727 m (5' 8\")   Wt 81.5 kg (179 lb 10.8 oz)   SpO2 97%   BMI 27.32 kg/m      Ventilator Settings:  Resp: 16      Intake/Output:    Intake/Output Summary (Last 24 hours) at 9/3/2019 0806  Last data filed at 9/3/2019 0500  Gross per 24 " hour   Intake 940 ml   Output 1925 ml   Net -985 ml       Physical Examination:   Vitals:  B/P: 115/77, T: 98, P: 107, R: 16  General:  Laying in bed  HEENT:  NC/AT, no icterus, op pink and moist, no ear or nose drainage.   Cardiac:  RRR, no m/r/g  Chest:  CTAB  Abdomen:  S/NT/ND  Extremities:  No LE swelling.    Skin:  No rash or lesion.      Neuro Exam:  Mental status: AOx4, speech fluent  Cranial nerves: EOMI, face symmetric and equal to LT, voice clear  Motor: antigravity all 4x limbs  Sensory: equal to LT    Labs/Studies:  Recent Labs   Lab Test 09/03/19  0459 09/01/19  0358 08/30/19  0633    137 137   POTASSIUM 4.0 3.8 3.6   CHLORIDE 105 106 105   CO2 24 23 23   ANIONGAP 10 8 9   GLC 91 91 89   BUN 11 12 11   CR 0.59* 0.58* 0.67   ROSA 9.3 9.0 9.6   WBC 7.1 7.6 12.2*   RBC 3.57* 3.91* 3.94*   HGB 11.1* 12.2* 12.3*    347 313       Recent Labs   Lab Test 08/19/19  1201 01/17/19  0825   INR 1.17* 1.07   PTT 32 31       No lab results found in last 7 days.    ==========================================================    ASSESSMENT/PLAN: Tha Mcghee is a 28 year old with a history of partial epilepsy who was admitted on 8/27 for stereotactic placement of R depth electrodes and R craniotomy for R sided strip electrodes. Neurology consulted for seizure management.     Neuro:  #Partial epilepsy with impairment of consciousness with intractable epilepsy s/p surgical electrode placement  --off vimpat   --off gabapentin  --decrease Dilantin to 160-100  - Continuous EEG  - AED management per epilepsy team  - Stopped PTA depakote   - Stopped PTA Topiramate (for seizures and HAs)  --melatonin 6mg at bedtime for sleep     Home AEDs:  Vimpat 200 bid  Depakote 1250/1500  Dilantin 160/200  Gabapentin 300 bid     #Post operative pain  #Headaches. Stopped PTA Topiramate (seems to be for seizures and HAs)  - Dilaudid 0.2 - 0.4 PRN  - Compazine IV PRN  - Tylenol PRN     #Hx of substance abuse     #Depression  -  Continue PTA venlafaxine     Respiratory:  #Asthma  - Albuterol PRN     Cardiovascular:  No acute issues.     Renal:  - Electrolyte protocol     Endocrine:  No acute issues.     Heme:  #Normocytic anemia, stable  - Monitor CBC     GI:  #GERD  - continue PTA Omeprazole 20 daily     #Diet  - Jaw pain with eating so decreased PO  -  ml bolus x 1     - Antiemetics PRN  - Bowel regimen: Miralax PRN     ID:  #Leukocytosis, improving  Likely post-operative related. Afebrile. No signs of infection  - monitor CBC  - pan-culture if spikes fever     #Irish-op prophylaxis   - Ceftriaxone per NSG     MSK:  #L ankle sprain  - XR L ankle with no fracture     :  #Urinary retention  - May be due to dilaudid vs poor fluid intake   - Barton discontinued per NSG 9/2  - Monitor I/O     FEN: Advance as tolerated to regular diet  DVT prophylaxis: SCDs  Lines: PIV  Code Status: Full    =========================================================    This patient was seen & discussed with my attending, Dr. Ureña, who agrees with my assessment and plan.     Zaki Edwards  Vascular Neurology Fellow  Pager: [phone]

## 2019-09-03 NOTE — PROGRESS NOTES
"S: gabapentin discontinued and restarted yesterday. Phenytoin weaning down.     O:  Temp:  [97.8  F (36.6  C)-98.2  F (36.8  C)] 97.8  F (36.6  C)  Pulse:  [105-107] 107  Heart Rate:  [] 80  Resp:  [16-22] 16  BP: (115-141)/() 115/77  SpO2:  [96 %-100 %] 97 %    Exam:  General: Awake;  Alert, In No Acute Distress  Pulm: Breathing Comfortably on room air  Mental status: Oriented x 3  Cranial Nerves: Cranial Nerves II-XII Intact Bilaterally  Strength:      Del Tr Bi WE WF Gr  R 5 5 5 5 5 5  L 5 5 5 5 5 5     HF KE KF DF PF   R 5 5 5 5 5   L 5 5 5 5 5     Pronator Drift: Absent  Sensory: Intact to Light Touch  INCISION: Head Wrap Intact; No Drainage Present.     Assessment:   28 year old male with history of epilepsy s/p  MAGAN Assisted Stereotactic Placement Of Right Sided Depth Electrodes. Awaiting seizure localization.     Plan:     Neuro:   Anti-epileptics: per epilepsy team   Appreciate assistance of Epilepsy team    Cardiovascular: blood pressure goals: SBP < 140    Pulmonary:   Incentive spirometry     Gastrointestinal: Regular Diet    Renal: monitor intake and output     Heme:   Maintain INR < 1.4; Platelet > 100K; Fibrinogen > 200, Hemoglobin > 8    Endocrine: No issues    Infectious: IV ceftriaxone while leads are in place; Continue to Monitor    DVT prophylaxis: Sequential compression devices to Bilateral Lower Extremities     Ulcer prophylaxis: Not Indicated    DISPO: 4A     Barriers: Seizure localization     Doan \"Jose Rafael\" MD [facility]   Neurosurgery, PGY-3    Please contact neurosurgery resident on call with questions.    Dial * * [phone], enter [phone] when prompted.       "

## 2019-09-03 NOTE — PROGRESS NOTES
"[facility] - Epilepsy Service Daily Note      Interval History:   Patient had no seizures overnight. Official EEG report pending. No major complaints except mild headache.      Review of System:   Denies nausea, vomitting, abdominal pain, dizziness and chest pain.     Medications:   AEDs on admission with most recent levels:  1. Vimpat 200-200 (2.6)  2. Depakote 4884-2107 (71, 8.8)  3. Gabapentin 300-300 (1.0)  4. Dilantin 160-200 (14.6, 1.75)  5. Topiramate 50 - 50 (<1.5)     Antiepileptic Medications Current Doses:   1. Dilantin 160- 100    Exam: Blood pressure (!) 134/97, pulse 107, temperature 97.8  F (36.6  C), temperature source Oral, resp. rate 18, height 1.727 m (5' 8\"), weight 81.5 kg (179 lb 10.8 oz), SpO2 100 %.  General appearance: No acute distress. Awake, alert and oriented x 4.   Neuro: Pupils are equal, round, and reactive to light, face symmetric, no pronator drift, equal  strength, normal to light touch with no sensory deficits noted, finger to nose normal, no focal deficits noted.    Video EE2019: Pending.     2019: IMPRESSION:  Abnormal.  An active epileptiform focus continues from the medial hippocampal electrodes.  Infrequent epileptiform discharges are also noted in the lateral posterior hippocampal electrodes.  Seizures are not recorded in this study.       Assessment/ Plan: Patient seen and discussed with attending physician Dr. Preston.   1. 28 year old right handed male with a h/o depression, anxiety and seizure disorder since  is being admitted for intracranial vEEG monitoring. Patient underwent stereotactic placement of R depth electrodes and R craniotomy for R sided strip electrodes on . Thus far all seizures emerging from right hippocampus. Would like at least two more seizures with obvious impairment to confirm that seizure onset area is consistent.     - Continue video EGG monitoring  - Seizure precautions  - SCDs while in bed for " DVT prevention  - Ativan prn as ordered for seizures  - Decrease Dilantin to 100- 100  - PATIENT TO BE UP WITH ASSISTANCE OR STANDBY ONLY. PATIENT TO HAVE A GAIT BELT ON WHEN UP OUT OF BED (Discussed with bedside RN to enforce this. Also discussed with the patient)      Total time: 25 minute was spent in the care of this patient. The patient agrees with the above mentioned plan of care. I answered all the patient's questions and addressed immediate concerns. More than 50% of time spent consisted of counseling and coordinating care, including discussion of the diagnostic significance of EEG findings, anti-seizure medication management, and planning for discharge home.     Deborah Webb, CNP  Neurology

## 2019-09-03 NOTE — PLAN OF CARE
Reason for admission: admitted on 8/27 for a GRID placement and right side craniotomy  Activity: Needs to have a gait belt and an attendant with him   Neuros: AA & Ox4, following commands appropriately, pupils are equal and reactive to light, moves all extremities spontaneously, denies any n/t  Cardiac: Sinus rhythm with occasional PACs and PVCs,   Respiratory: On RA, lungs clear. Infrequent productive cough  GI/: voiding adequately, fair appetite, passing flatus, +BS  Skin: bilateral forearm have infiltrated sites from previous PIV, neuro surgery resident notified, marked the blanchable redness on the right forearm Turban is intact  Lines: Midline double lumen is intact, TPA given, will follow up in 1 hr to draw for possible blood return   Plan: needs to have at least 2 more seizures, encourage nutrition intake, encourage activities.   Will continue to monitor & follow POC.

## 2019-09-03 NOTE — PROCEDURES
EEG #-7 (Day 7 of Video-EEG Monitoring)    DATE OF RECORDIN2019    DURATION OF RECORDIN hours, 49 minutes.    CLINICAL SUMMARY:  This presurgical video-EEG monitoring procedure was performed with intracranial electrodes for planning of epilepsy surgery in treatment of refractory partial epilepsy in [facility].  He was reported to be receiving phenytoin, gabapentin, venlafaxine, cyclobenzaprine, oxycodone and hydromorphone on this day of monitoring.      TECHNICAL SUMMARY:  This continuous EEG monitoring procedure was performed with intraparenchymal depth electrodes and subdural strip and grid electrodes in and over multiple sites of the right temporal and frontal lobes.    Three 14-contact depth electrodes were placed through temporal cortex into medial structures with deepest contacts numbered  1  and located in:    right amygdala ( AM ),   right anterior hippocampus ( AH ), and   right hippocampus more posteriorly ( PH ).    The subdural strip and grid electrodes had variable numbers of contacts and locations, consisting of:   6 strip contacts over right inferior frontal cortex ( IF ),   6 strip contacts over right premotor frontal cortex ( PMF ),   6 strip contacts over right frontal and temporal cortex ( FT ),   4 strip contacts over right superior posterior temporal cortex ( SPT ),   4 strip contacts over right middle posterior temporal cortex ( MPT ),   4 strip contacts over right inferior posterior temporal cortex ( IPT ),   2-x-16 small, densely-spaced grid contacts over right dorsolateral prefrontal cortex ( FGR ).     Video monitoring was utilized and periodically reviewed by EEG technologists and the physician for electroclinical correlation.     INTERICTAL EEG ACTIVITIES:    During waking and drowsy states the medial temporal electrodes had predominantly irregular 1-4 Hz delta activities, the lateral temporal electrodes had mixed 1-6 Hz delta-theta activities, and the frontal  electrodes had mixed 2-12 Hz delta-theta-alpha activities.  All sites had greater delta slowing in slow wave sleep.      During all states there was very frequent occurrence of spike-wave complexes at AH 1-4, and frequent occurrence of spike-wave complexes at AM 1-4 both independently and synchronously with those at AH 1-4.  Occasionally, spike-wave complexes at PH 1-4 occurred synchronously with spike-wave complexes in AH 1-4, or with spike-wave complexes in AH 1-4 and AM 1-4.     ICTAL RECORDINGS:  No electrographic seizures and no paroxysmal behavioral events were recorded during this day of monitoring.      SUMMARY OF DAY 7 OF VIDEO-EEG MONITORING:    The interictal EEG recording was abnormal due to very frequent focal spike-wave complexes in the right medial temporal electrodes.    No electrographic seizures and no paroxysmal behavioral events were recorded on this day of monitoring.   Neel Preston M.D., Professor of Neurology       D: 2019   T: 2019   MT:       Name:     SANTHOSH HODGES   MRN:      [id number]        Account:        [id number]   :      1990           Procedure Date: 2019      Document: [id number]

## 2019-09-04 ENCOUNTER — ALLIED HEALTH/NURSE VISIT (OUTPATIENT)
Dept: NEUROLOGY | Facility: CLINIC | Age: 29
End: 2019-09-04
Attending: PSYCHIATRY & NEUROLOGY
Payer: COMMERCIAL

## 2019-09-04 DIAGNOSIS — G40.219 PARTIAL EPILEPSY WITH IMPAIRMENT OF CONSCIOUSNESS, INTRACTABLE (H): Primary | ICD-10-CM

## 2019-09-04 PROCEDURE — 20000004 ZZH R&B ICU UMMC

## 2019-09-04 PROCEDURE — 25800030 ZZH RX IP 258 OP 636: Performed by: STUDENT IN AN ORGANIZED HEALTH CARE EDUCATION/TRAINING PROGRAM

## 2019-09-04 PROCEDURE — 25000132 ZZH RX MED GY IP 250 OP 250 PS 637: Performed by: STUDENT IN AN ORGANIZED HEALTH CARE EDUCATION/TRAINING PROGRAM

## 2019-09-04 PROCEDURE — 25000132 ZZH RX MED GY IP 250 OP 250 PS 637: Performed by: NURSE PRACTITIONER

## 2019-09-04 PROCEDURE — 25000128 H RX IP 250 OP 636: Performed by: STUDENT IN AN ORGANIZED HEALTH CARE EDUCATION/TRAINING PROGRAM

## 2019-09-04 PROCEDURE — C9254 INJECTION, LACOSAMIDE: HCPCS | Performed by: STUDENT IN AN ORGANIZED HEALTH CARE EDUCATION/TRAINING PROGRAM

## 2019-09-04 PROCEDURE — 95951 ZZHC EEG VIDEO EACH 24 HR: CPT | Mod: ZF

## 2019-09-04 RX ORDER — TOPIRAMATE 50 MG/1
50 TABLET, FILM COATED ORAL 2 TIMES DAILY
Status: DISCONTINUED | OUTPATIENT
Start: 2019-09-04 | End: 2019-09-13 | Stop reason: HOSPADM

## 2019-09-04 RX ORDER — KETOROLAC TROMETHAMINE 15 MG/ML
15 INJECTION, SOLUTION INTRAMUSCULAR; INTRAVENOUS ONCE
Status: COMPLETED | OUTPATIENT
Start: 2019-09-04 | End: 2019-09-04

## 2019-09-04 RX ORDER — KETOROLAC TROMETHAMINE 30 MG/ML
30 INJECTION, SOLUTION INTRAMUSCULAR; INTRAVENOUS ONCE
Status: DISCONTINUED | OUTPATIENT
Start: 2019-09-04 | End: 2019-09-04

## 2019-09-04 RX ORDER — LORAZEPAM 2 MG/ML
2 INJECTION INTRAMUSCULAR ONCE
Status: COMPLETED | OUTPATIENT
Start: 2019-09-04 | End: 2019-09-04

## 2019-09-04 RX ORDER — PHENYTOIN SODIUM 100 MG/1
200 CAPSULE, EXTENDED RELEASE ORAL 2 TIMES DAILY
Status: DISCONTINUED | OUTPATIENT
Start: 2019-09-04 | End: 2019-09-13 | Stop reason: HOSPADM

## 2019-09-04 RX ORDER — LANOLIN ALCOHOL/MO/W.PET/CERES
12 CREAM (GRAM) TOPICAL AT BEDTIME
Status: DISCONTINUED | OUTPATIENT
Start: 2019-09-04 | End: 2019-09-13 | Stop reason: HOSPADM

## 2019-09-04 RX ORDER — GABAPENTIN 300 MG/1
300 CAPSULE ORAL 2 TIMES DAILY
Status: DISCONTINUED | OUTPATIENT
Start: 2019-09-04 | End: 2019-09-13 | Stop reason: HOSPADM

## 2019-09-04 RX ORDER — DIPHENHYDRAMINE HYDROCHLORIDE 50 MG/ML
25 INJECTION INTRAMUSCULAR; INTRAVENOUS ONCE
Status: COMPLETED | OUTPATIENT
Start: 2019-09-04 | End: 2019-09-04

## 2019-09-04 RX ORDER — LACOSAMIDE 200 MG/1
200 TABLET ORAL 2 TIMES DAILY
Status: DISCONTINUED | OUTPATIENT
Start: 2019-09-05 | End: 2019-09-13 | Stop reason: HOSPADM

## 2019-09-04 RX ADMIN — CEFTRIAXONE SODIUM 2 G: 2 INJECTION, POWDER, FOR SOLUTION INTRAMUSCULAR; INTRAVENOUS at 09:09

## 2019-09-04 RX ADMIN — DIPHENHYDRAMINE HYDROCHLORIDE 25 MG: 50 INJECTION INTRAMUSCULAR; INTRAVENOUS at 19:02

## 2019-09-04 RX ADMIN — MELATONIN TAB 3 MG 12 MG: 3 TAB at 20:27

## 2019-09-04 RX ADMIN — OMEPRAZOLE 20 MG: 20 CAPSULE, DELAYED RELEASE ORAL at 07:35

## 2019-09-04 RX ADMIN — Medication 0.2 MG: at 10:27

## 2019-09-04 RX ADMIN — KETOROLAC TROMETHAMINE 15 MG: 15 INJECTION, SOLUTION INTRAMUSCULAR; INTRAVENOUS at 12:41

## 2019-09-04 RX ADMIN — Medication 0.2 MG: at 00:03

## 2019-09-04 RX ADMIN — SENNOSIDES AND DOCUSATE SODIUM 1 TABLET: 8.6; 5 TABLET ORAL at 20:29

## 2019-09-04 RX ADMIN — THERA TABS 1 TABLET: TAB at 07:36

## 2019-09-04 RX ADMIN — PROCHLORPERAZINE EDISYLATE 10 MG: 5 INJECTION INTRAMUSCULAR; INTRAVENOUS at 20:43

## 2019-09-04 RX ADMIN — Medication 0.2 MG: at 20:39

## 2019-09-04 RX ADMIN — PROCHLORPERAZINE EDISYLATE 5 MG: 5 INJECTION INTRAMUSCULAR; INTRAVENOUS at 12:18

## 2019-09-04 RX ADMIN — PHENYTOIN SODIUM 200 MG: 100 CAPSULE ORAL at 20:28

## 2019-09-04 RX ADMIN — TOPIRAMATE 50 MG: 50 TABLET ORAL at 20:28

## 2019-09-04 RX ADMIN — SENNOSIDES AND DOCUSATE SODIUM 2 TABLET: 8.6; 5 TABLET ORAL at 07:37

## 2019-09-04 RX ADMIN — CYCLOBENZAPRINE 10 MG: 10 TABLET, FILM COATED ORAL at 15:22

## 2019-09-04 RX ADMIN — OXYCODONE HYDROCHLORIDE 10 MG: 5 TABLET ORAL at 03:14

## 2019-09-04 RX ADMIN — SODIUM CHLORIDE 500 MG PE: 9 INJECTION, SOLUTION INTRAVENOUS at 12:36

## 2019-09-04 RX ADMIN — Medication 0.2 MG: at 05:53

## 2019-09-04 RX ADMIN — CYCLOBENZAPRINE 10 MG: 10 TABLET, FILM COATED ORAL at 20:28

## 2019-09-04 RX ADMIN — SODIUM CHLORIDE 200 MG: 9 INJECTION, SOLUTION INTRAVENOUS at 20:44

## 2019-09-04 RX ADMIN — PHENYTOIN SODIUM 100 MG: 100 CAPSULE ORAL at 07:36

## 2019-09-04 RX ADMIN — GABAPENTIN 300 MG: 300 CAPSULE ORAL at 20:28

## 2019-09-04 RX ADMIN — LORAZEPAM 2 MG: 2 INJECTION INTRAMUSCULAR; INTRAVENOUS at 12:14

## 2019-09-04 RX ADMIN — OXYCODONE HYDROCHLORIDE 10 MG: 5 TABLET ORAL at 09:19

## 2019-09-04 RX ADMIN — Medication 5 MG: at 01:40

## 2019-09-04 RX ADMIN — VENLAFAXINE HYDROCHLORIDE 225 MG: 150 CAPSULE, EXTENDED RELEASE ORAL at 07:36

## 2019-09-04 RX ADMIN — CYCLOBENZAPRINE 10 MG: 10 TABLET, FILM COATED ORAL at 07:35

## 2019-09-04 ASSESSMENT — ACTIVITIES OF DAILY LIVING (ADL)
ADLS_ACUITY_SCORE: 15

## 2019-09-04 ASSESSMENT — PAIN DESCRIPTION - DESCRIPTORS: DESCRIPTORS: HEADACHE;DULL

## 2019-09-04 ASSESSMENT — MIFFLIN-ST. JEOR: SCORE: 1729.5

## 2019-09-04 NOTE — PROGRESS NOTES
D/I= 27 yo male in ICU w/ grid in place to evaluate for seizures activity. Neuro checks and vital signs completed every 4 hrs. Medicated twice w/ dilaudid and oxycodone prn for c/o right sided headache. Ice bags applied to head also. A/P= VSS.   AAOx4 and pleasant,  cooperative w/ cares. No seizure activity noted.does have jerkiing hand movements and talks in his sleep occasionally. This am noted grinding of teeth and appears to grasp for air twice. O2 sat 97% , Received r/o pain and has slept for 1 1/2-2 hr intervals tonoc.  Continue cares and close observation  And report any seizure activity.

## 2019-09-04 NOTE — PLAN OF CARE
Reason for admission: admitted on 8/27 for a GRID placement and right side craniotomy  Activity: Needs to have a gait belt and an attendant with him   Neuros: AA & Ox4, following commands appropriately, pupils are equal and reactive to light, moves all extremities spontaneously, denies any n/t,  Had one seizure that lasted for 4mins, ativan, compazine and toradol  given. Reoriented to situation. Cerebyx infusion given.    Cardiac: Sinus tachycardiac   Respiratory: On RA, lungs clear. Infrequent productive cough  GI/: voiding adequately, fair appetite, passing flatus, +BS, had one medium soft BM   Skin: bilateral forearm have infiltrated sites from previous PIV, blanchable slight pink on the right forearm,  Turban is intact, new dressing applied by the EEG techs  Lines: Midline double lumen is intact, SL.   Plan: needs to have at least 1 more seizures, encourage nutrition intake, encourage activities.   Will continue to monitor & follow POC.

## 2019-09-04 NOTE — PROGRESS NOTES
-Neuro: remain on EEG, no seizures. Neurologically intact. Attendant at bedside  -Cardiac: NSR-sinus tach 80-110s. SBP goal of <140 achieved without intervention. Afebrile  -Resp: lungs clear, on room air  -GI: tolerating reg diet, no nausea. Last BM 9/3  -: voids spontaneously without difficulty  -Pain: prn ibuprofen, prn IV dilaudid, prn oxy  -Skin: bilateral old PIV sites remain reddened, warm and painful. Redness marked and symptoms charted. Discussed with oncoming nurse next steps on who to consult to manage sites if needed  -Activity: up with stand by assist  -Access: double lumen midline    Plan: continue to monitor neuro status and for seizures, pain control    Zoya Polo RN  9/3/2019  11:18 PM

## 2019-09-04 NOTE — PROGRESS NOTES
"Neurosurgery Progress Note      S: no complaints today, hoping to experience seizure soon.  No clinical seizures overnight.     O:  /76 (BP Location: Right arm)   Pulse 97   Temp 97.9  F (36.6  C) (Oral)   Resp 18   Ht 1.727 m (5' 8\")   Wt 79 kg (174 lb 2.6 oz)   SpO2 97%   BMI 26.48 kg/m    Exam:  General: Awake;  Alert, In No Acute Distress  Pulm: Breathing Comfortably on room air  Mental status: Oriented x 3  Cranial Nerves: Cranial Nerves II-XII Intact Bilaterally  Strength:      Del Tr Bi WE WF Gr  R 5 5 5 5 5 5  L 5 5 5 5 5 5     HF KE KF DF PF   R 5 5 5 5 5   L 5 5 5 5 5     Pronator Drift: Absent  Sensory: Intact to Light Touch  INCISION: Head Wrap Intact; No Drainage Present.     Assessment:   28 year old male with history of epilepsy s/p  MAGAN Assisted Stereotactic Placement Of Right Sided Depth Electrodes. Awaiting seizure localization.     Plan:     Neuro:   Anti-epileptics: per epilepsy team   Appreciate assistance of Epilepsy team    Cardiovascular: blood pressure goals: SBP < 140    Pulmonary: Incentive spirometry     Gastrointestinal: Regular Diet    Renal: monitor intake and output     Heme:   Maintain INR < 1.4; Platelet > 100K; Fibrinogen > 200, Hemoglobin > 8    Endocrine: No issues    Infectious: IV Ceftriaxone 2G Q 24 HR  while leads are in place; Continue to Monitor    DVT prophylaxis: Sequential compression devices to Bilateral Lower Extremities     Ulcer prophylaxis: Not Indicated    DISPO:  4A     Barriers:  Seizure localization       CHEYANNE Ruano, CNP  Department of Neurosurgery  Pager: [phone]    "

## 2019-09-04 NOTE — PROGRESS NOTES
"[facility] - Epilepsy Service Daily Note      Interval History:   Patient had a secondarily generalized seizure this morning. Official EEG report pending. He had 2 mg Ativan at 1214 pm. Also loaded with Fosphenytoin 500 mg IV x 1. PO PHT dose increased to 200- 200. When I saw him around 1300 pm, patient resting with eyes closed. Awakens to speech, answering questions appropriately. Reports that \"he is tired\". He remembers having his typical aura prior to his seizure.      Review of System:   Denies nausea, vomitting, abdominal pain, headaches, dizziness and chest pain.      AEDs on admission with most recent levels:  1. Vimpat 200-200 (2.6)  2. Depakote [id number] (71, 8.8)  3. Gabapentin 300-300 (1.0)  4. Dilantin 160-200 (14.6, 1.75)  5. Topiramate 50 - 50 (<1.5)    Antiepileptic Medications Current Doses:   1. Dilantin 200- 200      Exam: Blood pressure 134/89, pulse 97, temperature 97.3  F (36.3  C), temperature source Oral, resp. rate 18, height 1.727 m (5' 8\"), weight 79 kg (174 lb 2.6 oz), SpO2 98 %.  General appearance: Resting with eyes closed.   Neuro: Awakens to speech, EOMs intact, face symmetric, no focal deficits noted.    Video EE2019: The interictal EEG recording was abnormal due to frequent focal spike-wave complexes in the right medial temporal electrodes.  No electrographic seizures and no paroxysmal behavioral events were recorded on this day of monitoring.       Assessment/ Plan: Patient seen and discussed with attending physician Dr. Preston.   1. 28 year old right handed male with a h/o depression, anxiety and seizure disorder since  is being admitted for intracranial vEEG monitoring. Patient underwent stereotactic placement of R depth electrodes and R craniotomy for R sided strip electrodes on . Thus far all seizures emerging from right hippocampus. Would like at least one more seizures with obvious impairment to confirm that seizure onset area is " consistent.     - Continue video EGG monitoring  - Seizure precautions  - SCDs while in bed for DVT prevention  - Continue - 200  - PATIENT TO BE UP WITH ASSISTANCE OR STANDBY ONLY. PATIENT TO HAVE A GAIT BELT ON WHEN UP OUT OF BED           Total time: 25 minute was spent in the care of this patient. The patient agrees with the above mentioned plan of care. I answered all the patient's questions and addressed immediate concerns. More than 50% of time spent consisted of counseling and coordinating care, including discussion of the diagnostic significance of EEG findings, anti-seizure medication management, and planning for discharge home.     Deborah Webb, CNP  Neurology

## 2019-09-04 NOTE — PROGRESS NOTES
"Neuroscience Intensive Care Progress Note  09/04/2019    24 hour events: had a seizure this AM! Requested extra melatonin last night that did not help him sleep.     Current Medications:    cefTRIAXone  2 g Intravenous Q24H     cyclobenzaprine  10 mg Oral or Feeding Tube TID     heparin lock flush  5-15 mL Intracatheter Q24H     magnesium hydroxide  30 mL Oral Daily     melatonin  12 mg Oral At Bedtime     multivitamin, therapeutic  1 tablet Oral or Feeding Tube Daily     omeprazole  20 mg Oral QAM     phenytoin  100 mg Oral BID     polyethylene glycol  17 g Oral TID     senna-docusate  2 tablet Oral or Feeding Tube BID     sodium chloride (PF)  10 mL Intracatheter Q8H     sodium chloride (PF)  10 mL Intracatheter Q8H     sodium chloride (PF)  3 mL Intracatheter Q8H     venlafaxine  225 mg Oral QAM     cholecalciferol  5,000 Units Oral or Feeding Tube At Bedtime       PRN Medications:  albuterol, bisacodyl, calcium carbonate, heparin lock flush, hydrALAZINE, HYDROmorphone, labetalol, lidocaine 4%, lidocaine 4%, lidocaine, lidocaine (buffered or not buffered), lidocaine (buffered or not buffered), lidocaine (buffered or not buffered), LORazepam, magnesium sulfate, magnesium sulfate, naloxone, ondansetron **OR** ondansetron, oxyCODONE, potassium chloride, potassium chloride with lidocaine, potassium chloride, potassium chloride, potassium chloride, prochlorperazine, rizatriptan, sodium chloride (PF), sodium chloride (PF), sodium chloride (PF), sodium phosphate, sodium phosphate, sodium phosphate, sodium phosphate    Infusions:      Objective findings:  /76 (BP Location: Right arm)   Pulse 97   Temp 97.9  F (36.6  C) (Oral)   Resp 18   Ht 1.727 m (5' 8\")   Wt 79 kg (174 lb 2.6 oz)   SpO2 97%   BMI 26.48 kg/m      Ventilator Settings:  Resp: 18      Intake/Output:    Intake/Output Summary (Last 24 hours) at 9/4/2019 1018  Last data filed at 9/4/2019 0800  Gross per 24 hour   Intake 1280 ml   Output 1975 ml "   Net -695 ml       Physical Examination:   Vitals:  B/P: 112/76, T: 97.9, P: 97, R: 18  General:  Laying in bed  HEENT:  NC/AT, no icterus, op pink and moist, no ear or nose drainage.   Cardiac:  RRR, no m/r/g  Chest:  CTAB  Abdomen:  S/NT/ND  Extremities:  No LE swelling.    Skin:  No rash or lesion.      Neuro Exam:  Mental status: AOx4, speech fluent  Cranial nerves: EOMI, voice clear, face symmetric with speech  Motor: antigravity in arms  Sensory: deferred    Labs/Studies:  Recent Labs   Lab Test 09/03/19  0459 09/01/19  0358 08/30/19  0633    137 137   POTASSIUM 4.0 3.8 3.6   CHLORIDE 105 106 105   CO2 24 23 23   ANIONGAP 10 8 9   GLC 91 91 89   BUN 11 12 11   CR 0.59* 0.58* 0.67   ROSA 9.3 9.0 9.6   WBC 7.1 7.6 12.2*   RBC 3.57* 3.91* 3.94*   HGB 11.1* 12.2* 12.3*    347 313       Recent Labs   Lab Test 08/19/19  1201 01/17/19  0825   INR 1.17* 1.07   PTT 32 31       No lab results found in last 7 days.    ==========================================================    ASSESSMENT/PLAN: Tha Mcghee is a 28 year old with a history of partial epilepsy who was admitted on 8/27 for stereotactic placement of R depth electrodes and R craniotomy for R sided strip electrodes. Neurology consulted for seizure management.     Neuro:  #Partial epilepsy with impairment of consciousness with intractable epilepsy s/p surgical electrode placement. Had a seizure 9/4.   --off vimpat   --off gabapentin  ---100.   - Continuous EEG  - AED management per epilepsy team  - Stopped PTA depakote   - Stopped PTA Topiramate (for seizures and HAs)  --melatonin 12mg at bedtime for sleep     Home AEDs:  Vimpat 200 bid  Depakote 1250/1500  Dilantin 160/200  Gabapentin 300 bid     #Post operative pain  #Headaches. Stopped PTA Topiramate (seems to be for seizures and HAs)  - Dilaudid 0.2 - 0.4 PRN  - Compazine IV PRN  - Tylenol PRN     #Hx of substance abuse     #Depression  - Continue PTA  venlafaxine     Respiratory:  #Asthma  - Albuterol PRN     Cardiovascular:  No acute issues.     Renal:  - Electrolyte protocol     Endocrine:  No acute issues.     Heme:  #Normocytic anemia, stable  - Monitor CBC     GI:  #GERD  - continue PTA Omeprazole 20 daily     #Diet  - Jaw pain with eating so decreased PO  -  ml bolus x 1     - Antiemetics PRN  - Bowel regimen: Miralax PRN     ID:  #Leukocytosis, improving  Likely post-operative related. Afebrile. No signs of infection  - monitor CBC  - pan-culture if spikes fever     #Irish-op prophylaxis   - Ceftriaxone per NSG     MSK:  #L ankle sprain  - XR L ankle with no fracture     :  #Urinary retention  - May be due to dilaudid vs poor fluid intake   - Barton discontinued per NSG 9/2  - Monitor I/O     FEN: Advance as tolerated to regular diet  DVT prophylaxis: SCDs  Lines: PIV  Code Status: Full    =========================================================    This patient was seen & discussed with my attending, Dr. Ureña, who agrees with my assessment and plan.     Zaki Edwards  Vascular Neurology Fellow  Pager: [phone]

## 2019-09-04 NOTE — PROGRESS NOTES
"CLINICAL NUTRITION SERVICES - ASSESSMENT NOTE     Nutrition Prescription    RECOMMENDATIONS FOR MDs/PROVIDERS TO ORDER:  - Encouragement of increased PO intakes; outside foods per pt preference   - Continue thera-vit in the setting of decreased PO intakes.   - Continue antiemetics prn and consider scheduled if nausea persists     Malnutrition Status:    - Unable to determine due to unable to complete all parameters of NFPA    Recommendations already ordered by Registered Dietitian (RD):  - Ordered Gelatein Plus with meals     Future/Additional Recommendations:  - PO/supp adequacy      REASON FOR ASSESSMENT  Tha Mcghee is a/an 28 year old male assessed by the dietitian for LOS    Medical History: history of epilepsy s/p  MAGAN Assisted Stereotactic Placement Of Right Sided Depth Electrodes. Awaiting seizure localization.     NUTRITION HISTORY  Assess as able. Pt with team this am and requesting quiet. 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     CURRENT NUTRITION ORDERS  Diet: Regular  Intake/Tolerance: decreased with chewing discomfort. Per RN, ate muffin and donut and coffee. Pt prefers foods from home. Per flow sheets 25-50%  Some nausea and jaw pain noted in chart notes     LABS  Labs reviewed  Phos: 5.1 (H)    MEDICATIONS  Medications reviewed  MOM  Thera-vit  Dilantin   Senokot   D3  Prn antiemetics     ANTHROPOMETRICS  Height: 172.7 cm (5' 8\") 68\"   Most Recent Weight: 79 kg (174 lb 2.6 oz)    IBW: 70 kg  BMI: Overweight BMI 25-29.9  Weight History:   Wt Readings from Last 9 Encounters:   09/04/19 79 kg (174 lb 2.6 oz)   08/19/19 80.2 kg (176 lb 12.8 oz)   06/18/19 79.4 kg (175 lb)   05/17/19 81.2 kg (179 lb)   04/22/19 83.6 kg (184 lb 6.4 oz)   03/05/19 85.3 kg (188 lb)   01/14/19 88 kg (194 lb)   11/15/18 81.9 kg (180 lb 9.6 oz)   11/07/18 82.3 kg (181 lb 6.4 oz)   7% weight loss over ~ 6 months     Dosing Weight: 79 kg (current/driest weight)    ASSESSED " NUTRITION NEEDS  Estimated Energy Needs: 3946-5211 kcals/day (25 - 30 kcals/kg)  Justification: Maintenance  Estimated Protein Needs:  grams protein/day (1.2 - 1.5 grams of pro/kg)  Justification: Increased needs  Estimated Fluid Needs: 0426-7491 mL/day (1 mL/kcal)   Justification: Maintenance and Per provider pending fluid status    PHYSICAL FINDINGS  See malnutrition section below.     MALNUTRITION  % Intake: Unable to assess; at least decreased over the last 8 days but unable to quantify   % Weight Loss: Weight loss does not meet criteria  Subcutaneous Fat Loss: Unable to assess  Muscle Loss: Unable to assess  Fluid Accumulation/Edema: Unable to assess  Malnutrition Diagnosis: Unable to determine due to unable to complete all parameters of NFPA    NUTRITION DIAGNOSIS  Inadequate oral intake related to nausea, jaw pain, and decreased appetite as evidenced by pt with decreased PO intakes since admit.       INTERVENTIONS  Implementation  Nutrition Education: Unable to complete due to pt with team    Medical food supplement therapy   Monitor PO intakes and adequacy; need for further intervention.     Goals  Patient to consume % of nutritionally adequate meal trays TID, or the equivalent with supplements/snacks.     Monitoring/Evaluation  Progress toward goals will be monitored and evaluated per protocol.

## 2019-09-04 NOTE — PLAN OF CARE
Patient had a seizure that lasted 4 minutes,he had repetitive movement, blinking, and eyelid flutter,  tachycardiac- heart rate in 150s, sweating, neuro is intact, reports left side of the forehead hurts, reoriented to the situation. Transferred back to bed with a mechanical lift.

## 2019-09-05 ENCOUNTER — ALLIED HEALTH/NURSE VISIT (OUTPATIENT)
Dept: NEUROLOGY | Facility: CLINIC | Age: 29
End: 2019-09-05
Attending: PSYCHIATRY & NEUROLOGY
Payer: COMMERCIAL

## 2019-09-05 ENCOUNTER — APPOINTMENT (OUTPATIENT)
Dept: PHYSICAL THERAPY | Facility: CLINIC | Age: 29
End: 2019-09-05
Attending: NEUROLOGICAL SURGERY
Payer: COMMERCIAL

## 2019-09-05 DIAGNOSIS — G40.109 EPILEPSY, FOCAL (H): Primary | ICD-10-CM

## 2019-09-05 DIAGNOSIS — G40.219 PARTIAL EPILEPSY WITH IMPAIRMENT OF CONSCIOUSNESS, INTRACTABLE (H): Primary | ICD-10-CM

## 2019-09-05 LAB
ANION GAP SERPL CALCULATED.3IONS-SCNC: 10 MMOL/L (ref 3–14)
BUN SERPL-MCNC: 9 MG/DL (ref 7–30)
CALCIUM SERPL-MCNC: 9.2 MG/DL (ref 8.5–10.1)
CHLORIDE SERPL-SCNC: 105 MMOL/L (ref 94–109)
CO2 SERPL-SCNC: 23 MMOL/L (ref 20–32)
CREAT SERPL-MCNC: 0.74 MG/DL (ref 0.66–1.25)
ERYTHROCYTE [DISTWIDTH] IN BLOOD BY AUTOMATED COUNT: 11.9 % (ref 10–15)
GFR SERPL CREATININE-BSD FRML MDRD: >90 ML/MIN/{1.73_M2}
GLUCOSE SERPL-MCNC: 100 MG/DL (ref 70–99)
HCT VFR BLD AUTO: 33.9 % (ref 40–53)
HGB BLD-MCNC: 11.3 G/DL (ref 13.3–17.7)
MCH RBC QN AUTO: 30.8 PG (ref 26.5–33)
MCHC RBC AUTO-ENTMCNC: 33.3 G/DL (ref 31.5–36.5)
MCV RBC AUTO: 92 FL (ref 78–100)
PLATELET # BLD AUTO: 464 10E9/L (ref 150–450)
POTASSIUM SERPL-SCNC: 3.8 MMOL/L (ref 3.4–5.3)
RBC # BLD AUTO: 3.67 10E12/L (ref 4.4–5.9)
SODIUM SERPL-SCNC: 139 MMOL/L (ref 133–144)
WBC # BLD AUTO: 9.3 10E9/L (ref 4–11)

## 2019-09-05 PROCEDURE — 25000132 ZZH RX MED GY IP 250 OP 250 PS 637: Performed by: STUDENT IN AN ORGANIZED HEALTH CARE EDUCATION/TRAINING PROGRAM

## 2019-09-05 PROCEDURE — 97530 THERAPEUTIC ACTIVITIES: CPT | Mod: GP | Performed by: REHABILITATION PRACTITIONER

## 2019-09-05 PROCEDURE — 85027 COMPLETE CBC AUTOMATED: CPT | Performed by: NEUROLOGICAL SURGERY

## 2019-09-05 PROCEDURE — 95951 ZZHC EEG VIDEO EACH 24 HR: CPT | Mod: ZF

## 2019-09-05 PROCEDURE — 40000894 ZZH STATISTIC OT IP EVAL DEFER: Performed by: OCCUPATIONAL THERAPIST

## 2019-09-05 PROCEDURE — 25000128 H RX IP 250 OP 636: Performed by: STUDENT IN AN ORGANIZED HEALTH CARE EDUCATION/TRAINING PROGRAM

## 2019-09-05 PROCEDURE — 25000132 ZZH RX MED GY IP 250 OP 250 PS 637: Performed by: NURSE PRACTITIONER

## 2019-09-05 PROCEDURE — 97110 THERAPEUTIC EXERCISES: CPT | Mod: GP | Performed by: REHABILITATION PRACTITIONER

## 2019-09-05 PROCEDURE — 80048 BASIC METABOLIC PNL TOTAL CA: CPT | Performed by: NEUROLOGICAL SURGERY

## 2019-09-05 PROCEDURE — 20000004 ZZH R&B ICU UMMC

## 2019-09-05 RX ADMIN — PROCHLORPERAZINE EDISYLATE 10 MG: 5 INJECTION INTRAMUSCULAR; INTRAVENOUS at 10:45

## 2019-09-05 RX ADMIN — PHENYTOIN SODIUM 200 MG: 100 CAPSULE ORAL at 20:48

## 2019-09-05 RX ADMIN — Medication 0.2 MG: at 20:47

## 2019-09-05 RX ADMIN — OXYCODONE HYDROCHLORIDE 10 MG: 5 TABLET ORAL at 14:56

## 2019-09-05 RX ADMIN — GABAPENTIN 300 MG: 300 CAPSULE ORAL at 20:48

## 2019-09-05 RX ADMIN — GABAPENTIN 300 MG: 300 CAPSULE ORAL at 08:39

## 2019-09-05 RX ADMIN — CYCLOBENZAPRINE 10 MG: 10 TABLET, FILM COATED ORAL at 20:48

## 2019-09-05 RX ADMIN — PROCHLORPERAZINE EDISYLATE 10 MG: 5 INJECTION INTRAMUSCULAR; INTRAVENOUS at 04:21

## 2019-09-05 RX ADMIN — CYCLOBENZAPRINE 10 MG: 10 TABLET, FILM COATED ORAL at 08:38

## 2019-09-05 RX ADMIN — PROCHLORPERAZINE EDISYLATE 10 MG: 5 INJECTION INTRAMUSCULAR; INTRAVENOUS at 18:53

## 2019-09-05 RX ADMIN — CYCLOBENZAPRINE 10 MG: 10 TABLET, FILM COATED ORAL at 14:09

## 2019-09-05 RX ADMIN — MELATONIN 5000 UNITS: at 20:49

## 2019-09-05 RX ADMIN — MELATONIN TAB 3 MG 12 MG: 3 TAB at 21:27

## 2019-09-05 RX ADMIN — OMEPRAZOLE 20 MG: 20 CAPSULE, DELAYED RELEASE ORAL at 08:39

## 2019-09-05 RX ADMIN — Medication 0.2 MG: at 04:21

## 2019-09-05 RX ADMIN — TOPIRAMATE 50 MG: 50 TABLET ORAL at 08:38

## 2019-09-05 RX ADMIN — TOPIRAMATE 50 MG: 50 TABLET ORAL at 20:48

## 2019-09-05 RX ADMIN — Medication 0.2 MG: at 17:30

## 2019-09-05 RX ADMIN — Medication 0.2 MG: at 08:53

## 2019-09-05 RX ADMIN — PHENYTOIN SODIUM 200 MG: 100 CAPSULE ORAL at 08:38

## 2019-09-05 RX ADMIN — POLYETHYLENE GLYCOL 3350 17 G: 17 POWDER, FOR SOLUTION ORAL at 20:48

## 2019-09-05 RX ADMIN — CEFTRIAXONE SODIUM 2 G: 2 INJECTION, POWDER, FOR SOLUTION INTRAMUSCULAR; INTRAVENOUS at 08:53

## 2019-09-05 RX ADMIN — VENLAFAXINE HYDROCHLORIDE 225 MG: 150 CAPSULE, EXTENDED RELEASE ORAL at 08:38

## 2019-09-05 RX ADMIN — Medication 0.2 MG: at 12:46

## 2019-09-05 RX ADMIN — LACOSAMIDE 200 MG: 200 TABLET, FILM COATED ORAL at 20:47

## 2019-09-05 RX ADMIN — LACOSAMIDE 200 MG: 200 TABLET, FILM COATED ORAL at 08:38

## 2019-09-05 RX ADMIN — SENNOSIDES AND DOCUSATE SODIUM 2 TABLET: 8.6; 5 TABLET ORAL at 20:48

## 2019-09-05 RX ADMIN — OXYCODONE HYDROCHLORIDE 10 MG: 5 TABLET ORAL at 20:49

## 2019-09-05 RX ADMIN — PROCHLORPERAZINE EDISYLATE 10 MG: 5 INJECTION INTRAMUSCULAR; INTRAVENOUS at 21:30

## 2019-09-05 ASSESSMENT — ACTIVITIES OF DAILY LIVING (ADL)
ADLS_ACUITY_SCORE: 15

## 2019-09-05 ASSESSMENT — PAIN DESCRIPTION - DESCRIPTORS
DESCRIPTORS: HEADACHE;DULL
DESCRIPTORS: HEADACHE;DULL;DISCOMFORT

## 2019-09-05 ASSESSMENT — MIFFLIN-ST. JEOR: SCORE: 1741.5

## 2019-09-05 NOTE — PROGRESS NOTES
"Neuroscience Intensive Care Progress Note  09/05/2019    24 hour events: 2nd seizure last night, placed back on pta seizure meds excepting VPA.     Current Medications:    cefTRIAXone  2 g Intravenous Q24H     cyclobenzaprine  10 mg Oral or Feeding Tube TID     gabapentin  300 mg Oral BID     heparin lock flush  5-15 mL Intracatheter Q24H     lacosamide  200 mg Oral BID     magnesium hydroxide  30 mL Oral Daily     melatonin  12 mg Oral At Bedtime     multivitamin, therapeutic  1 tablet Oral or Feeding Tube Daily     omeprazole  20 mg Oral QAM     phenytoin  200 mg Oral BID     polyethylene glycol  17 g Oral TID     senna-docusate  2 tablet Oral or Feeding Tube BID     sodium chloride (PF)  10 mL Intracatheter Q8H     sodium chloride (PF)  10 mL Intracatheter Q8H     sodium chloride (PF)  3 mL Intracatheter Q8H     topiramate  50 mg Oral BID     venlafaxine  225 mg Oral QAM     cholecalciferol  5,000 Units Oral or Feeding Tube At Bedtime       PRN Medications:  albuterol, bisacodyl, calcium carbonate, heparin lock flush, hydrALAZINE, HYDROmorphone, labetalol, lidocaine 4%, lidocaine 4%, lidocaine, lidocaine (buffered or not buffered), lidocaine (buffered or not buffered), lidocaine (buffered or not buffered), LORazepam, magnesium sulfate, magnesium sulfate, naloxone, ondansetron **OR** ondansetron, oxyCODONE, potassium chloride, potassium chloride with lidocaine, potassium chloride, potassium chloride, potassium chloride, prochlorperazine, rizatriptan, sodium chloride (PF), sodium chloride (PF), sodium chloride (PF), sodium phosphate, sodium phosphate, sodium phosphate, sodium phosphate    Infusions:      Objective findings:  /72 (BP Location: Right arm)   Pulse 101   Temp 98.7  F (37.1  C) (Oral)   Resp 17   Ht 1.727 m (5' 8\")   Wt 80.2 kg (176 lb 12.9 oz)   SpO2 100%   BMI 26.88 kg/m      Ventilator Settings:  Resp: 17      Intake/Output:    Intake/Output Summary (Last 24 hours) at 9/5/2019 0813  Last " data filed at 9/5/2019 0358  Gross per 24 hour   Intake 600 ml   Output 800 ml   Net -200 ml       Physical Examination:   Vitals:  B/P: 110/72, T: 98.7, P: 101, R: 17  General:  Laying in bed  HEENT:  NC/AT, no icterus, op pink and moist, no ear or nose drainage.   Cardiac:  RRR, no m/r/g  Chest:  CTAB  Abdomen:  S/NT/ND  Extremities:  No LE swelling.    Skin:  No rash or lesion.      Neuro Exam:  Mental status: AOx4, speech fluent  Cranial nerves: EOMI, voice clear, face symmetric with speech  Motor: antigravity in arms  Sensory: deferred  Labs/Studies:  Recent Labs   Lab Test 09/05/19  0412 09/03/19  0459 09/01/19  0358    139 137   POTASSIUM 3.8 4.0 3.8   CHLORIDE 105 105 106   CO2 23 24 23   ANIONGAP 10 10 8   * 91 91   BUN 9 11 12   CR 0.74 0.59* 0.58*   ROSA 9.2 9.3 9.0   WBC 9.3 7.1 7.6   RBC 3.67* 3.57* 3.91*   HGB 11.3* 11.1* 12.2*   * 368 347       Recent Labs   Lab Test 08/19/19  1201 01/17/19  0825   INR 1.17* 1.07   PTT 32 31       No lab results found in last 7 days.    ==========================================================    ASSESSMENT/PLAN: Tha Mcghee is a 28 year old with a history of partial epilepsy who was admitted on 8/27 for stereotactic placement of R depth electrodes and R craniotomy for R sided strip electrodes. Neurology consulted for seizure management.     Neuro:  #Partial epilepsy with impairment of consciousness with intractable epilepsy s/p surgical electrode placement. Multiple seizures captured, now back on seizure meds.  --vEEG  --AED management per epilepsy team  --surgical lead management per NSG  --restart -200  --restart -200  --restart TPM 50-50  ---300  --do not restart PTA depakote   --melatonin 12mg at bedtime for sleep     Home AEDs:  Vimpat 200 bid  Depakote 1250/1500  Dilantin 160/200  Gabapentin 300 bid     #Post operative pain  #Headaches.   --pta TPM 50-50  - Dilaudid 0.2 - 0.4 PRN  - Compazine IV PRN  - Tylenol  PRN     #Hx of substance abuse     #Depression  - Continue PTA venlafaxine     Respiratory:  #Asthma  - Albuterol PRN     Cardiovascular:  No acute issues.     Renal:  - Electrolyte protocol     Endocrine:  No acute issues.     Heme:  #Normocytic anemia, stable  - Monitor CBC     GI:  #GERD  - continue PTA Omeprazole 20 daily     #Diet  --reg diet     - Antiemetics PRN  - Bowel regimen: Miralax PRN     ID:  #Leukocytosis, improved  Likely post-operative related. Afebrile. No signs of infection  - monitor CBC     #Irish-op prophylaxis   - Ceftriaxone per NSG     MSK:  #L ankle sprain  - XR L ankle with no fracture     :  #Urinary retention  - May be due to dilaudid vs poor fluid intake   - Barton discontinued per NSG 9/2  - Monitor I/O     FEN: Advance as tolerated to regular diet  DVT prophylaxis: SCDs  Lines: PIV  Code Status: Full    =========================================================    This patient was seen & discussed with my attending, Dr. Ureña, who agrees with my assessment and plan.     Zaki Edwards  Vascular Neurology Fellow  Pager: [phone]

## 2019-09-05 NOTE — PROGRESS NOTES
"Neurosurgery Progress Note      S: no complaints today, hoping to experience seizure soon.  No clinical seizures overnight.     O:  /72 (BP Location: Right arm)   Pulse 101   Temp 98.7  F (37.1  C) (Oral)   Resp 17   Ht 1.727 m (5' 8\")   Wt 80.2 kg (176 lb 12.9 oz)   SpO2 100%   BMI 26.88 kg/m    Exam:  General: Awake;  Alert, In No Acute Distress  Pulm: Breathing Comfortably on room air  Mental status: Oriented x 3  Cranial Nerves: Cranial Nerves II-XII Intact Bilaterally  Strength:      Del Tr Bi WE WF Gr  R 5 5 5 5 5 5  L 5 5 5 5 5 5     HF KE KF DF PF   R 5 5 5 5 5   L 5 5 5 5 5     Pronator Drift: Absent  Sensory: Intact to Light Touch  INCISION: Head Wrap Intact; No Drainage Present.     Assessment:   28 year old male with history of epilepsy s/p  MAGAN Assisted Stereotactic Placement Of Right Sided Depth Electrodes. Awaiting seizure localization.     Plan:     Neuro:   Anti-epileptics: per epilepsy team   Appreciate assistance of Epilepsy team  Will plan for removal of right sided depth and strip electrodes and right craniotomy for right temporal lobectomy and amygdalohippocampectomy on 9/10/2019 with Dr Jason Funes     Cardiovascular: blood pressure goals: SBP < 140    Pulmonary: Incentive spirometry     Gastrointestinal: Regular Diet    Renal: monitor intake and output     Heme:   Maintain INR < 1.4; Platelet > 100K; Fibrinogen > 200, Hemoglobin > 8    Endocrine: No issues    Infectious: IV Ceftriaxone 2G Q 24 HR  while leads are in place; Continue to Monitor    DVT prophylaxis: Sequential compression devices to Bilateral Lower Extremities     Ulcer prophylaxis: Not Indicated    DISPO:  Will plan for surgery on 9/10/2019        CHEYANNE Ruano, CNP  Department of Neurosurgery  Pager: [phone]    "

## 2019-09-05 NOTE — PROCEDURES
EEG #-8 (Day 8 of Video-EEG Monitoring)    DATE OF RECORDIN2019    DURATION OF RECORDIN hours, 23 minutes.    CLINICAL SUMMARY:  This video-EEG monitoring procedure was performed with intracranial electrodes for planning of epilepsy surgery in treatment of refractory partial epilepsy in [facility].  He was reported to be receiving phenytoin, gabapentin, venlafaxine, cyclobenzaprine, oxycodone and hydromorphone on this day of monitoring.       TECHNICAL SUMMARY:  This continuous EEG monitoring procedure was performed with intraparenchymal depth electrodes and subdural strip and grid electrodes in and over multiple sites of the right temporal and frontal lobes.    Three 14-contact depth electrodes were placed through temporal cortex into medial structures with deepest contacts numbered  1  and located in:    right amygdala ( AM ),   right anterior hippocampus ( AH ), and   right hippocampus more posteriorly ( PH ).    The subdural strip and grid electrodes had variable numbers of contacts and locations, consisting of:   6 strip contacts over right inferior frontal cortex ( IF ),   6 strip contacts over right premotor frontal cortex ( PMF ),   6 strip contacts over right frontal and temporal cortex ( FT ),   4 strip contacts over right superior posterior temporal cortex ( SPT ),   4 strip contacts over right middle posterior temporal cortex ( MPT ),   4 strip contacts over right inferior posterior temporal cortex ( IPT ),   2-x-16 small, densely-spaced grid contacts over right dorsolateral prefrontal cortex ( FGR ).     Video monitoring was utilized and periodically reviewed by EEG technologists and the physician for electroclinical correlation.     INTERICTAL EEG ACTIVITIES:    During waking and drowsy states the medial temporal electrodes had predominantly irregular 1-4 Hz delta activities, the lateral temporal electrodes had mixed 1-6 Hz delta-theta activities, and the frontal electrodes  had mixed 2-12 Hz delta-theta-alpha activities.  All sites had greater delta slowing in slow wave sleep.      During all states there was very frequent occurrence of spike-wave complexes at AH 1-4, and frequent occurrence of spike-wave complexes at AM 1-4 both independently and synchronously with those at AH 1-4.  Occasionally, spike-wave complexes at PH 1-4 occurred synchronously with spike-wave complexes in AH 1-4, or with spike-wave complexes in AH 1-4 and AM 1-4.     ICTAL RECORDINGS:  No electrographic seizures and no paroxysmal behavioral events were recorded during this day of monitoring.      SUMMARY OF DAY 8 OF VIDEO-EEG MONITORING:    The interictal EEG recording was abnormal on the basis of very frequent focal spike-wave complexes in the right medial temporal electrodes.    No electrographic seizures and no paroxysmal behavioral events were recorded on this day of monitoring.   Neel Preston M.D., Professor of Neurology       D: 2019   T: 2019   MT:       Name:     SANTHOSH HODGES   MRN:      [id number]        Account:        [id number]   :      1990           Procedure Date: 2019      Document: [id number]

## 2019-09-05 NOTE — PLAN OF CARE
OT 4AB:  Defer. eval orders received. Per chart review, discussion with interdisciplinary team and pt. Pt. is indep. with BADLs and aware of precautions. No IP OT needs at this time. PT will  continue to follow for any gait/strengthening needs.  OT orders completed at this time.

## 2019-09-05 NOTE — PROGRESS NOTES
Care Coordinator Progress Note    Admission Date/Time:  8/27/2019  Attending MD:  Jason Funes, *    Data  Chart reviewed, discussed with interdisciplinary team.   Patient was admitted for: placement of R depth electrodes for seizure monitoring.      Assessment  Pt remain in ICU for seizure monitoring.  Awaiting seizure localization.  RNCC will cont to follow plan of care.     Plan  Anticipated Discharge Date:  TBD.  Anticipated Discharge Plan:   Home with assist and OP PT.  RNCC will cont to follow plan of care.      Rosalee Edouard RN, PHN, BSN  4A and 4E/ ICU  Care Coordinator  Phone: [phone]  Pager: [phone]

## 2019-09-05 NOTE — PLAN OF CARE
D/I/A:  Neuro exam intact. C/O right sided headache, moderately improved with PRNs. Grid intact and EEG monitored. No seizures this shift. Vitals at baseline, tachycardic 1teens-120s.      P: Continue to monitor and treat as ordered, notify team with changes/concerns. Plan for OR on Tuesday.

## 2019-09-05 NOTE — PLAN OF CARE
Discharge Planner PT  4AB  Patient plan for discharge: Home  Current status: Pt participated in standing LE Therex, seated ergometer x 10 minutes. Pt is mobilizing well, impulsive with mobility.   Barriers to return to prior living situation: craniotomy precautions  Recommendations for discharge: Home  Rationale for recommendations: Anticipate pt will be safe to discharge home when medically ready for discharge.        Entered by: Soraida Luna 09/05/2019 5:19 PM

## 2019-09-05 NOTE — PLAN OF CARE
Had a seizure at 1901, that lasted for 2 mins, had a rhythmic jerking, repetitive movement, eye flutter and blinking, Neuro critical  notified, restarted on seizure meds, reoriented to the the situation, remains neuro intact.

## 2019-09-05 NOTE — PROGRESS NOTES
"[facility] - Epilepsy Service Daily Note      Interval History:   Had GTC yesterday morning and another clinical seizure around 1900. Antiepileptic drugs restarted and plan for removal of right sided depth and strip electrodes and resection 9/10/2019 with Dr Jason Funes. This was reviewed with patient by Dr. Preston.    Review of System:   Denies nausea, vomiting, abdominal pain, headaches, dizziness and chest pain.      AEDs on admission with most recent levels:  1. Vimpat 200-200 (2.6)  2. Depakote 9481-0025 (71, 8.8)  3. Gabapentin 300-300 (1.0)  4. Dilantin 160-200 (14.6, 1.75)  5. Topiramate 50 - 50 (<1.5)    Antiepileptic Medications Current Doses:   1. Dilantin 200- 200  2. Vimpat 200-200  3. Gabapentin 300-300  4. Topiramate 50-50      Exam: Blood pressure 110/72, pulse 101, temperature 98.7  F (37.1  C), temperature source Oral, resp. rate 28, height 1.727 m (5' 8\"), weight 80.2 kg (176 lb 12.9 oz), SpO2 100 %.   General: NAD, sitting up in chair   Neuro: Alert and oriented. Speech fluent. Pupils equal, round and reactive to light. EOM's intact. Face symmetric. Strength full and equal bilaterally. No drift or pronation. Intact FNF. Sensation grossly intact to light touch.     Video EE/3/2019: The interictal EEG recording was abnormal due to frequent focal spike-wave complexes in the right medial temporal electrodes.  No electrographic seizures and no paroxysmal behavioral events were recorded on this day of monitoring.       Assessment/ Plan: Patient seen and discussed with attending physician Dr. Preston.   1. 28 year old right handed male with a h/o depression, anxiety and seizure disorder since  is being admitted for intracranial vEEG monitoring. Patient underwent stereotactic placement of R depth electrodes and R craniotomy for R sided strip electrodes on . Five clinical seizures recorded emerging from right hippocampus. Antiepileptic drugs restarted and plan for " OR on 9/10/2019 with Dr. Funes.     - Continue video EGG monitoring  - Seizure precautions  - SCDs while in bed for DVT prevention  - Continue current antiepileptic drugs (will stay off PTA divalproex)  - PATIENT TO BE UP WITH ASSISTANCE OR STANDBY ONLY. PATIENT TO HAVE A GAIT BELT ON WHEN UP OUT OF BED       Anastasia Hansen PA-C    Total time: 25 minute was spent in the care of this patient. The patient agrees with the above mentioned plan of care. I answered all the patient's questions and addressed immediate concerns. More than 50% of time spent consisted of counseling and coordinating care, including discussion of the diagnostic significance of EEG findings, anti-seizure medication management, and planning for discharge home.

## 2019-09-06 ENCOUNTER — ALLIED HEALTH/NURSE VISIT (OUTPATIENT)
Dept: NEUROLOGY | Facility: CLINIC | Age: 29
End: 2019-09-06
Attending: PSYCHIATRY & NEUROLOGY
Payer: COMMERCIAL

## 2019-09-06 ENCOUNTER — APPOINTMENT (OUTPATIENT)
Dept: ULTRASOUND IMAGING | Facility: CLINIC | Age: 29
End: 2019-09-06
Attending: STUDENT IN AN ORGANIZED HEALTH CARE EDUCATION/TRAINING PROGRAM
Payer: COMMERCIAL

## 2019-09-06 DIAGNOSIS — G40.219 PARTIAL EPILEPSY WITH IMPAIRMENT OF CONSCIOUSNESS, INTRACTABLE (H): Primary | ICD-10-CM

## 2019-09-06 LAB
ABO + RH BLD: NORMAL
ABO + RH BLD: NORMAL
ANION GAP SERPL CALCULATED.3IONS-SCNC: 6 MMOL/L (ref 3–14)
BLD GP AB SCN SERPL QL: NORMAL
BLOOD BANK CMNT PATIENT-IMP: NORMAL
BUN SERPL-MCNC: 8 MG/DL (ref 7–30)
CALCIUM SERPL-MCNC: 8.8 MG/DL (ref 8.5–10.1)
CHLORIDE SERPL-SCNC: 105 MMOL/L (ref 94–109)
CO2 SERPL-SCNC: 27 MMOL/L (ref 20–32)
CREAT SERPL-MCNC: 0.72 MG/DL (ref 0.66–1.25)
ERYTHROCYTE [DISTWIDTH] IN BLOOD BY AUTOMATED COUNT: 11.9 % (ref 10–15)
GFR SERPL CREATININE-BSD FRML MDRD: >90 ML/MIN/{1.73_M2}
GLUCOSE SERPL-MCNC: 99 MG/DL (ref 70–99)
HCT VFR BLD AUTO: 32.3 % (ref 40–53)
HGB BLD-MCNC: 10.8 G/DL (ref 13.3–17.7)
MCH RBC QN AUTO: 30.9 PG (ref 26.5–33)
MCHC RBC AUTO-ENTMCNC: 33.4 G/DL (ref 31.5–36.5)
MCV RBC AUTO: 92 FL (ref 78–100)
PLATELET # BLD AUTO: 489 10E9/L (ref 150–450)
POTASSIUM SERPL-SCNC: 4 MMOL/L (ref 3.4–5.3)
RADIOLOGIST FLAGS: ABNORMAL
RBC # BLD AUTO: 3.5 10E12/L (ref 4.4–5.9)
SODIUM SERPL-SCNC: 137 MMOL/L (ref 133–144)
SPECIMEN EXP DATE BLD: NORMAL
WBC # BLD AUTO: 12.8 10E9/L (ref 4–11)

## 2019-09-06 PROCEDURE — 25000128 H RX IP 250 OP 636: Performed by: STUDENT IN AN ORGANIZED HEALTH CARE EDUCATION/TRAINING PROGRAM

## 2019-09-06 PROCEDURE — 25000132 ZZH RX MED GY IP 250 OP 250 PS 637: Performed by: STUDENT IN AN ORGANIZED HEALTH CARE EDUCATION/TRAINING PROGRAM

## 2019-09-06 PROCEDURE — 86850 RBC ANTIBODY SCREEN: CPT | Performed by: NEUROLOGICAL SURGERY

## 2019-09-06 PROCEDURE — 86900 BLOOD TYPING SEROLOGIC ABO: CPT | Performed by: NEUROLOGICAL SURGERY

## 2019-09-06 PROCEDURE — 80048 BASIC METABOLIC PNL TOTAL CA: CPT | Performed by: NEUROLOGICAL SURGERY

## 2019-09-06 PROCEDURE — 95951 ZZHC EEG VIDEO EACH 24 HR: CPT | Mod: ZF

## 2019-09-06 PROCEDURE — 86901 BLOOD TYPING SEROLOGIC RH(D): CPT | Performed by: NEUROLOGICAL SURGERY

## 2019-09-06 PROCEDURE — 93971 EXTREMITY STUDY: CPT | Mod: LT

## 2019-09-06 PROCEDURE — 20000004 ZZH R&B ICU UMMC

## 2019-09-06 PROCEDURE — 40000556 ZZH STATISTIC PERIPHERAL IV START W US GUIDANCE

## 2019-09-06 PROCEDURE — 85027 COMPLETE CBC AUTOMATED: CPT | Performed by: NEUROLOGICAL SURGERY

## 2019-09-06 PROCEDURE — 25000132 ZZH RX MED GY IP 250 OP 250 PS 637: Performed by: NURSE PRACTITIONER

## 2019-09-06 RX ORDER — HEPARIN SODIUM 5000 [USP'U]/.5ML
5000 INJECTION, SOLUTION INTRAVENOUS; SUBCUTANEOUS EVERY 8 HOURS
Status: COMPLETED | OUTPATIENT
Start: 2019-09-06 | End: 2019-09-09

## 2019-09-06 RX ADMIN — PHENYTOIN SODIUM 200 MG: 100 CAPSULE ORAL at 07:51

## 2019-09-06 RX ADMIN — CYCLOBENZAPRINE 10 MG: 10 TABLET, FILM COATED ORAL at 20:02

## 2019-09-06 RX ADMIN — OXYCODONE HYDROCHLORIDE 10 MG: 5 TABLET ORAL at 12:11

## 2019-09-06 RX ADMIN — LACOSAMIDE 200 MG: 200 TABLET, FILM COATED ORAL at 07:52

## 2019-09-06 RX ADMIN — LACOSAMIDE 200 MG: 200 TABLET, FILM COATED ORAL at 20:02

## 2019-09-06 RX ADMIN — Medication 0.2 MG: at 15:54

## 2019-09-06 RX ADMIN — PROCHLORPERAZINE EDISYLATE 10 MG: 5 INJECTION INTRAMUSCULAR; INTRAVENOUS at 21:00

## 2019-09-06 RX ADMIN — Medication 0.2 MG: at 21:00

## 2019-09-06 RX ADMIN — GABAPENTIN 300 MG: 300 CAPSULE ORAL at 20:01

## 2019-09-06 RX ADMIN — OMEPRAZOLE 20 MG: 20 CAPSULE, DELAYED RELEASE ORAL at 07:51

## 2019-09-06 RX ADMIN — SENNOSIDES AND DOCUSATE SODIUM 1 TABLET: 8.6; 5 TABLET ORAL at 07:52

## 2019-09-06 RX ADMIN — TOPIRAMATE 50 MG: 50 TABLET ORAL at 07:51

## 2019-09-06 RX ADMIN — TOPIRAMATE 50 MG: 50 TABLET ORAL at 20:02

## 2019-09-06 RX ADMIN — OXYCODONE HYDROCHLORIDE 10 MG: 5 TABLET ORAL at 06:05

## 2019-09-06 RX ADMIN — PHENYTOIN SODIUM 200 MG: 100 CAPSULE ORAL at 20:01

## 2019-09-06 RX ADMIN — OXYCODONE HYDROCHLORIDE 10 MG: 5 TABLET ORAL at 21:54

## 2019-09-06 RX ADMIN — VENLAFAXINE HYDROCHLORIDE 225 MG: 150 CAPSULE, EXTENDED RELEASE ORAL at 07:52

## 2019-09-06 RX ADMIN — GABAPENTIN 300 MG: 300 CAPSULE ORAL at 07:52

## 2019-09-06 RX ADMIN — PROCHLORPERAZINE EDISYLATE 10 MG: 5 INJECTION INTRAMUSCULAR; INTRAVENOUS at 06:50

## 2019-09-06 RX ADMIN — OXYCODONE HYDROCHLORIDE 10 MG: 5 TABLET ORAL at 18:50

## 2019-09-06 RX ADMIN — CYCLOBENZAPRINE 10 MG: 10 TABLET, FILM COATED ORAL at 13:56

## 2019-09-06 RX ADMIN — CYCLOBENZAPRINE 10 MG: 10 TABLET, FILM COATED ORAL at 07:51

## 2019-09-06 RX ADMIN — SENNOSIDES AND DOCUSATE SODIUM 1 TABLET: 8.6; 5 TABLET ORAL at 20:02

## 2019-09-06 RX ADMIN — POTASSIUM CHLORIDE 20 MEQ: 10 TABLET, EXTENDED RELEASE ORAL at 06:05

## 2019-09-06 RX ADMIN — MELATONIN 5000 UNITS: at 20:02

## 2019-09-06 RX ADMIN — Medication 0.2 MG: at 11:14

## 2019-09-06 RX ADMIN — OXYCODONE HYDROCHLORIDE 10 MG: 5 TABLET ORAL at 09:09

## 2019-09-06 RX ADMIN — OXYCODONE HYDROCHLORIDE 10 MG: 5 TABLET ORAL at 15:38

## 2019-09-06 RX ADMIN — Medication 0.2 MG: at 06:06

## 2019-09-06 RX ADMIN — CEFTRIAXONE SODIUM 2 G: 2 INJECTION, POWDER, FOR SOLUTION INTRAMUSCULAR; INTRAVENOUS at 09:09

## 2019-09-06 RX ADMIN — MELATONIN TAB 3 MG 12 MG: 3 TAB at 21:54

## 2019-09-06 RX ADMIN — PROCHLORPERAZINE EDISYLATE 10 MG: 5 INJECTION INTRAMUSCULAR; INTRAVENOUS at 12:12

## 2019-09-06 ASSESSMENT — PAIN DESCRIPTION - DESCRIPTORS
DESCRIPTORS: ACHING

## 2019-09-06 ASSESSMENT — ACTIVITIES OF DAILY LIVING (ADL)
ADLS_ACUITY_SCORE: 15

## 2019-09-06 ASSESSMENT — MIFFLIN-ST. JEOR: SCORE: 1743.5

## 2019-09-06 NOTE — SIGNIFICANT EVENT
"SPIRITUAL HEALTH SERVICES  SPIRITUAL ASSESSMENT Progress Note  [facility] 4A     Pt accompanied by his father and a nurse sitter. Pt stated:  \"I'm doing good. They're operating on me this Tuesday and I hope to be home Wednesday or Thursday. There's an 80% chance what they're going to do will work.\"     He has good support from his parents, siblings and his dog who was visiting when I arrived. He accepted my offer to anoint him.     PATIENT ANOINTED by Father Manav Bowling 9/6/2019     PLAN: Will visit this coming week.    Manav Bowling M.Div.     Pager [phone]    "

## 2019-09-06 NOTE — PROGRESS NOTES
"Neuroscience Intensive Care Progress Note  09/06/2019    24 hour events: got upset at nursing staff last night    Current Medications:    cefTRIAXone  2 g Intravenous Q24H     cyclobenzaprine  10 mg Oral or Feeding Tube TID     gabapentin  300 mg Oral BID     heparin lock flush  5-15 mL Intracatheter Q24H     lacosamide  200 mg Oral BID     magnesium hydroxide  30 mL Oral Daily     melatonin  12 mg Oral At Bedtime     multivitamin, therapeutic  1 tablet Oral or Feeding Tube Daily     omeprazole  20 mg Oral QAM     phenytoin  200 mg Oral BID     polyethylene glycol  17 g Oral TID     senna-docusate  2 tablet Oral or Feeding Tube BID     sodium chloride (PF)  10 mL Intracatheter Q8H     sodium chloride (PF)  3 mL Intracatheter Q8H     topiramate  50 mg Oral BID     venlafaxine  225 mg Oral QAM     cholecalciferol  5,000 Units Oral or Feeding Tube At Bedtime       PRN Medications:  albuterol, bisacodyl, calcium carbonate, heparin lock flush, hydrALAZINE, HYDROmorphone, labetalol, lidocaine 4%, lidocaine, lidocaine (buffered or not buffered), LORazepam, magnesium sulfate, magnesium sulfate, naloxone, ondansetron **OR** ondansetron, oxyCODONE, potassium chloride, potassium chloride with lidocaine, potassium chloride, potassium chloride, potassium chloride, prochlorperazine, rizatriptan, sodium chloride (PF), sodium phosphate, sodium phosphate, sodium phosphate, sodium phosphate    Infusions:      Objective findings:  /75   Pulse 93   Temp 97.8  F (36.6  C) (Oral)   Resp 12   Ht 1.727 m (5' 8\")   Wt 80.4 kg (177 lb 4 oz)   SpO2 98%   BMI 26.95 kg/m      Ventilator Settings:  Resp: 12      Intake/Output:    Intake/Output Summary (Last 24 hours) at 9/6/2019 1015  Last data filed at 9/6/2019 1000  Gross per 24 hour   Intake 420 ml   Output 2100 ml   Net -1680 ml       Physical Examination:   Vitals:  B/P: 116/75, T: 97.8, P: 93, R: 12  General:  Laying in bed  HEENT:  NC/AT, no icterus, op pink and moist, no ear " or nose drainage.   Cardiac:  RRR, no m/r/g  Chest:  CTAB  Abdomen:  S/NT/ND  Extremities:  No LE swelling.    Skin:  No rash or lesion.      Neuro Exam:  Mental status: AOx4, speech fluent  Cranial nerves: EOMI, voice clear, face symmetric with speech  Motor: antigravity in all 4x limbs  Sensory: deferred    Labs/Studies:  Recent Labs   Lab Test 09/05/19  0412 09/03/19  0459 09/01/19  0358    139 137   POTASSIUM 3.8 4.0 3.8   CHLORIDE 105 105 106   CO2 23 24 23   ANIONGAP 10 10 8   * 91 91   BUN 9 11 12   CR 0.74 0.59* 0.58*   ROSA 9.2 9.3 9.0   WBC 9.3 7.1 7.6   RBC 3.67* 3.57* 3.91*   HGB 11.3* 11.1* 12.2*   * 368 347       Recent Labs   Lab Test 08/19/19  1201 01/17/19  0825   INR 1.17* 1.07   PTT 32 31       No lab results found in last 7 days.    ==========================================================    ASSESSMENT/PLAN: Tha Mcghee is a 28 year old with a history of partial epilepsy who was admitted on 8/27 for stereotactic placement of R depth electrodes and R craniotomy for R sided strip electrodes. Neurology consulted for seizure management.     Neuro:  #Partial epilepsy with impairment of consciousness with intractable epilepsy s/p surgical electrode placement. Multiple seizures captured, now back on seizure meds.  --vEEG  --AED management per epilepsy team  --plan is to remove leads and do epilepsy surgery 9/10  ---200  ---200  --TPM 50-50  ---300  --do not restart PTA depakote   --melatonin 12mg at bedtime for sleep     Home AEDs:  Vimpat 200 bid  Depakote 1250/1500   Dilantin 160/200  Gabapentin 300 bid     #Post operative pain  #Headaches.   --pta TPM 50-50  - Dilaudid 0.2 - 0.4 PRN  - Compazine IV PRN  - Tylenol PRN     #Hx of substance abuse     #Depression  - Continue PTA venlafaxine     Respiratory:  #Asthma  - Albuterol PRN     Cardiovascular:  No acute issues.     Renal:  - Electrolyte protocol     Endocrine:  No acute issues.     Heme:  #Normocytic  anemia, stable  - Monitor CBC     GI:  #GERD  - continue PTA Omeprazole 20 daily     #Diet  --reg diet     - Antiemetics PRN  - Bowel regimen: Miralax PRN     ID:  #Leukocytosis, improved  Likely post-operative related. Afebrile. No signs of infection  - monitor CBC     #Irish-op prophylaxis   - Ceftriaxone per NSG     MSK:  #L ankle sprain  - XR L ankle with no fracture     :  #Urinary retention  - May be due to dilaudid vs poor fluid intake   - Barton discontinued per NSG 9/2  - Monitor I/O     FEN: Advance as tolerated to regular diet  DVT prophylaxis: SCDs  Lines: PIV  Code Status: Full    =========================================================    This patient was seen & discussed with my attending, Dr. Ureña, who agrees with my assessment and plan.     Zaki Edwards  Vascular Neurology Fellow  Pager: [phone]

## 2019-09-06 NOTE — PLAN OF CARE
Pt neurologically intact, no issues. SIMENTAL, LL ankle sprained. Independently repositions. Pain managed with compazine x 2, dilaudid x2, and oxy x 2. Slept comfortable most of the shift/ Bedside sitter for safety. EEG monitoring continuous.Seizure pads on. VSS. Sinus rhythm/sinus tach. HR 70-100s. Afebrile. LS clear. Voiding spon .Midline SL.     Will continue to monitor pt closely. Notify MD of significant changes.

## 2019-09-06 NOTE — PROVIDER NOTIFICATION
Patient notified RN that his left arm felt more swollen and was hurting more.  Increased swelling noted.  Left midline catheter remains in place and has been saline locked/not infusing since earlier this am.    Neuro critical care notified and plan to order imaging.  Keep line in place until imaging obtained - may decide to keep line based on results.  NO IV meds until imaging is obtained.

## 2019-09-06 NOTE — PROGRESS NOTES
"Neurosurgery Progress Note      S: Seizure noted yesterday afternoon.  Planning for OR for lead removal and temporal lobectomy on 9/10    O:  /68 (BP Location: Right arm)   Pulse 93   Temp 98  F (36.7  C) (Oral)   Resp 20   Ht 1.727 m (5' 8\")   Wt 80.4 kg (177 lb 4 oz)   SpO2 95%   BMI 26.95 kg/m    Exam:  General: Awake;  Alert, In No Acute Distress  Pulm: Breathing Comfortably on room air  Mental status: Oriented x 3  Cranial Nerves: Cranial Nerves II-XII Intact Bilaterally  Strength:      Del Tr Bi WE WF Gr  R 5 5 5 5 5 5  L 5 5 5 5 5 5     HF KE KF DF PF   R 5 5 5 5 5   L 5 5 5 5 5     Pronator Drift: Absent  Sensory: Intact to Light Touch  INCISION: Head Wrap Intact; No Drainage Present.     Assessment:   28 year old male with history of epilepsy s/p  MAGAN Assisted Stereotactic Placement Of Right Sided Depth Electrodes. Awaiting seizure localization.     Plan:     Neuro:   Anti-epileptics: per epilepsy team   Appreciate assistance of Epilepsy team  Will plan for removal of right sided depth and strip electrodes and right craniotomy for right temporal lobectomy and amygdalohippocampectomy on 9/10/2019 with Dr Jason Funes     Cardiovascular: blood pressure goals: SBP < 140    Pulmonary: Incentive spirometry     Gastrointestinal: Regular Diet    Renal: monitor intake and output     Heme:   Maintain INR < 1.4; Platelet > 100K; Fibrinogen > 200, Hemoglobin > 8    Endocrine: No issues    Infectious: IV Ceftriaxone 2G Q 24 HR  while leads are in place; Continue to Monitor    DVT prophylaxis: Sequential compression devices to Bilateral Lower Extremities     Ulcer prophylaxis: Not Indicated    DISPO:  Will plan for surgery on 9/10/2019      CHEYANNE Ruano, CNP  Department of Neurosurgery  Pager: [phone]    "

## 2019-09-06 NOTE — PLAN OF CARE
Neuro: Oriented x 4 and neurologically intact.  No seizure activity noted today.  Attendant remains at bedside.    CV: SR/ST.  BP stable.  Afebrile.    Pulm: On room air and clear lung sounds.    GI: Regular diet.  Poor appetite - patient states this is normal for him at home.      : Up and voiding per bathroom.  Intermittent retention.    Skin: Turban surgical dressing remains in place.  Extravasation to both arms from previous infiltrated IVs.  Some bruising to arms and legs.    IV/Gtt: Midline catheter no longer positive for blood return but still flushes easily without resistance and site is non-symptomatic.  See additional note regarding issues with access.    Activity: Up standby assist.    Pain: Scheduled Flexeril and PRN Oxycodone, Compazine, and Dilaudid given for pain.      Plan: Continue bedside attendant and monitoring for seizure activity.  Surgery planned for Tuesday 9/10.

## 2019-09-06 NOTE — PROVIDER NOTIFICATION
Spoke to neuro crit and neurosurgery teams regarding patient's access issues.  Patient currently has a left midline catheter that no longer draws blood.  Both of patient's arms had IVs infiltrated within the last week.  The extravasation in both arms makes it difficult/impossible to stick him peripherally in his arms at this time for blood draws or peripheral access.  Patient has labs ordered for the following am.    Spoke to Neuro Crit - per neuro crit, they were to speak with neurosurgery to see if labs could be discontinued at this time.  No order was seen placed, so neurosurgery was contacted.  Per neurosurgery resident, periodic labs would still be required during hospitalization but could be put off another day to see if arms improve.  Awaiting order to be placed to extend labs to Sunday am.

## 2019-09-06 NOTE — PROVIDER NOTIFICATION
Upon initial IV assessment this am it was noted that patient's midline catheter flushed easily but was unable to draw blood.  Neuro critical care was notified.  Order was received for midline catheter to be discontinued; however, patient would then have no IV access.  Patient has had issues with access this hospitalization including at least 2 infiltrated IVs that caused extravasation to both arms that has left his arms pink, painful, and somewhat hard to the touch.  Spoke to neuro critical care team - ok to keep midline catheter so long as it flushes without issue for continued IV antibiotics and IV pain meds.

## 2019-09-06 NOTE — PROGRESS NOTES
"[facility] - Epilepsy Service Daily Note      Interval History:   No further seizures.    Review of System:   Denies nausea, vomiting, abdominal pain, headaches, dizziness and chest pain.      AEDs on admission with most recent levels:  1. Vimpat 200-200 (2.6)  2. Depakote [id number] (71, 8.8)  3. Gabapentin 300-300 (1.0)  4. Dilantin 160-200 (14.6, 1.75)  5. Topiramate 50 - 50 (<1.5)    Antiepileptic Medications Current Doses:   1. Dilantin 200- 200  2. Vimpat 200-200  3. Gabapentin 300-300  4. Topiramate 50-50      Exam: Blood pressure 115/71, pulse 93, temperature 97.8  F (36.6  C), temperature source Oral, resp. rate 18, height 1.727 m (5' 8\"), weight 80.4 kg (177 lb 4 oz), SpO2 96 %.   General: NAD, sitting up in chair   Neuro: Alert and oriented. Speech fluent. Pupils equal, round and reactive to light. EOM's intact. Face symmetric. Strength full and equal bilaterally. No drift or pronation. Intact FNF. Sensation grossly intact to light touch.     Video EE/3/2019: The interictal EEG recording was abnormal due to frequent focal spike-wave complexes in the right medial temporal electrodes.  No electrographic seizures and no paroxysmal behavioral events were recorded on this day of monitoring.       Assessment/ Plan: Patient seen and discussed with attending physician Dr. Preston.   1. 28 year old right handed male with a h/o depression, anxiety and seizure disorder since  is being admitted for intracranial vEEG monitoring. Patient underwent stereotactic placement of R depth electrodes and R craniotomy for R sided strip electrodes on . Five clinical seizures recorded emerging from right hippocampus. Antiepileptic drugs restarted and plan for OR on 9/10/2019 with Dr. Funes.     - Continue video EGG monitoring  - Seizure precautions  - SCDs while in bed for DVT prevention  - Continue current antiepileptic drugs (will stay off PTA divalproex)  - PATIENT TO BE UP WITH ASSISTANCE " OR STANDBY ONLY. PATIENT TO HAVE A GAIT BELT ON WHEN UP OUT OF BED       Anastasia Hansen PA-C    Total time: 15 minute was spent in the care of this patient. The patient agrees with the above mentioned plan of care. I answered all the patient's questions and addressed immediate concerns. More than 50% of time spent consisted of counseling and coordinating care, including discussion of the diagnostic significance of EEG findings, anti-seizure medication management, and planning for discharge home.

## 2019-09-07 ENCOUNTER — ALLIED HEALTH/NURSE VISIT (OUTPATIENT)
Dept: NEUROLOGY | Facility: CLINIC | Age: 29
End: 2019-09-07
Attending: PSYCHIATRY & NEUROLOGY
Payer: COMMERCIAL

## 2019-09-07 DIAGNOSIS — G40.219 PARTIAL EPILEPSY WITH IMPAIRMENT OF CONSCIOUSNESS, INTRACTABLE (H): Primary | ICD-10-CM

## 2019-09-07 PROCEDURE — 25000132 ZZH RX MED GY IP 250 OP 250 PS 637: Performed by: STUDENT IN AN ORGANIZED HEALTH CARE EDUCATION/TRAINING PROGRAM

## 2019-09-07 PROCEDURE — 25000128 H RX IP 250 OP 636: Performed by: STUDENT IN AN ORGANIZED HEALTH CARE EDUCATION/TRAINING PROGRAM

## 2019-09-07 PROCEDURE — 25000132 ZZH RX MED GY IP 250 OP 250 PS 637: Performed by: NURSE PRACTITIONER

## 2019-09-07 PROCEDURE — 95951 ZZHC EEG VIDEO EACH 24 HR: CPT | Mod: ZF

## 2019-09-07 PROCEDURE — 20000004 ZZH R&B ICU UMMC

## 2019-09-07 RX ADMIN — GABAPENTIN 300 MG: 300 CAPSULE ORAL at 08:54

## 2019-09-07 RX ADMIN — OXYCODONE HYDROCHLORIDE 10 MG: 5 TABLET ORAL at 09:22

## 2019-09-07 RX ADMIN — CEFTRIAXONE SODIUM 2 G: 2 INJECTION, POWDER, FOR SOLUTION INTRAMUSCULAR; INTRAVENOUS at 08:57

## 2019-09-07 RX ADMIN — OXYCODONE HYDROCHLORIDE 10 MG: 5 TABLET ORAL at 15:56

## 2019-09-07 RX ADMIN — PROCHLORPERAZINE EDISYLATE 10 MG: 5 INJECTION INTRAMUSCULAR; INTRAVENOUS at 04:53

## 2019-09-07 RX ADMIN — VENLAFAXINE HYDROCHLORIDE 225 MG: 150 CAPSULE, EXTENDED RELEASE ORAL at 08:54

## 2019-09-07 RX ADMIN — OXYCODONE HYDROCHLORIDE 10 MG: 5 TABLET ORAL at 04:28

## 2019-09-07 RX ADMIN — OXYCODONE HYDROCHLORIDE 10 MG: 5 TABLET ORAL at 00:39

## 2019-09-07 RX ADMIN — SENNOSIDES AND DOCUSATE SODIUM 2 TABLET: 8.6; 5 TABLET ORAL at 19:52

## 2019-09-07 RX ADMIN — Medication 0.2 MG: at 09:22

## 2019-09-07 RX ADMIN — PHENYTOIN SODIUM 200 MG: 100 CAPSULE ORAL at 08:55

## 2019-09-07 RX ADMIN — Medication 0.2 MG: at 00:39

## 2019-09-07 RX ADMIN — OXYCODONE HYDROCHLORIDE 10 MG: 5 TABLET ORAL at 12:17

## 2019-09-07 RX ADMIN — Medication 5000 UNITS: at 16:00

## 2019-09-07 RX ADMIN — MELATONIN TAB 3 MG 12 MG: 3 TAB at 22:05

## 2019-09-07 RX ADMIN — OXYCODONE HYDROCHLORIDE 10 MG: 5 TABLET ORAL at 19:00

## 2019-09-07 RX ADMIN — LACOSAMIDE 200 MG: 200 TABLET, FILM COATED ORAL at 19:53

## 2019-09-07 RX ADMIN — CYCLOBENZAPRINE 10 MG: 10 TABLET, FILM COATED ORAL at 14:49

## 2019-09-07 RX ADMIN — PHENYTOIN SODIUM 200 MG: 100 CAPSULE ORAL at 19:52

## 2019-09-07 RX ADMIN — TOPIRAMATE 50 MG: 50 TABLET ORAL at 08:55

## 2019-09-07 RX ADMIN — OMEPRAZOLE 20 MG: 20 CAPSULE, DELAYED RELEASE ORAL at 08:55

## 2019-09-07 RX ADMIN — OXYCODONE HYDROCHLORIDE 10 MG: 5 TABLET ORAL at 22:05

## 2019-09-07 RX ADMIN — POTASSIUM CHLORIDE 20 MEQ: 10 TABLET, EXTENDED RELEASE ORAL at 04:28

## 2019-09-07 RX ADMIN — THERA TABS 1 TABLET: TAB at 08:55

## 2019-09-07 RX ADMIN — GABAPENTIN 300 MG: 300 CAPSULE ORAL at 19:53

## 2019-09-07 RX ADMIN — PROCHLORPERAZINE EDISYLATE 10 MG: 5 INJECTION INTRAMUSCULAR; INTRAVENOUS at 12:23

## 2019-09-07 RX ADMIN — Medication 0.2 MG: at 21:15

## 2019-09-07 RX ADMIN — TOPIRAMATE 50 MG: 50 TABLET ORAL at 19:53

## 2019-09-07 RX ADMIN — LABETALOL 20 MG/4 ML (5 MG/ML) INTRAVENOUS SYRINGE 10 MG: at 19:53

## 2019-09-07 RX ADMIN — CYCLOBENZAPRINE 10 MG: 10 TABLET, FILM COATED ORAL at 19:53

## 2019-09-07 RX ADMIN — Medication 5000 UNITS: at 00:39

## 2019-09-07 RX ADMIN — Medication 0.2 MG: at 13:07

## 2019-09-07 RX ADMIN — Medication 5000 UNITS: at 08:55

## 2019-09-07 RX ADMIN — MELATONIN 5000 UNITS: at 19:52

## 2019-09-07 RX ADMIN — Medication 0.2 MG: at 17:04

## 2019-09-07 RX ADMIN — LABETALOL 20 MG/4 ML (5 MG/ML) INTRAVENOUS SYRINGE 10 MG: at 22:00

## 2019-09-07 RX ADMIN — PROCHLORPERAZINE EDISYLATE 10 MG: 5 INJECTION INTRAMUSCULAR; INTRAVENOUS at 19:00

## 2019-09-07 RX ADMIN — CYCLOBENZAPRINE 10 MG: 10 TABLET, FILM COATED ORAL at 08:55

## 2019-09-07 RX ADMIN — SENNOSIDES AND DOCUSATE SODIUM 2 TABLET: 8.6; 5 TABLET ORAL at 10:36

## 2019-09-07 RX ADMIN — Medication 0.2 MG: at 04:53

## 2019-09-07 RX ADMIN — LACOSAMIDE 200 MG: 200 TABLET, FILM COATED ORAL at 08:55

## 2019-09-07 ASSESSMENT — ACTIVITIES OF DAILY LIVING (ADL)
ADLS_ACUITY_SCORE: 9.5
ADLS_ACUITY_SCORE: 15
ADLS_ACUITY_SCORE: 15
ADLS_ACUITY_SCORE: 9.5
ADLS_ACUITY_SCORE: 9.5
ADLS_ACUITY_SCORE: 15

## 2019-09-07 ASSESSMENT — VISUAL ACUITY: OU: BASELINE

## 2019-09-07 ASSESSMENT — MIFFLIN-ST. JEOR: SCORE: 1769.5

## 2019-09-07 NOTE — PLAN OF CARE
Neuro: A/ox4. No seizure activity noted overnight. PERRL. Following commands. 5/5 strength. Continues to complain of headache. Slept soundly between cares. Up with sba in room.     C/V: SR/ST. No prns needed to maintain sbp<140.     Pulm: LS clear. RA. Denies sob.     GI: Last bm 9/6.     : Voiding in urinal.     Skin: Both arms swollen, painful, and pink from previous extravasations. LUE with DVT, limb alert band in place.     Pain: Prn oxycodone, dilaudid, and compazine given. C/o ongoing headache, and bilateral arm pain.     IV/Gtts: PIV SL. Midline pulled per order.

## 2019-09-07 NOTE — PROVIDER NOTIFICATION
Called [facility] resident last evening about upper extremity U/S study result. Written result states DVT is in RUE, however, U/S was done on LUE.  Clarified with MD that LUE is location of DVT, MD in agreement and stated would notify radiologist.     Updated MD this am that report still states DVT in RUE, [facility] resident verbalized understanding and stated that Radiology department was aware of situation and was working to update result.

## 2019-09-07 NOTE — PROGRESS NOTES
Neuroscience Intensive Care Progress Note  2019    24 hour events: Non-occlusive venous thrombus found in distal LUE after nurses unable to draw blood and pt complaining of arm swelling and tenderness. Otherwise NAEO.    Current Medications:    cefTRIAXone  2 g Intravenous Q24H     cyclobenzaprine  10 mg Oral or Feeding Tube TID     gabapentin  300 mg Oral BID     heparin lock flush  5-15 mL Intracatheter Q24H     heparin  5,000 Units Subcutaneous Q8H     lacosamide  200 mg Oral BID     magnesium hydroxide  30 mL Oral Daily     melatonin  12 mg Oral At Bedtime     multivitamin, therapeutic  1 tablet Oral or Feeding Tube Daily     omeprazole  20 mg Oral QAM     phenytoin  200 mg Oral BID     polyethylene glycol  17 g Oral TID     senna-docusate  2 tablet Oral or Feeding Tube BID     sodium chloride (PF)  10 mL Intracatheter Q8H     sodium chloride (PF)  3 mL Intracatheter Q8H     topiramate  50 mg Oral BID     venlafaxine  225 mg Oral QAM     cholecalciferol  5,000 Units Oral or Feeding Tube At Bedtime       PRN Medications:  albuterol, bisacodyl, calcium carbonate, heparin lock flush, hydrALAZINE, HYDROmorphone, labetalol, lidocaine 4%, lidocaine, lidocaine (buffered or not buffered), LORazepam, magnesium sulfate, magnesium sulfate, naloxone, ondansetron **OR** ondansetron, oxyCODONE, potassium chloride, potassium chloride with lidocaine, potassium chloride, potassium chloride, potassium chloride, prochlorperazine, rizatriptan, sodium chloride (PF), sodium phosphate, sodium phosphate, sodium phosphate, sodium phosphate      Objective findings:  Temp: 98  F (36.7  C) Temp  Min: 97.8  F (36.6  C)  Max: 98.4  F (36.9  C)  Resp: 12 Resp  Min: 12  Max: 20  SpO2: 96 % SpO2  Min: 96 %  Max: 99 %    No data recorded  Heart Rate: 94 Heart Rate  Min: 88  Max: 113  BP: 127/75 Systolic (24hrs), Av , Min:115 , Max:138   Diastolic (24hrs), Av, Min:71, Max:88      Intake/Output:    I/O last 3 completed shifts:  In:  780 [P.O.:780]  Out: 1650 [Urine:1650]      Physical Examination:   General:  Laying in bed  HEENT:  NC/AT, no icterus, op pink and moist, no ear or nose drainage.   Cardiac:  RRR, no m/r/g  Chest:  CTAB  Abdomen:  S/NT/ND  Extremities:  No LE swelling.    Skin:  No rash or lesion.        Neuro Exam:  Mental status: AOx4, speech fluent  Cranial nerves: EOMI, voice clear, face symmetric with speech  Motor: antigravity in all 4x limbs  Sensory: deferred    Labs/Studies:  Lab Results   Component Value Date    WBC 12.8 (H) 09/06/2019    WBC 9.3 09/05/2019    WBC 7.1 09/03/2019    WBC 7.6 09/01/2019    WBC 12.2 (H) 08/30/2019    HGB 10.8 (L) 09/06/2019    HGB 11.3 (L) 09/05/2019    HGB 11.1 (L) 09/03/2019    HGB 12.2 (L) 09/01/2019    HGB 12.3 (L) 08/30/2019    HCT 32.3 (L) 09/06/2019    HCT 33.9 (L) 09/05/2019    HCT 33.6 (L) 09/03/2019    HCT 36.8 (L) 09/01/2019    HCT 37.1 (L) 08/30/2019     (H) 09/06/2019     (H) 09/05/2019     09/03/2019     09/01/2019     08/30/2019     09/06/2019     09/05/2019     09/03/2019     09/01/2019     08/30/2019    POTASSIUM 4.0 09/06/2019    POTASSIUM 3.8 09/05/2019    POTASSIUM 4.0 09/03/2019    POTASSIUM 3.8 09/01/2019    POTASSIUM 3.6 08/30/2019    CHLORIDE 105 09/06/2019    CHLORIDE 105 09/05/2019    CHLORIDE 105 09/03/2019    CHLORIDE 106 09/01/2019    CHLORIDE 105 08/30/2019    CO2 27 09/06/2019    CO2 23 09/05/2019    CO2 24 09/03/2019    CO2 23 09/01/2019    CO2 23 08/30/2019    BUN 8 09/06/2019    BUN 9 09/05/2019    BUN 11 09/03/2019    BUN 12 09/01/2019    BUN 11 08/30/2019    CR 0.72 09/06/2019    CR 0.74 09/05/2019    CR 0.59 (L) 09/03/2019    CR 0.58 (L) 09/01/2019    CR 0.67 08/30/2019    GLC 99 09/06/2019     (H) 09/05/2019    GLC 91 09/03/2019    GLC 91 09/01/2019    GLC 89 08/30/2019    SED 2 06/28/2017    AST 16 10/23/2018    AST 16 06/27/2017    AST 14 03/16/2016    AST 16 07/14/2015    AST 32  05/09/2014    ALT 22 10/23/2018    ALT 27 06/27/2017    ALT 24 03/16/2016    ALT 24 07/14/2015    ALT 38 05/09/2014    ALKPHOS 67 10/23/2018    ALKPHOS 68 06/27/2017    ALKPHOS 69 03/16/2016    ALKPHOS 116 07/14/2015    ALKPHOS 68 05/09/2014    BILITOTAL 0.1 (L) 10/23/2018    BILITOTAL 0.3 06/27/2017    BILITOTAL 0.3 03/16/2016    BILITOTAL 0.3 07/14/2015    BILITOTAL 0.2 05/09/2014    INR 1.17 (H) 08/19/2019    INR 1.07 01/17/2019           ==========================================================    ASSESSMENT/PLAN: Tha Mcghee is a 28 year old with a history of partial epilepsy who was admitted on 8/27 for stereotactic placement of R depth electrodes and R craniotomy for R sided strip electrodes. Neurology consulted for seizure management.     Neuro:  #Partial epilepsy with impairment of consciousness with intractable epilepsy s/p surgical electrode placement. Multiple seizures captured, now back on seizure meds.  --vEEG  --AED management per epilepsy team  --plan to remove leads and do epilepsy surgery 9/10  ---200  ---200  --TPM 50-50  ---300  --do not restart PTA depakote   --melatonin 12mg at bedtime for sleep     Home AEDs:  Vimpat 200 bid  Depakote 1250/1500   Dilantin 160/200  Gabapentin 300 bid     #Post operative pain  #Headaches.   --pta TPM 50-50  - Dilaudid 0.2 - 0.4 PRN  - Compazine IV PRN  - Tylenol PRN     #Hx of substance abuse  #Depression  - Continue PTA venlafaxine     Respiratory:  #Asthma  - Albuterol PRN     Cardiovascular:  No acute issues.     Renal:  - Electrolyte protocol     Endocrine:  No acute issues.     Heme:  #Normocytic anemia, stable  - Monitor CBC    #ULE DVT  -heparin subq  -Midline removed     GI:  #GERD  - continue PTA Omeprazole 20 daily     #Diet  --reg diet     - Antiemetics PRN  - Bowel regimen: Miralax PRN     ID:  #Leukocytosis, returned  Afebrile. No signs of infection.  - monitor CBC     #Irish-op prophylaxis   - Ceftriaxone qday per  NSG     MSK:  #L ankle sprain, stable  - XR L ankle with no fracture, NTD     :  #Urinary retention, resolved  - Monitor I/O     FEN: Advance as tolerated to regular diet  DVT prophylaxis: SCDs while in bed, subcutaneous heparin  Lines: PIV  Code Status: Full    =========================================================    This patient was seen & discussed with my attending, Dr. Urñea, who agrees with my assessment and plan.     Marta Wood M.D.  PGY-2 Neurology  Pager #[phone]

## 2019-09-07 NOTE — PROGRESS NOTES
Epilepsy-EEG Attending Note  Intracranial electrodes continue to function well.  No further seizures recorded.  Chronic AEDs re-started, except divalproex held.  No intolerable AED effects.  Right anterior temporal lobectomy planned for 09/10/2019.  No new problems identified.  No new recommendations.  Neel Preston M.D.

## 2019-09-07 NOTE — PLAN OF CARE
Shift durration: 0700-1930    LOC: alert, oriented x4.  Not on sedation.  Neuro: grossly intact. No seizure activity noted for today, pt remains on video EEG with grid.  Cards: NSR/STachy with activity .  MAP's 80-90's. S1, S2. RLE used for BP d/t pt has DVT recently diagnosed to LUE and PIV in RUE. Pt not on vasoactive gtts.  Pulm: LS clear, pt on RA.  GI/: adequate UOP and elimination.  Pt oral intake is fair.  Skin: no changes to skin/incisions/extravasations.  Mobility: SBA for safety with grid in place.  Vasc access: PIV.  Drains/Devices: Grid.  Special/Event: no changes today, continue to monitor pt for seizure activity and other signs of instability.

## 2019-09-08 ENCOUNTER — ALLIED HEALTH/NURSE VISIT (OUTPATIENT)
Dept: NEUROLOGY | Facility: CLINIC | Age: 29
End: 2019-09-08
Attending: PSYCHIATRY & NEUROLOGY
Payer: COMMERCIAL

## 2019-09-08 DIAGNOSIS — G40.219 PARTIAL EPILEPSY WITH IMPAIRMENT OF CONSCIOUSNESS, INTRACTABLE (H): Primary | ICD-10-CM

## 2019-09-08 LAB
ANION GAP SERPL CALCULATED.3IONS-SCNC: 10 MMOL/L (ref 3–14)
BUN SERPL-MCNC: 7 MG/DL (ref 7–30)
CALCIUM SERPL-MCNC: 9.1 MG/DL (ref 8.5–10.1)
CHLORIDE SERPL-SCNC: 104 MMOL/L (ref 94–109)
CO2 SERPL-SCNC: 22 MMOL/L (ref 20–32)
CREAT SERPL-MCNC: 0.8 MG/DL (ref 0.66–1.25)
ERYTHROCYTE [DISTWIDTH] IN BLOOD BY AUTOMATED COUNT: 11.9 % (ref 10–15)
GFR SERPL CREATININE-BSD FRML MDRD: >90 ML/MIN/{1.73_M2}
GLUCOSE SERPL-MCNC: 228 MG/DL (ref 70–99)
HCT VFR BLD AUTO: 33.1 % (ref 40–53)
HGB BLD-MCNC: 11 G/DL (ref 13.3–17.7)
MCH RBC QN AUTO: 31.2 PG (ref 26.5–33)
MCHC RBC AUTO-ENTMCNC: 33.2 G/DL (ref 31.5–36.5)
MCV RBC AUTO: 94 FL (ref 78–100)
PHENYTOIN SERPL-MCNC: 21.2 MG/L (ref 10–20)
PLATELET # BLD AUTO: 455 10E9/L (ref 150–450)
POTASSIUM SERPL-SCNC: 3.6 MMOL/L (ref 3.4–5.3)
POTASSIUM SERPL-SCNC: 3.8 MMOL/L (ref 3.4–5.3)
RBC # BLD AUTO: 3.53 10E12/L (ref 4.4–5.9)
SODIUM SERPL-SCNC: 137 MMOL/L (ref 133–144)
WBC # BLD AUTO: 11.4 10E9/L (ref 4–11)

## 2019-09-08 PROCEDURE — 25000132 ZZH RX MED GY IP 250 OP 250 PS 637: Performed by: STUDENT IN AN ORGANIZED HEALTH CARE EDUCATION/TRAINING PROGRAM

## 2019-09-08 PROCEDURE — 85027 COMPLETE CBC AUTOMATED: CPT | Performed by: STUDENT IN AN ORGANIZED HEALTH CARE EDUCATION/TRAINING PROGRAM

## 2019-09-08 PROCEDURE — 20000004 ZZH R&B ICU UMMC

## 2019-09-08 PROCEDURE — 25000128 H RX IP 250 OP 636: Performed by: STUDENT IN AN ORGANIZED HEALTH CARE EDUCATION/TRAINING PROGRAM

## 2019-09-08 PROCEDURE — 25000132 ZZH RX MED GY IP 250 OP 250 PS 637: Performed by: NURSE PRACTITIONER

## 2019-09-08 PROCEDURE — 80185 ASSAY OF PHENYTOIN TOTAL: CPT | Performed by: NEUROLOGICAL SURGERY

## 2019-09-08 PROCEDURE — 36415 COLL VENOUS BLD VENIPUNCTURE: CPT | Performed by: STUDENT IN AN ORGANIZED HEALTH CARE EDUCATION/TRAINING PROGRAM

## 2019-09-08 PROCEDURE — 80186 ASSAY OF PHENYTOIN FREE: CPT | Performed by: NEUROLOGICAL SURGERY

## 2019-09-08 PROCEDURE — 80299 QUANTITATIVE ASSAY DRUG: CPT | Performed by: NEUROLOGICAL SURGERY

## 2019-09-08 PROCEDURE — 80048 BASIC METABOLIC PNL TOTAL CA: CPT | Performed by: STUDENT IN AN ORGANIZED HEALTH CARE EDUCATION/TRAINING PROGRAM

## 2019-09-08 PROCEDURE — 95951 ZZHC EEG VIDEO EACH 24 HR: CPT | Mod: ZF

## 2019-09-08 PROCEDURE — 36415 COLL VENOUS BLD VENIPUNCTURE: CPT | Performed by: NEUROLOGICAL SURGERY

## 2019-09-08 PROCEDURE — 84132 ASSAY OF SERUM POTASSIUM: CPT | Performed by: STUDENT IN AN ORGANIZED HEALTH CARE EDUCATION/TRAINING PROGRAM

## 2019-09-08 PROCEDURE — 80201 ASSAY OF TOPIRAMATE: CPT | Performed by: NEUROLOGICAL SURGERY

## 2019-09-08 PROCEDURE — 40000556 ZZH STATISTIC PERIPHERAL IV START W US GUIDANCE

## 2019-09-08 RX ADMIN — Medication 0.2 MG: at 04:00

## 2019-09-08 RX ADMIN — Medication 5000 UNITS: at 23:28

## 2019-09-08 RX ADMIN — VENLAFAXINE HYDROCHLORIDE 225 MG: 150 CAPSULE, EXTENDED RELEASE ORAL at 08:31

## 2019-09-08 RX ADMIN — OXYCODONE HYDROCHLORIDE 10 MG: 5 TABLET ORAL at 19:04

## 2019-09-08 RX ADMIN — OXYCODONE HYDROCHLORIDE 10 MG: 5 TABLET ORAL at 11:35

## 2019-09-08 RX ADMIN — PROCHLORPERAZINE EDISYLATE 10 MG: 5 INJECTION INTRAMUSCULAR; INTRAVENOUS at 23:15

## 2019-09-08 RX ADMIN — LABETALOL 20 MG/4 ML (5 MG/ML) INTRAVENOUS SYRINGE 10 MG: at 20:38

## 2019-09-08 RX ADMIN — OXYCODONE HYDROCHLORIDE 10 MG: 5 TABLET ORAL at 08:28

## 2019-09-08 RX ADMIN — PHENYTOIN SODIUM 200 MG: 100 CAPSULE ORAL at 08:32

## 2019-09-08 RX ADMIN — LABETALOL 20 MG/4 ML (5 MG/ML) INTRAVENOUS SYRINGE 10 MG: at 21:18

## 2019-09-08 RX ADMIN — Medication 0.2 MG: at 23:15

## 2019-09-08 RX ADMIN — MELATONIN 5000 UNITS: at 20:16

## 2019-09-08 RX ADMIN — CYCLOBENZAPRINE 10 MG: 10 TABLET, FILM COATED ORAL at 08:30

## 2019-09-08 RX ADMIN — CEFTRIAXONE SODIUM 2 G: 2 INJECTION, POWDER, FOR SOLUTION INTRAMUSCULAR; INTRAVENOUS at 08:49

## 2019-09-08 RX ADMIN — POTASSIUM CHLORIDE 20 MEQ: 10 TABLET, EXTENDED RELEASE ORAL at 08:30

## 2019-09-08 RX ADMIN — PROCHLORPERAZINE EDISYLATE 10 MG: 5 INJECTION INTRAMUSCULAR; INTRAVENOUS at 03:19

## 2019-09-08 RX ADMIN — CYCLOBENZAPRINE 10 MG: 10 TABLET, FILM COATED ORAL at 14:36

## 2019-09-08 RX ADMIN — PROCHLORPERAZINE EDISYLATE 10 MG: 5 INJECTION INTRAMUSCULAR; INTRAVENOUS at 15:19

## 2019-09-08 RX ADMIN — LACOSAMIDE 200 MG: 200 TABLET, FILM COATED ORAL at 08:32

## 2019-09-08 RX ADMIN — Medication 0.2 MG: at 10:49

## 2019-09-08 RX ADMIN — GABAPENTIN 300 MG: 300 CAPSULE ORAL at 08:32

## 2019-09-08 RX ADMIN — CYCLOBENZAPRINE 10 MG: 10 TABLET, FILM COATED ORAL at 20:17

## 2019-09-08 RX ADMIN — Medication 5000 UNITS: at 00:09

## 2019-09-08 RX ADMIN — SENNOSIDES AND DOCUSATE SODIUM 2 TABLET: 8.6; 5 TABLET ORAL at 20:17

## 2019-09-08 RX ADMIN — TOPIRAMATE 50 MG: 50 TABLET ORAL at 08:30

## 2019-09-08 RX ADMIN — LABETALOL 20 MG/4 ML (5 MG/ML) INTRAVENOUS SYRINGE 10 MG: at 23:15

## 2019-09-08 RX ADMIN — SENNOSIDES AND DOCUSATE SODIUM 2 TABLET: 8.6; 5 TABLET ORAL at 08:31

## 2019-09-08 RX ADMIN — OXYCODONE HYDROCHLORIDE 10 MG: 5 TABLET ORAL at 22:11

## 2019-09-08 RX ADMIN — OXYCODONE HYDROCHLORIDE 10 MG: 5 TABLET ORAL at 14:36

## 2019-09-08 RX ADMIN — GABAPENTIN 300 MG: 300 CAPSULE ORAL at 20:17

## 2019-09-08 RX ADMIN — OMEPRAZOLE 20 MG: 20 CAPSULE, DELAYED RELEASE ORAL at 08:32

## 2019-09-08 RX ADMIN — MELATONIN TAB 3 MG 12 MG: 3 TAB at 22:11

## 2019-09-08 RX ADMIN — Medication 5000 UNITS: at 08:36

## 2019-09-08 RX ADMIN — TOPIRAMATE 50 MG: 50 TABLET ORAL at 20:17

## 2019-09-08 RX ADMIN — PHENYTOIN SODIUM 200 MG: 100 CAPSULE ORAL at 20:17

## 2019-09-08 RX ADMIN — Medication 0.2 MG: at 19:10

## 2019-09-08 RX ADMIN — OXYCODONE HYDROCHLORIDE 10 MG: 5 TABLET ORAL at 03:19

## 2019-09-08 RX ADMIN — Medication 0.2 MG: at 14:37

## 2019-09-08 RX ADMIN — LACOSAMIDE 200 MG: 200 TABLET, FILM COATED ORAL at 20:17

## 2019-09-08 RX ADMIN — Medication 5000 UNITS: at 16:13

## 2019-09-08 ASSESSMENT — ACTIVITIES OF DAILY LIVING (ADL)
ADLS_ACUITY_SCORE: 13
ADLS_ACUITY_SCORE: 15

## 2019-09-08 ASSESSMENT — VISUAL ACUITY
OU: BASELINE
OU: BASELINE

## 2019-09-08 ASSESSMENT — MIFFLIN-ST. JEOR: SCORE: 1767.5

## 2019-09-08 NOTE — PROCEDURES
EEG #-9 (Day 9 of Video-EEG Monitoring)    DATE OF RECORDIN2019    DURATION OF RECORDIN hours, 59 minutes.    CLINICAL SUMMARY:  This presurgical video-EEG monitoring procedure was performed with intracranial electrodes for planning of epilepsy surgery in treatment of refractory partial epilepsy in [facility].  He was reported to be receiving lacosamide, topiramate, phenytoin, gabapentin, venlafaxine, cyclobenzaprine, oxycodone and hydromorphone on this day of monitoring.       TECHNICAL SUMMARY:  This continuous EEG monitoring procedure was performed with intraparenchymal depth electrodes and subdural strip and grid electrodes in and over multiple sites of the right temporal and frontal lobes.    Three 14-contact depth electrodes were placed through temporal cortex into medial structures with deepest contacts numbered  1  and located in:    right amygdala ( AM ),   right anterior hippocampus ( AH ), and   right hippocampus more posteriorly ( PH ).    The subdural strip and grid electrodes had variable numbers of contacts and locations, consisting of:   6 strip contacts over right inferior frontal cortex ( IF ),   6 strip contacts over right premotor frontal cortex ( PMF ),   6 strip contacts over right frontal and temporal cortex ( FT ),   4 strip contacts over right superior posterior temporal cortex ( SPT ),   4 strip contacts over right middle posterior temporal cortex ( MPT ),   4 strip contacts over right inferior posterior temporal cortex ( IPT ),   2-x-16 small, densely-spaced grid contacts over right dorsolateral prefrontal cortex ( FGR ).     Video monitoring was utilized and periodically reviewed by EEG technologists and the physician for electroclinical correlation.     INTERICTAL EEG ACTIVITIES:    During waking and drowsy states the medial temporal electrodes had predominantly irregular 1-4 Hz delta activities, the lateral temporal electrodes had mixed 1-6 Hz delta-theta  activities, and the frontal electrodes had mixed 2-12 Hz delta-theta-alpha activities.  All sites had greater delta slowing in slow wave sleep.      During all states there was very frequent occurrence of spike-wave complexes at AH 1-4, and frequent occurrence of spike-wave complexes at AM 1-4 both independently and synchronously with those at AH 1-4.  Occasionally, spike-wave complexes at PH 1-4 occurred synchronously with spike-wave complexes in AH 1-4, or with spike-wave complexes in AH 1-4 and AM 1-4.     ICTAL RECORDINGS:  The patient had 2 electroclinical seizures during this day of monitoring, as a secondarily generalized tonic-clonic seizure and a complex partial seizure.      The generalized tonic-clonic seizure began at 09:29, and the earliest clinical manifestation was the patient's subjective report of his habitual aura which came at approximately 8 seconds after earliest electrographic seizure discharges.  He subsequently became unresponsive and although he was intermittently obscured by a computer monitor in front of him and by nursing staff persons moving in the room in front of him, a portion of the seizure during which marked left cephalic version was present, was noted, prior to generalized limb and trunk extension with synchronous clonus.  The earliest electrographic manifestation of the seizure began with approximately 4 seconds of high amplitude rapidly repetitive but irregular spike-wave discharge with amplitude maximum at AH 1-4 contacts, followed by highest amplitude 25 Hz fast activities at the same electrodes for 4 seconds, then followed by rhythmic repetitive sharp waves and subsequently slow waves with sharp elements evolving in frequency, with earliest spread to the AM 1-4 and PH 1-4 contacts.  Subsequently there was spread of ictal discharges after approximately 30 seconds to the other intracranial electrodes.  The electrographic seizure lasted approximately 150 seconds, and was  followed by marked amplitude depression of all activities at the intracranial electrodes before gradual resumption of baseline interictal activities.      The complex partial seizure began at 19:03, with the initial clinical manifestation occurring as subjective report of an aura at approximately 10 seconds after onset of the earliest ictal discharges.  Following the aura, the patient became unresponsive, with oral movements and then grunting, followed by marked leftward cephalic version, and several rhythmic jerks on the left side, but without full spread of clonus bilaterally.  The earliest electrographic seizure activities occurred with approximately 2 seconds of repetitive irregular spike-wave complexes, maximum at the AH 1-4 electrodes, then followed by approximately 2 seconds of high amplitude continuous fast activities at these same electrodes, then with slower repetitive spike-wave and subsequently evolving sharply contoured theta and delta waves, then with more complex wave forms through the end of the seizure.  Spread from the AH electrode next involved the AM 1-4 and PH 1-4 contacts before definite ictal propagation to all of the other intracranial electrodes by approximately 20 seconds after ictal onset.  There was diffuse amplitude attenuation after the end of the seizure, which lasted approximately 80 seconds, then followed by gradual return of baseline EEG activities.     SUMMARY OF DAY 9 OF VIDEO-EEG MONITORING:    The interictal EEG recording was abnormal due to very frequent focal spike-wave complexes in the right medial temporal electrodes.    Two electroclinical seizures occurred on this day of monitoring, with definite ictal discharges preceding the earliest clinical manifestations in each case, and with earliest ictal discharges maximal at the AH 1-4 contacts (right anterior hippocampus), first as a partial-onset secondarily generalized tonic-clonic seizure and later as a complex partial  seizure.   Neel Preston M.D., Professor of Neurology       D: 2019   T: 2019   MT: XUAN      Name:     SANTHOSH HODGES   MRN:      [id number]        Account:        [id number]   :      1990           Procedure Date: 2019      Document: [id number]

## 2019-09-08 NOTE — PROGRESS NOTES
Epilepsy-EEG Attending Note  Intracranial electrodes continue to function well.  No further seizures were recorded.  He had two atypical arousals with perioral and arm movements early this morning, noted by staff; the movements did not resemble his habitual seizures and the EEG showed no ictal discharges at the time of these arousals.  He is again on the chronic AEDs (except divalproex was held), without intolerable AED effects.  Right anterior temporal lobectomy planned for 09/10/2019.  No new problems identified.    I would recommend checking AED levels, with dose alteration if needed to maintain levels in the ranges as prior to this admission.  Neel Preston M.D.

## 2019-09-08 NOTE — PLAN OF CARE
[id number]: Pt slept very soundly overnight between cares.     Neuro: Several short (<5 sec) episodes of seizure-like behaviors (lip smacking and rhythmic arm jerking) noted by bedside attendant this am, see provider notification note. Remains on video EEG monitoring. Bedside attendant present. A/ox4. PERRL. Moving all extremities to command. Standby assist in room. 5/5 strength.     C/V: SR/ST. Prn labetalol given x2 last evening for sbp >140.     Pulm: LS clear. RA.     GI: Reg diet. Last BM 9/7 per patient.     : Voiding in urinal in bathroom    Skin: Forearm extravasation sites unchanged. LLE bruised r/t ankle sprain. Gauze surgical dressing over grid CDI.     Pain: Continues to c/o headache and bilateral arm pain r/t extravasations and DVT (L arm more painful than R). Prn compazine, dilaudid, and oxycodone utilized for pain management.     IV/Gtts: ERIK SL.

## 2019-09-08 NOTE — PROGRESS NOTES
Neuro: Neuro exam stable, No seizure activity noted this shift. Pt complains of headache from surgical incision. Treated with oxycodone, hydromorphone IV and one dose compazine. Pt up in chair all shift. Bedside attendant present.   CV: No hypertension noted this shift.NSR to ST.   Pulmonary: Lungs clear to auscultation. Sats well on room air.   GI: Tolerates regular diet Denies nausea, requested compazine to potentiate pain medications.   : voids adequately.   Skin: Bilateral arms continue to be painful secondary to previous extravasations.   Plan: Anticipate OR on Tuesday for resection,

## 2019-09-08 NOTE — PROGRESS NOTES
Neuroscience Intensive Care Progress Note    Subjective: Doing well this AM. Arm still hurts. Pain medications are providing only modest relief. Looking forward to [employer] game today    Problem List  1. Refractory partial epilepsy with secondary generalization  2. S/p depth electrode and strip placement  3. Migraine headache  4. Depressed mood  5. Asthma  6. Normocytic anemia  7. Left upper extremity DVT  8. GERD  9. Left ankle sprain    24 hour events:  - small events overnight without EEG correlate    24 Hour Vital Signs Summary:  Temperatures:  Current - Temp: 98.4  F (36.9  C); Max - Temp  Av.1  F (36.7  C)  Min: 97.7  F (36.5  C)  Max: 99.1  F (37.3  C)  Respiration range: Resp  Av.1  Min: 15  Max: 29  Pulse range: Pulse  Av.8  Min: 91  Max: 101  Blood pressure range: Systolic (24hrs), Av , Min:113 , Max:147   ; Diastolic (24hrs), Av, Min:61, Max:91    Pulse oximetry range: SpO2  Av.7 %  Min: 96 %  Max: 99 %    Ventilator Settings  Resp: 24    Intake/Output Summary (Last 24 hours) at 2019 0732  Last data filed at 2019 0300  Gross per 24 hour   Intake 1200 ml   Output 2250 ml   Net -1050 ml       BP - Mean:  [] 86    Current Medications:    cefTRIAXone  2 g Intravenous Q24H     cyclobenzaprine  10 mg Oral or Feeding Tube TID     gabapentin  300 mg Oral BID     heparin lock flush  5-15 mL Intracatheter Q24H     heparin  5,000 Units Subcutaneous Q8H     lacosamide  200 mg Oral BID     magnesium hydroxide  30 mL Oral Daily     melatonin  12 mg Oral At Bedtime     multivitamin, therapeutic  1 tablet Oral or Feeding Tube Daily     omeprazole  20 mg Oral QAM     phenytoin  200 mg Oral BID     polyethylene glycol  17 g Oral TID     senna-docusate  2 tablet Oral or Feeding Tube BID     sodium chloride (PF)  10 mL Intracatheter Q8H     sodium chloride (PF)  3 mL Intracatheter Q8H     topiramate  50 mg Oral BID     venlafaxine  225 mg Oral QAM     cholecalciferol  5,000 Units  "Oral or Feeding Tube At Bedtime       PRN Medications:  albuterol, bisacodyl, calcium carbonate, heparin lock flush, hydrALAZINE, HYDROmorphone, labetalol, lidocaine 4%, lidocaine, lidocaine (buffered or not buffered), LORazepam, magnesium sulfate, magnesium sulfate, naloxone, ondansetron **OR** ondansetron, oxyCODONE, potassium chloride, potassium chloride with lidocaine, potassium chloride, potassium chloride, potassium chloride, prochlorperazine, rizatriptan, sodium chloride (PF), sodium phosphate, sodium phosphate, sodium phosphate, sodium phosphate      Allergies   Allergen Reactions     Amoxicillin Hives     Tolerated ceftriaxone Aug 2019 without a reaction     Ceclor [Cefaclor Monohydrate] Hives     Tolerated ceftriaxone Aug 2019 without a reaction       Hydrocodone-Acetaminophen Nausea     Penicillins Hives     Tolerated ceftriaxone Aug 2019 without a reaction       Sulfa Drugs Hives     Physical Examination:  /69 (BP Location: Right arm)   Pulse 91   Temp 98.4  F (36.9  C) (Oral)   Resp 24   Ht 1.727 m (5' 8\")   Wt 82.8 kg (182 lb 8.7 oz)   SpO2 98%   BMI 27.76 kg/m      EXAM:  On exam, he is sitting comfortably in his chair. He is alert and interactive. Language appropriate. No dysarthria. No facial asymmetry. Eye movements full without nystagmus. Pupils symmetric. Limbs move spontaneously, purposefully, and with antigravity strength. Left arm elevation is somewhat limited by pain from DVT. No asymmetries in strength are observed.    Labs/Studies:  Recent Labs   Lab Test 09/08/19  0602 09/06/19  2051 09/05/19  0412 09/03/19  0459   NA  --  137 139 139   POTASSIUM 3.8 4.0 3.8 4.0   CHLORIDE  --  105 105 105   CO2  --  27 23 24   ANIONGAP  --  6 10 10   GLC  --  99 100* 91   BUN  --  8 9 11   CR  --  0.72 0.74 0.59*   ROSA  --  8.8 9.2 9.3   WBC  --  12.8* 9.3 7.1   RBC  --  3.50* 3.67* 3.57*   HGB  --  10.8* 11.3* 11.1*   PLT  --  489* 464* 368       Recent Labs   Lab Test 08/19/19  1201 " 01/17/19  0825   INR 1.17* 1.07   PTT 32 31     Assessment:  Mr Mcghee is a 29yo gentleman admitted for epilepsy surgery evaluation who is s/p depth electrode and strip electrode placement POD#12. His hospitalization has been complicated by LUE DVT and inability for therapeutic anticoagulation due to impending surgery (Plan for OR 9/10).     Plan:  Neuro:  #refractory partial epilepsy with secondary generalization s/p depth and strip electrode   -continue AED as directed by epilepsy service  -check AED levels  -continue EEG  -seizure precautions  -plan for OR 9/10 for electrode removal and lobectomy    #insomnia  -melatonin    #migraine headaches  -topiramate  -hydromorphone PRN  -compazine PRN  -acetaminophen PRN    #depressed mood, nos  -venlafaxine    Resp:   #asthma  -albuterol PRN    CV: No active issues.  - continuous monitoring while in ICU    Renal: No active issues. Electrolytes replaced PRN    Endo: No active issues. Hypoglycemia protocol if needed    Heme:  #normocytic anemia, stable, monitor VS for signs of bleeding, none at this point, CBC stable    #LUE DVT - unable to anticoagulate with therapeutic dose due to impending surgery  - discuss timing of anticoagulation after surgery 9/10  - on subcutaneous heparin for ppx    GI:   #GERD  -pantoprazole    ID: No active issues. Afebrile  - perioperative ppx with ceftriaxone    PPX:    DVT: SCDs, subcutaneous heprain    GI: pantoprazole    Lines and Tubes:  - PIV x1    Code Status: FULL CODE    Dispo: 4A pending surgery scheduled for 9/10    This patient was seen and discussed with attending neurointensivist, Dr Adelita Garcia, DO  PGY5 Neurocritical Care Fellow

## 2019-09-08 NOTE — PROGRESS NOTES
S: Feels well.  No seizures during day.  Planning for OR 9/10 for lead/strip removal and temporal lobectomy.    O:  Exam:  General: Awake;  Alert, In No Acute Distress  Pulm: Breathing Comfortably on room air  Mental status: Oriented x 3  Cranial Nerves: Cranial Nerves II-XII Intact Bilaterally  Strength:      Del Tr Bi WE WF Gr  R 5 5 5 5 5 5  L 5 5 5 5 5 5     HF KE KF DF PF   R 5 5 5 5 5   L 5 5 5 5 5     Pronator Drift: Absent  Sensory: Intact to Light Touch  INCISION: Head Wrap Intact; No Drainage Present.     Assessment:   28 year old male with history of epilepsy s/p  MAGAN Assisted Stereotactic Placement Of Right Sided Depth Electrodes. Awaiting temporal lobectomy.    Plan:     Neuro:   Anti-epileptics: per epilepsy team   Appreciate assistance of Epilepsy team  Will plan for removal of right sided depth and strip electrodes and right craniotomy for right temporal lobectomy and amygdalohippocampectomy on 9/10/2019 with Dr Jason Funes     Cardiovascular: blood pressure goals: SBP < 140    Pulmonary: Incentive spirometry     Gastrointestinal: Regular Diet    Renal: monitor intake and output     Heme:   Maintain INR < 1.4; Platelet > 100K; Fibrinogen > 200, Hemoglobin > 8    Endocrine: No issues    Infectious: IV Ceftriaxone 2G Q 24 HR  while leads are in place; Continue to Monitor    DVT prophylaxis: Sequential compression devices to Bilateral Lower Extremities     Ulcer prophylaxis: Not Indicated    DISPO:  Will plan for surgery on 9/10/2019      Mani Munroe M.D.  Neurosurgery Resident, PGY-2    Please contact neurosurgery resident on call with questions.    Dial * [phone], enter [phone] when prompted.

## 2019-09-08 NOTE — PROCEDURES
EEG #-11 (Day 11 of Video-EEG Monitoring)    DATE OF RECORDIN2019    DURATION OF RECORDIN hours, 49 minutes.    CLINICAL SUMMARY:  This presurgical video-EEG monitoring procedure was performed with intracranial electrodes for planning of epilepsy surgery in treatment of refractory partial epilepsy in [facility].  He was reported to be receiving lacosamide, topiramate, phenytoin, gabapentin, venlafaxine, cyclobenzaprine, oxycodone and hydromorphone on this day of monitoring.       TECHNICAL SUMMARY:  This continuous EEG monitoring procedure was performed with intraparenchymal depth electrodes and subdural strip and grid electrodes in and over multiple sites of the right temporal and frontal lobes.    Three 14-contact depth electrodes were placed through temporal cortex into medial structures with deepest contacts numbered  1  and located in:    right amygdala ( AM ),   right anterior hippocampus ( AH ), and   right hippocampus more posteriorly ( PH ).    The subdural strip and grid electrodes had variable numbers of contacts and locations, consisting of:   6 strip contacts over right inferior frontal cortex ( IF ),   6 strip contacts over right premotor frontal cortex ( PMF ),   6 strip contacts over right frontal and temporal cortex ( FT ),   4 strip contacts over right superior posterior temporal cortex ( SPT ),   4 strip contacts over right middle posterior temporal cortex ( MPT ),   4 strip contacts over right inferior posterior temporal cortex ( IPT ),   2-x-16 small, densely-spaced grid contacts over right dorsolateral prefrontal cortex ( FGR ).     Video monitoring was utilized and periodically reviewed by EEG technologists and the physician for electroclinical correlation.     INTERICTAL EEG ACTIVITIES:    During waking and drowsy states the medial temporal electrodes had predominantly irregular 1-4 Hz delta activities, the lateral temporal electrodes had mixed 1-6 Hz delta-theta  activities, and the frontal electrodes had mixed 2-12 Hz delta-theta-alpha activities.  All sites had greater delta slowing in slow wave sleep.      During all states there was very frequent occurrence of spike-wave complexes at AH 1-4, and frequent occurrence of spike-wave complexes at AM 1-4 both independently and synchronously with those at AH 1-4.  Occasionally, spike-wave complexes at PH 1-4 occurred synchronously with spike-wave complexes in AH 1-4, or with spike-wave complexes in AH 1-4 and AM 1-4.     ICTAL RECORDINGS:  No electrographic seizures and no paroxysmal behavioral events were recorded during this day of monitoring.      SUMMARY OF DAY 11 OF VIDEO-EEG MONITORING:    The interictal EEG recording was abnormal on the basis very frequent focal spike-wave complexes in the right medial temporal electrodes.    No electrographic seizures and no paroxysmal behavioral events were recorded on this day of monitoring.   Neel Preston M.D., Professor of Neurology       D: 2019   T: 2019   MT: KERRY      Name:     SANTHOSH HODGES   MRN:      [id number]        Account:        [id number]   :      1990           Procedure Date: 2019      Document: [id number]

## 2019-09-08 NOTE — PROVIDER NOTIFICATION
Pt had several short (<5 sec) episodes of lip smacking and rhythmic arm/leg jerking starting around 0525, per bedside attendant. Pt unaware of behaviors when RN went to assess pt. Able to rouse patient to voice/gentle touch, pt a/o x4 on exam. PERRL. Pt following commands and able to read sentence when asked. However, did not have item or word recall when asked 30 sec later. NSG and NCC notified. NCC at bedside to evaluate patient. RN instructed to update teams if further episodes occur, and no need for ativan at this time.

## 2019-09-08 NOTE — PROGRESS NOTES
"Neurosurgery Progress Note    S: Feels well. Several episodes of lip-smacking without apparent post-ictal mental status.  Continued plan for OR 9/10 for lead/strip removal and temporal lobectomy.    O:  /69 (BP Location: Right arm)   Pulse 91   Temp 98.4  F (36.9  C) (Oral)   Resp 27   Ht 1.727 m (5' 8\")   Wt 82.8 kg (182 lb 8.7 oz)   SpO2 98%   BMI 27.76 kg/m    Exam:  General: Awake;  Alert, In No Acute Distress  Pulm: Breathing Comfortably on room air  Mental status: Oriented x 3  Cranial Nerves: Cranial Nerves II-XII Intact Bilaterally  Strength:      Del Tr Bi WE WF Gr  R 5 5 5 5 5 5  L 5 5 5 5 5 5     HF KE KF DF PF   R 5 5 5 5 5   L 5 5 5 5 5     Pronator Drift: Absent  Sensory: Intact to Light Touch  INCISION: Head Wrap Intact; No Drainage Present.     Assessment:   28 year old male with history of epilepsy s/p  MAGAN Assisted Stereotactic Placement Of Right Sided Depth Electrodes. Awaiting temporal lobectomy.    Plan:     Neuro:   Anti-epileptics: per epilepsy team   Appreciate assistance of Epilepsy team  Will plan for removal of right sided depth and strip electrodes and right craniotomy for right temporal lobectomy and amygdalohippocampectomy on 9/10/2019 with Dr Jason Funes     Cardiovascular: blood pressure goals: SBP < 140    Pulmonary: Incentive spirometry     Gastrointestinal: Regular Diet    Renal: monitor intake and output     Heme:   Maintain INR < 1.4; Platelet > 100K; Fibrinogen > 200, Hemoglobin > 8    Endocrine: No issues    Infectious: IV Ceftriaxone 2G Q 24 HR  while leads are in place; Continue to Monitor    DVT prophylaxis: Sequential compression devices to Bilateral Lower Extremities     Ulcer prophylaxis: Not Indicated    DISPO:  Will plan for surgery on 9/10/2019    Augusto Ni MD  Neurosurgery Resident, PGY-1  "

## 2019-09-08 NOTE — PROCEDURES
EEG #-10 (Day 10 of Video-EEG Monitoring)    DATE OF RECORDIN2019    DURATION OF RECORDIN hours, 51 minutes.    CLINICAL SUMMARY:  This video-EEG monitoring procedure was performed with intracranial electrodes for planning of epilepsy surgery in treatment of refractory partial epilepsy in [facility].  He was reported to be receiving lacosamide, topiramate, phenytoin, gabapentin, venlafaxine, cyclobenzaprine, oxycodone and hydromorphone on this day of monitoring.       TECHNICAL SUMMARY:  This continuous EEG monitoring procedure was performed with intraparenchymal depth electrodes and subdural strip and grid electrodes in and over multiple sites of the right temporal and frontal lobes.    Three 14-contact depth electrodes were placed through temporal cortex into medial structures with deepest contacts numbered  1  and located in:    right amygdala ( AM ),   right anterior hippocampus ( AH ), and   right hippocampus more posteriorly ( PH ).    The subdural strip and grid electrodes had variable numbers of contacts and locations, consisting of:   6 strip contacts over right inferior frontal cortex ( IF ),   6 strip contacts over right premotor frontal cortex ( PMF ),   6 strip contacts over right frontal and temporal cortex ( FT ),   4 strip contacts over right superior posterior temporal cortex ( SPT ),   4 strip contacts over right middle posterior temporal cortex ( MPT ),   4 strip contacts over right inferior posterior temporal cortex ( IPT ),   2-x-16 small, densely-spaced grid contacts over right dorsolateral prefrontal cortex ( FGR ).     Video monitoring was utilized and periodically reviewed by EEG technologists and the physician for electroclinical correlation.     INTERICTAL EEG ACTIVITIES:    During waking and drowsy states the medial temporal electrodes had predominantly irregular 1-4 Hz delta activities, the lateral temporal electrodes had mixed 1-6 Hz delta-theta activities,  and the frontal electrodes had mixed 2-12 Hz delta-theta-alpha activities.  All sites had greater delta slowing in slow wave sleep.      During all states there was very frequent occurrence of spike-wave complexes at AH 1-4, and frequent occurrence of spike-wave complexes at AM 1-4 both independently and synchronously with those at AH 1-4.  Occasionally, spike-wave complexes at PH 1-4 occurred synchronously with spike-wave complexes in AH 1-4, or with spike-wave complexes in AH 1-4 and AM 1-4.     ICTAL RECORDINGS:  No electrographic seizures and no paroxysmal behavioral events were recorded during this day of monitoring.      SUMMARY OF DAY 10 OF VIDEO-EEG MONITORING:    The interictal EEG recording was abnormal by virtue of very frequent focal spike-wave complexes in the right medial temporal electrodes.    No electrographic seizures and no paroxysmal behavioral events were recorded on this day of monitoring.   Neel Preston M.D., Professor of Neurology      D: 2019   T: 2019   MT: XUAN      Name:     SANTHOSH HODGES   MRN:      [id number]        Account:        [id number]   :      1990           Procedure Date: 2019      Document: [id number]

## 2019-09-09 ENCOUNTER — ALLIED HEALTH/NURSE VISIT (OUTPATIENT)
Dept: NEUROLOGY | Facility: CLINIC | Age: 29
End: 2019-09-09
Attending: PSYCHIATRY & NEUROLOGY
Payer: COMMERCIAL

## 2019-09-09 ENCOUNTER — ANESTHESIA EVENT (OUTPATIENT)
Dept: SURGERY | Facility: CLINIC | Age: 29
End: 2019-09-09
Payer: COMMERCIAL

## 2019-09-09 DIAGNOSIS — G40.219 PARTIAL EPILEPSY WITH IMPAIRMENT OF CONSCIOUSNESS, INTRACTABLE (H): Primary | ICD-10-CM

## 2019-09-09 LAB — PHENYTOIN FREE SERPL-MCNC: 1.74 UG/ML (ref 1–2)

## 2019-09-09 PROCEDURE — 25000132 ZZH RX MED GY IP 250 OP 250 PS 637: Performed by: STUDENT IN AN ORGANIZED HEALTH CARE EDUCATION/TRAINING PROGRAM

## 2019-09-09 PROCEDURE — 25000132 ZZH RX MED GY IP 250 OP 250 PS 637: Performed by: NURSE PRACTITIONER

## 2019-09-09 PROCEDURE — 40000556 ZZH STATISTIC PERIPHERAL IV START W US GUIDANCE

## 2019-09-09 PROCEDURE — 25000128 H RX IP 250 OP 636: Performed by: STUDENT IN AN ORGANIZED HEALTH CARE EDUCATION/TRAINING PROGRAM

## 2019-09-09 PROCEDURE — 95951 ZZHC EEG VIDEO EACH 24 HR: CPT | Mod: ZF

## 2019-09-09 PROCEDURE — 20000004 ZZH R&B ICU UMMC

## 2019-09-09 RX ADMIN — OXYCODONE HYDROCHLORIDE 10 MG: 5 TABLET ORAL at 19:33

## 2019-09-09 RX ADMIN — LACOSAMIDE 200 MG: 200 TABLET, FILM COATED ORAL at 19:34

## 2019-09-09 RX ADMIN — MELATONIN 5000 UNITS: at 19:34

## 2019-09-09 RX ADMIN — PHENYTOIN SODIUM 200 MG: 100 CAPSULE ORAL at 08:08

## 2019-09-09 RX ADMIN — PHENYTOIN SODIUM 200 MG: 100 CAPSULE ORAL at 19:33

## 2019-09-09 RX ADMIN — Medication 0.2 MG: at 12:26

## 2019-09-09 RX ADMIN — CEFTRIAXONE SODIUM 2 G: 2 INJECTION, POWDER, FOR SOLUTION INTRAMUSCULAR; INTRAVENOUS at 09:02

## 2019-09-09 RX ADMIN — LACOSAMIDE 200 MG: 200 TABLET, FILM COATED ORAL at 08:09

## 2019-09-09 RX ADMIN — TOPIRAMATE 50 MG: 50 TABLET ORAL at 19:34

## 2019-09-09 RX ADMIN — GABAPENTIN 300 MG: 300 CAPSULE ORAL at 19:34

## 2019-09-09 RX ADMIN — CYCLOBENZAPRINE 10 MG: 10 TABLET, FILM COATED ORAL at 08:09

## 2019-09-09 RX ADMIN — OMEPRAZOLE 20 MG: 20 CAPSULE, DELAYED RELEASE ORAL at 08:09

## 2019-09-09 RX ADMIN — OXYCODONE HYDROCHLORIDE 10 MG: 5 TABLET ORAL at 15:15

## 2019-09-09 RX ADMIN — PROCHLORPERAZINE EDISYLATE 10 MG: 5 INJECTION INTRAMUSCULAR; INTRAVENOUS at 16:23

## 2019-09-09 RX ADMIN — SENNOSIDES AND DOCUSATE SODIUM 2 TABLET: 8.6; 5 TABLET ORAL at 19:34

## 2019-09-09 RX ADMIN — OXYCODONE HYDROCHLORIDE 5 MG: 5 TABLET ORAL at 01:40

## 2019-09-09 RX ADMIN — GABAPENTIN 300 MG: 300 CAPSULE ORAL at 08:10

## 2019-09-09 RX ADMIN — Medication 0.2 MG: at 16:13

## 2019-09-09 RX ADMIN — Medication 0.2 MG: at 20:28

## 2019-09-09 RX ADMIN — VENLAFAXINE HYDROCHLORIDE 225 MG: 150 CAPSULE, EXTENDED RELEASE ORAL at 08:09

## 2019-09-09 RX ADMIN — PROCHLORPERAZINE EDISYLATE 10 MG: 5 INJECTION INTRAMUSCULAR; INTRAVENOUS at 07:08

## 2019-09-09 RX ADMIN — OXYCODONE HYDROCHLORIDE 10 MG: 5 TABLET ORAL at 10:06

## 2019-09-09 RX ADMIN — Medication 0.2 MG: at 07:07

## 2019-09-09 RX ADMIN — MELATONIN TAB 3 MG 12 MG: 3 TAB at 20:28

## 2019-09-09 RX ADMIN — TOPIRAMATE 50 MG: 50 TABLET ORAL at 08:08

## 2019-09-09 RX ADMIN — OXYCODONE HYDROCHLORIDE 10 MG: 5 TABLET ORAL at 07:08

## 2019-09-09 RX ADMIN — Medication 0.2 MG: at 04:02

## 2019-09-09 RX ADMIN — SENNOSIDES AND DOCUSATE SODIUM 2 TABLET: 8.6; 5 TABLET ORAL at 08:09

## 2019-09-09 RX ADMIN — CYCLOBENZAPRINE 10 MG: 10 TABLET, FILM COATED ORAL at 19:34

## 2019-09-09 RX ADMIN — Medication 5000 UNITS: at 08:11

## 2019-09-09 RX ADMIN — Medication 5000 UNITS: at 16:13

## 2019-09-09 RX ADMIN — CYCLOBENZAPRINE 10 MG: 10 TABLET, FILM COATED ORAL at 15:15

## 2019-09-09 ASSESSMENT — ACTIVITIES OF DAILY LIVING (ADL)
ADLS_ACUITY_SCORE: 15

## 2019-09-09 ASSESSMENT — PAIN DESCRIPTION - DESCRIPTORS
DESCRIPTORS: ACHING

## 2019-09-09 ASSESSMENT — MIFFLIN-ST. JEOR: SCORE: 1738.5

## 2019-09-09 NOTE — PROGRESS NOTES
"[facility] - Epilepsy Service Daily Note      Interval History:   Patient had no seizures overnight. Official EEG report pending. No major complaints except mild headache. Family at bedside, supportive cares.      Review of System:   Denies nausea, vomitting, abdominal pain, dizziness and chest pain.     AEDs on admission with most recent levels:  1. Vimpat 200-200 (2.6)  2. Depakote 2259-7900 (71, 8.8)  3. Gabapentin 300-300 (1.0)  4. Dilantin 160-200 (14.6, 1.75)  5. Topiramate 50 - 50 (<1.5)    Antiepileptic Medications Current Doses:   1. Vimpat 200-200   2. Gabapentin 300-300   3. Dilantin 200-200   4. Topiramate 50 - 50     Exam: Blood pressure 126/88, pulse 98, temperature 98.4  F (36.9  C), temperature source Oral, resp. rate 14, height 1.727 m (5' 8\"), weight 79.9 kg (176 lb 2.4 oz), SpO2 98 %.  General appearance: No acute distress. Awake, alert and oriented x 4.   Neuro: Pupils are equal, round, and reactive to light, face symmetric, no pronator drift, equal  strength, normal to light touch with no sensory deficits noted, finger to nose normal, no focal deficits noted.    Video EE2019: Pending.     2019: The interictal EEG recording was abnormal, due to frequent focal spike-wave complexes in the right medial temporal electrodes.  No electrographic seizures and no paroxysmal behavioral events were recorded during this day of monitoring.      Ref. Range 2019 20:08   Phenytoin Level Latest Ref Range: 10 - 20 mg/L 21.2 (H)     Other AED levels pending.     Assessment/ Plan: Patient seen and discussed with attending physician Dr. Albarado.   1. 28 year old right handed male with a h/o depression, anxiety and seizure disorder since  is being admitted for intracranial vEEG monitoring. Patient underwent stereotactic placement of R depth electrodes and R craniotomy for R sided strip electrodes on . Five electroclinical seizures recorded, all of Right Anterior " Hippocampal ictal onset. OR planned on 09/10/19, for intracranial electrode removal and right anterior temporal lobectomy.    - Continue video EGG monitoring  - Seizure precautions  - SCDs while in bed for DVT prevention  - Ativan prn as ordered for seizures  - PATIENT TO BE UP WITH ASSISTANCE OR STANDBY ONLY. PATIENT TO HAVE A GAIT BELT ON WHEN UP OUT OF BED        Total time: 25 minute was spent in the care of this patient. The patient agrees with the above mentioned plan of care. I answered all the patient's questions and addressed immediate concerns. More than 50% of time spent consisted of counseling and coordinating care, including discussion of the diagnostic significance of EEG findings, anti-seizure medication management, and planning for discharge home.     Deborah Webb, CNP  Neurology

## 2019-09-09 NOTE — PROGRESS NOTES
Neuro: Neuros  intact per baseline exam. No seizure activity noted. Pt complains of headache from grid wires and pain in arms from extravasations. Moderate relief with narcotic pain medications. EEG monitoring discontinued at 1700 today, pt able to ambulate in halls with gait belt and supervision per MD orders. Cold packs to head, warm packs to arms.   CV: BP WNL. SR 90's no ectopy. Lost single R arm PIV due to tape sticking to chair arm and getting pulled out. Awaiting IV team to attempt new PIV.   Pulm: Lungs clear sats adequate on room air.   GI: BM x1 today. Good PO intake. Anticipate NPO after midnight for resection in OR tomorrow.   : Voids adequately.  Plan: Anticipate OR tomorrow for resection.

## 2019-09-09 NOTE — PROGRESS NOTES
Neuroscience Intensive Care Progress Note    Subjective: Doing well today. Poorly slept last night. Arm still aching.    Problem List  1.  Refractory partial epilepsy with generalization  2.  Status post upper electrode and strip placement postop day #13  3.  Migraine headache, NOS  4.  Depressed mood, NOS  5.  Asthma  6.  Normocytic anemia  7.  Left upper extremity DVT  8.  GERD  9.  Left ankle sprain    24 hour events:  None    24 Hour Vital Signs Summary:  Temperatures:  Current - Temp: 98.3  F (36.8  C); Max - Temp  Av.4  F (36.9  C)  Min: 98.1  F (36.7  C)  Max: 98.7  F (37.1  C)  Respiration range: Resp  Av.6  Min: 12  Max: 37  Pulse range: Pulse  Av  Min: 98  Max: 98  Blood pressure range: Systolic (24hrs), Av , Min:113 , Max:172   ; Diastolic (24hrs), Av, Min:54, Max:100    Pulse oximetry range: SpO2  Av.8 %  Min: 98 %  Max: 100 %    Ventilator Settings  Resp: 30    Intake/Output Summary (Last 24 hours) at 2019 0657  Last data filed at 2019 0428  Gross per 24 hour   Intake 1220 ml   Output 2450 ml   Net -1230 ml     BP - Mean:  [] 77    Current Medications:    cefTRIAXone  2 g Intravenous Q24H     cyclobenzaprine  10 mg Oral or Feeding Tube TID     gabapentin  300 mg Oral BID     heparin lock flush  5-15 mL Intracatheter Q24H     heparin  5,000 Units Subcutaneous Q8H     lacosamide  200 mg Oral BID     magnesium hydroxide  30 mL Oral Daily     melatonin  12 mg Oral At Bedtime     multivitamin, therapeutic  1 tablet Oral or Feeding Tube Daily     omeprazole  20 mg Oral QAM     phenytoin  200 mg Oral BID     polyethylene glycol  17 g Oral TID     senna-docusate  2 tablet Oral or Feeding Tube BID     sodium chloride (PF)  10 mL Intracatheter Q8H     sodium chloride (PF)  3 mL Intracatheter Q8H     topiramate  50 mg Oral BID     venlafaxine  225 mg Oral QAM     cholecalciferol  5,000 Units Oral or Feeding Tube At Bedtime       PRN Medications:  albuterol, bisacodyl,  "calcium carbonate, heparin lock flush, hydrALAZINE, HYDROmorphone, labetalol, lidocaine 4%, lidocaine, lidocaine (buffered or not buffered), LORazepam, magnesium sulfate, magnesium sulfate, naloxone, ondansetron **OR** ondansetron, oxyCODONE, potassium chloride, potassium chloride with lidocaine, potassium chloride, potassium chloride, potassium chloride, prochlorperazine, rizatriptan, sodium chloride (PF), sodium phosphate, sodium phosphate, sodium phosphate, sodium phosphate    Allergies   Allergen Reactions     Amoxicillin Hives     Tolerated ceftriaxone Aug 2019 without a reaction     Ceclor [Cefaclor Monohydrate] Hives     Tolerated ceftriaxone Aug 2019 without a reaction       Hydrocodone-Acetaminophen Nausea     Penicillins Hives     Tolerated ceftriaxone Aug 2019 without a reaction       Sulfa Drugs Hives     Physical Examination:  /62 (BP Location: Left leg)   Pulse 98   Temp 98.3  F (36.8  C) (Oral)   Resp 30   Ht 1.727 m (5' 8\")   Wt 79.9 kg (176 lb 2.4 oz)   SpO2 99%   BMI 26.78 kg/m      EXAM:  On exam, he is wide awake and interactive. No language deficits. No dysarthria. Eye movements full. No facial asymmetry. Limbs moving purposefully with antigravity strength. Breathing comfortably on room air. Symmetric chest rise. Regular cardiac rhythm.    Labs/Studies:  Recent Labs   Lab Test 09/08/19  0927 09/08/19  0602 09/06/19 2051 09/05/19  0412     --  137 139   POTASSIUM 3.6 3.8 4.0 3.8   CHLORIDE 104  --  105 105   CO2 22  --  27 23   ANIONGAP 10  --  6 10   *  --  99 100*   BUN 7  --  8 9   CR 0.80  --  0.72 0.74   ROSA 9.1  --  8.8 9.2   WBC 11.4*  --  12.8* 9.3   RBC 3.53*  --  3.50* 3.67*   HGB 11.0*  --  10.8* 11.3*   *  --  489* 464*     Recent Labs   Lab Test 08/19/19  1201 01/17/19  0825   INR 1.17* 1.07   PTT 32 31     Assessment:  Mr Mcghee is a 27yo gentleman admitted for surgical treatment of refractory partial epilepsy with generalization. POD#13 s/p depth and " strip electrode placement. Plan for strip removal and lobectomy tomorrow.    Plan:  Neuro:   #refractory partial epilepsy with generalization s/p depth and strip electrode positioning  -Antiepileptic management directed by epilepsy service  - Follow-up AED levels checked yesterday  -Continue video EEG  -Continue seizure precautions  -Plan for OR tomorrow    #Insomnia  -Melatonin    #Migraine headaches, NOS  - Continue topiramate  - Hydromorphone as needed for surgical site pain, not for migraine  -Compazine as needed  -Acetaminophen as needed    #Depressed mood, NOS  -Venlafaxine    Resp:   #Asthma  -Albuterol as needed    CV: No active issues  -Continuous cardiac monitoring    Renal: No active issues.  Electrolytes replaced as needed    Endo: No active issues.  Hypoglycemia protocol if necessary    Heme:   #Normocytic anemia, no evidence of ongoing bleeding, CBC stable    #Left upper extremity DVT  -We will need to discuss timing of anticoagulation after his surgery tomorrow  - Remains on prophylactic subcutaneous heparin    GI:   #GERD  -Pantoprazole    ID: No active issues.  Afebrile  -Continue perioperative antibiotic prophylaxis    PPX:    DVT: SCDs, subcutaneous heparin will hold as directed by surgery service in preparation for procedure tomorrow    GI: Pantoprazole    Lines and Tubes:  Peripheral IV    Code Status: Full code    Dispo: 4A pending surgery tomorrow    This patient was seen and discussed with attending neurointensivist, Dr Jordan Garcia, DO  PGY5 Neurocritical Care Fellow

## 2019-09-09 NOTE — PROCEDURES
EEG #-12 (Day 12 of Video-EEG Monitoring)    DATE OF RECORDIN2019    DURATION OF RECORDIN hours, 43 minutes.    CLINICAL SUMMARY:  This video-EEG monitoring procedure was performed with intracranial electrodes for planning of epilepsy surgery in treatment of refractory partial epilepsy in [facility].  He was reported to be receiving lacosamide, topiramate, phenytoin, gabapentin, venlafaxine, cyclobenzaprine, oxycodone and hydromorphone on this day of monitoring.       TECHNICAL SUMMARY:  This continuous EEG monitoring procedure was performed with intraparenchymal depth electrodes and subdural strip and grid electrodes in and over multiple sites of the right temporal and frontal lobes.    Three 14-contact depth electrodes were placed through temporal cortex into medial structures with deepest contacts numbered  1  and located in:    right amygdala ( AM ),   right anterior hippocampus ( AH ), and   right hippocampus more posteriorly ( PH ).    The subdural strip and grid electrodes had variable numbers of contacts and locations, consisting of:   6 strip contacts over right inferior frontal cortex ( IF ),   6 strip contacts over right premotor frontal cortex ( PMF ),   6 strip contacts over right frontal and temporal cortex ( FT ),   4 strip contacts over right superior posterior temporal cortex ( SPT ),   4 strip contacts over right middle posterior temporal cortex ( MPT ),   4 strip contacts over right inferior posterior temporal cortex ( IPT ),   2-x-16 small, densely-spaced grid contacts over right dorsolateral prefrontal cortex ( FGR ).     Video monitoring was utilized and periodically reviewed by EEG technologists and the physician for electroclinical correlation.     INTERICTAL EEG ACTIVITIES:    During waking and drowsy states the medial temporal electrodes had predominantly irregular 1-4 Hz delta activities, the lateral temporal electrodes had mixed 1-6 Hz delta-theta activities,  and the frontal electrodes had mixed 2-12 Hz delta-theta-alpha activities.  All sites had greater delta slowing in slow wave sleep.      During all states there was very frequent occurrence of spike-wave complexes at AH 1-4, and frequent occurrence of spike-wave complexes at AM 1-4 both independently and synchronously with those at AH 1-4.  Occasionally, spike-wave complexes at PH 1-4 occurred synchronously with spike-wave complexes in AH 1-4, or with spike-wave complexes in AH 1-4 and AM 1-4.     ICTAL RECORDINGS:  No electrographic seizures and no paroxysmal behavioral events were recorded during this day of monitoring.      SUMMARY OF DAY 12 OF VIDEO-EEG MONITORING:    The interictal EEG recording was abnormal due to very frequent focal spike-wave complexes in the right medial temporal electrodes.    No electrographic seizures and no paroxysmal behavioral events were recorded on this day of monitoring.   Neel Preston M.D., Professor of Neurology      D: 2019   T: 2019   MT: NITA      Name:     SANTHOSH HODGES   MRN:      [id number]        Account:        [id number]   :      1990           Procedure Date: 2019      Document: [id number]

## 2019-09-09 NOTE — PROCEDURES
Procedure Date: 09/08/2019      EEG #-13       DATE OF RECORDING/SERVICE DATE:  This is day 13 of 24-hour video EEG monitoring, 09/08/2019       SOURCE FILE DURATION:  23 hours 58 minutes 31 seconds.      CLINICAL SUMMARY:  The patient is a 28-year-old male with history of intractable focal epilepsy who underwent intracranial depth and strip placement and video EEG monitoring for presurgical evaluation.      MEDICATIONS:  Lacosamide 200 mg b.i.d., phenytoin 200 mg b.i.d., topiramate 50 mg b.i.d., gabapentin 300 mg b.i.d., Effexor and Celexa.     TECHNICAL SUMMARY:  This continuous EEG monitoring procedure was performed with intraparenchymal depth electrodes and subdural strip and grid electrodes in and over multiple sites of the right temporal and frontal lobes.    Three 14-contact depth electrodes were placed through temporal cortex into medial structures with deepest contacts numbered  1  and located in:    right amygdala ( AM ),   right anterior hippocampus ( AH ), and   right hippocampus more posteriorly ( PH ).    The subdural strip and grid electrodes had variable numbers of contacts and locations, consisting of:   6 strip contacts over right inferior frontal cortex ( IF ),   6 strip contacts over right premotor frontal cortex ( PMF ),   6 strip contacts over right frontal and temporal cortex ( FT ),   4 strip contacts over right superior posterior temporal cortex ( SPT ),   4 strip contacts over right middle posterior temporal cortex ( MPT ),   4 strip contacts over right inferior posterior temporal cortex ( IPT ),   2-x-16 small, densely-spaced grid contacts over right dorsolateral prefrontal cortex ( FGR ).      Video monitoring was utilized and periodically reviewed by EEG technologists and the physician for electroclinical correlation.      INTERICTAL ACTIVITY:  Mixture of alpha and theta activities were present over the frontal electrodes.  Intermittent delta slowing was present over posterior  temporal electrodes, amygdala and hippocampus.  Frequent sharp waves were present over right anterior hippocampus contacts 1-3.  There were also occasional sharp waves or spike and wave discharges seen independently over amygdala 1-3 and posterior hippocampus 1-3.  At times, discharges were seen synchronously over anterior and posterior hippocampus.      CLINICAL AND ICTAL EVENTS:  No electrographic seizures were recorded.  There were reported episodes of brief lip smacking and arm jerking around 5:30 reported by the nurse.  The patient was tested and was able to talk and answer questions.  On the EEG, there were no changes from the baseline during these events and no ictal discharges were seen.      IMPRESSION:  This is an abnormal intracranial video EEG due to the presence of frequent epileptiform discharges over the right anterior hippocampus and less frequently over right amygdala and right posterior hippocampus consistent with focal irritability and focal epilepsy.  There was also delta slowing over right posterior temporal region and right amygdala and hippocampus consistent with a structural abnormality there.  No electrographic seizures were recorded on this date of monitoring.  There was reported lip smacking and arm shaking in brief episodes reported by nurse.  The room was dark and it was not visible on the video.  There were no changes on the EEG and no ictal discharges were seen during these events. Clinical correlation advised.         PITA HERNANDEZ MD             D: 2019   T: 2019   MT: VICENTE      Name:     SANTHOSH HODGES   MRN:      [id number]        Account:        [id number]   :      1990           Procedure Date: 2019      Document: [id number]

## 2019-09-09 NOTE — PROGRESS NOTES
"S: no acute events overnight. Had questions regarding upcoming surgery and activity restrictions.     O:  Temp:  [98.1  F (36.7  C)-98.7  F (37.1  C)] 98.4  F (36.9  C)  Pulse:  [98] 98  Heart Rate:  [] 110  Resp:  [12-37] 14  BP: (113-172)/() 126/88  SpO2:  [98 %-100 %] 98 %    Exam:  General: Awake;  Alert, In No Acute Distress  Pulm: Breathing Comfortably on room air  Mental status: Oriented x 3  Cranial Nerves: Cranial Nerves II-XII Intact Bilaterally  Strength:      Del Tr Bi WE WF Gr  R 5 5 5 5 5 5  L 5 5 5 5 5 5     HF KE KF DF PF   R 5 5 5 5 5   L 5 5 5 5 5     Pronator Drift: Absent  Sensory: Intact to Light Touch  INCISION: Head Wrap Intact; No Drainage Present.     Assessment:   28 year old male with history of epilepsy s/p  MAGAN Assisted Stereotactic Placement Of Right Sided Depth Electrodes. Awaiting temporal lobectomy. Ongoing issues: line-associated deep venous thrombosis.     Plan:     Neuro:   Anti-epileptics: per epilepsy team   Appreciate assistance of Epilepsy team  Will plan for removal of right sided depth and strip electrodes and right craniotomy for right temporal lobectomy and amygdalohippocampectomy on 9/10/2019 with Dr Jason Funes     Cardiovascular: blood pressure goals: SBP < 140    Pulmonary: Incentive spirometry     Gastrointestinal: Regular Diet    Renal: monitor intake and output     Heme:   Maintain INR < 1.4; Platelet > 100K; Fibrinogen > 200, Hemoglobin > 8    Endocrine: No issues    Infectious: IV Ceftriaxone 2G Q 24 HR while leads are in place; Continue to Monitor    DVT prophylaxis: Sequential compression devices to Bilateral Lower Extremities; subcutaneous heparin last dose (9/9)    Ulcer prophylaxis: Not Indicated    DISPO:  4A    Doan \"Jose Rafael\" MD [place]   Neurosurgery, PGY-3    Please contact neurosurgery resident on call with questions.    Dial * * [phone], enter [phone] when prompted.       "

## 2019-09-09 NOTE — PLAN OF CARE
Neuro: No seizure activity noted overnight. Neuro exam stable. C/o headache r/t grid. Attendant at bedside.      C/V: SR/ST. Prn labetalol given x3 last evening.     Pulm: On RA. LS clear. Denies sob.     GI: Pt reports having BM 9/8. Denies nausea.     : Voiding in urinal in bathroom, c/o hesitancy overnight     Skin: Extravasation sites unchanged. Head wrap intact.     Pain: C/o headache and bilateral arm pain. Prn oxycodone, hydromorphone, and compazine given. Hot and cold packs applied.     IV/Gtts: New PIV last evening in right AC, saline locked.

## 2019-09-10 ENCOUNTER — ANESTHESIA (OUTPATIENT)
Dept: SURGERY | Facility: CLINIC | Age: 29
End: 2019-09-10
Payer: COMMERCIAL

## 2019-09-10 LAB
ABO + RH BLD: NORMAL
ABO + RH BLD: NORMAL
ANION GAP SERPL CALCULATED.3IONS-SCNC: 9 MMOL/L (ref 3–14)
APTT PPP: 40 SEC (ref 22–37)
BASE DEFICIT BLDA-SCNC: 0.7 MMOL/L
BLD GP AB SCN SERPL QL: NORMAL
BLOOD BANK CMNT PATIENT-IMP: NORMAL
BUN SERPL-MCNC: 9 MG/DL (ref 7–30)
CA-I BLD-MCNC: 4.9 MG/DL (ref 4.4–5.2)
CALCIUM SERPL-MCNC: 9.5 MG/DL (ref 8.5–10.1)
CHLORIDE SERPL-SCNC: 104 MMOL/L (ref 94–109)
CO2 SERPL-SCNC: 25 MMOL/L (ref 20–32)
CREAT SERPL-MCNC: 0.84 MG/DL (ref 0.66–1.25)
ERYTHROCYTE [DISTWIDTH] IN BLOOD BY AUTOMATED COUNT: 11.9 % (ref 10–15)
GFR SERPL CREATININE-BSD FRML MDRD: >90 ML/MIN/{1.73_M2}
GLUCOSE BLD-MCNC: 124 MG/DL (ref 70–99)
GLUCOSE BLDC GLUCOMTR-MCNC: 190 MG/DL (ref 70–99)
GLUCOSE BLDC GLUCOMTR-MCNC: 92 MG/DL (ref 70–99)
GLUCOSE SERPL-MCNC: 88 MG/DL (ref 70–99)
HCO3 BLD-SCNC: 22 MMOL/L (ref 21–28)
HCT VFR BLD AUTO: 32.1 % (ref 40–53)
HGB BLD-MCNC: 10.4 G/DL (ref 13.3–17.7)
HGB BLD-MCNC: 10.5 G/DL (ref 13.3–17.7)
INR PPP: 1.26 (ref 0.86–1.14)
MCH RBC QN AUTO: 30.5 PG (ref 26.5–33)
MCHC RBC AUTO-ENTMCNC: 32.7 G/DL (ref 31.5–36.5)
MCV RBC AUTO: 93 FL (ref 78–100)
O2/TOTAL GAS SETTING VFR VENT: 60 %
PCO2 BLD: 29 MM HG (ref 35–45)
PH BLD: 7.49 PH (ref 7.35–7.45)
PLATELET # BLD AUTO: 499 10E9/L (ref 150–450)
PO2 BLD: 240 MM HG (ref 80–105)
POTASSIUM BLD-SCNC: 4.1 MMOL/L (ref 3.4–5.3)
POTASSIUM SERPL-SCNC: 4 MMOL/L (ref 3.4–5.3)
RBC # BLD AUTO: 3.44 10E12/L (ref 4.4–5.9)
SODIUM BLD-SCNC: 137 MMOL/L (ref 133–144)
SODIUM SERPL-SCNC: 139 MMOL/L (ref 133–144)
SPECIMEN EXP DATE BLD: NORMAL
TOPIRAMATE SERPL-MCNC: <1.5 UG/ML (ref 5–20)
WBC # BLD AUTO: 9.5 10E9/L (ref 4–11)

## 2019-09-10 PROCEDURE — 25000125 ZZHC RX 250: Performed by: NURSE ANESTHETIST, CERTIFIED REGISTERED

## 2019-09-10 PROCEDURE — 40000170 ZZH STATISTIC PRE-PROCEDURE ASSESSMENT II: Performed by: NEUROLOGICAL SURGERY

## 2019-09-10 PROCEDURE — 25000128 H RX IP 250 OP 636: Performed by: STUDENT IN AN ORGANIZED HEALTH CARE EDUCATION/TRAINING PROGRAM

## 2019-09-10 PROCEDURE — 71000017 ZZH RECOVERY PHASE 1 LEVEL 3 EA ADDTL HR: Performed by: NEUROLOGICAL SURGERY

## 2019-09-10 PROCEDURE — 25000125 ZZHC RX 250: Performed by: NEUROLOGICAL SURGERY

## 2019-09-10 PROCEDURE — 85730 THROMBOPLASTIN TIME PARTIAL: CPT | Performed by: NEUROLOGICAL SURGERY

## 2019-09-10 PROCEDURE — 36000064 ZZH SURGERY LEVEL 4 EA 15 ADDTL MIN - UMMC: Performed by: NEUROLOGICAL SURGERY

## 2019-09-10 PROCEDURE — 27210794 ZZH OR GENERAL SUPPLY STERILE: Performed by: NEUROLOGICAL SURGERY

## 2019-09-10 PROCEDURE — 25000132 ZZH RX MED GY IP 250 OP 250 PS 637: Performed by: STUDENT IN AN ORGANIZED HEALTH CARE EDUCATION/TRAINING PROGRAM

## 2019-09-10 PROCEDURE — 25800030 ZZH RX IP 258 OP 636: Performed by: NURSE ANESTHETIST, CERTIFIED REGISTERED

## 2019-09-10 PROCEDURE — 36000062 ZZH SURGERY LEVEL 4 1ST 30 MIN - UMMC: Performed by: NEUROLOGICAL SURGERY

## 2019-09-10 PROCEDURE — 00P032Z REMOVAL OF MONITORING DEVICE FROM BRAIN, PERCUTANEOUS APPROACH: ICD-10-PCS | Performed by: NEUROLOGICAL SURGERY

## 2019-09-10 PROCEDURE — 25000128 H RX IP 250 OP 636: Performed by: NURSE ANESTHETIST, CERTIFIED REGISTERED

## 2019-09-10 PROCEDURE — 88342 IMHCHEM/IMCYTCHM 1ST ANTB: CPT | Performed by: NEUROLOGICAL SURGERY

## 2019-09-10 PROCEDURE — 20000004 ZZH R&B ICU UMMC

## 2019-09-10 PROCEDURE — 25000128 H RX IP 250 OP 636: Performed by: NEUROLOGICAL SURGERY

## 2019-09-10 PROCEDURE — 25800030 ZZH RX IP 258 OP 636: Performed by: NEUROLOGICAL SURGERY

## 2019-09-10 PROCEDURE — 0NU50JZ SUPPLEMENT RIGHT TEMPORAL BONE WITH SYNTHETIC SUBSTITUTE, OPEN APPROACH: ICD-10-PCS | Performed by: NEUROLOGICAL SURGERY

## 2019-09-10 PROCEDURE — 37000008 ZZH ANESTHESIA TECHNICAL FEE, 1ST 30 MIN: Performed by: NEUROLOGICAL SURGERY

## 2019-09-10 PROCEDURE — 25000565 ZZH ISOFLURANE, EA 15 MIN: Performed by: NEUROLOGICAL SURGERY

## 2019-09-10 PROCEDURE — 80048 BASIC METABOLIC PNL TOTAL CA: CPT | Performed by: STUDENT IN AN ORGANIZED HEALTH CARE EDUCATION/TRAINING PROGRAM

## 2019-09-10 PROCEDURE — 00B70ZZ EXCISION OF CEREBRAL HEMISPHERE, OPEN APPROACH: ICD-10-PCS | Performed by: NEUROLOGICAL SURGERY

## 2019-09-10 PROCEDURE — 40000275 ZZH STATISTIC RCP TIME EA 10 MIN

## 2019-09-10 PROCEDURE — 27810169 ZZH OR IMPLANT GENERAL: Performed by: NEUROLOGICAL SURGERY

## 2019-09-10 PROCEDURE — C1713 ANCHOR/SCREW BN/BN,TIS/BN: HCPCS | Performed by: NEUROLOGICAL SURGERY

## 2019-09-10 PROCEDURE — 36415 COLL VENOUS BLD VENIPUNCTURE: CPT | Performed by: STUDENT IN AN ORGANIZED HEALTH CARE EDUCATION/TRAINING PROGRAM

## 2019-09-10 PROCEDURE — 25000132 ZZH RX MED GY IP 250 OP 250 PS 637: Performed by: NURSE PRACTITIONER

## 2019-09-10 PROCEDURE — 86850 RBC ANTIBODY SCREEN: CPT | Performed by: STUDENT IN AN ORGANIZED HEALTH CARE EDUCATION/TRAINING PROGRAM

## 2019-09-10 PROCEDURE — 86901 BLOOD TYPING SEROLOGIC RH(D): CPT | Performed by: STUDENT IN AN ORGANIZED HEALTH CARE EDUCATION/TRAINING PROGRAM

## 2019-09-10 PROCEDURE — 25000128 H RX IP 250 OP 636: Performed by: ANESTHESIOLOGY

## 2019-09-10 PROCEDURE — 27210995 ZZH RX 272: Performed by: NEUROLOGICAL SURGERY

## 2019-09-10 PROCEDURE — 82330 ASSAY OF CALCIUM: CPT | Performed by: ANESTHESIOLOGY

## 2019-09-10 PROCEDURE — 00000146 ZZHCL STATISTIC GLUCOSE BY METER IP

## 2019-09-10 PROCEDURE — 00800ZZ DIVISION OF BRAIN, OPEN APPROACH: ICD-10-PCS | Performed by: NEUROLOGICAL SURGERY

## 2019-09-10 PROCEDURE — 71000016 ZZH RECOVERY PHASE 1 LEVEL 3 FIRST HR: Performed by: NEUROLOGICAL SURGERY

## 2019-09-10 PROCEDURE — 86900 BLOOD TYPING SEROLOGIC ABO: CPT | Performed by: STUDENT IN AN ORGANIZED HEALTH CARE EDUCATION/TRAINING PROGRAM

## 2019-09-10 PROCEDURE — 25800030 ZZH RX IP 258 OP 636: Performed by: STUDENT IN AN ORGANIZED HEALTH CARE EDUCATION/TRAINING PROGRAM

## 2019-09-10 PROCEDURE — C1763 CONN TISS, NON-HUMAN: HCPCS | Performed by: NEUROLOGICAL SURGERY

## 2019-09-10 PROCEDURE — 37000009 ZZH ANESTHESIA TECHNICAL FEE, EACH ADDTL 15 MIN: Performed by: NEUROLOGICAL SURGERY

## 2019-09-10 PROCEDURE — 40000014 ZZH STATISTIC ARTERIAL MONITORING DAILY

## 2019-09-10 PROCEDURE — 82947 ASSAY GLUCOSE BLOOD QUANT: CPT | Performed by: ANESTHESIOLOGY

## 2019-09-10 PROCEDURE — 84295 ASSAY OF SERUM SODIUM: CPT | Performed by: ANESTHESIOLOGY

## 2019-09-10 PROCEDURE — 84132 ASSAY OF SERUM POTASSIUM: CPT | Performed by: ANESTHESIOLOGY

## 2019-09-10 PROCEDURE — 82803 BLOOD GASES ANY COMBINATION: CPT | Performed by: ANESTHESIOLOGY

## 2019-09-10 PROCEDURE — 88307 TISSUE EXAM BY PATHOLOGIST: CPT | Performed by: NEUROLOGICAL SURGERY

## 2019-09-10 PROCEDURE — 85610 PROTHROMBIN TIME: CPT | Performed by: NEUROLOGICAL SURGERY

## 2019-09-10 PROCEDURE — 85027 COMPLETE CBC AUTOMATED: CPT | Performed by: STUDENT IN AN ORGANIZED HEALTH CARE EDUCATION/TRAINING PROGRAM

## 2019-09-10 DEVICE — IMPLANTABLE DEVICE: Type: IMPLANTABLE DEVICE | Site: SKULL | Status: FUNCTIONAL

## 2019-09-10 DEVICE — GRAFT DURAGEN 2X2" ID220: Type: IMPLANTABLE DEVICE | Site: BRAIN | Status: FUNCTIONAL

## 2019-09-10 DEVICE — IMP BUR HOLE COVER 17MM LOW PROFILE TI 421.527: Type: IMPLANTABLE DEVICE | Site: SKULL | Status: FUNCTIONAL

## 2019-09-10 DEVICE — IMP SCR SYN MATRIX LOW PRO 1.5X04MM SELF DRILL 04.503.104.01: Type: IMPLANTABLE DEVICE | Site: SKULL | Status: FUNCTIONAL

## 2019-09-10 DEVICE — IMP PLATE SYN BOX LOW PROFILE 04H TI 421.511: Type: IMPLANTABLE DEVICE | Site: SKULL | Status: FUNCTIONAL

## 2019-09-10 RX ORDER — HYDRALAZINE HYDROCHLORIDE 20 MG/ML
2.5-5 INJECTION INTRAMUSCULAR; INTRAVENOUS EVERY 10 MIN PRN
Status: DISCONTINUED | OUTPATIENT
Start: 2019-09-10 | End: 2019-09-10 | Stop reason: HOSPADM

## 2019-09-10 RX ORDER — DEXAMETHASONE SODIUM PHOSPHATE 4 MG/ML
INJECTION, SOLUTION INTRA-ARTICULAR; INTRALESIONAL; INTRAMUSCULAR; INTRAVENOUS; SOFT TISSUE PRN
Status: DISCONTINUED | OUTPATIENT
Start: 2019-09-10 | End: 2019-09-10

## 2019-09-10 RX ORDER — HYDROMORPHONE HYDROCHLORIDE 1 MG/ML
.3-.5 INJECTION, SOLUTION INTRAMUSCULAR; INTRAVENOUS; SUBCUTANEOUS EVERY 4 HOURS PRN
Status: DISCONTINUED | OUTPATIENT
Start: 2019-09-10 | End: 2019-09-12

## 2019-09-10 RX ORDER — LABETALOL 20 MG/4 ML (5 MG/ML) INTRAVENOUS SYRINGE
PRN
Status: DISCONTINUED | OUTPATIENT
Start: 2019-09-10 | End: 2019-09-10

## 2019-09-10 RX ORDER — ONDANSETRON 2 MG/ML
4 INJECTION INTRAMUSCULAR; INTRAVENOUS EVERY 30 MIN PRN
Status: DISCONTINUED | OUTPATIENT
Start: 2019-09-10 | End: 2019-09-10 | Stop reason: HOSPADM

## 2019-09-10 RX ORDER — SODIUM CHLORIDE, SODIUM LACTATE, POTASSIUM CHLORIDE, CALCIUM CHLORIDE 600; 310; 30; 20 MG/100ML; MG/100ML; MG/100ML; MG/100ML
INJECTION, SOLUTION INTRAVENOUS CONTINUOUS
Status: DISCONTINUED | OUTPATIENT
Start: 2019-09-10 | End: 2019-09-10 | Stop reason: HOSPADM

## 2019-09-10 RX ORDER — DIPHENHYDRAMINE HCL 25 MG
25 CAPSULE ORAL EVERY 6 HOURS PRN
Status: DISCONTINUED | OUTPATIENT
Start: 2019-09-10 | End: 2019-09-11

## 2019-09-10 RX ORDER — LABETALOL HYDROCHLORIDE 5 MG/ML
INJECTION, SOLUTION INTRAVENOUS PRN
Status: DISCONTINUED | OUTPATIENT
Start: 2019-09-10 | End: 2019-09-10

## 2019-09-10 RX ORDER — PROPOFOL 10 MG/ML
INJECTION, EMULSION INTRAVENOUS PRN
Status: DISCONTINUED | OUTPATIENT
Start: 2019-09-10 | End: 2019-09-10

## 2019-09-10 RX ORDER — SODIUM CHLORIDE, SODIUM LACTATE, POTASSIUM CHLORIDE, CALCIUM CHLORIDE 600; 310; 30; 20 MG/100ML; MG/100ML; MG/100ML; MG/100ML
INJECTION, SOLUTION INTRAVENOUS CONTINUOUS PRN
Status: DISCONTINUED | OUTPATIENT
Start: 2019-09-10 | End: 2019-09-10

## 2019-09-10 RX ORDER — ONDANSETRON 2 MG/ML
INJECTION INTRAMUSCULAR; INTRAVENOUS PRN
Status: DISCONTINUED | OUTPATIENT
Start: 2019-09-10 | End: 2019-09-10

## 2019-09-10 RX ORDER — CEFTRIAXONE 2 G/1
INJECTION, POWDER, FOR SOLUTION INTRAMUSCULAR; INTRAVENOUS PRN
Status: DISCONTINUED | OUTPATIENT
Start: 2019-09-10 | End: 2019-09-10

## 2019-09-10 RX ORDER — LABETALOL 20 MG/4 ML (5 MG/ML) INTRAVENOUS SYRINGE
10-40
Status: DISCONTINUED | OUTPATIENT
Start: 2019-09-10 | End: 2019-09-10

## 2019-09-10 RX ORDER — ONDANSETRON 4 MG/1
4 TABLET, ORALLY DISINTEGRATING ORAL EVERY 30 MIN PRN
Status: DISCONTINUED | OUTPATIENT
Start: 2019-09-10 | End: 2019-09-10 | Stop reason: HOSPADM

## 2019-09-10 RX ORDER — HYDROMORPHONE HYDROCHLORIDE 1 MG/ML
.3-.5 INJECTION, SOLUTION INTRAMUSCULAR; INTRAVENOUS; SUBCUTANEOUS EVERY 5 MIN PRN
Status: DISCONTINUED | OUTPATIENT
Start: 2019-09-10 | End: 2019-09-10 | Stop reason: HOSPADM

## 2019-09-10 RX ORDER — NALOXONE HYDROCHLORIDE 0.4 MG/ML
.1-.4 INJECTION, SOLUTION INTRAMUSCULAR; INTRAVENOUS; SUBCUTANEOUS
Status: ACTIVE | OUTPATIENT
Start: 2019-09-10 | End: 2019-09-11

## 2019-09-10 RX ORDER — ACETAMINOPHEN 325 MG/1
325-650 TABLET ORAL EVERY 4 HOURS PRN
Status: DISCONTINUED | OUTPATIENT
Start: 2019-09-10 | End: 2019-09-13 | Stop reason: HOSPADM

## 2019-09-10 RX ORDER — FENTANYL CITRATE 50 UG/ML
25-50 INJECTION, SOLUTION INTRAMUSCULAR; INTRAVENOUS
Status: DISCONTINUED | OUTPATIENT
Start: 2019-09-10 | End: 2019-09-10 | Stop reason: HOSPADM

## 2019-09-10 RX ORDER — LIDOCAINE HYDROCHLORIDE 20 MG/ML
INJECTION, SOLUTION INFILTRATION; PERINEURAL PRN
Status: DISCONTINUED | OUTPATIENT
Start: 2019-09-10 | End: 2019-09-10

## 2019-09-10 RX ORDER — HYDRALAZINE HYDROCHLORIDE 20 MG/ML
10-20 INJECTION INTRAMUSCULAR; INTRAVENOUS EVERY 30 MIN PRN
Status: DISCONTINUED | OUTPATIENT
Start: 2019-09-10 | End: 2019-09-10

## 2019-09-10 RX ORDER — SODIUM CHLORIDE 9 MG/ML
INJECTION, SOLUTION INTRAVENOUS CONTINUOUS
Status: DISCONTINUED | OUTPATIENT
Start: 2019-09-10 | End: 2019-09-11

## 2019-09-10 RX ORDER — LABETALOL 20 MG/4 ML (5 MG/ML) INTRAVENOUS SYRINGE
10
Status: DISCONTINUED | OUTPATIENT
Start: 2019-09-10 | End: 2019-09-10 | Stop reason: HOSPADM

## 2019-09-10 RX ORDER — MEPERIDINE HYDROCHLORIDE 25 MG/ML
12.5 INJECTION INTRAMUSCULAR; INTRAVENOUS; SUBCUTANEOUS EVERY 5 MIN PRN
Status: DISCONTINUED | OUTPATIENT
Start: 2019-09-10 | End: 2019-09-10 | Stop reason: HOSPADM

## 2019-09-10 RX ORDER — CEFTRIAXONE 2 G/1
2 INJECTION, POWDER, FOR SOLUTION INTRAMUSCULAR; INTRAVENOUS EVERY 24 HOURS
Status: COMPLETED | OUTPATIENT
Start: 2019-09-10 | End: 2019-09-11

## 2019-09-10 RX ORDER — FENTANYL CITRATE 50 UG/ML
INJECTION, SOLUTION INTRAMUSCULAR; INTRAVENOUS PRN
Status: DISCONTINUED | OUTPATIENT
Start: 2019-09-10 | End: 2019-09-10

## 2019-09-10 RX ADMIN — ACETAMINOPHEN 650 MG: 325 TABLET, FILM COATED ORAL at 21:18

## 2019-09-10 RX ADMIN — GABAPENTIN 300 MG: 300 CAPSULE ORAL at 19:55

## 2019-09-10 RX ADMIN — OXYCODONE HYDROCHLORIDE 10 MG: 5 TABLET ORAL at 19:55

## 2019-09-10 RX ADMIN — FENTANYL CITRATE 50 MCG: 50 INJECTION, SOLUTION INTRAMUSCULAR; INTRAVENOUS at 11:33

## 2019-09-10 RX ADMIN — ONDANSETRON 4 MG: 2 INJECTION INTRAMUSCULAR; INTRAVENOUS at 14:56

## 2019-09-10 RX ADMIN — SODIUM CHLORIDE, POTASSIUM CHLORIDE, SODIUM LACTATE AND CALCIUM CHLORIDE: 600; 310; 30; 20 INJECTION, SOLUTION INTRAVENOUS at 10:28

## 2019-09-10 RX ADMIN — SODIUM CHLORIDE, POTASSIUM CHLORIDE, SODIUM LACTATE AND CALCIUM CHLORIDE: 600; 310; 30; 20 INJECTION, SOLUTION INTRAVENOUS at 08:21

## 2019-09-10 RX ADMIN — PROCHLORPERAZINE EDISYLATE 5 MG: 5 INJECTION INTRAMUSCULAR; INTRAVENOUS at 16:54

## 2019-09-10 RX ADMIN — ROCURONIUM BROMIDE 12 MCG/KG/MIN: 10 INJECTION INTRAVENOUS at 10:04

## 2019-09-10 RX ADMIN — CEFTRIAXONE SODIUM 2 G: 2 INJECTION, POWDER, FOR SOLUTION INTRAMUSCULAR; INTRAVENOUS at 09:08

## 2019-09-10 RX ADMIN — LACOSAMIDE 200 MG: 200 TABLET, FILM COATED ORAL at 06:05

## 2019-09-10 RX ADMIN — TOPIRAMATE 50 MG: 50 TABLET ORAL at 19:55

## 2019-09-10 RX ADMIN — FENTANYL CITRATE 50 MCG: 50 INJECTION, SOLUTION INTRAMUSCULAR; INTRAVENOUS at 10:57

## 2019-09-10 RX ADMIN — FENTANYL CITRATE 50 MCG: 50 INJECTION, SOLUTION INTRAMUSCULAR; INTRAVENOUS at 10:33

## 2019-09-10 RX ADMIN — PHENYTOIN SODIUM 200 MG: 100 CAPSULE ORAL at 19:55

## 2019-09-10 RX ADMIN — LACOSAMIDE 200 MG: 200 TABLET, FILM COATED ORAL at 19:55

## 2019-09-10 RX ADMIN — VENLAFAXINE HYDROCHLORIDE 225 MG: 150 CAPSULE, EXTENDED RELEASE ORAL at 06:06

## 2019-09-10 RX ADMIN — FENTANYL CITRATE 100 MCG: 50 INJECTION, SOLUTION INTRAMUSCULAR; INTRAVENOUS at 10:09

## 2019-09-10 RX ADMIN — ROCURONIUM BROMIDE 10 MG: 10 INJECTION INTRAVENOUS at 10:17

## 2019-09-10 RX ADMIN — ROCURONIUM BROMIDE 100 MG: 10 INJECTION INTRAVENOUS at 08:32

## 2019-09-10 RX ADMIN — FENTANYL CITRATE 50 MCG: 50 INJECTION, SOLUTION INTRAMUSCULAR; INTRAVENOUS at 09:48

## 2019-09-10 RX ADMIN — PHENYTOIN SODIUM 200 MG: 100 CAPSULE ORAL at 06:05

## 2019-09-10 RX ADMIN — LABETALOL 20 MG/4 ML (5 MG/ML) INTRAVENOUS SYRINGE 5 MG: at 14:08

## 2019-09-10 RX ADMIN — Medication 0.2 MG: at 19:54

## 2019-09-10 RX ADMIN — FENTANYL CITRATE 200 MCG: 50 INJECTION, SOLUTION INTRAMUSCULAR; INTRAVENOUS at 08:32

## 2019-09-10 RX ADMIN — OMEPRAZOLE 20 MG: 20 CAPSULE, DELAYED RELEASE ORAL at 06:06

## 2019-09-10 RX ADMIN — LABETALOL 20 MG/4 ML (5 MG/ML) INTRAVENOUS SYRINGE 5 MG: at 12:23

## 2019-09-10 RX ADMIN — CYCLOBENZAPRINE 10 MG: 10 TABLET, FILM COATED ORAL at 06:06

## 2019-09-10 RX ADMIN — OXYCODONE HYDROCHLORIDE 10 MG: 5 TABLET ORAL at 22:54

## 2019-09-10 RX ADMIN — SODIUM CHLORIDE: 9 INJECTION, SOLUTION INTRAVENOUS at 18:00

## 2019-09-10 RX ADMIN — SUGAMMADEX 200 MG: 100 INJECTION, SOLUTION INTRAVENOUS at 16:01

## 2019-09-10 RX ADMIN — FENTANYL CITRATE 50 MCG: 50 INJECTION, SOLUTION INTRAMUSCULAR; INTRAVENOUS at 15:40

## 2019-09-10 RX ADMIN — DIPHENHYDRAMINE HYDROCHLORIDE 25 MG: 25 CAPSULE ORAL at 23:24

## 2019-09-10 RX ADMIN — GABAPENTIN 300 MG: 300 CAPSULE ORAL at 06:06

## 2019-09-10 RX ADMIN — HYDROMORPHONE HYDROCHLORIDE 0.5 MG: 1 INJECTION, SOLUTION INTRAMUSCULAR; INTRAVENOUS; SUBCUTANEOUS at 23:24

## 2019-09-10 RX ADMIN — PROPOFOL 200 MG: 10 INJECTION, EMULSION INTRAVENOUS at 08:32

## 2019-09-10 RX ADMIN — PROCHLORPERAZINE EDISYLATE 10 MG: 5 INJECTION INTRAMUSCULAR; INTRAVENOUS at 20:06

## 2019-09-10 RX ADMIN — PROCHLORPERAZINE EDISYLATE 10 MG: 5 INJECTION INTRAMUSCULAR; INTRAVENOUS at 04:39

## 2019-09-10 RX ADMIN — LABETALOL HYDROCHLORIDE 25 MG: 5 INJECTION INTRAVENOUS at 16:09

## 2019-09-10 RX ADMIN — DEXAMETHASONE SODIUM PHOSPHATE 8 MG: 4 INJECTION, SOLUTION INTRA-ARTICULAR; INTRALESIONAL; INTRAMUSCULAR; INTRAVENOUS; SOFT TISSUE at 10:48

## 2019-09-10 RX ADMIN — OXYCODONE HYDROCHLORIDE 10 MG: 5 TABLET ORAL at 04:39

## 2019-09-10 RX ADMIN — Medication 0.2 MG: at 04:40

## 2019-09-10 RX ADMIN — ONDANSETRON 4 MG: 2 INJECTION INTRAMUSCULAR; INTRAVENOUS at 16:48

## 2019-09-10 RX ADMIN — CEFTRIAXONE SODIUM 2 G: 2 INJECTION, POWDER, FOR SOLUTION INTRAMUSCULAR; INTRAVENOUS at 19:55

## 2019-09-10 RX ADMIN — CYCLOBENZAPRINE 10 MG: 10 TABLET, FILM COATED ORAL at 19:55

## 2019-09-10 RX ADMIN — PROCHLORPERAZINE EDISYLATE 5 MG: 5 INJECTION INTRAMUSCULAR; INTRAVENOUS at 17:08

## 2019-09-10 RX ADMIN — ONDANSETRON HYDROCHLORIDE 4 MG: 2 INJECTION, SOLUTION INTRAMUSCULAR; INTRAVENOUS at 21:18

## 2019-09-10 RX ADMIN — TOPIRAMATE 50 MG: 50 TABLET ORAL at 06:05

## 2019-09-10 RX ADMIN — LIDOCAINE HYDROCHLORIDE 100 MG: 20 INJECTION, SOLUTION INFILTRATION; PERINEURAL at 08:32

## 2019-09-10 RX ADMIN — FENTANYL CITRATE 50 MCG: 50 INJECTION, SOLUTION INTRAMUSCULAR; INTRAVENOUS at 15:58

## 2019-09-10 RX ADMIN — ROCURONIUM BROMIDE 50 MG: 10 INJECTION INTRAVENOUS at 09:30

## 2019-09-10 RX ADMIN — MIDAZOLAM 2 MG: 1 INJECTION INTRAMUSCULAR; INTRAVENOUS at 08:21

## 2019-09-10 RX ADMIN — FENTANYL CITRATE 50 MCG: 50 INJECTION, SOLUTION INTRAMUSCULAR; INTRAVENOUS at 14:02

## 2019-09-10 ASSESSMENT — ACTIVITIES OF DAILY LIVING (ADL)
ADLS_ACUITY_SCORE: 15
ADLS_ACUITY_SCORE: 15
ADLS_ACUITY_SCORE: 16

## 2019-09-10 ASSESSMENT — PAIN DESCRIPTION - DESCRIPTORS
DESCRIPTORS: ACHING
DESCRIPTORS: ACHING

## 2019-09-10 NOTE — PLAN OF CARE
Shift events 1774-3044:   Neuro: intact, no seizures, moderate pain in head and BUE, mild/moderate relief with narcotic pain medication. Moves well with SBA and gait belt, multiple walks this shift.   CV: Afebrile, tachycardic.   R: Clear, RA.   GI/: NPO at midnight for procedure today. Voids adequately.     Plan: OR this morning for R craniotomy. Will continue to monitor and assess. Will notify MD with changes/concerns/updates.   See flowsheets/MAR for additional documentation.

## 2019-09-10 NOTE — ANESTHESIA CARE TRANSFER NOTE
Patient: Tha Mcghee    Procedure(s):  Removal Of Right Sided Depth And Strip Electrodes, Right Craniotomy For Right Temporal Lobectomy And Amygliohippocampectomy Latex free    Diagnosis: Epilepsy, Focal  Diagnosis Additional Information: No value filed.    Anesthesia Type:   No value filed.     Note:  Airway :Face Mask  Patient transferred to:PACU  Handoff Report: Identifed the Patient, Identified the Reponsible Provider, Reviewed the pertinent medical history, Discussed the surgical course, Reviewed Intra-OP anesthesia mangement and issues during anesthesia, Set expectations for post-procedure period and Allowed opportunity for questions and acknowledgement of understanding      Vitals: (Last set prior to Anesthesia Care Transfer)    CRNA VITALS  9/10/2019 1548 - 9/10/2019 1633      9/10/2019             Resp Rate (observed):  16                Electronically Signed By: CHEYANNE Hinkle CRNA  September 10, 2019  4:33 PM

## 2019-09-10 NOTE — BRIEF OP NOTE
[facility], [place]    Brief Operative Note    Pre-operative diagnosis: Epilepsy, Focal  Post-operative diagnosis * No post-op diagnosis entered *  Procedure: Procedure(s):  Removal Of Right Sided Depth And Strip Electrodes, Right Craniotomy For Right Temporal Lobectomy And Amygliohippocampectomy Latex free  Surgeon: Surgeon(s) and Role:     * Jason Funes MD - Primary     * Lamine Goldstein MD - Resident - Assisting     * Leschke, John M, MD - Resident - Assisting     * Mani Munroe MD - Resident - Assisting  Anesthesia: General   Estimated blood loss: 200 ml  Drains: None  Specimens:   ID Type Source Tests Collected by Time Destination   A : Right Temporal lobe Tissue Brain SURGICAL PATHOLOGY EXAM Jason Funes MD 9/10/2019  1:24 PM      Findings:   Intentional sacrifice of a structure (right mesial temporal lobe including hippocampus and amygdala).  Complications: None.  Implants:    Implant Name Type Inv. Item Serial No.  Lot No. LRB No. Used   EEG [employer] [place]     [id number] Right 2   EEG [employer] [place]    [employer] [id number] Right 1   Grid electrode with 1mm contacts    [employer] [id number] Right 1   [place]las 14 contact mini connector   [id number] [employer]  Right 1   Platinum 14 contact mini connector   [id number] [employer]  Right 1   Platinum 14 contact mini connector   67569 [employer]  Right 1   Subdural strip electrode, platinum    [employer] [id number] Right 1   Subdural Strip electrode, Platinum 4 Contact     [id number] Right 3   Subdural Strip Electrode, Platinum, 4.5 Contact Diam    [employer] [id number] Right 1   Subdural Strip Electrode, platinum, 4.5mm contact diam    [employer] [id number] Right 1   Subdural Strip Electrode, Platinum, 4.5mm contact diam    [employer] [id number] Right 1   Subdural Strip Electrode, Platinum, 4.5mm contact diam    [employer] [id number] Right 1   GRAFT DURAGEN 2X2&quot; [id number]  Bone/Tissue/Biologic GRAFT DURAGEN 2X2&quot; [id number]  [employer] [id number] Right 1   IMP PLATE SYN BOX LOW PROFILE 04H .511 [employer]/[place] IMP PLATE SYN BOX LOW PROFILE 04H .511  SYNTH[employer]-STRAT[employer] NONE Right 4   IMP PLATE SYN ADAPTION LOW PROFILE 07H .519 Metallic Hardware/[place] IMP PLATE SYN ADAPTION LOW PROFILE 07H .519  [employer] NONE Right 1   IMP BUR HOLE COVER 17MM LOW PROFILE .527 Metallic Hard[employer]/[place] IMP BUR HOLE COVER 17MM LOW PROFILE .527  [employer] NONE Right 1   IMP SCR SYN MATRIX LOW PRO 1.5X04MM SELF DRILL [phone] Metallic Hardware/[place] IMP SCR SYN MATRIX LOW PRO 1.5X04MM SELF DRILL [phone]  SYN[employer]ES[employer]ATEC NONE Right 9        Mani Munroe M.D.  Neurosurgery Resident, PGY-2    Please contact neurosurgery resident on call with questions.    Dial * * [phone], enter [phone] when prompted.

## 2019-09-10 NOTE — ANESTHESIA PREPROCEDURE EVALUATION
Anesthesia Pre-Procedure Evaluation    Patient: Tha Mcghee   MRN:     [id number] Gender:   male   Age:    28 year old :      1990        Preoperative Diagnosis: Epilepsy, Focal   Procedure(s):  Removal Of Right Sided Depth And Strip Electrodes, Right Craniotomy For Right Temporal Lobectomy And Amygliohippocampectomy Latex free     Past Medical History:   Diagnosis Date     Anxiety      Depression      Localization-related (focal) (partial) epilepsy and epileptic syndromes with complex partial seizures, without mention of intractable epilepsy     preliminary      Past Surgical History:   Procedure Laterality Date     DENTAL SURGERY      wisdom teeth extraction     IR CAROTID CEREBRAL ANGIOGRAM BILATERAL  2019    WADA test     MAGAN ASSISTED CRANIOTOMY Right 2019    Procedure: MAGAN Assisted Right Craniotomy For Right Sided Strip Electrodes;  Surgeon: Jason Funes MD;  Location: [facility] OR     MAGAN ASSISTED PLACEMENT DEPTH ELECTRODE Right 2019    Procedure: MAGAN Assisted Stereotactic Placement Of Right Sided Depth Electrodes;  Surgeon: Jason Funes MD;  Location: [facility] OR          Anesthesia Evaluation     . Pt has had prior anesthetic. Type: General           ROS/MED HX    ENT/Pulmonary:       Neurologic:       Cardiovascular:         METS/Exercise Tolerance:     Hematologic:         Musculoskeletal:         GI/Hepatic:         Renal/Genitourinary:         Endo:         Psychiatric:         Infectious Disease:         Malignancy:         Other:                     JZG FV AN PHYSICAL EXAM    LABS:  CBC:   Lab Results   Component Value Date    WBC 9.5 09/10/2019    WBC 11.4 (H) 2019    HGB 10.5 (L) 09/10/2019    HGB 11.0 (L) 2019    HCT 32.1 (L) 09/10/2019    HCT 33.1 (L) 2019     (H) 09/10/2019     (H) 2019     BMP:   Lab Results   Component Value Date     09/10/2019     2019    POTASSIUM 4.0 09/10/2019    POTASSIUM 3.6  "09/08/2019    CHLORIDE 104 09/10/2019    CHLORIDE 104 09/08/2019    CO2 25 09/10/2019    CO2 22 09/08/2019    BUN 9 09/10/2019    BUN 7 09/08/2019    CR 0.84 09/10/2019    CR 0.80 09/08/2019    GLC 88 09/10/2019     (H) 09/08/2019     COAGS:   Lab Results   Component Value Date    PTT 40 (H) 09/10/2019    INR 1.26 (H) 09/10/2019     POC:   Lab Results   Component Value Date    BGM 92 09/10/2019     OTHER:   Lab Results   Component Value Date    LACT 1.3 06/29/2017    ROSA 9.5 09/10/2019    PHOS 5.1 (H) 09/03/2019    MAG 2.1 09/03/2019    ALBUMIN 3.7 10/23/2018    PROTTOTAL 7.0 10/23/2018    ALT 22 10/23/2018    AST 16 10/23/2018    ALKPHOS 67 10/23/2018    BILITOTAL 0.1 (L) 10/23/2018    TSH 3.46 06/27/2017    CRP <2.9 06/28/2017    SED 2 06/28/2017        Preop Vitals    BP Readings from Last 3 Encounters:   09/10/19 (!) 146/89   08/19/19 115/80   06/18/19 132/81    Pulse Readings from Last 3 Encounters:   09/09/19 96   08/19/19 120   06/18/19 90      Resp Readings from Last 3 Encounters:   09/10/19 16   08/19/19 19   01/17/19 24    SpO2 Readings from Last 3 Encounters:   09/10/19 99%   08/19/19 97%   01/17/19 98%      Temp Readings from Last 1 Encounters:   09/10/19 36.8  C (98.2  F) (Oral)    Ht Readings from Last 1 Encounters:   08/27/19 1.727 m (5' 8\")      Wt Readings from Last 1 Encounters:   09/09/19 79.9 kg (176 lb 2.4 oz)    Estimated body mass index is 26.78 kg/m  as calculated from the following:    Height as of this encounter: 1.727 m (5' 8\").    Weight as of this encounter: 79.9 kg (176 lb 2.4 oz).     LDA:  Peripheral IV 09/09/19 Medial;Distal;Left  (Active)   Site Assessment WDL 9/10/2019  6:00 AM   Line Status Saline locked 9/10/2019  6:00 AM   Phlebitis Scale 0-->no symptoms 9/10/2019  6:00 AM   Infiltration Scale 0 9/10/2019  6:00 AM   Extravasation? No 9/10/2019 12:00 AM   Dressing Intervention New dressing  9/9/2019  7:20 PM   Number of days: 1       Arterial Line 09/10/19 Radial (Active) "   Number of days: 0       ETT (adult) (Active)   Number of days: 0        Assessment:   ASA SCORE: 3       NPO Status: NPO Appropriate     Plan:   Anes. Type:  General   Pre-Medication: None   Induction:  IV (Standard)   Airway: ETT; Oral   Access/Monitoring: PIV; A-Line; 2nd PIV   Maintenance: Balanced     Postop Plan:   Postop Pain: Opioids  Postop Sedation/Airway: Not planned     PONV Management:   Adult Risk Factors:, Postop Opioids   Prevention: Ondansetron, Dexamethasone     CONSENT: Direct conversation   Plan and risks discussed with: Patient          Comments for Plan/Consent:  See preop eval from 8/19 and operative records for that date.  Has been in hospital since. No relevant interim changes EXCEPT major problem has been recurrent iv failures and thrombosis, including failed PIC cath left arm now with DVT in that arm.  Also some phlebitis (?) right upper arm.  Have told patient we'll avoid his left arm entirely, attempt IVs in feet.                   Jason Acevedo MD

## 2019-09-10 NOTE — ANESTHESIA PROCEDURE NOTES
Arterial Line Procedure Note  Staff:     Anesthesiologist:  Jason Acevedo MD  Location: In OR After Induction  Procedure Start/Stop Times:     patient identified and IV checked      Correct Patient: Yes    Line Placement:     Procedure:  Arterial Line    Insertion Site:  Radial    Insertion laterality:  Right    Skin Prep: Chloraprep      Patient Prep: mask, sterile gloves, hat and hand hygiene      Local skin infiltration:  None    Ultrasound Guided?: Yes      Artery evaluated via ultrasound confirming patency.   Using realtime imaging, the artery was punctured and the needle was observed entering the artery.      A permanent image is NOT entered into the patient's record.      Catheter size:  20 gauge, 12 cm    Cath secured with: other (comment)      Dressing:  Tegaderm    Complications:  None obvious    Arterial waveform: Yes

## 2019-09-10 NOTE — PROGRESS NOTES
MD from Neuro Crit/Neuro Surg rounded this morning on Tad. Verbal order okay to go to PreOp off monitor with sitter/family. Asked them to write a patient care order okay-ing this order. Patient sent down off monitor with sitter. Patient care order written.

## 2019-09-10 NOTE — ANESTHESIA POSTPROCEDURE EVALUATION
Anesthesia POST Procedure Evaluation    Patient: Tha Mcghee   MRN:     [id number] Gender:   male   Age:    28 year old :      1990        Preoperative Diagnosis: Epilepsy, Focal   Procedure(s):  Removal Of Right Sided Depth And Strip Electrodes, Right Craniotomy For Right Temporal Lobectomy And Amygliohippocampectomy Latex free   Postop Comments: No value filed.       Anesthesia Type:  Not documented  General    Reportable Event: NO     PAIN: Uncomplicated   Sign Out status: Comfortable, Well controlled pain     PONV: No PONV   Sign Out status:  No Nausea or Vomiting     Neuro/Psych: Uneventful perioperative course   Sign Out Status: Preoperative baseline; Age appropriate mentation     Airway/Resp.: Uneventful perioperative course   Sign Out Status: Non labored breathing, age appropriate RR; Resp. Status within EXPECTED Parameters     CV: Uneventful perioperative course   Sign Out status: Appropriate BP and perfusion indices; Appropriate HR/Rhythm     Disposition:   Sign Out in:  PACU  Disposition:  Phase II; Home  Recovery Course: Uneventful  Follow-Up: Not required     Comments/Narrative:  One episode nausea - no emesis. OK to transfer to SICU           Last Anesthesia Record Vitals:  CRNA VITALS  9/10/2019 1548 - 9/10/2019 1648      9/10/2019             Resp Rate (observed):  16          Last PACU Vitals:  Vitals Value Taken Time   /73 9/10/2019  5:30 PM   Temp 36.8  C (98.2  F) 9/10/2019  4:45 PM   Pulse 57 9/10/2019  5:30 PM   Resp 16 9/10/2019  4:45 PM   SpO2 98 % 9/10/2019  5:34 PM   Temp src     NIBP     Pulse     SpO2     Resp     Temp     Ht Rate     Temp 2     Vitals shown include unvalidated device data.      Electronically Signed By: Jason Acevedo MD, September 10, 2019, 5:35 PM

## 2019-09-10 NOTE — PLAN OF CARE
Admitted/transferred from: PACU  Reason for admission/transfer: q1h neuro assessments and continuous BP monitoring   Patient status upon admission/transfer: Hemodynamically stable, on RA and SpO2 99%, /85, lethargic but following commands and oriented x4  Code Status: FULL   Admitting team: Neurosurgery   Airway: RA  Lines present: R radial arterial line, R foot PIV, L shin PIV    Interventions: Neuro assessment completed with PACU RN, NS @ 100 ml/hr started   Plan: Continue q1h neuro assessments and notify Neurosurgery team of changes/concerns, closely monitor BP and utilize PRN Labetalol and Hydralazine to keep SBP < 140, monitor Capnography and vitals q1h     2 RN skin assessment: completed by Lauren VERDE and Ely CHILEL  Result of skin assessment and interventions/actions:   Height, weight, drug calc weight: done   Patient belongings: With family and in patient room 4230 closet   Family notified of admission: Yes

## 2019-09-10 NOTE — PROGRESS NOTES
CLINICAL NUTRITION SERVICES - REASSESSMENT NOTE     Nutrition Prescription    RECOMMENDATIONS FOR MDs/PROVIDERS TO ORDER:  - None additional other than continued encouragement of adequate PO    Malnutrition Status:    - Unable to determine due to unable to complete all parameters of NFPA     Recommendations already ordered by Registered Dietitian (RD):  - none currently while NPO     Future/Additional Recommendations:  - PO adequacy (cafe passes?)     EVALUATION OF THE PROGRESS TOWARD GOALS   Diet: Regular (NPO for procedure 9/10)  Nutrition Support: NA  Intake: pt tolerating PO prior to NPO for procedure 9/10.      NEW FINDINGS   Nutrition/GI Pt on regular diet and tolerating. NPO for procedure 9/10. Pt was eating 100% per flow sheets. Per RN, pt with decreased PO today post surgery. Pt with c/o nausea and HA.     Labs/meds:  Thera-vit; MOM; Miralax; Senokot; Dilantin; D3.     Neuro: pt to OR 9/10 for removal of right sided depth and strip electrodes and right craniotomy for right temporal lobectomy and amygdalohippocampectomy.    Weights:  Pt weight stable since admit: 179 lbs --> 176 lbs. Fluctuations throughout likely r/t fluid status.     MALNUTRITION  % Intake: Decreased intake does not meet criteria; requires further assessment.   % Weight Loss: Weight loss does not meet criteria  Subcutaneous Fat Loss: Unable to assess  Muscle Loss: Unable to assess  Fluid Accumulation/Edema: Unable to assess  Malnutrition Diagnosis: Unable to determine due to unable to complete all parameters of NFPA     Previous Goals   Patient to consume % of nutritionally adequate meal trays TID, or the equivalent with supplements/snacks.  Evaluation: Met    Previous Nutrition Diagnosis  Inadequate oral intake related to nausea, jaw pain, and decreased appetite as evidenced by pt with decreased PO intakes since admit.   Evaluation: No change now post-surgery     CURRENT NUTRITION DIAGNOSIS  Inadequate oral intake related to nausea,  jaw pain, and intermittently decreased appetite as evidenced by pt with variable PO intakes since admit and currently NPO meeting 0% kcal/PRO needs.     INTERVENTIONS  Implementation  Follow for PO intakes and provide cafe passes if/as appropriate if needed.     Goals  Patient to consume % of nutritionally adequate meal trays TID, or the equivalent with supplements/snacks.    Monitoring/Evaluation  Progress toward goals will be monitored and evaluated per protocol.     Manish Red RD, MARCIAL, [facility]  Neuro ICU  Pager: [phone]

## 2019-09-10 NOTE — OR NURSING
The following page sent to Dr. Munroe: LESLY Mcghee-PACU: do you want MIVF? I don't see any orders and hes NPO? also SBP goal 140? I don't see that order. thanks. linda [phone]

## 2019-09-10 NOTE — PROCEDURES
Procedure Date: 09/09/2019      EEG -14 -- DAY #24 OF 24-HOUR VIDEO EEG       DATE OF RECORDING/SERVICE DATE:  09/09/2019.      SOURCE FILE DURATION:  16 hours, 45 minutes, 11 seconds     CLINICAL SUMMARY:  The patient is a 28-year-old male with history of intractable focal epilepsy who underwent intracranial depth and strip placement and video EEG monitoring for presurgical evaluation.      MEDICATIONS:  Lacosamide, fosphenytoin, topiramate, gabapentin, Effexor, Flexeril.     TECHNICAL SUMMARY:  This continuous EEG monitoring procedure was performed with intraparenchymal depth electrodes and subdural strip and grid electrodes in and over multiple sites of the right temporal and frontal lobes.    Three 14-contact depth electrodes were placed through temporal cortex into medial structures with deepest contacts numbered  1  and located in:    right amygdala ( AM ),   right anterior hippocampus ( AH ), and   right hippocampus more posteriorly ( PH ).    The subdural strip and grid electrodes had variable numbers of contacts and locations, consisting of:   6 strip contacts over right inferior frontal cortex ( IF ),   6 strip contacts over right premotor frontal cortex ( PMF ),   6 strip contacts over right frontal and temporal cortex ( FT ),   4 strip contacts over right superior posterior temporal cortex ( SPT ),   4 strip contacts over right middle posterior temporal cortex ( MPT ),   4 strip contacts over right inferior posterior temporal cortex ( IPT ),   2-x-16 small, densely-spaced grid contacts over right dorsolateral prefrontal cortex ( FGR ).      Video monitoring was utilized and periodically reviewed by EEG technologists and the physician for electroclinical correlation.      INTERICTAL ACTIVITY:  Mixture of alpha and theta activities were present over the frontal electrodes.  Intermittent delta slowing was present over posterior temporal electrodes, amygdala and hippocampus.  Frequent sharp waves were  present over right anterior hippocampus contacts 1-3.  There were also occasional sharp waves or spike and wave discharges seen independently over amygdala 1-3 and posterior hippocampus 1-3.  At times, discharges were seen synchronously over anterior and posterior hippocampus.      CLINICAL/ICTAL EVENTS:  No electrographic or clinical seizures or paroxysmal behavioral events were recorded.      IMPRESSION:  This is an abnormal intracranial video EEG monitoring due to presence of occasional epileptiform discharges over the right anterior hippocampus and less frequently over the right amygdala and right posterior hippocampus consistent with focal areas of irritability and tendency for seizures.  There was also focal slowing over the right posterior temporal and right amygdala and hippocampus consistent with areas of neuronal dysfunction there.  No electrographic or clinical seizures were recorded on this date of monitoring.      SUMMARY OF 14 DAYS OF INTRACRANIAL VIDEO EEG MONITORING:  Intracranial EEG was abnormal due to the presence of delta slowing over the posterior temporal region as well as right amygdala and hippocampus.  There were frequent epileptiform discharges over the anterior hippocampal electrode contacts 1-4.  There were less frequent epileptiform discharges over the right amygdala electrode contacts 1-3, and right posterior hippocampal electrode contacts 1-4.  Five focal electroclinical seizures were recorded during these 14 days of monitoring.        On day #3 of monitoring, a focal impaired aware seizure was recorded with electrographic onset 3 seconds after clinical onset at right anterior hippocampal  electrode contacts 1-4, posterior hippocampal electrode contacts 1-4, and amygdala electrode contacts 1-4.  Another subtle focal seizure was also recorded with EEG onset approximately 8 seconds prior to clinical onset with ictal discharge at the right anterior hippocampal electrode contacts 1-6,  which quickly goes to the right posterior hippocampus contacts 1-7.  With the first seizure, the EEG onset precedes clinical onset with approximately 7 seconds.  The patient had another focal subtle seizure on day #4 of monitoring with EEG onset preceding clinical onset by 5 seconds at the right anterior hippocampal electrode contacts 1-5 and posterior hippocampal contacts 1-6, and amygdala contacts 1-5.  Two more focal seizures were captured on day #9 of monitoring.  The first one was focal to bilateral tonic-clonic seizure which started with the patient's habitual aura which started approximately 8 seconds after electrographic seizure onset, followed by the patient becoming unresponsive, followed with moderate left cephalic version, followed by generalized extremity and trunk extension with synchronous clonus.  EEG onset was maximal at the right anterior hippocampal electrode contacts 1-4, with early spread to amygdala and hippocampal and posterior hippocampal electrode contacts 1-4.  The patient also had focal impaired aware seizure with electrographic ictal discharge preceding an aura approximately 10 seconds.  Following the aura, the patient became unresponsive and had oral automatisms and then grunting followed by marked leftward cephalic version and several rhythmic jerks on the left side, but without full spread of clonus bilaterally.  EEG onset was at the right anterior hippocampal electrode contacts 1-4, with a spread to amygdala and posterior hippocampal electrodes contacts 1-4.  Findings are consistent with focal epilepsy with probable seizure focus over the right anterior hippocampus.         PITA HERNANDEZ MD             D: 09/10/2019   T: 09/10/2019   MT: SHANAE      Name:     SANTHOSH HODGES   MRN:      [id number]        Account:        [id number]   :      1990           Procedure Date: 2019      Document: [id number]

## 2019-09-11 ENCOUNTER — APPOINTMENT (OUTPATIENT)
Dept: MRI IMAGING | Facility: CLINIC | Age: 29
End: 2019-09-11
Attending: STUDENT IN AN ORGANIZED HEALTH CARE EDUCATION/TRAINING PROGRAM
Payer: COMMERCIAL

## 2019-09-11 LAB
ANION GAP SERPL CALCULATED.3IONS-SCNC: 10 MMOL/L (ref 3–14)
BUN SERPL-MCNC: 6 MG/DL (ref 7–30)
CALCIUM SERPL-MCNC: 8.4 MG/DL (ref 8.5–10.1)
CHLORIDE SERPL-SCNC: 104 MMOL/L (ref 94–109)
CO2 SERPL-SCNC: 22 MMOL/L (ref 20–32)
CREAT SERPL-MCNC: 0.62 MG/DL (ref 0.66–1.25)
ERYTHROCYTE [DISTWIDTH] IN BLOOD BY AUTOMATED COUNT: 11.9 % (ref 10–15)
GFR SERPL CREATININE-BSD FRML MDRD: >90 ML/MIN/{1.73_M2}
GLUCOSE SERPL-MCNC: 145 MG/DL (ref 70–99)
HCT VFR BLD AUTO: 26.2 % (ref 40–53)
HGB BLD-MCNC: 8.7 G/DL (ref 13.3–17.7)
LACOSAMIDE SERPL-MCNC: 4.7 UG/ML (ref 5–10)
MCH RBC QN AUTO: 30.7 PG (ref 26.5–33)
MCHC RBC AUTO-ENTMCNC: 33.2 G/DL (ref 31.5–36.5)
MCV RBC AUTO: 93 FL (ref 78–100)
PLATELET # BLD AUTO: 430 10E9/L (ref 150–450)
POTASSIUM SERPL-SCNC: 3.4 MMOL/L (ref 3.4–5.3)
RBC # BLD AUTO: 2.83 10E12/L (ref 4.4–5.9)
SODIUM SERPL-SCNC: 135 MMOL/L (ref 133–144)
WBC # BLD AUTO: 22.9 10E9/L (ref 4–11)

## 2019-09-11 PROCEDURE — 25000128 H RX IP 250 OP 636: Performed by: STUDENT IN AN ORGANIZED HEALTH CARE EDUCATION/TRAINING PROGRAM

## 2019-09-11 PROCEDURE — A9585 GADOBUTROL INJECTION: HCPCS | Performed by: RADIOLOGY

## 2019-09-11 PROCEDURE — 25000132 ZZH RX MED GY IP 250 OP 250 PS 637: Performed by: STUDENT IN AN ORGANIZED HEALTH CARE EDUCATION/TRAINING PROGRAM

## 2019-09-11 PROCEDURE — 25500064 ZZH RX 255 OP 636: Performed by: RADIOLOGY

## 2019-09-11 PROCEDURE — 70553 MRI BRAIN STEM W/O & W/DYE: CPT

## 2019-09-11 PROCEDURE — 80048 BASIC METABOLIC PNL TOTAL CA: CPT | Performed by: STUDENT IN AN ORGANIZED HEALTH CARE EDUCATION/TRAINING PROGRAM

## 2019-09-11 PROCEDURE — 85027 COMPLETE CBC AUTOMATED: CPT | Performed by: STUDENT IN AN ORGANIZED HEALTH CARE EDUCATION/TRAINING PROGRAM

## 2019-09-11 PROCEDURE — 40000141 ZZH STATISTIC PERIPHERAL IV START W/O US GUIDANCE

## 2019-09-11 PROCEDURE — 25000132 ZZH RX MED GY IP 250 OP 250 PS 637: Performed by: NEUROLOGICAL SURGERY

## 2019-09-11 PROCEDURE — 12000001 ZZH R&B MED SURG/OB UMMC

## 2019-09-11 PROCEDURE — 25800030 ZZH RX IP 258 OP 636: Performed by: STUDENT IN AN ORGANIZED HEALTH CARE EDUCATION/TRAINING PROGRAM

## 2019-09-11 PROCEDURE — 36415 COLL VENOUS BLD VENIPUNCTURE: CPT | Performed by: STUDENT IN AN ORGANIZED HEALTH CARE EDUCATION/TRAINING PROGRAM

## 2019-09-11 RX ORDER — DIPHENHYDRAMINE HYDROCHLORIDE 50 MG/ML
25-50 INJECTION INTRAMUSCULAR; INTRAVENOUS EVERY 6 HOURS PRN
Status: DISCONTINUED | OUTPATIENT
Start: 2019-09-11 | End: 2019-09-12

## 2019-09-11 RX ORDER — OXYCODONE HYDROCHLORIDE 10 MG/1
10-15 TABLET ORAL
Status: DISCONTINUED | OUTPATIENT
Start: 2019-09-11 | End: 2019-09-11

## 2019-09-11 RX ORDER — GADOBUTROL 604.72 MG/ML
0.1 INJECTION INTRAVENOUS ONCE
Status: COMPLETED | OUTPATIENT
Start: 2019-09-11 | End: 2019-09-11

## 2019-09-11 RX ORDER — OXYCODONE HYDROCHLORIDE 10 MG/1
10 TABLET ORAL
Status: DISCONTINUED | OUTPATIENT
Start: 2019-09-11 | End: 2019-09-12

## 2019-09-11 RX ADMIN — HYDROMORPHONE HYDROCHLORIDE 0.5 MG: 1 INJECTION, SOLUTION INTRAMUSCULAR; INTRAVENOUS; SUBCUTANEOUS at 15:21

## 2019-09-11 RX ADMIN — LACOSAMIDE 200 MG: 200 TABLET, FILM COATED ORAL at 07:55

## 2019-09-11 RX ADMIN — PHENYTOIN SODIUM 200 MG: 100 CAPSULE ORAL at 07:55

## 2019-09-11 RX ADMIN — GABAPENTIN 300 MG: 300 CAPSULE ORAL at 07:55

## 2019-09-11 RX ADMIN — PROCHLORPERAZINE EDISYLATE 10 MG: 5 INJECTION INTRAMUSCULAR; INTRAVENOUS at 21:16

## 2019-09-11 RX ADMIN — MELATONIN 5000 UNITS: at 19:52

## 2019-09-11 RX ADMIN — VENLAFAXINE HYDROCHLORIDE 225 MG: 150 CAPSULE, EXTENDED RELEASE ORAL at 07:55

## 2019-09-11 RX ADMIN — OXYCODONE HYDROCHLORIDE 15 MG: 5 TABLET ORAL at 18:54

## 2019-09-11 RX ADMIN — HYDROMORPHONE HYDROCHLORIDE 0.5 MG: 1 INJECTION, SOLUTION INTRAMUSCULAR; INTRAVENOUS; SUBCUTANEOUS at 03:32

## 2019-09-11 RX ADMIN — DIPHENHYDRAMINE HYDROCHLORIDE 50 MG: 50 INJECTION, SOLUTION INTRAMUSCULAR; INTRAVENOUS at 07:52

## 2019-09-11 RX ADMIN — HYDROMORPHONE HYDROCHLORIDE 0.5 MG: 1 INJECTION, SOLUTION INTRAMUSCULAR; INTRAVENOUS; SUBCUTANEOUS at 23:54

## 2019-09-11 RX ADMIN — MELATONIN TAB 3 MG 12 MG: 3 TAB at 23:53

## 2019-09-11 RX ADMIN — ACETAMINOPHEN 650 MG: 325 TABLET, FILM COATED ORAL at 06:15

## 2019-09-11 RX ADMIN — PHENYTOIN SODIUM 200 MG: 100 CAPSULE ORAL at 19:52

## 2019-09-11 RX ADMIN — ACETAMINOPHEN 650 MG: 325 TABLET, FILM COATED ORAL at 01:34

## 2019-09-11 RX ADMIN — TOPIRAMATE 50 MG: 50 TABLET ORAL at 07:55

## 2019-09-11 RX ADMIN — CYCLOBENZAPRINE 10 MG: 10 TABLET, FILM COATED ORAL at 14:02

## 2019-09-11 RX ADMIN — OXYCODONE HYDROCHLORIDE 10 MG: 5 TABLET ORAL at 07:59

## 2019-09-11 RX ADMIN — OMEPRAZOLE 20 MG: 20 CAPSULE, DELAYED RELEASE ORAL at 07:55

## 2019-09-11 RX ADMIN — SODIUM CHLORIDE: 9 INJECTION, SOLUTION INTRAVENOUS at 04:59

## 2019-09-11 RX ADMIN — OXYCODONE HYDROCHLORIDE 15 MG: 5 TABLET ORAL at 14:02

## 2019-09-11 RX ADMIN — HYDROMORPHONE HYDROCHLORIDE 0.5 MG: 1 INJECTION, SOLUTION INTRAMUSCULAR; INTRAVENOUS; SUBCUTANEOUS at 12:01

## 2019-09-11 RX ADMIN — THERA TABS 1 TABLET: TAB at 07:55

## 2019-09-11 RX ADMIN — TOPIRAMATE 50 MG: 50 TABLET ORAL at 19:52

## 2019-09-11 RX ADMIN — OXYCODONE HYDROCHLORIDE 15 MG: 5 TABLET ORAL at 22:10

## 2019-09-11 RX ADMIN — OXYCODONE HYDROCHLORIDE 10 MG: 5 TABLET ORAL at 04:58

## 2019-09-11 RX ADMIN — PROCHLORPERAZINE EDISYLATE 10 MG: 5 INJECTION INTRAMUSCULAR; INTRAVENOUS at 14:02

## 2019-09-11 RX ADMIN — LACOSAMIDE 200 MG: 200 TABLET, FILM COATED ORAL at 19:52

## 2019-09-11 RX ADMIN — CYCLOBENZAPRINE 10 MG: 10 TABLET, FILM COATED ORAL at 07:55

## 2019-09-11 RX ADMIN — OXYCODONE HYDROCHLORIDE 10 MG: 5 TABLET ORAL at 01:34

## 2019-09-11 RX ADMIN — ACETAMINOPHEN 650 MG: 325 TABLET, FILM COATED ORAL at 19:52

## 2019-09-11 RX ADMIN — DIPHENHYDRAMINE HYDROCHLORIDE 50 MG: 50 INJECTION, SOLUTION INTRAMUSCULAR; INTRAVENOUS at 14:02

## 2019-09-11 RX ADMIN — HYDROMORPHONE HYDROCHLORIDE 0.5 MG: 1 INJECTION, SOLUTION INTRAMUSCULAR; INTRAVENOUS; SUBCUTANEOUS at 07:49

## 2019-09-11 RX ADMIN — CEFTRIAXONE SODIUM 2 G: 2 INJECTION, POWDER, FOR SOLUTION INTRAMUSCULAR; INTRAVENOUS at 20:00

## 2019-09-11 RX ADMIN — PROCHLORPERAZINE EDISYLATE 10 MG: 5 INJECTION INTRAMUSCULAR; INTRAVENOUS at 01:34

## 2019-09-11 RX ADMIN — ACETAMINOPHEN 650 MG: 325 TABLET, FILM COATED ORAL at 11:08

## 2019-09-11 RX ADMIN — DIPHENHYDRAMINE HYDROCHLORIDE 50 MG: 50 INJECTION, SOLUTION INTRAMUSCULAR; INTRAVENOUS at 21:16

## 2019-09-11 RX ADMIN — ACETAMINOPHEN 650 MG: 325 TABLET, FILM COATED ORAL at 15:21

## 2019-09-11 RX ADMIN — OXYCODONE HYDROCHLORIDE 15 MG: 5 TABLET ORAL at 11:08

## 2019-09-11 RX ADMIN — PROCHLORPERAZINE EDISYLATE 10 MG: 5 INJECTION INTRAMUSCULAR; INTRAVENOUS at 07:50

## 2019-09-11 RX ADMIN — HYDROMORPHONE HYDROCHLORIDE 0.5 MG: 1 INJECTION, SOLUTION INTRAMUSCULAR; INTRAVENOUS; SUBCUTANEOUS at 19:50

## 2019-09-11 RX ADMIN — GADOBUTROL 7.5 ML: 604.72 INJECTION INTRAVENOUS at 13:58

## 2019-09-11 RX ADMIN — GABAPENTIN 300 MG: 300 CAPSULE ORAL at 19:52

## 2019-09-11 RX ADMIN — CYCLOBENZAPRINE 10 MG: 10 TABLET, FILM COATED ORAL at 19:53

## 2019-09-11 ASSESSMENT — ACTIVITIES OF DAILY LIVING (ADL)
ADLS_ACUITY_SCORE: 15

## 2019-09-11 ASSESSMENT — VISUAL ACUITY: OU: BASELINE

## 2019-09-11 NOTE — PROGRESS NOTES
"[facility] - Epilepsy Service Daily Note      Interval History:   Yesterday, patient underwent right temporal lobectomy for medically refractory epilepsy. Overall doing well since surgery except headache and nausea. No witnessed seizures since surgery.       Review of System:   Denies abdominal pain, dizziness and chest pain.     AEDs on admission with most recent levels:  1. Vimpat 200-200 (2.6)  2. Depakote 0372-0037 (71, 8.8)  3. Gabapentin 300-300 (1.0)  4. Dilantin 160-200 (14.6, 1.75)  5. Topiramate 50 - 50 (<1.5)     Antiepileptic Medications Current Doses:   1. Vimpat 200-200   2. Gabapentin 300-300   3. Dilantin 200-200   4. Topiramate 50 - 50    Exam: Blood pressure 132/79, pulse 73, temperature 97.9  F (36.6  C), temperature source Oral, resp. rate 23, height 1.727 m (5' 8\"), weight 79.9 kg (176 lb 2.4 oz), SpO2 99 %.  General appearance: Resting with eyes closed. Awakens to speech. Answers questions appropriately.   Neuro: Pupils are equal, round, and reactive to light, EOMs intact, face symmetric, no pronator drift, equal  strength, normal to light touch with no sensory deficits noted, finger to nose normal, no focal deficits noted.    9/9/2019 Post op MRI Brain:  Impression:   Findings post right anterior temporal lobectomy, with postoperative  extra-axial-subdural fluid collection mixed with hemorrhage causing  mild adjacent mass effect but no herniation or midline shift.    Assessment/ Plan: Patient seen and discussed with attending physician Dr. Albarado.   1. 28 year old right handed male with a h/o depression, anxiety and seizure disorder since 2011 is being admitted for intracranial vEEG monitoring. Patient underwent stereotactic placement of R depth electrodes and R craniotomy for R sided strip electrodes on 8/27. Five electroclinical seizures recorded, all of Right Anterior Hippocampal ictal onset. Patient underwent right anterior temporal lobectomy on 9/10/2019. " Surgery uneventful.     - Continue monitoring  - Seizure precautions  - SCDs while in bed for DVT prevention  - No changes in AEDs        Total time: 25 minute was spent in the care of this patient. The patient agrees with the above mentioned plan of care. I answered all the patient's questions and addressed immediate concerns. More than 50% of time spent consisted of counseling and coordinating care, including discussion of the diagnostic significance of EEG findings, anti-seizure medication management, and planning for discharge home.     Deborah Webb, CNP  Neurology

## 2019-09-11 NOTE — PROGRESS NOTES
"Neurosurgery Progress Note      S:  Underwent removal of grid yesterday.  Ongoing headache and nausea since.  No seizures overnight.     O:  /78 (BP Location: Right arm)   Pulse 73   Temp 98.6  F (37  C) (Oral)   Resp 28   Ht 1.727 m (5' 8\")   Wt 79.9 kg (176 lb 2.4 oz)   SpO2 97%   BMI 26.78 kg/m    Exam:  General: Awake;  Alert, In No Acute Distress  Pulm: Breathing Comfortably on room air  Mental status: Oriented x 3  Cranial Nerves: Cranial Nerves II-XII Intact Bilaterally  Strength: 5/5 in bilateral upper and lower extremities   Pronator Drift: Absent  Sensory: Intact to Light Touch  Incision: clean/dry/intact     Assessment:   Status post right temporal lobectomy for medically refractory epilepsy. Ongoing issues: line-associated deep venous thrombosis.     Plan:     Neuro:   Anti-epileptics: per epilepsy team   Appreciate assistance of Epilepsy team    Cardiovascular: blood pressure goals: SBP < 160    Pulmonary: Incentive spirometry     Gastrointestinal: Regular Diet    Renal: monitor intake and output     Heme: no issues     Endocrine: No issues    Infectious: IV Ceftriaxone 2G Q 24 HR; monitor for fever     DVT prophylaxis: Sequential compression devices to Bilateral Lower Extremities; holding heparin due to bleeding risk     Ulcer prophylaxis: Not Indicated    DISPO:  6A      CHEYANNE Ruano, CNP  Department of Neurosurgery  Pager: [phone]    "

## 2019-09-11 NOTE — PLAN OF CARE
"  Problem: Adult Inpatient Plan of Care  Goal: Plan of Care Review  Outcome: No Change   D/I: Patient admitted to unit 4A Surgical/Neuro ICU s/p Removal Of Right Sided Depth And Strip Electrodes, Right Craniotomy For Right Temporal Lobectomy And Amygliohippocampectomy  /78 (BP Location: Right arm)   Pulse 73   Temp 98.6  F (37  C) (Oral)   Resp 28   Ht 1.727 m (5' 8\")   Wt 79.9 kg (176 lb 2.4 oz)   SpO2 97%   BMI 26.78 kg/m    Neuro- Intact, follows commands, SIMENTAL. BUE slightly weaker at time, Hx of DVTs.  Patient also reports constant severe Headache and incisional pain all shift.  PERRL  CV-HR , SBP goal <140.  Afebrile  Respiratory-Room Air O2 sats >96%.  GI-Clear liquids overnight  -Barton in place.  Good UOP.   Pain- constant headache all night.  Dilaudid, oxycodene, and tylenol PRN.  See flowsheets for further interventions and assessments.   A: Stable  P: Continue to monitor pt closely. Notify MD of significant changes. MRI this AM.  Possible Transfer to  today.      "

## 2019-09-12 ENCOUNTER — APPOINTMENT (OUTPATIENT)
Dept: PHYSICAL THERAPY | Facility: CLINIC | Age: 29
End: 2019-09-12
Attending: NEUROLOGICAL SURGERY
Payer: COMMERCIAL

## 2019-09-12 LAB
ANION GAP SERPL CALCULATED.3IONS-SCNC: 8 MMOL/L (ref 3–14)
BUN SERPL-MCNC: 4 MG/DL (ref 7–30)
CALCIUM SERPL-MCNC: 8.7 MG/DL (ref 8.5–10.1)
CHLORIDE SERPL-SCNC: 102 MMOL/L (ref 94–109)
CO2 SERPL-SCNC: 22 MMOL/L (ref 20–32)
CREAT SERPL-MCNC: 0.55 MG/DL (ref 0.66–1.25)
ERYTHROCYTE [DISTWIDTH] IN BLOOD BY AUTOMATED COUNT: 12.1 % (ref 10–15)
GFR SERPL CREATININE-BSD FRML MDRD: >90 ML/MIN/{1.73_M2}
GLUCOSE SERPL-MCNC: 118 MG/DL (ref 70–99)
HCT VFR BLD AUTO: 24.2 % (ref 40–53)
HGB BLD-MCNC: 8.1 G/DL (ref 13.3–17.7)
LACTATE BLD-SCNC: 0.7 MMOL/L (ref 0.7–2)
MCH RBC QN AUTO: 31.2 PG (ref 26.5–33)
MCHC RBC AUTO-ENTMCNC: 33.5 G/DL (ref 31.5–36.5)
MCV RBC AUTO: 93 FL (ref 78–100)
PHENYTOIN FREE SERPL-MCNC: 1.58 UG/ML (ref 1–2)
PHENYTOIN SERPL-MCNC: 18 MG/L (ref 10–20)
PLATELET # BLD AUTO: 433 10E9/L (ref 150–450)
POTASSIUM SERPL-SCNC: 3.4 MMOL/L (ref 3.4–5.3)
RBC # BLD AUTO: 2.6 10E12/L (ref 4.4–5.9)
SODIUM SERPL-SCNC: 133 MMOL/L (ref 133–144)
WBC # BLD AUTO: 18.7 10E9/L (ref 4–11)

## 2019-09-12 PROCEDURE — 25800030 ZZH RX IP 258 OP 636: Performed by: NURSE PRACTITIONER

## 2019-09-12 PROCEDURE — 97116 GAIT TRAINING THERAPY: CPT | Mod: GP

## 2019-09-12 PROCEDURE — 80048 BASIC METABOLIC PNL TOTAL CA: CPT | Performed by: STUDENT IN AN ORGANIZED HEALTH CARE EDUCATION/TRAINING PROGRAM

## 2019-09-12 PROCEDURE — 25000132 ZZH RX MED GY IP 250 OP 250 PS 637: Performed by: NEUROLOGICAL SURGERY

## 2019-09-12 PROCEDURE — 25000132 ZZH RX MED GY IP 250 OP 250 PS 637: Performed by: STUDENT IN AN ORGANIZED HEALTH CARE EDUCATION/TRAINING PROGRAM

## 2019-09-12 PROCEDURE — 86900 BLOOD TYPING SEROLOGIC ABO: CPT | Performed by: STUDENT IN AN ORGANIZED HEALTH CARE EDUCATION/TRAINING PROGRAM

## 2019-09-12 PROCEDURE — 80299 QUANTITATIVE ASSAY DRUG: CPT | Performed by: PHYSICIAN ASSISTANT

## 2019-09-12 PROCEDURE — 25000132 ZZH RX MED GY IP 250 OP 250 PS 637: Performed by: NURSE PRACTITIONER

## 2019-09-12 PROCEDURE — 80185 ASSAY OF PHENYTOIN TOTAL: CPT | Performed by: PHYSICIAN ASSISTANT

## 2019-09-12 PROCEDURE — 25000128 H RX IP 250 OP 636: Performed by: STUDENT IN AN ORGANIZED HEALTH CARE EDUCATION/TRAINING PROGRAM

## 2019-09-12 PROCEDURE — 36415 COLL VENOUS BLD VENIPUNCTURE: CPT | Performed by: PHYSICIAN ASSISTANT

## 2019-09-12 PROCEDURE — 97530 THERAPEUTIC ACTIVITIES: CPT | Mod: GP

## 2019-09-12 PROCEDURE — 80186 ASSAY OF PHENYTOIN FREE: CPT | Performed by: PHYSICIAN ASSISTANT

## 2019-09-12 PROCEDURE — 97112 NEUROMUSCULAR REEDUCATION: CPT | Mod: GP

## 2019-09-12 PROCEDURE — 86850 RBC ANTIBODY SCREEN: CPT | Performed by: STUDENT IN AN ORGANIZED HEALTH CARE EDUCATION/TRAINING PROGRAM

## 2019-09-12 PROCEDURE — 36415 COLL VENOUS BLD VENIPUNCTURE: CPT | Performed by: STUDENT IN AN ORGANIZED HEALTH CARE EDUCATION/TRAINING PROGRAM

## 2019-09-12 PROCEDURE — 83605 ASSAY OF LACTIC ACID: CPT

## 2019-09-12 PROCEDURE — 85027 COMPLETE CBC AUTOMATED: CPT | Performed by: STUDENT IN AN ORGANIZED HEALTH CARE EDUCATION/TRAINING PROGRAM

## 2019-09-12 PROCEDURE — 40000556 ZZH STATISTIC PERIPHERAL IV START W US GUIDANCE

## 2019-09-12 PROCEDURE — 12000001 ZZH R&B MED SURG/OB UMMC

## 2019-09-12 PROCEDURE — 86901 BLOOD TYPING SEROLOGIC RH(D): CPT | Performed by: STUDENT IN AN ORGANIZED HEALTH CARE EDUCATION/TRAINING PROGRAM

## 2019-09-12 RX ORDER — ONDANSETRON 4 MG/1
4-8 TABLET, ORALLY DISINTEGRATING ORAL EVERY 6 HOURS PRN
Status: DISCONTINUED | OUTPATIENT
Start: 2019-09-12 | End: 2019-09-13 | Stop reason: HOSPADM

## 2019-09-12 RX ORDER — ONDANSETRON 2 MG/ML
4-8 INJECTION INTRAMUSCULAR; INTRAVENOUS EVERY 6 HOURS PRN
Status: DISCONTINUED | OUTPATIENT
Start: 2019-09-12 | End: 2019-09-13 | Stop reason: HOSPADM

## 2019-09-12 RX ORDER — DEXAMETHASONE SODIUM PHOSPHATE 4 MG/ML
6 INJECTION, SOLUTION INTRA-ARTICULAR; INTRALESIONAL; INTRAMUSCULAR; INTRAVENOUS; SOFT TISSUE ONCE
Status: COMPLETED | OUTPATIENT
Start: 2019-09-12 | End: 2019-09-12

## 2019-09-12 RX ORDER — PROCHLORPERAZINE MALEATE 10 MG
10 TABLET ORAL EVERY 6 HOURS PRN
Status: DISCONTINUED | OUTPATIENT
Start: 2019-09-12 | End: 2019-09-13 | Stop reason: HOSPADM

## 2019-09-12 RX ORDER — DIPHENHYDRAMINE HCL 25 MG
25 CAPSULE ORAL EVERY 6 HOURS PRN
Status: DISCONTINUED | OUTPATIENT
Start: 2019-09-12 | End: 2019-09-13 | Stop reason: HOSPADM

## 2019-09-12 RX ORDER — PROCHLORPERAZINE 25 MG
25 SUPPOSITORY, RECTAL RECTAL EVERY 12 HOURS PRN
Status: DISCONTINUED | OUTPATIENT
Start: 2019-09-12 | End: 2019-09-13 | Stop reason: HOSPADM

## 2019-09-12 RX ORDER — HYDROMORPHONE HYDROCHLORIDE 2 MG/1
2-4 TABLET ORAL EVERY 6 HOURS PRN
Status: DISCONTINUED | OUTPATIENT
Start: 2019-09-12 | End: 2019-09-13 | Stop reason: HOSPADM

## 2019-09-12 RX ORDER — OXYCODONE HYDROCHLORIDE 5 MG/1
5-10 TABLET ORAL EVERY 6 HOURS PRN
Status: DISCONTINUED | OUTPATIENT
Start: 2019-09-12 | End: 2019-09-12

## 2019-09-12 RX ORDER — IBUPROFEN 200 MG
800 TABLET ORAL EVERY 8 HOURS PRN
Status: DISCONTINUED | OUTPATIENT
Start: 2019-09-12 | End: 2019-09-13 | Stop reason: HOSPADM

## 2019-09-12 RX ORDER — SODIUM CHLORIDE 9 MG/ML
INJECTION, SOLUTION INTRAVENOUS CONTINUOUS
Status: DISCONTINUED | OUTPATIENT
Start: 2019-09-12 | End: 2019-09-12

## 2019-09-12 RX ADMIN — HYDROMORPHONE HYDROCHLORIDE 0.5 MG: 1 INJECTION, SOLUTION INTRAMUSCULAR; INTRAVENOUS; SUBCUTANEOUS at 04:28

## 2019-09-12 RX ADMIN — CYCLOBENZAPRINE 10 MG: 10 TABLET, FILM COATED ORAL at 13:51

## 2019-09-12 RX ADMIN — ACETAMINOPHEN 650 MG: 325 TABLET, FILM COATED ORAL at 19:31

## 2019-09-12 RX ADMIN — IBUPROFEN 800 MG: 200 TABLET, FILM COATED ORAL at 13:51

## 2019-09-12 RX ADMIN — TOPIRAMATE 50 MG: 50 TABLET ORAL at 19:32

## 2019-09-12 RX ADMIN — DEXAMETHASONE SODIUM PHOSPHATE 6 MG: 4 INJECTION, SOLUTION INTRAMUSCULAR; INTRAVENOUS at 14:52

## 2019-09-12 RX ADMIN — TOPIRAMATE 50 MG: 50 TABLET ORAL at 08:16

## 2019-09-12 RX ADMIN — CYCLOBENZAPRINE 10 MG: 10 TABLET, FILM COATED ORAL at 19:31

## 2019-09-12 RX ADMIN — OXYCODONE HYDROCHLORIDE 15 MG: 5 TABLET ORAL at 03:17

## 2019-09-12 RX ADMIN — PROCHLORPERAZINE EDISYLATE 10 MG: 5 INJECTION INTRAMUSCULAR; INTRAVENOUS at 11:17

## 2019-09-12 RX ADMIN — POTASSIUM CHLORIDE 20 MEQ: 10 TABLET, EXTENDED RELEASE ORAL at 04:45

## 2019-09-12 RX ADMIN — RIZATRIPTAN BENZOATE 10 MG: 10 TABLET ORAL at 00:14

## 2019-09-12 RX ADMIN — OXYCODONE HYDROCHLORIDE 10 MG: 5 TABLET ORAL at 11:17

## 2019-09-12 RX ADMIN — PROCHLORPERAZINE EDISYLATE 10 MG: 5 INJECTION INTRAMUSCULAR; INTRAVENOUS at 03:18

## 2019-09-12 RX ADMIN — OMEPRAZOLE 20 MG: 20 CAPSULE, DELAYED RELEASE ORAL at 08:16

## 2019-09-12 RX ADMIN — LACOSAMIDE 200 MG: 200 TABLET, FILM COATED ORAL at 08:27

## 2019-09-12 RX ADMIN — ACETAMINOPHEN 650 MG: 325 TABLET, FILM COATED ORAL at 13:51

## 2019-09-12 RX ADMIN — VENLAFAXINE HYDROCHLORIDE 225 MG: 150 CAPSULE, EXTENDED RELEASE ORAL at 08:16

## 2019-09-12 RX ADMIN — GABAPENTIN 300 MG: 300 CAPSULE ORAL at 08:16

## 2019-09-12 RX ADMIN — SENNOSIDES AND DOCUSATE SODIUM 2 TABLET: 8.6; 5 TABLET ORAL at 19:31

## 2019-09-12 RX ADMIN — MELATONIN 5000 UNITS: at 19:31

## 2019-09-12 RX ADMIN — POLYETHYLENE GLYCOL 3350 17 G: 17 POWDER, FOR SOLUTION ORAL at 13:51

## 2019-09-12 RX ADMIN — CYCLOBENZAPRINE 10 MG: 10 TABLET, FILM COATED ORAL at 08:16

## 2019-09-12 RX ADMIN — PHENYTOIN SODIUM 200 MG: 100 CAPSULE ORAL at 19:31

## 2019-09-12 RX ADMIN — HYDROMORPHONE HYDROCHLORIDE 4 MG: 2 TABLET ORAL at 18:14

## 2019-09-12 RX ADMIN — IBUPROFEN 800 MG: 200 TABLET, FILM COATED ORAL at 21:26

## 2019-09-12 RX ADMIN — OXYCODONE HYDROCHLORIDE 10 MG: 5 TABLET ORAL at 17:14

## 2019-09-12 RX ADMIN — OXYCODONE HYDROCHLORIDE 15 MG: 5 TABLET ORAL at 06:15

## 2019-09-12 RX ADMIN — DIPHENHYDRAMINE HYDROCHLORIDE 25 MG: 25 CAPSULE ORAL at 18:14

## 2019-09-12 RX ADMIN — ACETAMINOPHEN 650 MG: 325 TABLET, FILM COATED ORAL at 03:26

## 2019-09-12 RX ADMIN — DIPHENHYDRAMINE HYDROCHLORIDE 50 MG: 50 INJECTION, SOLUTION INTRAMUSCULAR; INTRAVENOUS at 11:17

## 2019-09-12 RX ADMIN — SODIUM CHLORIDE: 9 INJECTION, SOLUTION INTRAVENOUS at 13:52

## 2019-09-12 RX ADMIN — GABAPENTIN 300 MG: 300 CAPSULE ORAL at 19:32

## 2019-09-12 RX ADMIN — HYDROMORPHONE HYDROCHLORIDE 0.5 MG: 1 INJECTION, SOLUTION INTRAMUSCULAR; INTRAVENOUS; SUBCUTANEOUS at 08:27

## 2019-09-12 RX ADMIN — ACETAMINOPHEN 650 MG: 325 TABLET, FILM COATED ORAL at 08:27

## 2019-09-12 RX ADMIN — ONDANSETRON HYDROCHLORIDE 4 MG: 2 INJECTION, SOLUTION INTRAMUSCULAR; INTRAVENOUS at 00:59

## 2019-09-12 RX ADMIN — PHENYTOIN SODIUM 200 MG: 100 CAPSULE ORAL at 08:16

## 2019-09-12 RX ADMIN — PROCHLORPERAZINE MALEATE 10 MG: 10 TABLET ORAL at 17:52

## 2019-09-12 RX ADMIN — LACOSAMIDE 200 MG: 200 TABLET, FILM COATED ORAL at 19:32

## 2019-09-12 ASSESSMENT — VISUAL ACUITY
OU: BASELINE
OU: BASELINE

## 2019-09-12 ASSESSMENT — ACTIVITIES OF DAILY LIVING (ADL)
ADLS_ACUITY_SCORE: 13
ADLS_ACUITY_SCORE: 15
ADLS_ACUITY_SCORE: 13

## 2019-09-12 NOTE — PLAN OF CARE
Discharge Planner PT   Patient plan for discharge: home (parent's)   Current status: Pt performed bed mobility and supine>sit - supervised - mod indep. Ambulated 2 x ~200' without AD, SBA-supervision. Pt completed 2 x 3 steps with R ascending handrail and SBA. Step over gait pattern. Completed dynamic balance drills to promote improvement in functional stability. Compensated appropriately without overt LOB. Crani precautions handout provided. Co HA during session.   Barriers to return to prior living situation: medical needs, current mobility, level of A   Recommendations for discharge: home with PRN support from family   Rationale for recommendations: Pt would benefit from continued PT during hospitalization for maintenance of strength and activity tolerance.        Entered by: Racquel Mccallum 09/12/2019 11:11 AM

## 2019-09-12 NOTE — PROGRESS NOTES
Care Coordinator Progress Note    Admission Date/Time:  8/27/2019  Attending MD:  Dr Jason Funes    Data  Chart reviewed, discussed with interdisciplinary team. In 6A Discharge Rounds nursing reported pt was refusing some cares. Pt reporting nausea & vomiting, but emesis was not seen by staff. Up with SBA. Pt is oriented but slow to respond per report.   Chart reviewed and noted pt is on IV Dilaudid, IV Benadryl, IV Zofran & IV Compazine.   PT & OT recommending discharge home.     Patient was admitted for:  Partial epilepsy with impairment of consciousness with intractable epilepsy.  Procedure 8-27-19:  MAGAN assisted stereotactic placement of right sided depth electrodes; MAGAN assisted right craniotomy for right sided strip electrodes.   Procedure 9-10-19:  Removal of right sided depth and strip electrodes; right craniotomy for right temporal lobectomy and amygliohippocampectomy.      Past history includes:  Anxiety, depression, GERD.      Concerns with insurance coverage for discharge needs: None. Pt's insurance is [employer].   Current Living Situation: Patient lives with family.    Coordination of Care and Referrals  Chart reviewed.      Plan  Anticipated Discharge Date:  When medically stable.   Anticipated Discharge Plan:  Home with assist.         Lyn Barton RN Care Coordinator  Unit 6A, [facility]

## 2019-09-12 NOTE — PLAN OF CARE
Status: POD #2 Removal of R sided depth & strip electrodes (originally placed 8/27), R crani for R temporal lobectomy & amygliohippocampectomy.  Vitals: VSS, RA.  Neuros: A&O x4, 5/5 all extremities, denies N/T, denies visual disturbances. Constant HA w/ int. nausea. R sided periorbirtal swelling near incision.   IV: PIV SL.   Resp/trach: LS clear, sating in 90s on RA.   Diet: Advanced to regular diet. Declined any food d/t no appetite & nausea. Pt reported emesis x1, not witnessed by NST or RN.   Bowel status: Hypoactive BS. Passing flatus.   : Pt voiding w/ some hesitancy. Hat in toilet, remind pt of need for recording of output. Adequate urine output.   Skin: Dressing to R head CDI. Per report, pt removed head dressing & previous RN replaced it.   Pain: Pt reports constant HA & incisional pain. Given oxy, dilaudid, maxalt, zofran, Compazine, tylenol.   Activity: Up w/ SBA, GB. Seizure precautions in place. Pt attempted to disarm bed alarm, educated on importance of leaving bed alarm & calling for help.   Social: No visitors overnight.   Plan: Continue to monitor & follow POC. Encourage PO intake.

## 2019-09-12 NOTE — PLAN OF CARE
OT/6A:  Discharge Planner OT   Patient plan for discharge: home with assist  Current status: Per discussion with PT, pt with no acute OT needs at this time. Pt with no concerns with ADLs or cognitive function. PT to follow while inpt to progress activity tolerance, OT to complete orders   Barriers to return to prior living situation: medical status  Recommendations for discharge: home with assist  Rationale for recommendations: pt may require increased assist due to post op precautions        Entered by: Subha Braun 09/12/2019 1:28 PM

## 2019-09-12 NOTE — PROGRESS NOTES
"[facility] - Epilepsy Service Daily Note      Interval History:   Continues with bad headache and nausea.       Review of System:   Headache and nausea    AEDs on admission with most recent levels:  1. Vimpat 200-200 (2.6)  2. Depakote 1162-1966 (71, 8.8)  3. Gabapentin 300-300 (1.0)  4. Dilantin 160-200 (14.6, 1.75)  5. Topiramate 50 - 50 (<1.5)     Antiepileptic Medications Current Doses:   1. Vimpat 200-200   2. Gabapentin 300-300   3. Dilantin 200-200   4. Topiramate 50 - 50    Exam: Blood pressure 121/74, pulse 73, temperature 97.5  F (36.4  C), temperature source Oral, resp. rate 16, height 1.727 m (5' 8\"), weight 79.9 kg (176 lb 2.4 oz), SpO2 98 %.   General: NAD, sitting up in chair  Neuro: Alert and oriented. Speech fluent. Pupils equal, round and reactive to light. VFF. EOM's intact. Face symmetric, tongue midline. Shoulder shrug strong and equal bilaterally. No distal weakness. Sensation grossly intact to light touch.     9/9/2019 Post op MRI Brain:  Impression:   Findings post right anterior temporal lobectomy, with postoperative  extra-axial-subdural fluid collection mixed with hemorrhage causing  mild adjacent mass effect but no herniation or midline shift.    Assessment/ Plan: Patient seen and discussed with attending physician Dr. Albarado.   1. 28 year old right handed male with a h/o depression, anxiety and seizure disorder since 2011 is being admitted for intracranial vEEG monitoring. Patient underwent stereotactic placement of R depth electrodes and R craniotomy for R sided strip electrodes on 8/27. Five electroclinical seizures recorded, all of Right Anterior Hippocampal ictal onset. Patient underwent right anterior temporal lobectomy on 9/10/2019. Surgery uneventful, continues with headache and nausea.     -no changes to antiepileptic drugs  -lacosamide and phenytoin levels today   -f/u with Dr. Araujo 4 weeks after discharge       Anastasia Hansen PA-C    Total time: 25 " minute was spent in the care of this patient. The patient agrees with the above mentioned plan of care. I answered all the patient's questions and addressed immediate concerns. More than 50% of time spent consisted of counseling and coordinating care, including discussion of the diagnostic significance of EEG findings, anti-seizure medication management, and planning for discharge home.

## 2019-09-12 NOTE — PLAN OF CARE
Status: POD #2 Removal of R sided depth & strip electrodes (originally placed 8/27), R crani for R temporal lobectomy & amygliohippocampectomy.  Vitals: VSS on RA.  Neuros: A&Ox4. Lethargic for most of shift. 5/5 all extremities. Denies N/T.  IV: PIV infusing NS @ 75 mL/hr.  Resp/trach: LS clear all fields. Stable on RA.  Diet: Regular. Poor intake this shift.  Bowel status: No BM reported this shift. BS active all quads.  : Voids spontaneously.  Skin: L ankle sprain, wrapped up. R side head incision, CDI.  Pain: PRN given when due to headache pain.  Activity: Up w/ SBA and GB. Seizure precautions in place so RN educated patient on need for safety with bed alarm and to use call light. Be aware that pt may disarm bed alarm. Up ad alonso cleared by PT.  Social: Mother visited earlier this shift. Involved and supportive with cares. Wondering when discharge would be tomorrow since she works from 10 am-6 pm and needs to find a ride for him.  Plan: Continue to monitor and follow POC.

## 2019-09-12 NOTE — PROGRESS NOTES
"Neurosurgery Daily Progress Note    S:  Patient reporting ongoing severe Headaches. Patient reports that he slept poorly last night.     /74   Pulse 73   Temp 97.5  F (36.4  C) (Oral)   Resp 16   Ht 1.727 m (5' 8\")   Wt 79.9 kg (176 lb 2.4 oz)   SpO2 98%   BMI 26.78 kg/m      Current Facility-Administered Medications   Medication     acetaminophen (TYLENOL) tablet 325-650 mg     albuterol (PROAIR HFA/PROVENTIL HFA/VENTOLIN HFA) 108 (90 Base) MCG/ACT inhaler 2 puff     bisacodyl (DULCOLAX) Suppository 10 mg     calcium carbonate (TUMS) chewable tablet 500 mg     cyclobenzaprine (FLEXERIL) tablet 10 mg     diphenhydrAMINE (BENADRYL) injection 25-50 mg     gabapentin (NEURONTIN) capsule 300 mg     heparin lock flush 10 UNIT/ML injection 5 mL     heparin lock flush 10 UNIT/ML injection 5-15 mL     hydrALAZINE (APRESOLINE) injection 10-20 mg     HYDROmorphone (PF) (DILAUDID) injection 0.3-0.5 mg     ibuprofen (ADVIL/MOTRIN) tablet 800 mg     labetalol (NORMODYNE/TRANDATE) syringe 10 mg     lacosamide (VIMPAT) tablet 200 mg     lidocaine (LMX4) cream     lidocaine (XYLOCAINE) 2 % solution 5 mL     lidocaine 1 % 0.1-1 mL     LORazepam (ATIVAN) injection 2 mg     magnesium hydroxide (MILK OF MAGNESIA) suspension 30 mL     magnesium sulfate 2 g in NS intermittent infusion (PharMEDium or [facility] Cmpd)     magnesium sulfate 4 g in 100 mL sterile water (premade)     melatonin tablet 12 mg     multivitamin, therapeutic (THERA-VIT) tablet 1 tablet     naloxone (NARCAN) injection 0.1-0.4 mg     omeprazole (priLOSEC) CR capsule 20 mg     ondansetron (ZOFRAN-ODT) ODT tab 4 mg    Or     ondansetron (ZOFRAN) injection 4 mg     oxyCODONE IR (ROXICODONE) tablet 10 mg    Or     oxyCODONE (ROXICODONE) tablet 15 mg     phenytoin (DILANTIN) CR capsule 200 mg     polyethylene glycol (MIRALAX/GLYCOLAX) Packet 17 g     potassium chloride (KLOR-CON) Packet 20-40 mEq     potassium chloride 10 mEq in 100 mL intermittent infusion with 10 " mg lidocaine     potassium chloride 10 mEq in 100 mL sterile water intermittent infusion (premix)     potassium chloride 20 mEq in 50 mL intermittent infusion     potassium chloride ER (K-DUR/KLOR-CON M) CR tablet 20-40 mEq     prochlorperazine (COMPAZINE) injection 10 mg     rizatriptan (MAXALT) tablet 10 mg     senna-docusate (SENOKOT-S/PERICOLACE) 8.6-50 MG per tablet 2 tablet     sodium chloride (PF) 0.9% PF flush 10-20 mL     sodium chloride (PF) 0.9% PF flush 3 mL     sodium phosphate 10 mmol in D5W intermittent infusion     sodium phosphate 15 mmol in D5W intermittent infusion     sodium phosphate 20 mmol in D5W intermittent infusion     sodium phosphate 25 mmol in D5W intermittent infusion     topiramate (TOPAMAX) tablet 50 mg     venlafaxine (EFFEXOR-XR) 24 hr capsule 225 mg     vitamin D3 (CHOLECALCIFEROL) 1000 units (25 mcg) tablet 5,000 Units     Last Comprehensive Metabolic Panel:  Sodium   Date Value Ref Range Status   09/12/2019 133 133 - 144 mmol/L Final     Potassium   Date Value Ref Range Status   09/12/2019 3.4 3.4 - 5.3 mmol/L Final     Chloride   Date Value Ref Range Status   09/12/2019 102 94 - 109 mmol/L Final     Carbon Dioxide   Date Value Ref Range Status   09/12/2019 22 20 - 32 mmol/L Final     Anion Gap   Date Value Ref Range Status   09/12/2019 8 3 - 14 mmol/L Final     Glucose   Date Value Ref Range Status   09/12/2019 118 (H) 70 - 99 mg/dL Final     Urea Nitrogen   Date Value Ref Range Status   09/12/2019 4 (L) 7 - 30 mg/dL Final     Creatinine   Date Value Ref Range Status   09/12/2019 0.55 (L) 0.66 - 1.25 mg/dL Final     GFR Estimate   Date Value Ref Range Status   09/12/2019 >90 >60 mL/min/[1.73_m2] Final     Comment:     Non  GFR Calc  Starting 12/18/2018, serum creatinine based estimated GFR (eGFR) will be   calculated using the Chronic Kidney Disease Epidemiology Collaboration   (CKD-EPI) equation.       Calcium   Date Value Ref Range Status   09/12/2019 8.7 8.5 -  10.1 mg/dL Final     Lab Results   Component Value Date    WBC 18.7 09/12/2019     Lab Results   Component Value Date    RBC 2.60 09/12/2019     Lab Results   Component Value Date    HGB 8.1 09/12/2019     Lab Results   Component Value Date    HCT 24.2 09/12/2019     No components found for: MCT  Lab Results   Component Value Date    MCV 93 09/12/2019     Lab Results   Component Value Date    MCH 31.2 09/12/2019     Lab Results   Component Value Date    MCHC 33.5 09/12/2019     Lab Results   Component Value Date    RDW 12.1 09/12/2019     Lab Results   Component Value Date     09/12/2019         O:  Exam:  General: Awake;  Alert, In No Acute Distress  Pulm: Breathing Comfortably on room air  Mental status: Oriented x 3  Cranial Nerves: Cranial Nerves II-XII Intact Bilaterally  Strength: 5/5 in bilateral upper and lower extremities   Pronator Drift: Absent  Sensory: Intact to Light Touch  Incision: Clean, Dry and Intact with [place] Dressing.     Assessment:   Status post right temporal lobectomy for medically refractory epilepsy. Post-Operative Headaches.     Plan:     Neuro:  Suture Removal: 2   to 3 weeks Post-Operatively   Anti-epileptics: Per Epilepsy   Headaches: Continue Oxycodone 10-15 mg Q 3 HR PRN; Start Ibuprofen 800 mg TID PRN     Cardiovascular: blood pressure goals: SBP < 160    Pulmonary: Incentive spirometry     Gastrointestinal: Regular Diet    Renal: monitor intake and output     Heme: no issues     Endocrine: No issues    Infectious: Monitor for Fever    DVT prophylaxis: Sequential compression devices to Bilateral Lower Extremities; Ambulating     Ulcer prophylaxis: Not Indicated    DISPO: 6A; Discharge Home when Headache Controlled on Oral Pain Medications     Leslie Jimenez, RAMSEY-BC,CNRN  Department of Neurosurgery  Pager: [phone]

## 2019-09-13 ENCOUNTER — APPOINTMENT (OUTPATIENT)
Dept: PHYSICAL THERAPY | Facility: CLINIC | Age: 29
End: 2019-09-13
Attending: NEUROLOGICAL SURGERY
Payer: COMMERCIAL

## 2019-09-13 VITALS
BODY MASS INDEX: 26.7 KG/M2 | HEIGHT: 68 IN | OXYGEN SATURATION: 100 % | SYSTOLIC BLOOD PRESSURE: 109 MMHG | HEART RATE: 129 BPM | WEIGHT: 176.15 LBS | DIASTOLIC BLOOD PRESSURE: 52 MMHG | RESPIRATION RATE: 16 BRPM | TEMPERATURE: 95.4 F

## 2019-09-13 PROCEDURE — 97116 GAIT TRAINING THERAPY: CPT | Mod: GP

## 2019-09-13 PROCEDURE — 25000132 ZZH RX MED GY IP 250 OP 250 PS 637: Performed by: STUDENT IN AN ORGANIZED HEALTH CARE EDUCATION/TRAINING PROGRAM

## 2019-09-13 PROCEDURE — 97530 THERAPEUTIC ACTIVITIES: CPT | Mod: GP

## 2019-09-13 PROCEDURE — 25000132 ZZH RX MED GY IP 250 OP 250 PS 637: Performed by: NURSE PRACTITIONER

## 2019-09-13 RX ORDER — ONDANSETRON 4 MG/1
4-8 TABLET, ORALLY DISINTEGRATING ORAL EVERY 6 HOURS PRN
Qty: 30 TABLET | Refills: 0 | Status: SHIPPED | OUTPATIENT
Start: 2019-09-13 | End: 2019-10-09

## 2019-09-13 RX ORDER — PHENYTOIN SODIUM 200 MG/1
200 CAPSULE, EXTENDED RELEASE ORAL 2 TIMES DAILY
Qty: 180 CAPSULE | Refills: 1 | Status: SHIPPED | OUTPATIENT
Start: 2019-09-13 | End: 2019-10-09

## 2019-09-13 RX ORDER — PROCHLORPERAZINE MALEATE 10 MG
10 TABLET ORAL EVERY 6 HOURS PRN
Qty: 30 TABLET | Refills: 0 | Status: SHIPPED | OUTPATIENT
Start: 2019-09-13 | End: 2019-09-25

## 2019-09-13 RX ORDER — TOPIRAMATE 50 MG/1
50 TABLET, FILM COATED ORAL 2 TIMES DAILY
Qty: 180 TABLET | Refills: 1 | Status: SHIPPED | OUTPATIENT
Start: 2019-09-13 | End: 2019-10-09

## 2019-09-13 RX ORDER — CYCLOBENZAPRINE HCL 10 MG
10 TABLET ORAL 3 TIMES DAILY PRN
Qty: 30 TABLET | Refills: 0 | Status: SHIPPED | OUTPATIENT
Start: 2019-09-13 | End: 2020-07-28

## 2019-09-13 RX ORDER — IBUPROFEN 800 MG/1
800 TABLET, FILM COATED ORAL EVERY 8 HOURS PRN
Qty: 120 TABLET | Refills: 0 | Status: SHIPPED | OUTPATIENT
Start: 2019-09-13 | End: 2019-09-13

## 2019-09-13 RX ORDER — HYDROMORPHONE HYDROCHLORIDE 2 MG/1
2-4 TABLET ORAL EVERY 6 HOURS PRN
Qty: 42 TABLET | Refills: 0 | Status: SHIPPED | OUTPATIENT
Start: 2019-09-13 | End: 2019-10-09

## 2019-09-13 RX ADMIN — OMEPRAZOLE 20 MG: 20 CAPSULE, DELAYED RELEASE ORAL at 09:09

## 2019-09-13 RX ADMIN — GABAPENTIN 300 MG: 300 CAPSULE ORAL at 09:09

## 2019-09-13 RX ADMIN — PROCHLORPERAZINE MALEATE 10 MG: 10 TABLET ORAL at 01:06

## 2019-09-13 RX ADMIN — IBUPROFEN 800 MG: 200 TABLET, FILM COATED ORAL at 05:09

## 2019-09-13 RX ADMIN — PHENYTOIN SODIUM 200 MG: 100 CAPSULE ORAL at 09:08

## 2019-09-13 RX ADMIN — HYDROMORPHONE HYDROCHLORIDE 4 MG: 2 TABLET ORAL at 01:06

## 2019-09-13 RX ADMIN — VENLAFAXINE HYDROCHLORIDE 225 MG: 150 CAPSULE, EXTENDED RELEASE ORAL at 09:08

## 2019-09-13 RX ADMIN — HYDROMORPHONE HYDROCHLORIDE 4 MG: 2 TABLET ORAL at 07:25

## 2019-09-13 RX ADMIN — MELATONIN TAB 3 MG 12 MG: 3 TAB at 01:06

## 2019-09-13 RX ADMIN — TOPIRAMATE 50 MG: 50 TABLET ORAL at 09:08

## 2019-09-13 RX ADMIN — SENNOSIDES AND DOCUSATE SODIUM 2 TABLET: 8.6; 5 TABLET ORAL at 09:08

## 2019-09-13 RX ADMIN — LACOSAMIDE 200 MG: 200 TABLET, FILM COATED ORAL at 09:07

## 2019-09-13 RX ADMIN — CYCLOBENZAPRINE 10 MG: 10 TABLET, FILM COATED ORAL at 09:08

## 2019-09-13 RX ADMIN — ACETAMINOPHEN 650 MG: 325 TABLET, FILM COATED ORAL at 07:25

## 2019-09-13 ASSESSMENT — ACTIVITIES OF DAILY LIVING (ADL)
ADLS_ACUITY_SCORE: 13

## 2019-09-13 NOTE — CONSULTS
[facility], [place]   Hematology/Oncology Consult Note    Tha Mcghee MRN# [id number]   Age: 28 year old YOB: 1990          Reason for Consult:   Left upper extremity DVT, POD2 for temporal lobectomy         Assessment and Plan:   James Mcghee is a 28 year old male with depression, anxiety, and epilesy involving Generalized Tonic Clonic Seizures now POD#2 right temporal lobectomy who has developed a LUE DVT.     Problem list:   # Left upper extremity DVT, line-associated  # Epilepsy and POD2 right temporal lobectomy   # Hx of recent RUE DVT, line-associated  LUE DVT was associated with a midline catheter; this has since been removed. Patient previously had a RUE DVT which was also line associated for which he was on heparin, which was held for surgery. Patient denies history of DVTs prior to these episodes. Discussed with Neurosurgery KEMAR that discussion was had with Neurosurgery attending Dr. Funes that they would be comfortable with starting anticoagulation therapy tomorrow. Would recommend apixaban 10 mg BID for 1 week, then apixaban 5 mg BID for 3 months. Does not need Hematology follow up, follow up with primary care and his surgery team is sufficient.     Summary of Recommendations:  - Recommend apixaban 10 mg BID for 1 week, then apixaban 5 mg BID for 3 months, to begin tomorrow 9/14/19.     Thank you for involving us in the care of this patient. We will continue to follow during the hospitalization.    Patient was seen and plan of care developed with Dr. Gerardo.    Brie May MD MPH, PGY-5  Internal Medicine  [phone]  Attending  The patient was seen and examined by me separate from the resident provider.The note above reflects my assessment and plan. I have personally reviewed today's labs,vital and radiology results. The points of care that were added by me are:    Interesting pt with chronic epilepsy, s/p craniotomy with bilateral Upper  extremity DVT. Agree with not above OK to anticoagulate with DOAC starting tomorrow (per Dr Funes) with Apixaban 10mg BID x 1 week the 5mg BID x 3 months. F/U with NS and neuro  Manav Gerardo M.D.  [phone]             History of Present Illness:   History obtained from chart review and confirmed with patient.    James Mcghee is a 28 year old male with depression, anxiety, and epilesy involving Generalized Tonic Clonic Seizures preceeded by an Aura. He had his first seizure in 2011. The patient's epilepsy has been refractory to medical management and the etiology of his seizures had remained unclear. The patient was discussed at the Epilepsy Case Management Conference and he was deemed an appropriate candidate for surgical management of his epilepsy. He is now POD#2 right temporal lobectomy.     Prior to admission, patient had right-sided phlebitis and DVT associated with line. He was anticoagulated with heparin. The heparin was held prior to the surgery on 9/10/19, and he subsequently developed a clot in the left arm associated with a midline catheter; US revealed a nonocclusive DVT in the axillary vein extending from the thrombosed right basilic vein. The midline catheter has since been removed. (Please refer to RN note 9/7/19 which clarifies that the U/S done on 9/6/19 was actually done on the LUE and this is the location of the DVT).          Review of Systems:   A comprehensive ROS was performed with the patient and was found to be negative with the exception of that noted in the HPI above.       Past Medical History:     Past Medical History:   Diagnosis Date     Anxiety      Depression      Localization-related (focal) (partial) epilepsy and epileptic syndromes with complex partial seizures, without mention of intractable epilepsy     preliminary            Past Surgical History:     Past Surgical History:   Procedure Laterality Date     CRANIOTOMY REMOVAL OF GRID Right 9/10/2019    Procedure:  Removal Of Right Sided Depth And Strip Electrodes, Right Craniotomy For Right Temporal Lobectomy And Amygliohippocampectomy Latex free;  Surgeon: Jason Funes MD;  Location: [facility] OR     DENTAL SURGERY      wisdom teeth extraction     IR CAROTID CEREBRAL ANGIOGRAM BILATERAL  2019    WADA test     MAGAN ASSISTED CRANIOTOMY Right 2019    Procedure: MAGAN Assisted Right Craniotomy For Right Sided Strip Electrodes;  Surgeon: Jason Funes MD;  Location: [facility] OR     MAGAN ASSISTED PLACEMENT DEPTH ELECTRODE Right 2019    Procedure: MAGAN Assisted Stereotactic Placement Of Right Sided Depth Electrodes;  Surgeon: Jason Funes MD;  Location: [facility] OR            Social History:     Social History     Socioeconomic History     Marital status: Single     Spouse name: Not on file     Number of children: Not on file     Years of education: Not on file     Highest education level: Not on file   Occupational History     Not on file   Social Needs     Financial resource strain: Not on file     Food insecurity:     Worry: Not on file     Inability: Not on file     Transportation needs:     Medical: Not on file     Non-medical: Not on file   Tobacco Use     Smoking status: Former Smoker     Packs/day: 0.50     Years: 9.00     Pack years: 4.50     Types: Cigars     Last attempt to quit: 2013     Years since quittin.1     Smokeless tobacco: Former User     Types: Chew   Substance and Sexual Activity     Alcohol use: No     Alcohol/week: 0.0 oz     Drug use: No     Sexual activity: Not on file   Lifestyle     Physical activity:     Days per week: Not on file     Minutes per session: Not on file     Stress: Not on file   Relationships     Social connections:     Talks on phone: Not on file     Gets together: Not on file     Attends Jew service: Not on file     Active member of club or organization: Not on file     Attends meetings of clubs or organizations: Not on file     Relationship  status: Not on file     Intimate partner violence:     Fear of current or ex partner: Not on file     Emotionally abused: Not on file     Physically abused: Not on file     Forced sexual activity: Not on file   Other Topics Concern     Parent/sibling w/ CABG, MI or angioplasty before 65F 55M? Not Asked   Social History Narrative     Not on file            Family History:     Family History   Problem Relation Age of Onset     Melanoma Mother      Myocardial Infarction Maternal Grandfather 78     Atrial fibrillation Paternal Uncle      Atrial fibrillation Paternal Aunt      Cancer No family hx of         No family history of skin cancer     Skin Cancer No family hx of      Anesthesia Reaction No family hx of             Allergies:     Allergies   Allergen Reactions     Amoxicillin Hives     Tolerated ceftriaxone Aug 2019 without a reaction     Ceclor [Cefaclor Monohydrate] Hives     Tolerated ceftriaxone Aug 2019 without a reaction       Hydrocodone-Acetaminophen Nausea     Penicillins Hives     Tolerated ceftriaxone Aug 2019 without a reaction       Sulfa Drugs Hives            Medications:     Medications Prior to Admission   Medication Sig Dispense Refill Last Dose     cholecalciferol (VITAMIN D3) 5000 units TABS tablet Take 5,000 Units by mouth At Bedtime    Past Week at Unknown time     ciclopirox (LOPROX) 0.77 % cream Apply topically as needed Pt. Last used 08/18/2019   Past Week at Unknown time     docusate sodium (COLACE) 100 MG capsule Take 100 mg by mouth 2 times daily    8/26/2019 at 2100     gabapentin (NEURONTIN) 300 MG capsule 300mg twice daily 180 capsule 3 9/10/2019 at 0600     lacosamide (VIMPAT) 200 MG TABS tablet Take 1 tablet (200 mg) by mouth 2 times daily 60 tablet 5 9/10/2019 at 0600     omeprazole (PRILOSEC) 10 MG DR capsule Take 20 mg by mouth every morning   8/27/2019 at 0800     venlafaxine (EFFEXOR XR) 75 MG 24 hr capsule Take 3 capsules (225 mg) by mouth every morning 270 capsule 3  9/10/2019 at 0600     albuterol (PROAIR HFA/PROVENTIL HFA/VENTOLIN HFA) 108 (90 Base) MCG/ACT inhaler Inhale 2 puffs into the lungs as needed   More than a month at Unknown time     ALPRAZolam (XANAX) 0.5 MG tablet Take 0.5 mg by mouth as needed.   More than a month at Unknown time     diazepam (DIAZEPAM INTENSOL) 5 MG/ML (HIGH CONC) solution For generalized seizures give 5 mg.  May repeat and give 2.5 mg after 10 minutes if needed. Do not exceed 7.5mg 30 mL 0 More than a month at Unknown time     promethazine (PHENERGAN) 25 MG tablet Take 1 tablet (25 mg) by mouth every 6 hours as needed for nausea (take with maxalt for nausea with migraines) 30 tablet 3 More than a month at Unknown time     Gquwyelmfdt-Izebhwpo-OS-GG (ACCUHIST DM OR) Take 200 mg by mouth At Bedtime   More than a month at Unknown time     rizatriptan (MAXALT) 10 MG tablet Take 1 tablet (10 mg) by mouth at onset of headache for migraine May repeat in 2 hours. Max 3 tablets/24 hours. 18 tablet 3 More than a month at Unknown time     [DISCONTINUED] divalproex sodium extended-release (DEPAKOTE ER) 250 MG 24 hr tablet Take 1 tab in the morning (along with two 500mg tabs) 90 tablet 3 8/27/2019 at 0800     [DISCONTINUED] divalproex sodium extended-release (DEPAKOTE ER) 500 MG 24 hr tablet Take 2 tabs in the morning and 3 tabs at night. 450 tablet 3 9/10/2019 at 0600     [DISCONTINUED] phenytoin (DILANTIN) 100 MG capsule TAKE 1 CAPSULE BY MOUTH DAILY IN THE MORNING AND 2 CAPSULES AT NIGHT 270 capsule 0 8/27/2019 at 0800     [DISCONTINUED] phenytoin (DILANTIN) 30 MG capsule TAKE 2 CAPSULES BY MOUTH EVERY MORNING 180 capsule 1 9/10/2019 at 0600     [DISCONTINUED] topiramate (TOPAMAX) 25 MG tablet Take 1 tablet (25 mg) by mouth 2 times daily 25 mg twice a day (Patient taking differently: Take 50 mg by mouth 2 times daily ) 180 tablet 3 9/10/2019 at 0600            Physical Exam:   /52 (BP Location: Right arm)   Pulse 129   Temp 95.4  F (35.2  C)  "(Oral)   Resp 16   Ht 1.727 m (5' 8\")   Wt 79.9 kg (176 lb 2.4 oz)   SpO2 100%   BMI 26.78 kg/m    Vitals:    09/07/19 0428 09/08/19 0600 09/09/19 0400   Weight: 83 kg (182 lb 15.7 oz) 82.8 kg (182 lb 8.7 oz) 79.9 kg (176 lb 2.4 oz)     General: Appears well, sitting in bed, in no acute distress.  Heme/Lymph: No overt bleeding. No cervical, axillary, or supraclavicular adenopathy.  Skin: Livedo reticularis of left arm at and below the elbow which pales with arm raise. No concerning lesions, rash, jaundice, cyanosis, erythema, or ecchymoses on exposed surfaces.  HEENT: NCAT. EOMI, anicteric sclera. Oral mucosa pink and moist with no lesions or thrush.  Respiratory: Non-labored breathing, good air exchange, lungs clear to auscultation bilaterally.  Cardiovascular: Regular rate and rhythm. No murmur or rub.   Gastrointestinal: Normoactive bowel sounds. Abdomen soft, non-distended, and non-tender. No palpable masses or organomegaly.  Extremities: No appreciable swelling of left arm when compared to right; no tenderness to palpation of left arm; moves all extremities equally   Neurologic: A&O x 3, speech normal, sensation to light touch grossly WNL.         Data:   I have personally reviewed the following labs/imaging:  CBC  Recent Labs   Lab 09/12/19  0342 09/11/19  0524 09/10/19  1009 09/10/19  0427 09/08/19  0927   WBC 18.7* 22.9*  --  9.5 11.4*   RBC 2.60* 2.83*  --  3.44* 3.53*   HGB 8.1* 8.7* 10.4* 10.5* 11.0*   HCT 24.2* 26.2*  --  32.1* 33.1*   MCV 93 93  --  93 94   MCH 31.2 30.7  --  30.5 31.2   MCHC 33.5 33.2  --  32.7 33.2   RDW 12.1 11.9  --  11.9 11.9    430  --  499* 455*     CMP  Recent Labs   Lab 09/12/19  0342 09/11/19  0524 09/10/19  1009 09/10/19  0427 09/08/19  0927    135 137 139 137   POTASSIUM 3.4 3.4 4.1 4.0 3.6   CHLORIDE 102 104  --  104 104   CO2 22 22  --  25 22   ANIONGAP 8 10  --  9 10   * 145* 124* 88 228*   BUN 4* 6*  --  9 7   CR 0.55* 0.62*  --  0.84 0.80 "   GFRESTIMATED >90 >90  --  >90 >90   GFRESTBLACK >90 >90  --  >90 >90   ROSA 8.7 8.4*  --  9.5 9.1     INR  Recent Labs   Lab 09/10/19  0427   INR 1.26*

## 2019-09-13 NOTE — PLAN OF CARE
Status: POD #3 Removal of R sided depth & strip electrodes (originally placed 8/27), R crani for R temporal lobectomy & amygliohippocampectomy.  Vitals: VSS, RA  Neuros: A&O x4, 5/5 all extremities, denies N/T, denies visual disturbances. Constant HA.   IV: PIV SL.   Resp/trach: LS clear, sating in 90s on RA.   Diet: regular diet.   Bowel status: Hypoactive BS. Passing flatus.   : Pt voiding spont.   Skin: crani incision ANNE-MARIE with no drainage.   Pain: Pt reports constant HA. Given dilaudid, Compazine, tylenol, and ibuprofen.   Activity: Up w/ SBA, GB. Seizure precautions in place. Pt disarmed bed alarm, educated on importance of leaving bed alarm & calling for help. Pt cleared by PT to be up ab alonso in room.   Social: No visitors overnight.   Plan: Continue to monitor & follow POC. Plan to discharge to home at 11am today. Discussed plan with pt who informed Mom.

## 2019-09-13 NOTE — PROGRESS NOTES
Per Mobility Level Guideline;  Bed/chair alarm NO.  Patient may ambulate independently  Patient requires the following assistive equipment: gait belt   PT/OT consult orders in place Yes

## 2019-09-13 NOTE — DISCHARGE SUMMARY
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 He had his first seizure in 2011 and has been under the care of Dr. Araujo at [facility]. The patient's epilepsy has been refractory to medical management and the etiology of his seizures had remained unclear. The patient was discussed at the Epilepsy Case Management Conference and he was deemed an appropriate candidate for Surgical Management of his Epilepsy. After risks and benefits of the operative treatments had been discussed, the patient provided consent to proceed with surgery.            Hospital Course:   The patient was admitted to the hospital and underwent the above named operation. There were no sondra-operative complications and post-operatively, the patient was transferred to Unit 4A, Neuro-Surgical ICU for routine post-operative cares and Video EEG Monitoring. The inpatient Epilepsy team was involved in the care of the patient and anti-epileptic medication management and titration. After adequate seizure data had been captured so that seizures could be localized, the patient returned to the  "operating room for a removal of the depth electrodes as well as to undergo a Right Temporal Lobectomy. There were no sondra-operative complications and again, the patient returned to Unit 4A for routine post-operative cares. He was evaluated with an MRI Brain post-operatively for routine surveillance that demonstrated expected post-operative changes. The patient was transferred to Unit 6A, Neurosciences for the remainder of his hospitalization. He was evaluated by Physical and Occupational therapy and was felt to benefit from Outpatient Physical Therapy for a Left Eversion Sprain. The patient's hospital course was also notable for development of a Left Axillary Non-Occlusive Deep Vein Thrombosis that was associated with a left midline catheter. The catheter was removed. Hematology was consulted and recommended to initiate the patient on Apixaban when safe to do so when safe from a Neurosurgical Standpoint. The patient will be treated for a duration of 3 months and no hematology follow-up was felt to be necessary. He will safely begin anticoagulation on 9/14/2019. The patient's hospital course was otherwise notable for seizure headaches for which he was treated with opioid analgesics, anti-emetics, Dexamethasone, Non-Steroidal Anti-Inflammatories, and muscle relaxants. Over the course of his hospitalization, the patient's pain improved. On 9/13/2019, the patient had met all of his discharge goals and was deemed medically and neurologically stable for discharge home.     /52 (BP Location: Right arm)   Pulse 129   Temp 95.4  F (35.2  C) (Oral)   Resp 16   Ht 1.727 m (5' 8\")   Wt 79.9 kg (176 lb 2.4 oz)   SpO2 100%   BMI 26.78 kg/m      General: Awake;  Alert, In No Acute Distress  Pulm: Breathing Comfortably on room air  Mental status: Oriented x 3  Cranial Nerves: Cranial Nerves II-XII Intact Bilaterally  Strength: 5/5 in bilateral upper and lower extremities   Pronator Drift: Absent  Sensory: Intact to " Light Touch  Incision: Clean, Dry and Intact with Sutures           Discharge Medications:     Discharge Medication List as of 9/13/2019 10:38 AM      START taking these medications    Details   !! apixaban ANTICOAGULANT (ELIQUIS) 5 MG tablet Take 2 tablets (10 mg) by mouth 2 times daily, Disp-28 tablet, R-0, Local PrintIndication: Left Axillary Deep Vein Thrombosis      !! apixaban ANTICOAGULANT (ELIQUIS) 5 MG tablet Take 1 tablet (5 mg) by mouth 2 times daily, Disp-60 tablet, R-0, Local PrintIndication: Left Axillar Deep Vein Thrombosis      cyclobenzaprine (FLEXERIL) 10 MG tablet 1 tablet (10 mg) by Oral or Feeding Tube route 3 times daily as needed for muscle spasms, Disp-30 tablet, R-0, Local PrintIndication: Neck Pain Related to Muscle Spasm      HYDROmorphone (DILAUDID) 2 MG tablet Take 1-2 tablets (2-4 mg) by mouth every 6 hours as needed for severe pain, Disp-42 tablet, R-0, Local PrintIndication: Moderate to Severe Post-Operative Pain      ondansetron (ZOFRAN-ODT) 4 MG ODT tab Take 1-2 tablets (4-8 mg) by mouth every 6 hours as needed for nausea or vomiting, Disp-30 tablet, R-0, Local PrintIndication: Nausea or Vomiting      prochlorperazine (COMPAZINE) 10 MG tablet Take 1 tablet (10 mg) by mouth every 6 hours as needed for vomiting, Disp-30 tablet, R-0, Local PrintIndication: Nausea/Vomiting; Adjuvant Treatment for Headache       !! - Potential duplicate medications found. Please discuss with provider.      CONTINUE these medications which have CHANGED    Details   phenytoin (DILANTIN) 200 MG capsule Take 1 capsule (200 mg) by mouth 2 times daily, Disp-180 capsule, R-1, Local Print      topiramate (TOPAMAX) 50 MG tablet Take 1 tablet (50 mg) by mouth 2 times daily, Disp-180 tablet, R-1, Local Print         CONTINUE these medications which have NOT CHANGED    Details   cholecalciferol (VITAMIN D3) 5000 units TABS tablet Take 5,000 Units by mouth At Bedtime , Historical      ciclopirox (LOPROX) 0.77 % cream  Apply topically as needed Pt. Last used 08/18/2019Historical      docusate sodium (COLACE) 100 MG capsule Take 100 mg by mouth 2 times daily , Historical      gabapentin (NEURONTIN) 300 MG capsule 300mg twice daily, Disp-180 capsule, R-3, E-Prescribe      lacosamide (VIMPAT) 200 MG TABS tablet Take 1 tablet (200 mg) by mouth 2 times daily, Disp-60 tablet, R-5, Local Print      omeprazole (PRILOSEC) 10 MG DR capsule Take 20 mg by mouth every morning, Historical      venlafaxine (EFFEXOR XR) 75 MG 24 hr capsule Take 3 capsules (225 mg) by mouth every morning, Disp-270 capsule, R-3, E-Prescribe      albuterol (PROAIR HFA/PROVENTIL HFA/VENTOLIN HFA) 108 (90 Base) MCG/ACT inhaler Inhale 2 puffs into the lungs as needed, HistoricalPharmacy may dispense brand covered by insurance (Proair, or proventil or ventolin or generic albuterol inhaler)      ALPRAZolam (XANAX) 0.5 MG tablet Take 0.5 mg by mouth as needed., Historical      diazepam (DIAZEPAM INTENSOL) 5 MG/ML (HIGH CONC) solution For generalized seizures give 5 mg.  May repeat and give 2.5 mg after 10 minutes if needed. Do not exceed 7.5mg, Disp-30 mL, R-0, Local Print      promethazine (PHENERGAN) 25 MG tablet Take 1 tablet (25 mg) by mouth every 6 hours as needed for nausea (take with maxalt for nausea with migraines), Disp-30 tablet, R-3, E-Prescribe      Vylobybgjnk-Ollcrrlc-EI-GG (ACCUHIST DM OR) Take 200 mg by mouth At Bedtime, Historical      rizatriptan (MAXALT) 10 MG tablet Take 1 tablet (10 mg) by mouth at onset of headache for migraine May repeat in 2 hours. Max 3 tablets/24 hours., Disp-18 tablet, R-3, E-Prescribe         STOP taking these medications       divalproex sodium extended-release (DEPAKOTE ER) 250 MG 24 hr tablet Comments:   Reason for Stopping:         divalproex sodium extended-release (DEPAKOTE ER) 500 MG 24 hr tablet Comments:   Reason for Stopping:         ibuprofen (ADVIL/MOTRIN) 800 MG tablet Comments:   Reason for Stopping:                        Discharge Instructions and Follow-Up:   Discharge diet: Regular     Discharge activity: You may advance activity as tolerated. No strenuous exercise or heay lifting greater than 10 lbs for 4 weeks or until seen and cleared in clinic.    Outpatient Physical Therapy for treatment of Left Eversion Sprain      Discharge follow-up: Follow-up with Dr. Jason Funes MD in about 2 weeks. Please call [phone] to schedule your follow-up appointment.     Please follow-[place] with Dr. Araujo in [place] [facility] in about 4 weeks.      Wound care: Ok to shower,however no scrubbing of the wound and no soaking of the wound, meaning no bathtubs or swimming pools. Pat dry only. Leave wound open to air.       Please call if you have:  1. increased pain, redness, drainage, swelling at your incision  2. fevers > 101.5 F degrees  3. with any questions or concerns.  You may reach the Neurosurgery clinic at [phone] during regular work hours. ER at [phone].    and ask for the Neurosurgery Resident on call at [phone], during off hours or weekends.         Discharge Disposition:   Discharged to home        Leslie Jimenez, RAMSEY-BC,CNRN  Department of Neurosurgery  Pager: [phone]

## 2019-09-13 NOTE — PLAN OF CARE
VSS. C/O headache, tylenol and dilaudid given with good relief. Neuros intact. Head incision ANNE-MARIE, no drainage noted. Good po intake. Voiding spontaneously, passing gas. Up indep in room and halls. Worked with Physical Therapy. Hematology met with pr regarding DVT, new medication prescribed. Pt is being discharged home at this time. Discharge medications and instructions gone over and questions answered at that time. Mother came to room and escorted pt to front door. Prescriptions were sent with pt to be filled at home pharmacy.

## 2019-09-13 NOTE — PLAN OF CARE
PT -   Discharge Planner PT   Patient plan for discharge: Return home   Current status: Pt was very pleasant this morning and is excited to go home. Pt is indp supine>sit>stand. Pt amb >200' indp. Pt completed 5x3 steps indp. Pt was indp with narrow stance Eo. Pt has minor difficulty with balance Ec. Pt was also educated on ankle HEP for L eversion sprain. Overall pt has completed all PT goals.   Barriers to return to prior living situation: medical needs.   Recommendations for discharge: home. Would likely benefit from OP PT for L eversion sprain.   Rationale for recommendations: Pt indp-Mod I for all mobility. No further IP therapy needs at this time.   Entered by: ISABEL ROONEY 09/13/2019 9:01 AM     Physical Therapy Discharge Summary    Reason for therapy discharge:    All goals and outcomes met, no further needs identified.    Progress towards therapy goal(s). See goals on Care Plan in [facility] electronic health record for goal details.  Goals met    Therapy recommendation(s):    Continue home exercise program.  Recommend OP PT for L eversion sprain.

## 2019-09-13 NOTE — PROGRESS NOTES
"SPIRITUAL HEALTH SERVICES  SPIRITUAL ASSESSMENT Progress Note  [facility] ([place]) 6A     Brief visit with pt as therapy was working with him. I asked him how his procedure went and he said it went well adding, \"...and look at my custom stitches!\" showing me the side of his head.     PLAN: Will visit weekly.    Manav Bowling M.Div.     Pager [phone]    "

## 2019-09-14 ENCOUNTER — TELEPHONE (OUTPATIENT)
Dept: NEUROLOGY | Facility: CLINIC | Age: 29
End: 2019-09-14

## 2019-09-14 LAB — LACOSAMIDE SERPL-MCNC: 6.8 UG/ML (ref 5–10)

## 2019-09-14 NOTE — TELEPHONE ENCOUNTER
"Telephone note:     I submitted a prior authorization at the patient's request for apixaban, which was recommended by the hematology team on 9/13/19 for treatment of line-associated deep venous thrombosis to [email or web address] (KEY #: [id number])    Franki \"Jose Rafael\" MD [place]   Neurosurgery, PGY-3    Please contact neurosurgery resident on call with questions.    Dial * * *[phone], enter [phone] when prompted.     "

## 2019-09-15 ENCOUNTER — TELEPHONE (OUTPATIENT)
Dept: NEUROSURGERY | Facility: CLINIC | Age: 29
End: 2019-09-15

## 2019-09-15 NOTE — TELEPHONE ENCOUNTER
Pt was called around 12:40 per his request regarding insurance authorization regarding his apixaban. I stated I did not know the status and would look into it. After looking into his pre-authorization status it stated that he had been denied coverage for apixaban. I tried calling 3 times throughout the day without answer to give him this news.    Augusto Ni MD   Neurosurgery Resident, PGY-1

## 2019-09-15 NOTE — TELEPHONE ENCOUNTER
Clinic Action Needed:No  Reason for Call: Pt calling about his apixaban and the prior authorization. Looks like it was sent in yesterday. He is concerned that he was to start a blood thinner yesterday and he is not on one yet.   12:02 Via the [facility]  I paged the on call Neurologist (Neurosurgery resident) Dr Ni to call the patient back directly at [phone].   Pt knows to call us back if he has not heard back in 20 minutes.  Routed to: None    Brenda Caban RN, [facility]

## 2019-09-16 ENCOUNTER — TELEPHONE (OUTPATIENT)
Dept: NEUROSURGERY | Facility: CLINIC | Age: 29
End: 2019-09-16

## 2019-09-16 ENCOUNTER — PATIENT OUTREACH (OUTPATIENT)
Dept: NEUROSURGERY | Facility: CLINIC | Age: 29
End: 2019-09-16

## 2019-09-16 ENCOUNTER — PATIENT OUTREACH (OUTPATIENT)
Dept: CARE COORDINATION | Facility: CLINIC | Age: 29
End: 2019-09-16

## 2019-09-16 ENCOUNTER — DOCUMENTATION ONLY (OUTPATIENT)
Dept: NEUROSURGERY | Facility: CLINIC | Age: 29
End: 2019-09-16

## 2019-09-16 DIAGNOSIS — I82.622 ACUTE DEEP VEIN THROMBOSIS (DVT) OF LEFT UPPER EXTREMITY, UNSPECIFIED VEIN (H): Primary | ICD-10-CM

## 2019-09-16 LAB — COPATH REPORT: NORMAL

## 2019-09-16 NOTE — PROGRESS NOTES
Dr. Funes aware that Eliquis is a non-preferred drug and is not on pt's formulary. He suggested notifying CNP aNncy Jimenez. Discussed issue with Nancy and she will f/u with hem/onc to see if there is a specific reason pt must be on Eliquis vs Xarelto or other similar medication. She will f/u when she has further information.

## 2019-09-16 NOTE — OP NOTE
PATIENT NAME: SANTHOSH HODGES  YOB: 1990  MRN:   [id number]  ACCOUNT:  [id number]      DATE OF PROCEDURE:  08/27/2019     PREOPERATIVE DIAGNOSIS:    1.  Partial epilepsy with impairment of consciousness, with intractable epilepsy  2.  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    POSTOPERATIVE DIAGNOSIS:    1.  Partial epilepsy with impairment of consciousness, with intractable epilepsy  2.  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    PROCEDURES PERFORMED:   1.  bertha CARRERO surgical assistant, assisted stereotactic placement of three right sided depth electrodes for inpatient invasive EEG monitoring: trajectories are   1.  Right amygdala (14 contact electrode).   2.  Right anterior hippocampus (14 contact electrode).   3.  Right posterior hippocampus (14 contact electrode).  2.  bertha CARRERO surgical assistant, assisted two right craniotomies and stereotactic placement of seven right sided subdural strip electrodes for inpatient invasive EEG monitoring: locations are   1.  Right inferior frontal (6 contact strip electrode).   2.  Right dorsolateral prefrontal (32 contact strip electrode).   3.  Right premotor frontal (6 contact strip electrode).   4.  Right frontotemporal (6 contact strip electrode).   5.  Right superior posterior temporal (4 contact strip electrode).   6.  Right middle posterior temporal (4 contact strip electrode).   7.  Right inferior posterior temporal (4 contact strip electrode).  3.  Placement of two 2 contact strip electrodes in the subgaleal space as a reference/ground.  4.  Stereotactic neuronavigation using MAGAN [employer] surgical assistant system, for surgical planning, targeting and approach: targets are as listed above.  5.  Use of intraoperative fluoroscopy.  6.  Use of intraoperative EEG monitoring for electrode testing.    ATTENDING SURGEON:  Jason Funes MD, PhD     RESIDENT SURGEONS:    1.  Lamine Goldstein MD  2.   Erich John MD    ANESTHESIA:  General anesthesia.    ESTIMATED BLOOD LOSS:  25 mL.     COMPLICATIONS:  None.    DRAINS:  None.     IMPLANTS:  Three 14 contact depth electrodes, three 6 contact subdural strip electrodes, one 32 contact subdural strip electrode, three 4 contact subdural strip electrodes and two 2 contact strip electrodes.  Three 14 contact depth electrodes are:   1.  Right amygdala.   2.  Right anterior hippocampus.   3.  Right posterior hippocampus.  Three 6 contact subdural strip electrodes are:   1.  Right inferior frontal.   2.  Right premotor frontal.   3.  Right frontotemporal.  One 32 contact subdural strip electrode is:   1.   Right dorsolateral prefrontal.  Three 4 contact subdural strip electrodes are:   1.  Right superior posterior temporal.   2.  Right middle posterior temporal.   3.  Right inferior posterior temporal.  Two 2 contact strip electrodes are grounds.    INDICATIONS FOR PROCEDURE:  Mr. Tha Mcghee is a 28-year-old right-handed male with a history of complex partial seizures.  Mr. Mcghee first suffered a seizure in April 2011.  His seizures are consistent for a flush of anxiety followed by confusion with lip-smacking.  These symptoms can progress to a generalized tonic-clonic seizure.  He has a second type of seizure in which he stares off and makes a clicking noise with his mouth.  His seizures are infrequent, occurring 1-3 times per year, with the latest being in 10/2018 during his video EEG monitoring.  Video EEG monitoring showed complex partial seizures that begin in the right temporal region.  EEGs in the past were notable for left temporal epileptiform discharges.  His MRI of the brain was unremarkable.  Of note, he has a medical history of left eye optic neuritis, anxiety and depression, and migraines with aura.  His left eye vision has resolved after work-up in 6/2017 and steroid treatment.  His migraines were stable as he weaned off topiramate.  Furthermore, his  depression improved with stopping levetiracetam.  He underwent WADA study on 1/17/2019 which showed that he had left hemisphere speech and language dominance.  Regarding memory, the patient has excellent capacity for mediating memory with the left hemisphere.  The study also suggested that the patient had little risk for a change in his language if he has a resective surgery to his right temporal lobe.  Per patient, It is important for him to proceed with treatment/evaluation as soon as possible so he can return to work.  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  He notes that the stress and heat associated with the job was a trigger for his seizures but that he is always drawn back to this particular industry.  His ultimate goal for surgical resection, if necessary, would be reduction of seizure frequency and improvement in quality of ana.  His case was discussed at [facility] Case Management Conference.  The recommendation was for invasive epilepsy evaluation with focus on the right amygdala and hippocampus with depth electrodes, right frontal area with strip electrodes and right posterior temporal area with strip electrodes.  We discussed that based on the invasive video EEG monitoring results, he could have a resective surgery or implantation of response neural stimulator placement.  The treatment could be performed during the same hospital stay at the time of the electrode removal or it could be performed at a later time, during a different hospital admission to allow for the brain to heal and to allow for the risk of infection to diminish.  I could not tell him which would be offered.  Risks of surgical intervention such as placement of invasive monitoring electrodes via craniotomy or small juan holes were discussed in detail.  The risks, benefits and alternative therapies were discussed in detail, including but not limited to bleeding, infection, injury to the brain, need for additional surgeries,  worsening neurological condition including epilepsy, stroke and death.  We also briefly discussed the risks of general anesthesia.  Patient also consented to participate in clinical research after detailed discussion of the study protocol.     DESCRIPTION OF PROCEDURE:  Patient underwent stereotactic protocol MRI and CT of the head prior to his surgery which was used for stereotactic navigation during the case.  Informed consent was obtained and the patient was brought into the operating room and was laid supine in his bed.  With the placement of endotracheal tube, general anesthesia was obtained.  Patient remained in a supine position.  Bilateral sequential compression devices, an arterial line, and a Barton catheter were placed.  At this time Narvaez head frame was used for head stabilization.  Then, the Narvaez adaptor was attached to the head frame and the patient's head attached to the frame was connected to the MAGAN, robotic surgical assistant, head frame carlos.  Therefore, the patient's head was now secured to the MAGAN frame.  We positioned the patient's head with a turn to the left so that the right side of his head would be exposed and could be reached with the MAGAN arm.  MAGAN safety check was performed and the operative bed control was disconnected and the operative bed turned off for safety.  Then, using a surgical clipper, we clipped his entire hair per patient's preference.    At this time, MAGAN stereotactic head registration was performed.  The MAGAN system was loaded with the CT of the head as well as the MRI of the brain.  The two image sets were merged with good coherence.  Then, using a laser surface registration protocol, patient's head was registered to the stereotactic images in the MAGAN.   The registration was confirmed to be good with good coherence noted.  The three trajectories for the depth electrode placement for the right side were confirmed and reviewed.  Using a probe's eye view, each  of the trajectory paths for the electrode was visualized to minimize the risks of surgical complications by avoiding vascular structures, sulci or other critical structures.  The three trajectories were:     1.  Right amygdala.   2.  Right anterior hippocampus.   3.  Right posterior hippocampus.    The two right sided craniotomies and the insertion points for the right sided subdural strip electrodes were also planned using the MAGAN navigation.  The seven locations for the subdural strip electrodes, three for the frontal, three for the posterior parietal and one spanning the frontotemporal area were confirmed and reviewed.  The frontal locations and the frontotemporal approach were going to be accessed using the frontal craniotomy and the posterior temporal locations were going to be accessed using the posterior temporal craniotomy.  The planned subdural strip electrode locations were:     1.  Right inferior frontal.   2.  Right dorsolateral prefrontal.   3.  Right premotor frontal.   4.  Right frontotemporal.   5.  Right superior posterior temporal.   6.  Right middle posterior temporal.   7.  Right inferior posterior temporal.    Then, using the laser pointer of the MAGAN, the three depth electrode trajectory entry points and the two craniotomy sites were localized on the skin and marked.  The direction of the placement of the subdural strip electrodes were also identified with the MAGAN and marked on the skin.      The plan was for three depth electrode insertion at the right temporal area, one craniotomy in the right frontal area and one craniotomy in the right posterior temporal area.  The planned incision for the right frontal area craniotomy was a horizontal incision.  The planned incision for the right temporal area incision was a near vertical linear incision well behind the right ear.  If a right side craniotomy incision is needed for a temporal lobectomy, the right frontal horizontal incision would be to  the top of the question josiah incision and the right temporal incision would be far away from the potential incision.      The right side of the head was then prepped and draped in routine surgical fashion.  At this time, time out was performed confirming the patient's identity, procedure be performed, site and side of the surgery and the administration of preoperative prophylactic antibiotic.  The area of the intended electrode insertion sites and craniotomies were injected with the local anesthetic, 10 mL of 1% Lidocaine with epinephrine.    First, we then turned our attention to the right frontal craniotomy site.  We moved the MAGAN arm and aligned to the right frontal craniotomy location, thus pointing at the site.  Using a #10 blade, horizontal incision was made in the right frontal area, taking the incision down through the temporalis muscle and down to the bone.  Self retaining retractor was placed.  Then, using an acorn drill bit, approximately 7 mm craniotomy juan hole was created.  Then, using a craniotome, 2 cm opening was created, thus exposing the dura.  Bone was waxed and hemostasis was obtained.  Dura was coagulated using a bipolar cautery for hemostasis.  Gelfoam soaked in Thrombin was placed over the dura.    Then, we turned our attention to the right posterior temporal craniotomy site.  We moved the MAGAN arm and aligned to the right posterior temporal craniotomy location, thus pointing at the site.  Using a #10 blade, a near vertical incision was made in the right posterior temporal area, taking the incision down through the galea and down to the bone.  Self retaining retractor was placed.  Then, using an acorn drill bit, approximately 1.5 cm juan hole was created.  Bone was waxed and hemostasis was obtained.  Dura was coagulated using a bipolar cautery for hemostasis.  Gelfoam soaked in Thrombin was placed over the dura.    We turned our attention to the right amygdala trajectory.  We moved the MAGAN  arm and aligned to the right amygdala trajectory.  Then, the MAGAN arm was placed in an axial mode, moving only in the axis of the planned trajectory.  The MAGAN arm was positioned closer to the patient's head so that the drill guide was now up against the scalp.  The entry point was marked and the MAGAN arm was pulled away.  Using a #15 blade, small stab incision was made down to the skull.  Tissue around the bone was scraped clean, making way for the drill.  The MAGAN arm was then brought close again, in the axial mode, until the drill guide was flush against the scalp and now somewhat within the incision.  Then, motorized drill was used to create a small hole in the skull along the trajectory.  Then, the obturator was used to confirm that we were through the skull, as the dura could be palpated.  The MAGAN arm was again pulled away from the entry point to make room.  Then, PMT anchor bolt post was brought in and secured to the skull using a special screw .  Then, an obturator was inserted into the center of the post and slowly advanced until it punctured the dura and went beyond the dura slightly.  Using the MAGAN as the pointer, distance from the top of the post to the target was obtained.  Then, the 14 contact electrode was measured and marked so that when the electrode was placed, the josiah would be at the top of the post.  Then, the 14 contact electrode was inserted into the center of the post until the josiah reached the top of the post.  The cap screw, which was already on the electrode was tightened to secure the electrode after the inner stylet was removed.    We turned our attention to the right anterior hippocampus trajectory.  We moved the MAGAN arm and aligned to the right anterior hippocampus trajectory.  Then, the MAGAN arm was placed in an axial mode, moving only in the axis of the planned trajectory.  The MAGAN arm was positioned closer to the patient's head so that the drill guide was now up against  the scalp.  The entry point was marked and the MAGAN arm was pulled away.  Using a #15 blade, small stab incision was made down to the skull.  Tissue around the bone was scraped clean, making way for the drill.  The MAGAN arm was then brought close again, in the axial mode, until the drill guide was flush against the scalp and now somewhat within the incision.  Then, motorized drill was used to create a small hole in the skull along the trajectory.  Then, the obturator was used to confirm that we were through the skull, as the dura could be palpated.  The MAGAN arm was again pulled away from the entry point to make room.  Then, PMT anchor bolt post was brought in and secured to the skull using a special screw .  Then, an obturator was inserted into the center of the post and slowly advanced until it punctured the dura and went beyond the dura slightly.  Using the MAGAN as the pointer, distance from the top of the post to the target was obtained.  Then, the 14 contact electrode was measured and marked so that when the electrode was placed, the josiah would be at the top of the post.  Then, the 14 contact electrode was inserted into the center of the post until the joisah reached the top of the post.  The cap screw, which was already on the electrode was tightened to secure the electrode after the inner stylet was removed.    We turned our attention to the right posterior hippocampus trajectory.  We moved the MAGAN arm and aligned to the right posterior hippocampus trajectory.  Then, the MAGAN arm was placed in an axial mode, moving only in the axis of the planned trajectory.  The MAGAN arm was positioned closer to the patient's head so that the drill guide was now up against the scalp.  The entry point was marked and the MAGAN arm was pulled away.  Using a #15 blade, small stab incision was made down to the skull.  Tissue around the bone was scraped clean, making way for the drill.  The MAGAN arm was then brought close  again, in the axial mode, until the drill guide was flush against the scalp and now somewhat within the incision.  Then, motorized drill was used to create a small hole in the skull along the trajectory.  Then, the obturator was used to confirm that we were through the skull, as the dura could be palpated.  The MAGAN arm was again pulled away from the entry point to make room.  Then, PMT anchor bolt post was brought in and secured to the skull using a special screw .  Then, an obturator was inserted into the center of the post and slowly advanced until it punctured the dura and went beyond the dura slightly.  Using the MAGAN as the pointer, distance from the top of the post to the target was obtained.  Then, the 14 contact electrode was measured and marked so that when the electrode was placed, the josiah would be at the top of the post.  Then, the 14 contact electrode was inserted into the center of the post until the josiah reached the top of the post.  The cap screw, which was already on the electrode was tightened to secure the electrode after the inner stylet was removed.    We then turned our attention to the right posterior temporal craniotomy site.  The dura was then opened in a cruciate fashion to expose the cortex and to access the subdural space.  Then, we moved the MAGAN arm to the right inferior posterior temporal trajectory, thus pointing at the tip location of the right inferior posterior temporal subdural strip electrode location.  Then, using the MAGAN localization as the guide, a 4 contact subdural strip electrode was inserted into the subdural space at the right posterior temporal craniotomy site and carefully advanced towards the localized tip position until all the contacts were buried in the subdural space beyond the craniotomy site.  There was no resistance noted.  There was no bleeding noted.  Then, we moved the MAGAN arm to the right middle posterior temporal trajectory, thus pointing at the  tip location of the right middle posterior temporal subdural strip electrode location.  Then, using the MAGAN localization as the guide, a 4 contact subdural strip electrode was inserted into the subdural space at the right posterior temporal craniotomy site and carefully advanced towards the localized tip position until all the contacts were buried in the subdural space beyond the craniotomy site.  There was no resistance noted.  There was no bleeding noted.  Then, we moved the MAGAN arm to the right superior posterior temporal trajectory, thus pointing at the tip location of the right superior posterior temporal subdural strip electrode location.  Then, using the MAGAN localization as the guide, a 4 contact subdural strip electrode was inserted into the subdural space at the right posterior temporal craniotomy site and carefully advanced towards the localized tip position until all the contacts were buried in the subdural space beyond the craniotomy site.  There was no resistance noted.  There was no bleeding noted.  Then, using a blunt dissection technique, a pocket was created in the subgaleal space pointing posteriorly.  Then, a 2 contact subdural strip electrode was placed in the pocket to be used as a ground.  The dura was the reapproximated using 4-0 Nurolon sutures.  4-0 Nurolon sutures were also used to anchor the subdural strip electrodes to the dura.      We then turned our attention to the right frontal craniotomy site.  The dura was then opened in a cruciate fashion to expose the cortex and to access the subdural space.  Then, we moved the MAGAN arm to the right inferior frontal trajectory, thus pointing at the tip location of the right inferior frontal subdural strip electrode location.  Then, using the MAGAN localization as the guide, a 6 contact subdural strip electrode was inserted into the subdural space at the right frontal craniotomy site and carefully advanced towards the localized tip position  until all the contacts were buried in the subdural space beyond the craniotomy site.  There was no resistance noted.  There was no bleeding noted.  Then, we moved the MAGAN arm to the right dorsolateral prefrontal trajectory, thus pointing at the tip location of the right dorsolateral prefrontal subdural strip electrode location.  Then, using the MAGAN localization as the guide, a 32 contact subdural strip electrode was inserted into the subdural space at the right frontal craniotomy site and carefully advanced towards the localized tip position until all the contacts were buried in the subdural space beyond the craniotomy site.  There was no resistance noted.  There was no bleeding noted.  Then, we moved the MAGAN arm to the right premotor frontal trajectory, thus pointing at the tip location of the right premotor frontal subdural strip electrode location.   Then, using the MAGAN localization as the guide, a 6 contact subdural strip electrode was inserted into the subdural space at the right frontal craniotomy site and carefully advanced towards the localized tip position until all the contacts were buried in the subdural space beyond the craniotomy site.  There was no resistance noted.  There was no bleeding noted.  Then, we moved the MAGAN arm to the right frontotemporal trajectory, thus pointing at the tip location of the right frontotemporal subdural strip electrode location.   Then, using the MAGAN localization as the guide, a 6 contact subdural strip electrode was inserted into the subdural space at the right frontal craniotomy site and carefully advanced towards the localized tip position until all the contacts were buried in the subdural space beyond the craniotomy site.  There was no resistance noted.  There was no bleeding noted. Then, using a blunt dissection technique, a pocket was created in the subgaleal space pointing superiorly.  Then, a 2 contact subdural strip electrode was placed in the pocket to be  used as a ground.  The dura was the reapproximated using 4-0 Nurolon sutures.  4-0 Nurolon sutures were also used to anchor the subdural strip electrodes to the dura.     Once the right side electrodes were placed, we began testing the right side electrodes.  The individual electrodes, including the grounds, color coded for labeling, were tested individually for signal quality and strength.  All the electrode contacts appear to be recording good signals.  All the recordings were within normal range and expected.      Intraoperative fluoroscopy was obtained to view the electrode positions.  All the electrodes appeared to be in the appropriate targets or locations with appropriate trajectory.  No abnormally curved electrodes or trajectories were noted.     With satisfactory placement of the electrodes, the surgical areas were generously irrigated and dried.  Then, using a tunneling needle, individual subdural strip electrode wires were tunneled out of the skin towards the midline sagittal location towards the vertex of the head through a separate needle puncture for each electrode wires.  The electrode wires were secured to the skin at the exit site using a 3-0 Nylon sutures.      Then, we began closing the incisions.  The right frontal incision was first closed.  The bone flap was positioned over the dural closure, after a piece of gelfoam was placed to cover the dural closure.  The temporalis muscle layer was reapproximated using 2-0 Vicryl sutures.  The galeal layer was reapproximated using 2-0 Vicryl sutures.  The skin was reapproximated using 3-0 Nylon sutures in a running fashion.  The right posterior temporal incision was then closed.  Piece of gelfoam was placed to cover the dural closure.  The galeal layer was reapproximated using 2-0 Vicryl sutures.  The skin was reapproximated using 3-0 Nylon sutures in a running fashion.    The skin was closed around the right side depth electrode anchors using a 3-0 Nylon  sutures in a purse string manner.  The electrodes were then coiled around with strain relief and anchors at multiple sites using 3-0 Nylon sutures.  Then, they were dressed in a following manner.  Biopatch at the bottom of each posts, followed by wrapping each post with Xeroform..      The incisions and the wires were covered with Xeroform.  Then, all the incisions, wires and the anchor bolts were covered with dry sterile dressing.      Then, for protection per protocol, the connector portions of the electrodes were placed into a latex pouch made from the blue surgical glove and umbilical tie was used to secure the pouch with a bow tie knot.  Then, the patient was taken out of his [place] head frame.  The pin sites were clean and dry and no active bleeding was noted, except for the one posterior occipital area pin site which required one staple for hemostasis.  Antibiotic ointment was applied to the pin sites.  Then, loose head wrap was applied to the head, taking care to place the latex pouches within the head wrap for protection and later connection to the EEG machine.    Patient was further awakened, extubated and taken to the recovery room in a stable condition.  At the end of the case, all counts including needle, sponge and instrument counts were correct x 2.  There were no complications.    IOscar M.D., Ph.D., Neurosurgery Attending, was present and scrubbed for the entire case and performed the key and critical portions of the case.      OSCAR MICHAELS MD, PHD

## 2019-09-16 NOTE — PROGRESS NOTES
Left VM for pt to check on his postop status following MAGAN Assisted Stereotactic Placement of Right Sided Depth Electrodes on 8/27/19 and Removal of Electrodes; Right Temporal Lobectomy on 9/10/2019, both by Dr. Funes.     Also wanted to talk to patient to see if he has been able to fill the prescription for Eliquis. Dr. Ni's note from 9/15/19 indicates that authorization was denied. I will look into the current status and appeal the denial if necessary.     Left my direct number and requested that pt call me back. I will f/u if I do not hear back.

## 2019-09-16 NOTE — TELEPHONE ENCOUNTER
Spoke with [facility] pharmacist to see if pt tried to fill his Eliquis prescription at [facility]. Per pharmacist, he did try to fill it but it requires prior authorization. They faxed this documentation to [phone]. I requested that it be refaxed to [phone], as I have not received it. Will f/u on authorization once paperwork received.

## 2019-09-16 NOTE — PROGRESS NOTES
Left  for pt letting him know that we are calling in a prescription for Xarelto to [facility] since Eliquis is not covered by his insurance. He may start this medication today. Requested that pt call me to confirm he received this message and to discuss his postop status.

## 2019-09-17 ENCOUNTER — TELEPHONE (OUTPATIENT)
Dept: NEUROSURGERY | Facility: CLINIC | Age: 29
End: 2019-09-17

## 2019-09-17 NOTE — PROGRESS NOTES
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 Funes  Discharge to: Home     Current status: Patient states he has incision pain 5/10 and headache pain 7/10 with only mild relief with two 2mg tabs of dilauded every 4-6 hours. Also taking ibuprofen with the dilauded and sometimes flexeril with little added pain relief. Pt states it takes at least an hour after taking dilauded before he feels any reduction in pain. Pt requesting something different for pain relief. Denies redness, swelling, increased tenderness, drainage, incision opening or elevated temp. Reports Incision CDI without signs of infection.  Denies current bowel or bladder issues.    Pt's insurance did not authorize Eliquis so provider wrote for Xarelto. This medication was called in yesterday to [facility] and pt instructed he should start taking it today. Pt expressed understanding and said his father would get it.     Pt wants to know when he can start hunting again. He uses a rifle and would like to hunt in November if possible. Will f/u with pt's neurology team re period of time someone should be seizure free before handling firearms.     Discharge instructions and medications reviewed with patient.  Follow up appointments/imaging/tests needed: 2 week post op needed. Will message  to f/u with pt.     RN triage/on call number given:  [phone]/ [phone]      ASSESSMENT      Discharge Assessment  Patient reports symptoms are: Unchanged  Does the patient have all of their medications?: No([facility]  Does patient know what their new medications are for?: Yes  Does patient have a follow-up appointment scheduled?: No  Does patient have any other questions or concerns?: Yes(postop pain)    Post-op  Did the patient have surgery or a procedure: Yes  Incision: closed;healing  Drainage: No  Bleeding: none  Fever: No  Chills: No  Redness: No  Warmth: No  Incision site pain: Yes  Closure: suture  Eating & Drinking: eating and drinking without complaints/concerns  PO Intake: regular diet  Bowel Function: normal  Urinary Status: voiding without complaint/concerns        PLAN                                                      Outpatient Plan:  Routine post op f/u. Will f/u with pt re pain relief recommendations.     Future Appointments   Date Time Provider Department Center   10/9/2019  3:00 PM [facility]           ALEX BALDWIN RN

## 2019-09-18 ENCOUNTER — PATIENT OUTREACH (OUTPATIENT)
Dept: NEUROLOGY | Facility: CLINIC | Age: 29
End: 2019-09-18

## 2019-09-18 ENCOUNTER — TELEPHONE (OUTPATIENT)
Dept: NEUROSURGERY | Facility: CLINIC | Age: 29
End: 2019-09-18

## 2019-09-18 DIAGNOSIS — R51.9 HEADACHE: ICD-10-CM

## 2019-09-18 DIAGNOSIS — G89.18 POSTOPERATIVE PAIN: Primary | ICD-10-CM

## 2019-09-18 DIAGNOSIS — L76.82 INCISIONAL PAIN: ICD-10-CM

## 2019-09-18 RX ORDER — KETOROLAC TROMETHAMINE 10 MG/1
TABLET, FILM COATED ORAL
Qty: 14 TABLET | Refills: 0 | Status: SHIPPED | OUTPATIENT
Start: 2019-09-18 | End: 2019-10-07

## 2019-09-18 NOTE — TELEPHONE ENCOUNTER
Spoke with Dr. Funes about pt's pain and poor response to dilauded. Dr. Funes recommends a short course of toradol. Prescription has been called in to [facility] in [place]. Pt should stop taking dilauded when he starts taking the toradol. He should not take ibuprofen when taking toradol. Pt expressed understanding and has no further questions.

## 2019-09-18 NOTE — OP NOTE
PATIENT NAME: THA MCGHEE  YOB: 1990  MRN:   [id number]  ACCOUNT:  [id number]      DATE OF PROCEDURE:  08/27/2019     PREOPERATIVE DIAGNOSIS:    1.  Partial epilepsy with impairment of consciousness, with intractable epilepsy  2.  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    POSTOPERATIVE DIAGNOSIS:    1.  Partial epilepsy with impairment of consciousness, with intractable epilepsy  2.  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    PROCEDURES PERFORMED:   1.  Removal of subgaleal strip, subdural strip, and SEEG electrodes  2.  Right craniotomy for temporal lobectomy and Temporal Lobectomy And Amygliohippocampectomy  3.  Stereotactic neuronavigation    ATTENDING SURGEON:  Jason Funes MD, PhD     RESIDENT SURGEONS:    1.  Lamine Goldstein MD  2.  Leschke, John M, MD    ANESTHESIA:  General anesthesia.    ESTIMATED BLOOD LOSS:  200cc    COMPLICATIONS:  None.    DRAINS:  None.     IMPLANTS:  None     INDICATIONS FOR PROCEDURE:  Mr. Tah Mcghee is a 28-year-old right-handed male with a history of complex partial seizures who previously underwent implantation of subdural strip, SEEG, and subgaleal grounding electrodes for localization of his seizure focus. His phase II monitoring revealed foci located in the mesial temporal lobe consistent with hippocampal onset. For this reason, he underwent the informed consent process for removal of his electrodes and a Right temporal lobectomy with Amygliohippocampectomy including a discussion of the potential risks, benefits, and alternatives. All of his questions were answered in full.     DESCRIPTION OF PROCEDURE:  Patient underwent stereotactic protocol MRI and CT of the head after his last surgery which were used for stereotactic navigation during the case.  Informed consent was obtained and the patient was brought into the operating room and was laid supine in his bed.  He underwent successful endotracheal  intubation with general anesthesial. IV access, an arterial line, SCDs, and gordillo catheter were placed. A Narvaez head carlos was applied with 60 lbs of pressure and affixed to the OR table. We removed his dressings and cut his tunneled electrodes at the skin surface. We careful removed his SEEG electrodes after loosening the skull bolts. His skin was washed and dried multiple times with chlorhexidine scrub. A clipper was used to remove regrown hair and his previous sutures were cut. His scalp was coregistered to his stereotactic scans and found to have excellent accuracy. His skin was prepped, draped, and a time out was performed.     We proceeded with first opening his most posterior incision with scissors. Hemostasis was obtained with bipolar cautery, and we were able to then remove the juan hole cover. The Right superior, middle, and inferior posterior electrodes were removed in addition to the temporal ground. The wound was copiously irrigated with bacitracin irrigation and inspected for hemostasis. His dura was reapproximated with 4-0 neurolon sutures, which was covered by gel foam. A new juan hole cover was placed, and 2-0 vicryl sutures were used to reapproximate the galea after copiously washing the wound. A running 3-0 nylon was used to close the skin.    We then turned our attention to the previous Right frontal incision, which was opened with scissors. The cranial flap was removed with the plating system, and the 4-0 neurolon sutures were cut. The wound was copiously irrigated and were able to carefully remove the Right frontotemporal, premotor, dorsolateral prefrontal, inferior frontal, and subgaleal ground electrodes without difficulty or resistance.     We then continued the incision inferiorly along a question josiah course to the tragus. The temporalis fascia was sharply incised with scissors, and a monopolar cautery was used to divide the temporalis muscle. Lonestar retractors were used to retract  the myocutaneous flap anteriorly. Hemostasis was obtained with monopolar and bipolar cautery. A high speed drill with footplate was used to extend the previous craniotomy inferiorly and anteriorly after dissecting the dura from the inner table.     A Leksell rongeur was used to extend the craniotomy further anterior and inferiorly. The dural opening was extended with scissors for adequate exposure of the temporal lobe. Neuronavigation was used to confirm the superior temporal sulcus, and a ruler was used to measure 6 cm back from the temporal tip, providing the cortical boundaries of the temporal lobectomy. Microscissors were used to open the arachnoid along these boundaries and bipolar cautery was used to cauterize the enma. Suction and bipolar cautery for blunt dissection was used to advance the cortisectomy medially and inferiorly towards the temporal horn and floor of the middle fossa respectively. Subpial dissection and resection was performed as we made our way deeper. Neuronavigation was intermittently used to verify our excellent location along the trajectory towards the temporal horn, which was encountered and lined with cottonoids medial to the edge of the hippocampus. We moved back and forth from subpial dissection along the floor of the middle fossa to subpial dissection along the anteromedial temporal lobe until meeting at the exposed temporal horn. We continued our resection while sending pathological specimens of the hippocampus and amygdala. We followed the hippocampus posteriorly along the medial temporal lobe enma, while identifying the PCA and its branches on the other side of the enma. We continued our posterior trajectory until nearly reaching the hippocampal tail at the back of the midbrain as verified by stealth and visualization of the posterolateral midbrain. We inspected the edges of the temporal lobectomy and obtained hemostasis with irrigation, surgicel, and surgiflo. The surrounding enma was  carefully cauterized with bipolar cautery, and the wound was copiously irrigated with normal saline irrigation until it ran clear. We inspected the resection and verified hemostasis. Surgicel was used to line the cavity and edges of the enma. We again verified hemostasis and turned our attention to reapproximation of the dural leaflets. Interrupted 4-0 neurolon was used to reapproximate the dural leaflets, and a 3x3 in piece of duragen was placed over the reapproximated dura. The bone flaps were plated together and peripherally with low-profile titanium plates and 4mm titanium screws. The cranial defect was irrigated and the cranial flap was placed back into the defect, securing the overlapping plates with 4mm screws. The wound was then copiously irrigated with bacitracin irrigation as we carefully removed the Lonestar retractors and progressively obtained hemostasis with bipolar cautery.     The temporalis fascia was reapproximated with interrupted 2-0 vicryl sutures. The galea was reapproximated with inverted 2-0 vicryl sutures. The skin was closed with a running 3-0 nylon. The previous SEEG electrode sites were closed with 4-0 monocryl sutures. The incision was cleaned, prepped, and bandaged.    Patient was further awakened, extubated and taken to the recovery room in a stable condition.  At the end of the case, all counts including needle, sponge and instrument counts were correct x 2.  There were no complications.      Dr. Funes was present and scrubbed for the entire operation.      OSCAR FUNES MD PhD    As prepared by HERLINDA ORO MD      NEUROSURGERY ATTENDING ATTESTATION: IOscar M.D., Ph.D., Neurosurgery Attending, was present and scrubbed for the entire case and performed the key and critical portions of the case.

## 2019-09-18 NOTE — PROGRESS NOTES
JOSÉ MANUEL Epilepsy Nurse call    Discharged 9/13/2019 following indwelling / subdural electrode placement and subsequent right temporal lobectomy with amygdalohippocampectomy    Headaches seem to be the main issue from the patient standpoint. He called the surgeons yesterday, and will continue to take meds   Sleep has been okay, does wake once or twice with pain.  No swelling issues patient is concerned with    Xarelto was provided and patient started this last night    No seizures that the patient is aware of.    Follow up date and time reviewed.    Patient reports his appetite is reduced, but he is keeping up on his fluids.    Seizure meds reviewed, patient reports he has not missed or been late with medications.  Patient has made the med changes initiated while inpatient. He has set up his med box since discharge.

## 2019-09-19 ENCOUNTER — TELEPHONE (OUTPATIENT)
Dept: NEUROSURGERY | Facility: CLINIC | Age: 29
End: 2019-09-19

## 2019-09-19 NOTE — TELEPHONE ENCOUNTER
"Pt states that his headache pain has not been touched by the toradol yesterday. He took 20 mg as a loading dose, as written, but subsequently continued to take 20 mg at a time instead of 10 mg b/c \"I didn't think 10 mg would do anything.\" Pt has 6 tablets out of 14 left. Advised pt not to take 2 tablets at a time. Take medication as prescribed.     Pt states that his HA now seems more like a migraine and does not seem like postoperative pain. He said he is sensitive to light and sound. He has not taken any maxalt, which is what he would normally take at onset of a migraine.     Pt is going to f/u with his neurology team to see what they recommend. No further questions at this time.   "

## 2019-09-19 NOTE — TELEPHONE ENCOUNTER
What is the concern that needs to be addressed by a nurse? Pt states that he has had a migraine consistently since he got out of the hospital on 9/13/19 after having brain surgery.  He has been taking Toradol since being released from hospital and it's not helping.  The hospital stated that he should talk to his primary dr and get a recommendation.  He has also tried dilaudid.    May a detailed message be left on voicemail? Yes    Date of last office visit: 9/13/19    Message routed to:

## 2019-09-20 NOTE — TELEPHONE ENCOUNTER
What is the concern that needs to be addressed by a nurse? Patient is calling because he has been having really bad headache after his surgery that he went to the ER yesterday,patient call his surgeon office and they told him he has to call his neurologist office.Patient does not know what to do but would like a call back ASAP.    May a detailed message be left on voicemail?yes    Date of last office visit:06/18/2019     Message routed to:[facility] RN POOL

## 2019-09-20 NOTE — TELEPHONE ENCOUNTER
"Patient calls the clinic to request advice on a headache that he has had since last Friday.  He describes this as a pounding headache. \"it worsens with every step I take\". He has been lying in bed due to pain. Patient tried maxalt yesterday. He had been trying toradol before that. (see notes from [facility] GONZALEZ in [facility]). He presented to the ER yesterday; he was discharged with diazepam tablets. In addition to the migraine, he states his stitches are irritated and itchy on the right side of his head, he notices that he is picking at them in his sleep. He would like to put hydrocortisone on this, but was appropriately advised not to.     He has an appt today with Dr. Recio at his primary care office. We will talk after that appointment to see what recommendations PCP has made.   "

## 2019-09-21 ENCOUNTER — TELEPHONE (OUTPATIENT)
Dept: NEUROLOGY | Facility: CLINIC | Age: 29
End: 2019-09-21

## 2019-09-21 NOTE — TELEPHONE ENCOUNTER
"Fielded call by patient regarding \"migraine\" headache.  He has been having severe pouding headache associated with photophobia, phonophobia, nausea and vomiting.  Worsening with time over at least several days.  May have vomited out seizure medications today.   Compazine and zofran haven't helped well enough.  Feels like oral dilaudid helped a little but he is running low.    Of note, patient recently underwent right craniotomy, right temporal lobectomy and amygliohippocampectomy 9/10/19.  There is concern that his headache is related to his recent surgery 1.5 weeks ago.    Advised patient to have low threshold to visit his local ED given his severe headache.  Neurosurgery team is aware of patient.  "

## 2019-09-22 ENCOUNTER — TELEPHONE (OUTPATIENT)
Dept: NEUROLOGY | Facility: CLINIC | Age: 29
End: 2019-09-22

## 2019-09-22 DIAGNOSIS — L76.82 INCISIONAL PAIN: ICD-10-CM

## 2019-09-22 DIAGNOSIS — R51.9 HEADACHE: ICD-10-CM

## 2019-09-22 DIAGNOSIS — G89.18 POSTOPERATIVE PAIN: ICD-10-CM

## 2019-09-22 DIAGNOSIS — G40.219 LOCALIZATION-RELATED EPILEPSY WITH COMPLEX PARTIAL SEIZURES WITH INTRACTABLE EPILEPSY (H): ICD-10-CM

## 2019-09-22 DIAGNOSIS — G40.219 PARTIAL EPILEPSY WITH IMPAIRMENT OF CONSCIOUSNESS, INTRACTABLE (H): ICD-10-CM

## 2019-09-22 DIAGNOSIS — G40.919 INTRACTABLE EPILEPSY WITHOUT STATUS EPILEPTICUS, UNSPECIFIED EPILEPSY TYPE (H): ICD-10-CM

## 2019-09-22 NOTE — TELEPHONE ENCOUNTER
Mr. Thon called me back this AM (see previous note).    He states that he has been experiencing severe migraines for the past several days.  He has visited his local emergency room, [place] [address] [facility], and [facility] in [place] for the past 3 days.  He states that he has not not had migraines since the time of his discharge from [facility] on 9/13.    He has been experiencing headache with photophobia, phonophobia, and pain in his head with walking.  He has also been having nausea with vomiting, which has made it difficult for him to take adequate food and drink intake and to take his medications.  He reports that, during his ED visits, he received some extra Dilaudid, Toradol, and diazepam; he states that only the diazepam provided any mild relief.    The first day, the patient left the emergency room after he was not seen for 2.5 hours in the waiting room.  The patient states that he may have had a baby named after him during one of his ED visits.    The patient has tried compazine and Zofran for his nausea, though neither has provided lasting relief.  He reports that the ED typically gives him Benadryl, toradol, and compazine for his migraines, which seems to work for him; however, none of his emergency medicine providers have given him this combination of medications.    He denies any seizure-like activity since the time of discharge.    The patient states that, yesterday, he threw up his antiepileptic medication.    He states that he cannot go on living like this with his pain.  He expresses frustration with difficulty leaving his house with the pain.    I recommended that Mr. Mcghee continue utilizing his preferred combination of 1 pill of Benadryl, compazine, and toradol this AM and to call us in several hours if his headache did not improve.  We will try to contact the [place] [address] imaging department to obtain his head CT.      Mani Munroe M.D.  Neurosurgery Resident, PGY-2    Please  contact neurosurgery resident on call with questions.    Dial * [phone], enter [phone] when prompted.

## 2019-09-23 ENCOUNTER — TELEPHONE (OUTPATIENT)
Dept: NURSING | Facility: CLINIC | Age: 29
End: 2019-09-23

## 2019-09-23 DIAGNOSIS — G43.809 OTHER MIGRAINE WITHOUT STATUS MIGRAINOSUS, NOT INTRACTABLE: Primary | ICD-10-CM

## 2019-09-23 RX ORDER — PROCHLORPERAZINE MALEATE 10 MG
10 TABLET ORAL EVERY 6 HOURS PRN
Qty: 30 TABLET | Refills: 0 | Status: CANCELLED | OUTPATIENT
Start: 2019-09-23

## 2019-09-23 RX ORDER — KETOROLAC TROMETHAMINE 10 MG/1
20 TABLET, FILM COATED ORAL EVERY 6 HOURS PRN
Qty: 10 TABLET | Refills: 0 | Status: SHIPPED | OUTPATIENT
Start: 2019-09-23 | End: 2019-09-25

## 2019-09-23 NOTE — TELEPHONE ENCOUNTER
Mr. Thon called into the on call line to discuss ongoing headaches. These have been well documented in recent telephone encounters. He reports the only thing that works for his headaches is a cocktail of diazepam, toradol, benadryl, and compazine, and he was requesting a refill of his diazepam and toradol. I explained that I would not refill his diazepam as it is a controlled substance but I would prescribe a few more tabs of toradol until he is able to talk to his PCP or primary neurologist. I sent a script for toradol 20mg q6h prn #10 tabs to his pharmacy and encouraged him to call [facility] and his PCP tomorrow.    Musa Love MD  Neurology PGY-4

## 2019-09-24 DIAGNOSIS — G43.809 OTHER MIGRAINE WITHOUT STATUS MIGRAINOSUS, NOT INTRACTABLE: ICD-10-CM

## 2019-09-24 DIAGNOSIS — G40.919 INTRACTABLE EPILEPSY WITHOUT STATUS EPILEPTICUS, UNSPECIFIED EPILEPSY TYPE (H): ICD-10-CM

## 2019-09-24 RX ORDER — KETOROLAC TROMETHAMINE 10 MG/1
TABLET, FILM COATED ORAL
Qty: 14 TABLET | Refills: 0 | OUTPATIENT
Start: 2019-09-24

## 2019-09-24 RX ORDER — DIVALPROEX SODIUM 250 MG/1
TABLET, EXTENDED RELEASE ORAL
Qty: 90 TABLET | Refills: 0 | OUTPATIENT
Start: 2019-09-24

## 2019-09-24 RX ORDER — TOPIRAMATE 25 MG/1
50 TABLET, FILM COATED ORAL 2 TIMES DAILY
Qty: 120 TABLET | Refills: 3 | Status: SHIPPED | OUTPATIENT
Start: 2019-09-24 | End: 2019-10-09

## 2019-09-24 NOTE — TELEPHONE ENCOUNTER
Would like a refill for diazepam 5mg tabs for his migraines. He states he takes it BID. He is taking it with Toradol and compazine. He states he has had a migraine since the surgery. He would like it to go to the [facility] in [place].

## 2019-09-24 NOTE — TELEPHONE ENCOUNTER
Patient calls the clinic to report ongoing headache. Migraines daily. Diazepam and toradol, benadryl and compazine is helpful for 10 hours or so. He is limiting his activity and resting. He is pushing fluids. ER visit 3 times in the past week. Vomiting is improving. Wearing sunglasses. Ice to posterior neck.     Denies numbness or tingling. Denies weakness. Denies vision changes and vision loss. He does not feel that he is having symptoms of optic neuritis.     He needs a refill of compazine. He has two days worth of toradol. He is out of diazepam tablet.     We discussed alternate treatment option of fioricet. He has tried this in the past and did not find it helpful. He is cautious to continue using toradol due to the potential adverse effects of the kidney.     I advised him to continue pushing fluids, eat well and to rest. He is agreeable to continueing use of compazine and toradol. I advised him to contact our office if he has any neurological changes such as weakness, numbness, tingling, vision changes. He will call us in a few days if he has not improved.

## 2019-09-25 RX ORDER — DIAZEPAM ORAL SOLUTION (CONCENTRATE) 5 MG/ML
SOLUTION ORAL
Qty: 30 ML | Refills: 0 | OUTPATIENT
Start: 2019-09-25

## 2019-09-25 RX ORDER — KETOROLAC TROMETHAMINE 10 MG/1
20 TABLET, FILM COATED ORAL EVERY 6 HOURS PRN
Qty: 10 TABLET | Refills: 0 | Status: SHIPPED | OUTPATIENT
Start: 2019-09-25 | End: 2019-10-09

## 2019-09-25 RX ORDER — PROCHLORPERAZINE MALEATE 10 MG
10 TABLET ORAL EVERY 6 HOURS PRN
Qty: 30 TABLET | Refills: 0 | Status: SHIPPED | OUTPATIENT
Start: 2019-09-25 | End: 2019-10-09

## 2019-09-26 ENCOUNTER — OFFICE VISIT (OUTPATIENT)
Dept: NEUROSURGERY | Facility: CLINIC | Age: 29
End: 2019-09-26
Payer: COMMERCIAL

## 2019-09-26 VITALS
BODY MASS INDEX: 27.76 KG/M2 | SYSTOLIC BLOOD PRESSURE: 108 MMHG | WEIGHT: 182.6 LBS | RESPIRATION RATE: 16 BRPM | OXYGEN SATURATION: 100 % | HEART RATE: 98 BPM | DIASTOLIC BLOOD PRESSURE: 71 MMHG

## 2019-09-26 DIAGNOSIS — G40.219 PARTIAL EPILEPSY WITH IMPAIRMENT OF CONSCIOUSNESS, WITH INTRACTABLE EPILEPSY (H): Primary | ICD-10-CM

## 2019-09-26 DIAGNOSIS — Z98.890 POSTOPERATIVE STATE: ICD-10-CM

## 2019-09-26 DIAGNOSIS — Z48.89 ENCOUNTER FOR POST SURGICAL WOUND CHECK: ICD-10-CM

## 2019-09-26 ASSESSMENT — PAIN SCALES - GENERAL: PAINLEVEL: MODERATE PAIN (4)

## 2019-09-26 NOTE — PATIENT INSTRUCTIONS
Avoid direct water contact to incision for 48 hours. No swimming or soaking the head under the water for 30 days from day of surgery. May gradually increase activities gradually as your headache improves.     Follow up with Dr. Funes in 4 weeks.    Follow up with Dr. Araujo as scheduled.    Call [phone] for concerns or questions.

## 2019-09-26 NOTE — PROGRESS NOTES
NEUROSURGERY PROGRESS NOTE      REASON FOR VISIT: Routine postop wound check aremoval.    DATE OF PROCEDURE:  09/10/2019   PREOPERATIVE DIAGNOSIS:    1.  Partial epilepsy with impairment of consciousness, with intractable epilepsy  2.  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  PROCEDURES PERFORMED:   1.  Removal of subgaleal strip, subdural strip, and SEEG electrodes  2.  Right craniotomy for temporal lobectomy and Temporal Lobectomy And Amygliohippocampectomy  3.  Stereotactic neuronavigationSURGEON:  Jason Funes MD, PhD        HPI: This is a 28 year old male PMH significant for depression, anxiety and epilesy involving generalized tonic clonic seizures; his seizures are proceeded by an aura. He had his first seizure in 2011 and [place] [facility] [place] has been refractory to medical management and the etiology of his seizures had remained unclear. The patient was discussed at the Epilepsy Case Management Conference and he was deemed an appropriate candidate for surgical management of his epilepsy. He saw Dr. Funes for this reason. The patient provided consent to proceed with surgery on 8/27/2019 without complications. His hospital course was also notable for development of a left axillary non-occlusive deep vein thrombosis that was associated with a left midline catheter. The catheter was removed. Hematology was consulted and recommended to initiate the patient on Apixaban when safe to do so when safe from a Neurosurgical Standpoint. The patient will be treated for a duration of 3 months and no hematology follow-up was felt to be necessary. It was determined that he will safely begin Eliquis on 9/14/2019. The patient also had seizure headaches for which he was treated with opioid analgesics, anti-emetics, Dexamethasone, Non-Steroidal Anti-Inflammatories, and muscle relaxants. On 9/13/2019, the patient had met all of his discharge goals and was deemed  medically and neurologically stable for discharge home. Since then he presents for routine wound check.    Interval History: The patient c/o constant migraine headache since the surgery. He endorses photosensitivity, phonophobia, nausea, and vomiting. He described the migraine similar to his post ictal migraine, which was under control with daily marijuana. He however was instructed to stop the marijuana for several weeks before the surgery. The headache prompted 2 interval ED visits. He found that the combination of Benadryl, Toradol, and compazine offered some relief. He was given a limited number of Valium and and took it simultaneously with the above medications for a short period with better result. He ran out of Valium however. He also restarted using the marijuana about a week ago. Unfortunately, the headache has not settled down. He currently has a follow up appointment with his neurologist Dr. Araujo on 10/9/2019. He denies recurrent seizures since discharge from [facility]. He further denies fever, chills, redness, swelling, and/or drainage from incision.      CURRENT MEDICATIONS:    Current Outpatient Medications:      albuterol (PROAIR HFA/PROVENTIL HFA/VENTOLIN HFA) 108 (90 Base) MCG/ACT inhaler, Inhale 2 puffs into the lungs as needed, Disp: , Rfl:      ALPRAZolam (XANAX) 0.5 MG tablet, Take 0.5 mg by mouth as needed., Disp: , Rfl:      cholecalciferol (VITAMIN D3) 5000 units TABS tablet, Take 5,000 Units by mouth At Bedtime , Disp: , Rfl:      ciclopirox (LOPROX) 0.77 % cream, Apply topically as needed Pt. Last used 08/18/2019, Disp: , Rfl:      cyclobenzaprine (FLEXERIL) 10 MG tablet, 1 tablet (10 mg) by Oral or Feeding Tube route 3 times daily as needed for muscle spasms, Disp: 30 tablet, Rfl: 0     diazepam (DIAZEPAM INTENSOL) 5 MG/ML (HIGH CONC) solution, For generalized seizures give 5 mg.  May repeat and give 2.5 mg after 10 minutes if needed. Do not exceed 7.5mg, Disp: 30 mL, Rfl: 0     docusate  sodium (COLACE) 100 MG capsule, Take 100 mg by mouth 2 times daily , Disp: , Rfl:      gabapentin (NEURONTIN) 300 MG capsule, 300mg twice daily, Disp: 180 capsule, Rfl: 3     HYDROmorphone (DILAUDID) 2 MG tablet, Take 1-2 tablets (2-4 mg) by mouth every 6 hours as needed for severe pain, Disp: 42 tablet, Rfl: 0     ketorolac (TORADOL) 10 MG tablet, Take 2 tablets (20 mg) by mouth every 6 hours as needed for moderate pain, Disp: 10 tablet, Rfl: 0     ketorolac (TORADOL) 10 MG tablet, Take 2 tablets (20 mg) one time.  Then 1 tablet (10 mg) every 6 hours, Disp: 14 tablet, Rfl: 0     lacosamide (VIMPAT) 200 MG TABS tablet, Take 1 tablet (200 mg) by mouth 2 times daily, Disp: 60 tablet, Rfl: 5     omeprazole (PRILOSEC) 10 MG DR capsule, Take 20 mg by mouth every morning, Disp: , Rfl:      ondansetron (ZOFRAN-ODT) 4 MG ODT tab, Take 1-2 tablets (4-8 mg) by mouth every 6 hours as needed for nausea or vomiting, Disp: 30 tablet, Rfl: 0     phenytoin (DILANTIN) 200 MG capsule, Take 1 capsule (200 mg) by mouth 2 times daily, Disp: 180 capsule, Rfl: 1     prochlorperazine (COMPAZINE) 10 MG tablet, Take 1 tablet (10 mg) by mouth every 6 hours as needed for vomiting, Disp: 30 tablet, Rfl: 0     promethazine (PHENERGAN) 25 MG tablet, Take 1 tablet (25 mg) by mouth every 6 hours as needed for nausea (take with maxalt for nausea with migraines), Disp: 30 tablet, Rfl: 3     Bfxpeaplawx-Yxfnaxaz-VR-GG (ACCIST DM OR), Take 200 mg by mouth At Bedtime, Disp: , Rfl:      rivaroxaban ANTICOAGULANT (XARELTO ANTICOAGULANT) 15 MG TABS tablet, Take 1 tablet (15 mg) by mouth 2 times daily (with meals) for 21 days, Disp: 42 tablet, Rfl: 0     rivaroxaban ANTICOAGULANT (XARELTO ANTICOAGULANT) 20 MG TABS tablet, Take 1 tablet (20 mg) by mouth daily (with dinner), Disp: 90 tablet, Rfl: 0     rizatriptan (MAXALT) 10 MG tablet, Take 1 tablet (10 mg) by mouth at onset of headache for migraine May repeat in 2 hours. Max 3 tablets/24 hours., Disp: 18  tablet, Rfl: 3     topiramate (TOPAMAX) 25 MG tablet, Take 2 tablets (50 mg) by mouth 2 times daily, Disp: 120 tablet, Rfl: 3     topiramate (TOPAMAX) 50 MG tablet, Take 1 tablet (50 mg) by mouth 2 times daily, Disp: 180 tablet, Rfl: 1     venlafaxine (EFFEXOR XR) 75 MG 24 hr capsule, Take 3 capsules (225 mg) by mouth every morning, Disp: 270 capsule, Rfl: 3      ALLERGIES:  Allergies   Allergen Reactions     Amoxicillin Hives     Tolerated ceftriaxone Aug 2019 without a reaction     Ceclor [Cefaclor Monohydrate] Hives     Tolerated ceftriaxone Aug 2019 without a reaction       Hydrocodone-Acetaminophen Nausea     Penicillins Hives     Tolerated ceftriaxone Aug 2019 without a reaction       Sulfa Drugs Hives         PMH, SOC HIST, FAM HIST, PROBLEM LIST:  All reviewed in EPIC.      FOCUS EXAMINATION:  /71 (BP Location: Right arm, Patient Position: Chair, Cuff Size: Adult Regular)   Pulse 98   Resp 16   Wt 82.8 kg (182 lb 9.6 oz)   SpO2 100%   BMI 27.76 kg/m    General:Well developed well nourished male in no apparent distress. He is accompanied.    LOC/Cognition: He is A&O X3.  Mood and affect WNL. Language and fund of knowledge intact.  Is able to sit and rise independently.     Focus neurologic exam: CN II- XII are grossly intact. Strength is 5/5 in four extremities. Sensation is intact to light touch.  Incision: Approximated by dissolvable sutures. No erythema, swelling, induration, and/or drainage.        ASSESSMENT:  1. Partial epilepsy with impairment of consciousness, with intractable epilepsy (H)    2. Encounter for post surgical wound check    3. Postoperative state      Tha Mcghee is currently suffering from migraine following the surgery. Otherwise, he has had no recurrent seizures and the wound is healthy without evidence of infection.      PLAN:  We discussed wound care.  *  Keep it open to air  *  May shower and shampoo with mild soap/shampoo including the incision. No hair conditioners,  hair treatments or skin cream for two more weeks.  *  May swim or bathe when all scabbing is gone from the incision in about 2 more weeks.  *  Call our services if you develop fever, chills, redness, swelling, and/or drainage from incision.  We discussed medication.    *  We recommend the patient continue taking Toradol and Compazine for headache as it seems helpful. It was also noted in EMR that Dr. Araujo has prescribed Topamax for his headache on 9/24/2019. We recommend the patient try Topamax and discuss treatment for his headache with Dr. Araujo.  We discussed activities and return to work.  *  We recommend he continue the current activity restrictions for 4 more week and then gradually return to normal activities as able.  We discussed follow up    *  Return to Dr. Funes's clinic in 4 weeks.   *  Follow up with Dr. Araujo for seizure and headache management on 10/9/2019.     All the patient's questions have been answered and they demonstrate good understanding of the above.The patient has our contact information and is aware that he should call if he has questions or concerns.     We appreciate the opportunity to be of service in the care of this pleasant patient.  Please do call if there is anything more we can do.    This note was completed using Dragon voice recognition software.  Although reviewed after completion, some word and grammatical errors may occur.      Serene Soriano DNP, APRN, FNP-BC  Department of Neurosurgery

## 2019-09-26 NOTE — LETTER
9/26/2019       RE: Tha Mcghee  [address] [id number]     Dear Colleague,    Thank you for referring your patient, Tha Mcghee, to the [facility] NEUROSURGERY at [facility]. Please see a copy of my visit note below.    NEUROSURGERY PROGRESS NOTE      REASON FOR VISIT: Routine postop wound check aremoval.    DATE OF PROCEDURE:  09/10/2019   PREOPERATIVE DIAGNOSIS:    1.  Partial epilepsy with impairment of consciousness, with intractable epilepsy  2.  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  PROCEDURES PERFORMED:   1.  Removal of subgaleal strip, subdural strip, and SEEG electrodes  2.  Right craniotomy for temporal lobectomy and Temporal Lobectomy And Amygliohippocampectomy  3.  Stereotactic neuronavigationSURGEON:  Jason Funes MD, PhD        HPI: This is a 28 year old male PMH significant for depression, anxiety and epilesy involving generalized tonic clonic seizures; his seizures are proceeded by an aura. He had his first seizure in 2011 and has [place] [facility] [place] His epilepsy has been refractory to medical management and the etiology of his seizures had remained unclear. The patient was discussed at the Epilepsy Case Management Conference and he was deemed an appropriate candidate for surgical management of his epilepsy. He saw Dr. Funes for this reason. The patient provided consent to proceed with surgery on 8/27/2019 without complications. His hospital course was also notable for development of a left axillary non-occlusive deep vein thrombosis that was associated with a left midline catheter. The catheter was removed. Hematology was consulted and recommended to initiate the patient on Apixaban when safe to do so when safe from a Neurosurgical Standpoint. The patient will be treated for a duration of 3 months and no hematology follow-up was felt to be necessary. It was determined that he will safely  begin Eliquis on 9/14/2019. The patient also had seizure headaches for which he was treated with opioid analgesics, anti-emetics, Dexamethasone, Non-Steroidal Anti-Inflammatories, and muscle relaxants. On 9/13/2019, the patient had met all of his discharge goals and was deemed medically and neurologically stable for discharge home.  Since then he presents for routine wound check.    Interval History: The patient c/o constant migraine headache since the surgery. He endorses photosensitivity, phonophobia, nausea, and vomiting. He described the migraine similar to his post ictal migraine, which was under control with daily marijuana. He however was instructed to stop the marijuana for several weeks before the surgery. The headache prompted 2 interval ED visits. He found that the combination of Benadryl, Toradol, and compazine offered some relief. He was given a limited number of Valium and and took it simultaneously with the above medications for a short period with better result. He ran out of Valium however. He also restarted using the marijuana about a week ago. Unfortunately, the headache has not settled down. He currently has a follow up appointment with his neurologist Dr. Araujo on 10/9/2019. He denies recurrent seizures since discharge from [facility]. He further denies fever, chills, redness, swelling, and/or drainage from incision.      CURRENT MEDICATIONS:    Current Outpatient Medications:      albuterol (PROAIR HFA/PROVENTIL HFA/VENTOLIN HFA) 108 (90 Base) MCG/ACT inhaler, Inhale 2 puffs into the lungs as needed, Disp: , Rfl:      ALPRAZolam (XANAX) 0.5 MG tablet, Take 0.5 mg by mouth as needed., Disp: , Rfl:      cholecalciferol (VITAMIN D3) 5000 units TABS tablet, Take 5,000 Units by mouth At Bedtime , Disp: , Rfl:      ciclopirox (LOPROX) 0.77 % cream, Apply topically as needed Pt. Last used 08/18/2019, Disp: , Rfl:      cyclobenzaprine (FLEXERIL) 10 MG tablet, 1 tablet (10 mg) by Oral or Feeding Tube route 3  times daily as needed for muscle spasms, Disp: 30 tablet, Rfl: 0     diazepam (DIAZEPAM INTENSOL) 5 MG/ML (HIGH CONC) solution, For generalized seizures give 5 mg.  May repeat and give 2.5 mg after 10 minutes if needed. Do not exceed 7.5mg, Disp: 30 mL, Rfl: 0     docusate sodium (COLACE) 100 MG capsule, Take 100 mg by mouth 2 times daily , Disp: , Rfl:      gabapentin (NEURONTIN) 300 MG capsule, 300mg twice daily, Disp: 180 capsule, Rfl: 3     HYDROmorphone (DILAUDID) 2 MG tablet, Take 1-2 tablets (2-4 mg) by mouth every 6 hours as needed for severe pain, Disp: 42 tablet, Rfl: 0     ketorolac (TORADOL) 10 MG tablet, Take 2 tablets (20 mg) by mouth every 6 hours as needed for moderate pain, Disp: 10 tablet, Rfl: 0     ketorolac (TORADOL) 10 MG tablet, Take 2 tablets (20 mg) one time.  Then 1 tablet (10 mg) every 6 hours, Disp: 14 tablet, Rfl: 0     lacosamide (VIMPAT) 200 MG TABS tablet, Take 1 tablet (200 mg) by mouth 2 times daily, Disp: 60 tablet, Rfl: 5     omeprazole (PRILOSEC) 10 MG DR capsule, Take 20 mg by mouth every morning, Disp: , Rfl:      ondansetron (ZOFRAN-ODT) 4 MG ODT tab, Take 1-2 tablets (4-8 mg) by mouth every 6 hours as needed for nausea or vomiting, Disp: 30 tablet, Rfl: 0     phenytoin (DILANTIN) 200 MG capsule, Take 1 capsule (200 mg) by mouth 2 times daily, Disp: 180 capsule, Rfl: 1     prochlorperazine (COMPAZINE) 10 MG tablet, Take 1 tablet (10 mg) by mouth every 6 hours as needed for vomiting, Disp: 30 tablet, Rfl: 0     promethazine (PHENERGAN) 25 MG tablet, Take 1 tablet (25 mg) by mouth every 6 hours as needed for nausea (take with maxalt for nausea with migraines), Disp: 30 tablet, Rfl: 3     Ymzbxbnzaoc-Jnuifbuj-GT-GG (ACCUHIST DM OR), Take 200 mg by mouth At Bedtime, Disp: , Rfl:      rivaroxaban ANTICOAGULANT (XARELTO ANTICOAGULANT) 15 MG TABS tablet, Take 1 tablet (15 mg) by mouth 2 times daily (with meals) for 21 days, Disp: 42 tablet, Rfl: 0     rivaroxaban ANTICOAGULANT  (XARELTO ANTICOAGULANT) 20 MG TABS tablet, Take 1 tablet (20 mg) by mouth daily (with dinner), Disp: 90 tablet, Rfl: 0     rizatriptan (MAXALT) 10 MG tablet, Take 1 tablet (10 mg) by mouth at onset of headache for migraine May repeat in 2 hours. Max 3 tablets/24 hours., Disp: 18 tablet, Rfl: 3     topiramate (TOPAMAX) 25 MG tablet, Take 2 tablets (50 mg) by mouth 2 times daily, Disp: 120 tablet, Rfl: 3     topiramate (TOPAMAX) 50 MG tablet, Take 1 tablet (50 mg) by mouth 2 times daily, Disp: 180 tablet, Rfl: 1     venlafaxine (EFFEXOR XR) 75 MG 24 hr capsule, Take 3 capsules (225 mg) by mouth every morning, Disp: 270 capsule, Rfl: 3      ALLERGIES:  Allergies   Allergen Reactions     Amoxicillin Hives     Tolerated ceftriaxone Aug 2019 without a reaction     Ceclor [Cefaclor Monohydrate] Hives     Tolerated ceftriaxone Aug 2019 without a reaction       Hydrocodone-Acetaminophen Nausea     Penicillins Hives     Tolerated ceftriaxone Aug 2019 without a reaction       Sulfa Drugs Hives       PMH, SOC HIST, FAM HIST, PROBLEM LIST:  All reviewed in EPIC.    FOCUS EXAMINATION:  /71 (BP Location: Right arm, Patient Position: Chair, Cuff Size: Adult Regular)   Pulse 98   Resp 16   Wt 82.8 kg (182 lb 9.6 oz)   SpO2 100%   BMI 27.76 kg/m     General:Well developed well nourished male in no apparent distress. He is accompanied.    LOC/Cognition: He is A&O X3.  Mood and affect WNL. Language and fund of knowledge intact.  Is able to sit and rise independently.     Focus neurologic exam: CN II- XII are grossly intact. Strength is 5/5 in four extremities. Sensation is intact to light touch.  Incision: Approximated by dissolvable sutures. No erythema, swelling, induration, and/or drainage.      ASSESSMENT:  1. Partial epilepsy with impairment of consciousness, with intractable epilepsy (H)    2. Encounter for post surgical wound check    3. Postoperative state      Tha Mcghee is currently suffering from migraine  following the surgery. Otherwise, he has had no recurrent seizures and the wound is healthy without evidence of infection.    PLAN:  We discussed wound care.  *  Keep it open to air  *  May shower and shampoo with mild soap/shampoo including the incision. No hair conditioners, hair treatments or skin cream for two more weeks.  *  May swim or bathe when all scabbing is gone from the incision in about 2 more weeks.  *  Call our services if you develop fever, chills, redness, swelling, and/or drainage from incision.  We discussed medication.    *  We recommend the patient continue taking Toradol and Compazine for headache as it seems helpful. It was also noted in EMR that Dr. Araujo has prescribed Topamax for his headache on 9/24/2019. We recommend the patient try Topamax and discuss treatment for his headache with Dr. Araujo.  We discussed activities and return to work.  *  We recommend he continue the current activity restrictions for 4 more week and then gradually return to normal activities as able.  We discussed follow up    *  Return to Dr. Funes's clinic in 4 weeks.   *  Follow up with Dr. Araujo for seizure and headache management on 10/9/2019.     All the patient's questions have been answered and they demonstrate good understanding of the above.The patient has our contact information and is aware that he should call if he has questions or concerns.     We appreciate the opportunity to be of service in the care of this pleasant patient.  Please do call if there is anything more we can do.    This note was completed using Dragon voice recognition software.  Although reviewed after completion, some word and grammatical errors may occur.    Serene Soriano, STEVE, APRN, FNP-BC  Department of Neurosurgery

## 2019-09-26 NOTE — NURSING NOTE
Chief Complaint   Patient presents with     RECHECK     [facility] RETURN 2 WEEK POST OP     Yanna Bui CMA

## 2019-10-01 ENCOUNTER — TELEPHONE (OUTPATIENT)
Dept: NEUROLOGY | Facility: CLINIC | Age: 29
End: 2019-10-01

## 2019-10-01 NOTE — TELEPHONE ENCOUNTER
Call returned to the patient. He states that he is asking about swimming restrictions in regards to his incision. I advised that he needs to ask Dr. Funes's team this question. I told him I would pass on his question to Apurva Wharton RN.

## 2019-10-01 NOTE — TELEPHONE ENCOUNTER
What is the concern that needs to be addressed by a nurse? Wants to know when he can swim or go in a hot tub after surgery.     May a detailed message be left on voicemail? yes    Date of last office visit:     Message routed to: mincep rn pool

## 2019-10-02 NOTE — TELEPHONE ENCOUNTER
Received message from [facility] that pt wants to know when he can swim/submerge his incisions. Left VM for pt letting him know to hold off on doing so until he is seen by Dr. Funes on 10/21/19. This is a separate issue, however, from neurology's recommendations about swimming/tubs in the context of pt's seizure history. Advised pt to follow up with his neurology team for safety recommendations. Left my direct number should he have additional questions.

## 2019-10-04 DIAGNOSIS — G43.911 INTRACTABLE MIGRAINE WITH STATUS MIGRAINOSUS, UNSPECIFIED MIGRAINE TYPE: Primary | ICD-10-CM

## 2019-10-04 DIAGNOSIS — R51.9 HEADACHE: ICD-10-CM

## 2019-10-04 DIAGNOSIS — L76.82 INCISIONAL PAIN: ICD-10-CM

## 2019-10-04 DIAGNOSIS — G89.18 POSTOPERATIVE PAIN: ICD-10-CM

## 2019-10-07 DIAGNOSIS — G43.809 OTHER MIGRAINE WITHOUT STATUS MIGRAINOSUS, NOT INTRACTABLE: ICD-10-CM

## 2019-10-07 RX ORDER — KETOROLAC TROMETHAMINE 10 MG/1
TABLET, FILM COATED ORAL
Qty: 10 TABLET | Refills: 0 | OUTPATIENT
Start: 2019-10-07

## 2019-10-07 RX ORDER — KETOROLAC TROMETHAMINE 10 MG/1
TABLET, FILM COATED ORAL
Qty: 14 TABLET | Refills: 0 | Status: SHIPPED | OUTPATIENT
Start: 2019-10-07 | End: 2019-10-09

## 2019-10-08 ENCOUNTER — DOCUMENTATION ONLY (OUTPATIENT)
Dept: NEUROSURGERY | Facility: CLINIC | Age: 29
End: 2019-10-08

## 2019-10-09 ENCOUNTER — OFFICE VISIT (OUTPATIENT)
Dept: NEUROLOGY | Facility: CLINIC | Age: 29
End: 2019-10-09
Payer: COMMERCIAL

## 2019-10-09 VITALS
TEMPERATURE: 98.4 F | HEART RATE: 105 BPM | SYSTOLIC BLOOD PRESSURE: 121 MMHG | BODY MASS INDEX: 27.52 KG/M2 | WEIGHT: 181 LBS | DIASTOLIC BLOOD PRESSURE: 76 MMHG

## 2019-10-09 DIAGNOSIS — L76.82 INCISIONAL PAIN: ICD-10-CM

## 2019-10-09 DIAGNOSIS — G40.209 LOCALIZATION-RELATED FOCAL EPILEPSY WITH COMPLEX PARTIAL SEIZURES (H): ICD-10-CM

## 2019-10-09 DIAGNOSIS — G40.219 PARTIAL EPILEPSY WITH IMPAIRMENT OF CONSCIOUSNESS, INTRACTABLE (H): ICD-10-CM

## 2019-10-09 DIAGNOSIS — G40.919 INTRACTABLE EPILEPSY WITHOUT STATUS EPILEPTICUS, UNSPECIFIED EPILEPSY TYPE (H): ICD-10-CM

## 2019-10-09 DIAGNOSIS — G89.18 POSTOPERATIVE PAIN: ICD-10-CM

## 2019-10-09 DIAGNOSIS — G43.911 INTRACTABLE MIGRAINE WITH STATUS MIGRAINOSUS, UNSPECIFIED MIGRAINE TYPE: ICD-10-CM

## 2019-10-09 RX ORDER — PHENYTOIN SODIUM 200 MG/1
200 CAPSULE, EXTENDED RELEASE ORAL 2 TIMES DAILY
Qty: 180 CAPSULE | Refills: 3 | Status: SHIPPED | OUTPATIENT
Start: 2019-10-09 | End: 2020-01-21

## 2019-10-09 RX ORDER — TOPIRAMATE 50 MG/1
50 TABLET, FILM COATED ORAL 2 TIMES DAILY
Qty: 180 TABLET | Refills: 3 | Status: SHIPPED | OUTPATIENT
Start: 2019-10-09 | End: 2020-01-21

## 2019-10-09 RX ORDER — PROCHLORPERAZINE MALEATE 10 MG
10 TABLET ORAL EVERY 6 HOURS PRN
Qty: 30 TABLET | Refills: 3 | Status: SHIPPED | OUTPATIENT
Start: 2019-10-09 | End: 2020-07-28

## 2019-10-09 RX ORDER — KETOROLAC TROMETHAMINE 10 MG/1
TABLET, FILM COATED ORAL
Qty: 14 TABLET | Refills: 3 | Status: SHIPPED | OUTPATIENT
Start: 2019-10-09 | End: 2020-07-28

## 2019-10-09 RX ORDER — GABAPENTIN 300 MG/1
CAPSULE ORAL
Qty: 120 CAPSULE | Refills: 11 | Status: SHIPPED | OUTPATIENT
Start: 2019-10-09 | End: 2020-01-21

## 2019-10-09 RX ORDER — LACOSAMIDE 200 MG/1
200 TABLET ORAL 2 TIMES DAILY
Qty: 60 TABLET | Refills: 5 | Status: SHIPPED | OUTPATIENT
Start: 2019-10-09 | End: 2020-01-21

## 2019-10-09 ASSESSMENT — PAIN SCALES - GENERAL: PAINLEVEL: MODERATE PAIN (4)
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
## 2019-10-09 NOTE — PATIENT INSTRUCTIONS
Continue  Vimpat 200-200    Dilantin 200-200  Topiramate 50 mg twice a day                Medication Name   Tablet Size         AM  (morning)   PM (Night)   Notes    Week 1-2   gabapentin  300 mg   1 tablet    2 tablet       Week 3-onward   gabapentin  300 mg   2 tablet    2 tablet          Headaches - Toradol, compazine, benadryl for pain every 6 hours as needed.   Hydrate with water 10 glasses per day       CONTINUE TAKING YOUR OTHER MEDICATIONS AS PREVIOUSLY DIRECTED.  IF YOU HAVE ANY SIDE EFFECTS OR CONCERNS CALL [facility] OFFICE [phone].     Rajni Araujo MD

## 2019-10-21 ENCOUNTER — OFFICE VISIT (OUTPATIENT)
Dept: NEUROSURGERY | Facility: CLINIC | Age: 29
End: 2019-10-21
Payer: COMMERCIAL

## 2019-10-21 VITALS
SYSTOLIC BLOOD PRESSURE: 138 MMHG | WEIGHT: 185 LBS | BODY MASS INDEX: 27.4 KG/M2 | DIASTOLIC BLOOD PRESSURE: 86 MMHG | OXYGEN SATURATION: 98 % | HEIGHT: 69 IN | HEART RATE: 93 BPM | RESPIRATION RATE: 16 BRPM

## 2019-10-21 DIAGNOSIS — Z98.890 S/P CRANIOTOMY: Primary | ICD-10-CM

## 2019-10-21 DIAGNOSIS — G40.219 PARTIAL EPILEPSY WITH IMPAIRMENT OF CONSCIOUSNESS, WITH INTRACTABLE EPILEPSY (H): ICD-10-CM

## 2019-10-21 DIAGNOSIS — Z90.89 S/P LOBECTOMY OF BRAIN: ICD-10-CM

## 2019-10-21 ASSESSMENT — MIFFLIN-ST. JEOR: SCORE: 1789.53

## 2019-10-21 ASSESSMENT — PAIN SCALES - GENERAL: PAINLEVEL: NO PAIN (0)

## 2019-10-21 NOTE — PROGRESS NOTES
HISTORY AND PHYSICAL EXAM    Chief Complaint   Patient presents with     RECHECK     P RETURN PER EDWIN CASTILLO        HISTORY OF PRESENT ILLNESS  We saw Mr. Tha Mcghee back today in Neurosurgery Clinic for follow-up of his intractable epilepsy.  In summary, he is a 29-year-old right-handed male with a history of complex partial seizures dating back to 04/2011. His seizures are consistent for a flush of anxiety followed by confusion with lip-smacking. These symptoms can progress to a generalized tonic-clonic seizure. He has a second type of seizure in which he stares off and makes a clicking noise with his mouth. His seizures are infrequent, occurring 1-3 times per year, with the latest being in 10/2018 during his video EEG monitoring. Video EEG monitoring showed complex partial seizures that begin in the right temporal region. EEGs in the past were notable for left temporal epileptiform discharges.  His MRI of the brain was unremarkable.     He also underwent implantation of subdural strip, SEEG, and subgaleal grounding electrodes for localization of his seizure focus. His phase II monitoring revealed foci located in the mesial temporal lobe consistent with hippocampal onset. For this reason, he underwent removal of his electrodes and right temporal lobectomy along with amygdalohippocampectomy on 09/10/2019. There were no intraoperative complications. His post-operative course was notable for development of a left axillary non-occlusive DVT that was associated with a left midline catheter. He was subsequently started on Eliquis.    Currently, he is doing well and appears pleasant. He reports that he developed migraines for the first few weeks following his operation. These have significantly improved since then. He has not had any auras or seizures since his operation. He denies upper extremity swelling.       Past Medical History:   Diagnosis Date     Anxiety      Depression      Localization-related  (focal) (partial) epilepsy and epileptic syndromes with complex partial seizures, without mention of intractable epilepsy     preliminary       Past Surgical History:   Procedure Laterality Date     CRANIOTOMY REMOVAL OF GRID Right 9/10/2019    Procedure: Removal Of Right Sided Depth And Strip Electrodes, Right Craniotomy For Right Temporal Lobectomy And Amygliohippocampectomy Latex free;  Surgeon: Jason Funes MD;  Location: [facility] OR     DENTAL SURGERY      wisdom teeth extraction     IR CAROTID CEREBRAL ANGIOGRAM BILATERAL  2019    WADA test     MAGAN ASSISTED CRANIOTOMY Right 2019    Procedure: MAGAN Assisted Right Craniotomy For Right Sided Strip Electrodes;  Surgeon: Jason Funes MD;  Location: [facility] OR     MAGAN ASSISTED PLACEMENT DEPTH ELECTRODE Right 2019    Procedure: MAAGN Assisted Stereotactic Placement Of Right Sided Depth Electrodes;  Surgeon: Jason Funes MD;  Location: [facility] OR       Family History   Problem Relation Age of Onset     Melanoma Mother      Myocardial Infarction Maternal Grandfather 78     Atrial fibrillation Paternal Uncle      Atrial fibrillation Paternal Aunt      Cancer No family hx of         No family history of skin cancer     Skin Cancer No family hx of      Anesthesia Reaction No family hx of        Social History     Socioeconomic History     Marital status: Single     Spouse name: Not on file     Number of children: Not on file     Years of education: Not on file     Highest education level: Not on file   Occupational History     Not on file   Social Needs     Financial resource strain: Not on file     Food insecurity:     Worry: Not on file     Inability: Not on file     Transportation needs:     Medical: Not on file     Non-medical: Not on file   Tobacco Use     Smoking status: Former Smoker     Packs/day: 0.50     Years: 9.00     Pack years: 4.50     Types: Cigars     Last attempt to quit: 2013     Years since quittin.2      Smokeless tobacco: Former User     Types: Chew   Substance and Sexual Activity     Alcohol use: No     Alcohol/week: 0.0 standard drinks     Drug use: No     Sexual activity: Not on file   Lifestyle     Physical activity:     Days per week: Not on file     Minutes per session: Not on file     Stress: Not on file   Relationships     Social connections:     Talks on phone: Not on file     Gets together: Not on file     Attends Synagogue service: Not on file     Active member of club or organization: Not on file     Attends meetings of clubs or organizations: Not on file     Relationship status: Not on file     Intimate partner violence:     Fear of current or ex partner: Not on file     Emotionally abused: Not on file     Physically abused: Not on file     Forced sexual activity: Not on file   Other Topics Concern     Parent/sibling w/ CABG, MI or angioplasty before 65F 55M? Not Asked   Social History Narrative     Not on file          Allergies   Allergen Reactions     Amoxicillin Hives     Tolerated ceftriaxone Aug 2019 without a reaction     Ceclor [Cefaclor Monohydrate] Hives     Tolerated ceftriaxone Aug 2019 without a reaction       Hydrocodone-Acetaminophen Nausea     Penicillins Hives     Tolerated ceftriaxone Aug 2019 without a reaction       Sulfa Drugs Hives       Current Outpatient Medications   Medication     albuterol (PROAIR HFA/PROVENTIL HFA/VENTOLIN HFA) 108 (90 Base) MCG/ACT inhaler     ALPRAZolam (XANAX) 0.5 MG tablet     cholecalciferol (VITAMIN D3) 5000 units TABS tablet     ciclopirox (LOPROX) 0.77 % cream     cyclobenzaprine (FLEXERIL) 10 MG tablet     docusate sodium (COLACE) 100 MG capsule     gabapentin (NEURONTIN) 300 MG capsule     ketorolac (TORADOL) 10 MG tablet     lacosamide (VIMPAT) 200 MG TABS tablet     omeprazole (PRILOSEC) 10 MG DR capsule     phenytoin sodium extended (DILANTIN) 200 MG capsule     prochlorperazine (COMPAZINE) 10 MG tablet     Pwqabsrvgtt-Kzyeiaci-KO-GG (ACCUHIST  DM OR)     rivaroxaban ANTICOAGULANT (XARELTO ANTICOAGULANT) 20 MG TABS tablet     topiramate (TOPAMAX) 50 MG tablet     venlafaxine (EFFEXOR XR) 75 MG 24 hr capsule     rivaroxaban ANTICOAGULANT (XARELTO ANTICOAGULANT) 15 MG TABS tablet     No current facility-administered medications for this visit.        REVIEW OF SYSTEMS  Answers for HPI/ROS submitted by the patient on 1/14/2019 - Updated 10/21/2019  General Symptoms: Yes  Skin Symptoms: No  HENT Symptoms: Yes  EYE SYMPTOMS: No  HEART SYMPTOMS: No  LUNG SYMPTOMS: Yes  INTESTINAL SYMPTOMS: Yes  URINARY SYMPTOMS: Yes  REPRODUCTIVE SYMPTOMS: No  SKELETAL SYMPTOMS: Yes  BLOOD SYMPTOMS: No  NERVOUS SYSTEM SYMPTOMS: No  MENTAL HEALTH SYMPTOMS: Yes  Fever: No  Loss of appetite: Yes  Weight loss: No  Weight gain: Yes  Fatigue: Yes  Night sweats: Yes  Chills: Yes  Increased stress: No  Excessive hunger: No  Excessive thirst: Yes  Feeling hot or cold when others believe the temperature is normal: Yes  Loss of height: No  Post-operative complications: No  Surgical site pain: No  Hallucinations: No  Change in or Loss of Energy: Yes  Hyperactivity: Yes  Confusion: No  Ear pain: No  Ear discharge: No  Hearing loss: No  Tinnitus: No  Nosebleeds: No  Congestion: Yes  Sinus pain: Yes  Trouble swallowing: No   Voice hoarseness: No  Mouth sores: No  Sore throat: Yes  Tooth pain: No  Gum tenderness: No  Bleeding gums: No  Change in taste: Yes  Change in sense of smell: Yes  Dry mouth: Yes  Hearing aid used: No  Neck lump: No  Cough: Yes  Sputum or phlegm: Yes  Coughing up blood: No  Difficulty breating or shortness of breath: No  Snoring: Yes  Wheezing: Yes  Difficulty breathing on exertion: No  Nighttime Cough: Yes  Difficulty breathing when lying flat: Yes  Heart burn or indigestion: No  Nausea: No  Vomiting: No  Abdominal pain: No  Bloating: No  Constipation: No  Diarrhea: No  Blood in stool: No  Black stools: No  Rectal or Anal pain: Yes  Fecal incontinence: No  Yellowing of  "skin or eyes: No  Vomit with blood: No  Change in stools: No  Trouble holding urine or incontinence: Yes  Pain or burning: No  Trouble starting or stopping: No  Increased frequency of urination: No  Blood in urine: No  Decreased frequency of urination: No  Frequent nighttime urination: Yes  Flank pain: No  Difficulty emptying bladder: Yes  Back pain: Yes  Muscle aches: Yes  Neck pain: Yes  Swollen joints: Yes  Joint pain: Yes  Bone pain: No  Muscle cramps: No  Muscle weakness: Yes  Joint stiffness: Yes  Bone fracture: No  Nervous or Anxious: Yes  Depression: Yes  Trouble sleeping: No  Trouble thinking or concentrating: Yes  Mood changes: No  Panic attacks: Yes      PHYSICAL EXAM  /86 (BP Location: Right arm, Patient Position: Chair, Cuff Size: Adult Regular)   Pulse 93   Resp 16   Ht 1.753 m (5' 9\")   Wt 83.9 kg (185 lb)   SpO2 98%   BMI 27.32 kg/m      General: Awake, alert, oriented. Well nourished, well developed, he is not in any acute distress.  HEENT: Head normocephalic, atraumatic. No carotid bruit. Neck supple. Good range of motion. No palpable thyroid mass.  Heart: Regular rhythm and rate. No murmurs.  Lungs: Clear to auscultation and percussion bilaterally. No rhonchi, rales or wheeze.  Abdomen: Soft, non-tender, non-distended. No hepatosplenomegaly.  Extremity: Warm with no clubbing or cyanosis. No lower extremity edema.  Incisions: right temporal lobectomy incision is healing well.  No redness.  No drainage.  No fluid collection.     Neurological  Awake, alert and oriented to date, time, place and person. Speech fluent.   Pupils equal, round, reactive to light.  Extraocular movement intact.  Visual Fields are full on confrontation.  Hearing is grossly normal to finger rub.   Facial sensation intact.  Face symmetric.  Tongue midline.  Uvula elevates equally.     Motor: full strength throughout.  Sensation: intact to light touch and pinpoint.  Deep tendon reflexes: 1+ throughout. Negative for " "clonus. Negative for Chaudhry's sign. No dysmetria.      ASSESSMENT  29-year-old right-handed male with a history of complex partial seizures that evolve into GTC seizures dating back to 4/2011\  S/p invasive EEG monitoring  S/p right temporal lobectomy along with amygdalohippocampectomy on 09/10/2019  History of optic neuritis - left eye vision improving as of 11/7/2018    Mr. Mcghee seems to be recovering well from his recent surgery.  He raised a question that he was told he had a \"brain bleed\" intraoperatively.  He and his family were somewhat upset and surprised that this was not relayed to them.      Patient had a postoperative subdural fluid collection with blood in his routine postoperative imaging.  I explained that this is actually post-operative subdural fluid mixed with hemorrhage, which is to be expected given the nature of his surgery.  I reassured him that this is a routine post-operative finding and typically we do not discuss this as it is not an unusual finding.  I also told him that he need not worry about this.      We will get another MRI of the brain in 6 months for monitoring and further evaluation of his resection cavity.  Because he does come from far away, he can obtain the MRI of the brain without/with gadolinium near his home and then his results can be pushed to our PACS.  Then, I can call him and his family over the phone to discuss the results of the MRI rather than having him drive to clinic.       PLAN  1.  We will get an MRI of the brain in 6 months and follow-up with him with results by phone.     I, Patricio Webb, am serving as a scribe to document services personally performed by Jason Funes MD, PhD, based upon my observations and the provider's statements to me. All documentation has been reviewed and edited by the aforementioned doctor prior to being entered into the official medical record.    I, Jason Funes, attest that above named individual is acting in scribe " capacity, has observed my performance of the services and has documented them in accordance with my direction. The documentation recorded by the scribe accurately reflects the service I personally performed and the decisions made by me. The document was also partially recorded by me and the entire document was edited by me as well.

## 2019-10-21 NOTE — LETTER
10/21/2019       RE: Tha Mcghee  [address] [id number]     Dear Colleague,    Thank you for referring your patient, Tha Mcghee, to the [facility] NEUROSURGERY at [facility]. Please see a copy of my visit note below.    HISTORY AND PHYSICAL EXAM    Chief Complaint   Patient presents with     RECHECK     [facility] RETURN PER EDWIN CASTILLO        HISTORY OF PRESENT ILLNESS  We saw Mr. Tha Mcghee back today in Neurosurgery Clinic for follow-up of his intractable epilepsy.  In summary, he is a 29-year-old right-handed male with a history of complex partial seizures dating back to 04/2011. His seizures are consistent for a flush of anxiety followed by confusion with lip-smacking. These symptoms can progress to a generalized tonic-clonic seizure. He has a second type of seizure in which he stares off and makes a clicking noise with his mouth. His seizures are infrequent, occurring 1-3 times per year, with the latest being in 10/2018 during his video EEG monitoring. Video EEG monitoring showed complex partial seizures that begin in the right temporal region. EEGs in the past were notable for left temporal epileptiform discharges.  His MRI of the brain was unremarkable.     He also underwent implantation of subdural strip, SEEG, and subgaleal grounding electrodes for localization of his seizure focus. His phase II monitoring revealed foci located in the mesial temporal lobe consistent with hippocampal onset. For this reason, he underwent removal of his electrodes and right temporal lobectomy along with amygdalohippocampectomy on 09/10/2019. There were no intraoperative complications. His post-operative course was notable for development of a left axillary non-occlusive DVT that was associated with a left midline catheter. He was subsequently started on Eliquis.    Currently, he is doing well and appears pleasant. He reports that he developed migraines for the first  few weeks following his operation. These have significantly improved since then. He has not had any auras or seizures since his operation. He denies upper extremity swelling.       Past Medical History:   Diagnosis Date     Anxiety      Depression      Localization-related (focal) (partial) epilepsy and epileptic syndromes with complex partial seizures, without mention of intractable epilepsy     preliminary       Past Surgical History:   Procedure Laterality Date     CRANIOTOMY REMOVAL OF GRID Right 9/10/2019    Procedure: Removal Of Right Sided Depth And Strip Electrodes, Right Craniotomy For Right Temporal Lobectomy And Amygliohippocampectomy Latex free;  Surgeon: Jason Funes MD;  Location: [facility] OR     DENTAL SURGERY      wisdom teeth extraction     IR CAROTID CEREBRAL ANGIOGRAM BILATERAL  1/17/2019    WADA test     MAGAN ASSISTED CRANIOTOMY Right 8/27/2019    Procedure: MAGAN Assisted Right Craniotomy For Right Sided Strip Electrodes;  Surgeon: Jason Funes MD;  Location: [facility] OR     MAGAN ASSISTED PLACEMENT DEPTH ELECTRODE Right 8/27/2019    Procedure: MAGAN Assisted Stereotactic Placement Of Right Sided Depth Electrodes;  Surgeon: Jason Funes MD;  Location: [facility] OR       Family History   Problem Relation Age of Onset     Melanoma Mother      Myocardial Infarction Maternal Grandfather 78     Atrial fibrillation Paternal Uncle      Atrial fibrillation Paternal Aunt      Cancer No family hx of         No family history of skin cancer     Skin Cancer No family hx of      Anesthesia Reaction No family hx of        Social History     Socioeconomic History     Marital status: Single     Spouse name: Not on file     Number of children: Not on file     Years of education: Not on file     Highest education level: Not on file   Occupational History     Not on file   Social Needs     Financial resource strain: Not on file     Food insecurity:     Worry: Not on file     Inability: Not on file      Transportation needs:     Medical: Not on file     Non-medical: Not on file   Tobacco Use     Smoking status: Former Smoker     Packs/day: 0.50     Years: 9.00     Pack years: 4.50     Types: Cigars     Last attempt to quit: 2013     Years since quittin.2     Smokeless tobacco: Former User     Types: Chew   Substance and Sexual Activity     Alcohol use: No     Alcohol/week: 0.0 standard drinks     Drug use: No     Sexual activity: Not on file   Lifestyle     Physical activity:     Days per week: Not on file     Minutes per session: Not on file     Stress: Not on file   Relationships     Social connections:     Talks on phone: Not on file     Gets together: Not on file     Attends Yazidi service: Not on file     Active member of club or organization: Not on file     Attends meetings of clubs or organizations: Not on file     Relationship status: Not on file     Intimate partner violence:     Fear of current or ex partner: Not on file     Emotionally abused: Not on file     Physically abused: Not on file     Forced sexual activity: Not on file   Other Topics Concern     Parent/sibling w/ CABG, MI or angioplasty before 65F 55M? Not Asked   Social History Narrative     Not on file          Allergies   Allergen Reactions     Amoxicillin Hives     Tolerated ceftriaxone Aug 2019 without a reaction     Ceclor [Cefaclor Monohydrate] Hives     Tolerated ceftriaxone Aug 2019 without a reaction       Hydrocodone-Acetaminophen Nausea     Penicillins Hives     Tolerated ceftriaxone Aug 2019 without a reaction       Sulfa Drugs Hives       Current Outpatient Medications   Medication     albuterol (PROAIR HFA/PROVENTIL HFA/VENTOLIN HFA) 108 (90 Base) MCG/ACT inhaler     ALPRAZolam (XANAX) 0.5 MG tablet     cholecalciferol (VITAMIN D3) 5000 units TABS tablet     ciclopirox (LOPROX) 0.77 % cream     cyclobenzaprine (FLEXERIL) 10 MG tablet     docusate sodium (COLACE) 100 MG capsule     gabapentin (NEURONTIN) 300 MG  "capsule     ketorolac (TORADOL) 10 MG tablet     lacosamide (VIMPAT) 200 MG TABS tablet     omeprazole (PRILOSEC) 10 MG DR capsule     phenytoin sodium extended (DILANTIN) 200 MG capsule     prochlorperazine (COMPAZINE) 10 MG tablet     Iiobgaeosev-Jqtcmezu-OU-GG (ACCUHIST DM OR)     rivaroxaban ANTICOAGULANT (XARELTO ANTICOAGULANT) 20 MG TABS tablet     topiramate (TOPAMAX) 50 MG tablet     venlafaxine (EFFEXOR XR) 75 MG 24 hr capsule     rivaroxaban ANTICOAGULANT (XARELTO ANTICOAGULANT) 15 MG TABS tablet     No current facility-administered medications for this visit.        PHYSICAL EXAM  /86 (BP Location: Right arm, Patient Position: Chair, Cuff Size: Adult Regular)   Pulse 93   Resp 16   Ht 1.753 m (5' 9\")   Wt 83.9 kg (185 lb)   SpO2 98%   BMI 27.32 kg/m       General: Awake, alert, oriented. Well nourished, well developed, he is not in any acute distress.  HEENT: Head normocephalic, atraumatic. No carotid bruit. Neck supple. Good range of motion. No palpable thyroid mass.  Heart: Regular rhythm and rate. No murmurs.  Lungs: Clear to auscultation and percussion bilaterally. No rhonchi, rales or wheeze.  Abdomen: Soft, non-tender, non-distended. No hepatosplenomegaly.  Extremity: Warm with no clubbing or cyanosis. No lower extremity edema.  Incisions: right temporal lobectomy incision is healing well.  No redness.  No drainage.  No fluid collection.     Neurological  Awake, alert and oriented to date, time, place and person. Speech fluent.   Pupils equal, round, reactive to light.  Extraocular movement intact.  Visual Fields are full on confrontation.  Hearing is grossly normal to finger rub.   Facial sensation intact.  Face symmetric.  Tongue midline.  Uvula elevates equally.     Motor: full strength throughout.  Sensation: intact to light touch and pinpoint.  Deep tendon reflexes: 1+ throughout. Negative for clonus. Negative for Chaudhry's sign. No dysmetria.    ASSESSMENT  29-year-old right-handed " "male with a history of complex partial seizures that evolve into GTC seizures dating back to 4/2011\  S/p invasive EEG monitoring  S/p right temporal lobectomy along with amygdalohippocampectomy on 09/10/2019  History of optic neuritis - left eye vision improving as of 11/7/2018    Mr. Mcghee seems to be recovering well from his recent surgery.  He raised a question that he was told he had a \"brain bleed\" intraoperatively.  He and his family were somewhat upset and surprised that this was not relayed to them.      Patient had a postoperative subdural fluid collection with blood in his routine postoperative imaging.  I explained that this is actually post-operative subdural fluid mixed with hemorrhage, which is to be expected given the nature of his surgery.  I reassured him that this is a routine post-operative finding and typically we do not discuss this as it is not an unusual finding.  I also told him that he need not worry about this.      We will get another MRI of the brain in 6 months for monitoring and further evaluation of his resection cavity.  Because he does come from far away, he can obtain the MRI of the brain without/with gadolinium near his home and then his results can be pushed to our PACS.  Then, I can call him and his family over the phone to discuss the results of the MRI rather than having him drive to clinic.       PLAN  1.  We will get an MRI of the brain in 6 months and follow-up with him with results by phone.     I, Patricio Webb, am serving as a scribe to document services personally performed by Jason Funes MD, PhD, based upon my observations and the provider's statements to me. All documentation has been reviewed and edited by the aforementioned doctor prior to being entered into the official medical record.    I, Jason Funes, attest that above named individual is acting in scribe capacity, has observed my performance of the services and has documented them in accordance with my " direction. The documentation recorded by the scribe accurately reflects the service I personally performed and the decisions made by me. The document was also partially recorded by me and the entire document was edited by me as well.     Jason Funes MD

## 2019-11-28 ENCOUNTER — TELEPHONE (OUTPATIENT)
Dept: NEUROLOGY | Facility: CLINIC | Age: 29
End: 2019-11-28

## 2019-11-28 DIAGNOSIS — G40.219 PARTIAL EPILEPSY WITH IMPAIRMENT OF CONSCIOUSNESS, INTRACTABLE (H): Primary | ICD-10-CM

## 2019-11-28 DIAGNOSIS — G40.219 PARTIAL EPILEPSY WITH IMPAIRMENT OF CONSCIOUSNESS, INTRACTABLE (H): ICD-10-CM

## 2019-11-28 RX ORDER — LACOSAMIDE 200 MG/1
200 TABLET ORAL 2 TIMES DAILY
Qty: 60 TABLET | Refills: 5 | Status: SHIPPED | OUTPATIENT
Start: 2019-11-28 | End: 2020-01-21

## 2019-11-28 RX ORDER — LACOSAMIDE 200 MG/1
200 TABLET ORAL 2 TIMES DAILY
Qty: 10 TABLET | Refills: 0 | Status: SHIPPED | OUTPATIENT
Start: 2019-11-28 | End: 2019-11-28

## 2019-11-28 NOTE — TELEPHONE ENCOUNTER
Patient called that he is out of Vimpat and is looking for prescription.  I told him that I am a resident I cannot prescribe Vimpat.  He is one of Dr. Araujo's patient. I communicated that with Dr. Araujo.  I am waiting for response.  I tried to order the medication but it did not go through as it requires prior authorization.    Ariel Hodge  Neurology resident   Pager: [phone]

## 2020-01-21 ENCOUNTER — OFFICE VISIT (OUTPATIENT)
Dept: NEUROLOGY | Facility: CLINIC | Age: 30
End: 2020-01-21
Payer: COMMERCIAL

## 2020-01-21 VITALS
BODY MASS INDEX: 27.47 KG/M2 | HEART RATE: 108 BPM | DIASTOLIC BLOOD PRESSURE: 83 MMHG | SYSTOLIC BLOOD PRESSURE: 136 MMHG | WEIGHT: 186 LBS

## 2020-01-21 DIAGNOSIS — G40.209 LOCALIZATION-RELATED FOCAL EPILEPSY WITH COMPLEX PARTIAL SEIZURES (H): ICD-10-CM

## 2020-01-21 DIAGNOSIS — R20.0 HAND NUMBNESS: Primary | ICD-10-CM

## 2020-01-21 DIAGNOSIS — G40.219 PARTIAL EPILEPSY WITH IMPAIRMENT OF CONSCIOUSNESS, INTRACTABLE (H): ICD-10-CM

## 2020-01-21 DIAGNOSIS — G40.919 INTRACTABLE EPILEPSY WITHOUT STATUS EPILEPTICUS, UNSPECIFIED EPILEPSY TYPE (H): ICD-10-CM

## 2020-01-21 RX ORDER — TOPIRAMATE 50 MG/1
50 TABLET, FILM COATED ORAL 2 TIMES DAILY
Qty: 180 TABLET | Refills: 3 | Status: SHIPPED | OUTPATIENT
Start: 2020-01-21 | End: 2020-07-28

## 2020-01-21 RX ORDER — GABAPENTIN 300 MG/1
CAPSULE ORAL
Qty: 360 CAPSULE | Refills: 3 | Status: SHIPPED | OUTPATIENT
Start: 2020-01-21 | End: 2020-07-28

## 2020-01-21 RX ORDER — PHENYTOIN SODIUM 200 MG/1
200 CAPSULE, EXTENDED RELEASE ORAL 2 TIMES DAILY
Qty: 180 CAPSULE | Refills: 3 | Status: SHIPPED | OUTPATIENT
Start: 2020-01-21 | End: 2020-07-28

## 2020-01-21 RX ORDER — LACOSAMIDE 200 MG/1
200 TABLET ORAL 2 TIMES DAILY
Qty: 60 TABLET | Refills: 5 | Status: SHIPPED | OUTPATIENT
Start: 2020-01-21 | End: 2020-07-28

## 2020-01-21 ASSESSMENT — PAIN SCALES - GENERAL: PAINLEVEL: NO PAIN (0)

## 2020-01-21 NOTE — PATIENT INSTRUCTIONS
Continue  Vimpat 200-200    Dilantin 200-200  Topiramate 50 mg twice a day   Gabapentin  600 mg twice a day     Follow up with Dr. Lorenzo for right arm numbness (EMG/NCS and MRI orders per Dr. Lorenzo's recommendation)    He will continue follow up with psychiatrist for anxiety treatment     For severe headaches - Toradol, compazine, benadryl for pain every 6 hours as needed.   Hydrate with water 10 glasses per day

## 2020-01-21 NOTE — PROGRESS NOTES
[facility] Epilepsy Care Progress Note    Patient:  Tha Mcghee  :  1990   Age:  29 year old   Today's Office Visit:  2020         EPILEPSY HISTORY:  Copied forward: The patient had his first seizure on 2011 and he came under [facility] care on  2011 under Dr. Blanco's care initially.  He typically has aura -> GTC. His seizure type he describes as a flush of anxiety followed by confusion with lip smacking and clicking sound in his throat.  This progresses to a generalized tonic-clonic seizure.  He was initially started on phenytoin (this helps his GTC), then VPA was added 2013 (this helps HA and seizure). Lastly, topiramate was added in  which has helped his headaches and seizure. His EEGs in the past have been notable for rare left temporal epileptiform discharges.  His MRI of the brain is normal and nonlesional.     INTERVAL HISTORY: He came alone. S/p Right Temporal Lobectomy 9/10/2019. He went to ER 2019 for alcohol intoxication and excessive vomiting. Right arm numbness started 1 month ago. Since last visit he has intermittent right hand numbness and arm numbness, this may happen in the day and night time. He notices it more at night and it rarely occurs during the day. I recommend at NCS study and then we can consider MRI brain and C spine. No vision/weakness/diziness changes are noted. Headaches are controlled.     Last seizure was 2019 (during the inpatient Video EEG evaluation), prior to this he had seizure cluster was 10/2018 he had 2 generalized tonic-clonic seizures in 2-4 complex partial seizures.     On today's visit we spent the entire time talking about seizure medications and right arm numbness. He was encouraged to continue his antiseizure medication and follow up  With Dr. Lorenzo for workup.           Prior to Admission medications    Medication Sig Start Date End Date Taking? Authorizing Provider   albuterol (PROAIR HFA/PROVENTIL HFA/VENTOLIN HFA) 108 (90  Base) MCG/ACT inhaler Inhale 2 puffs into the lungs as needed 12/24/18  Yes Reported, Patient   ALPRAZolam (XANAX) 0.5 MG tablet Take 0.5 mg by mouth as needed.   Yes Reported, Patient   cholecalciferol (VITAMIN D3) 5000 units TABS tablet Take 5,000 Units by mouth At Bedtime  7/24/18  Yes Reported, Patient   ciclopirox (LOPROX) 0.77 % cream Apply topically as needed Pt. Last used 08/18/2019 5/24/19  Yes Reported, Patient   cyclobenzaprine (FLEXERIL) 10 MG tablet 1 tablet (10 mg) by Oral or Feeding Tube route 3 times daily as needed for muscle spasms 9/13/19  Yes Leslie Jimenez APRN CNP   diazepam (DIAZEPAM INTENSOL) 5 MG/ML (HIGH CONC) solution For generalized seizures give 5 mg.  May repeat and give 2.5 mg after 10 minutes if needed. Do not exceed 7.5mg 5/23/19  Yes Rajni Araujo MD   docusate sodium (COLACE) 100 MG capsule Take 100 mg by mouth 2 times daily  8/10/17  Yes Reported, Patient   gabapentin (NEURONTIN) 300 MG capsule 300mg twice daily 6/18/19  Yes Rajni Araujo MD   HYDROmorphone (DILAUDID) 2 MG tablet Take 1-2 tablets (2-4 mg) by mouth every 6 hours as needed for severe pain 9/13/19  Yes Leslie Jimenez APRN CNP   ketorolac (TORADOL) 10 MG tablet Take 2 tablets (20 mg) one time.   Then 1 tablet (10 mg) every 6 hours 10/7/19  Yes Rajni Araujo MD   ketorolac (TORADOL) 10 MG tablet Take 2 tablets (20 mg) by mouth every 6 hours as needed for moderate pain 9/25/19  Yes Rajni Araujo MD   lacosamide (VIMPAT) 200 MG TABS tablet Take 1 tablet (200 mg) by mouth 2 times daily 6/18/19  Yes Rajni Araujo MD   omeprazole (PRILOSEC) 10 MG DR capsule Take 20 mg by mouth every morning   Yes Reported, Patient   ondansetron (ZOFRAN-ODT) 4 MG ODT tab Take 1-2 tablets (4-8 mg) by mouth every 6 hours as needed for nausea or vomiting 9/13/19  Yes Leslie Jimenez APRN CNP   phenytoin (DILANTIN) 200 MG capsule Take 1 capsule (200 mg) by mouth 2 times daily 9/13/19  Yes Leslie Jimenez  CHEYANNE Livingston CNP   prochlorperazine (COMPAZINE) 10 MG tablet Take 1 tablet (10 mg) by mouth every 6 hours as needed for vomiting 9/25/19  Yes Rajni Araujo MD   promethazine (PHENERGAN) 25 MG tablet Take 1 tablet (25 mg) by mouth every 6 hours as needed for nausea (take with maxalt for nausea with migraines) 3/5/19  Yes Rajni Araujo MD   Tywumxjroln-Rcgwynrc-XE-GG (ACCUHIST DM OR) Take 200 mg by mouth At Bedtime   Yes Reported, Patient   rivaroxaban ANTICOAGULANT (XARELTO ANTICOAGULANT) 20 MG TABS tablet Take 1 tablet (20 mg) by mouth daily (with dinner) 9/16/19  Yes Leslie Jimenez APRN CNP   rizatriptan (MAXALT) 10 MG tablet Take 1 tablet (10 mg) by mouth at onset of headache for migraine May repeat in 2 hours. Max 3 tablets/24 hours. 6/18/19  Yes Rajni Araujo MD   topiramate (TOPAMAX) 25 MG tablet Take 2 tablets (50 mg) by mouth 2 times daily 9/24/19  Yes Rajni Araujo MD   topiramate (TOPAMAX) 50 MG tablet Take 1 tablet (50 mg) by mouth 2 times daily 9/13/19  Yes Leslie Jimenez APRN CNP   venlafaxine (EFFEXOR XR) 75 MG 24 hr capsule Take 3 capsules (225 mg) by mouth every morning 1/4/18  Yes Rajni Araujo MD   rivaroxaban ANTICOAGULANT (XARELTO ANTICOAGULANT) 15 MG TABS tablet Take 1 tablet (15 mg) by mouth 2 times daily (with meals) for 21 days 9/16/19 10/7/19  Leslie Jimenez APRN CNP       MEDICATIONS:     Vimpat 200-200    Gabapentin 300-300  Dilantin 200-200  Topiramate 50 mg twice a day        MEDICATION ISSUES:    1. Depakote: Started VPA 1/7/2013 increased depakote [id number] July 2015. Not sure if depakote helped him. Depakote was stopped 9/2019 for epilepsy surgery.   2. Topiramate: TPM started 5/9/14 for seizure and headache. Topiramate higher than 75mg twice a day causes cognitive slowing.   3. Phenytoin is helpful   4. Levetiracetam 2018 caused severe depression   5. Vimpat was most helpful     REVIEW OF SYSTEMS:  Left eye vision is improved. Very depressed.   Patient denies nausea, vomiting, diarrhea.      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 He is driving. He is living with parents.      NEUROLOGICAL EXAMINATION:  /83   Pulse 108   Wt 186 lb (84.4 kg)   BMI 27.47 kg/m   Alert, orientated, speech is fluent, on visual confrontation testing there is no field cut.  face symmetric, no pronator drip, equal  strength, reflexes are symmetric, normal to light touch with no sensory deficits noted, finger to nose normal, no focal deficits noted.Gait is stable. Able to tandem gait.     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   Right Amygdala Enlargement and right posterior temporal gyral thickening noted on MRI of the brain October 2018  Depression/Anxiety   History of left optic neuritis (2017), etiology unknown  History of substance abuse (marijuana)    Discussion: Patient had S/p Right Temporal Lobectomy 9/10/201. He is seizure free. He presents today with right arm numbness. I have ordered MRI brain and C spine W/WO due to his history of optic neuritis. I have encouraged him to schedule visit with Dr. Lorenzo to determine if further workup is needed.     Post op he has severe headaches. We will increase  gabapentin and prescribe headache rescue plan. Continue seizure medications.         I reviewed this plan of care with the patient and he was agreeable to proceeding for epilepsy invasive evaluation.  The case management  conference discussion was reviewed with Mr. Mcghee.  Risks and benefits of epilepsy surgery were reviewed in detail.       PLAN:       Continue  Vimpat 200-200    Dilantin 200-200  Topiramate 50 mg twice a day   Gabapentin  600 mg twice a day     Follow up with Dr. Lorenzo for right arm numbness (EMG/NCS   Bj's recommendation). MRI brain and C spine ordered    He will continue follow up with psychiatrist for anxiety treatment     For severe headaches - Toradol, compazine, benadryl for pain every 6 hours as needed.   Hydrate with water 10 glasses per day       Follow-up with Van in 3-6 months    I spent 30 minutes with the patient.  During this time, counseling and coordination of care exceeded 50% of the time.        cc:   Bhaskar Morales MD   [facility]   [address], [address]   [place] [address]         JORGE GARCIA MD
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
## 2020-01-22 DIAGNOSIS — R20.0 HAND NUMBNESS: Primary | ICD-10-CM

## 2020-01-22 DIAGNOSIS — H54.62 VISION LOSS OF LEFT EYE: ICD-10-CM

## 2020-01-23 ENCOUNTER — TELEPHONE (OUTPATIENT)
Dept: NEUROLOGY | Facility: CLINIC | Age: 30
End: 2020-01-23

## 2020-01-23 NOTE — TELEPHONE ENCOUNTER
What is the concern that needs to be addressed by a nurse? Pt called to state that he is confused about the conversation that he had with Concha MUNGUIAMalina (Patient was sleeping) This morning and would like clarification.    May a detailed message be left on voicemail? Please call to discuss.    Date of last office visit: 1/21/19    Message routed to:     Tha calls today to see how/when he should get in touch with Dr. Lorenzo?

## 2020-01-23 NOTE — TELEPHONE ENCOUNTER
As discussed with Dr. Araujo, advised Tha to contact Dr. Lorenzo.   Dr. Lorenzo's clinic phone number was provided.  Tha thank me and had no other questions.

## 2020-01-24 ENCOUNTER — TELEPHONE (OUTPATIENT)
Dept: NEUROLOGY | Facility: CLINIC | Age: 30
End: 2020-01-24

## 2020-01-24 NOTE — TELEPHONE ENCOUNTER
What is the concern that needs to be addressed by a nurse? Tha is calling to see if Dr Araujo would like him to see a different dr as Dr Lorenzo isn't available for an appt with him until 3/17/20.  Please call and advise.    May a detailed message be left on voicemail? Yes    Message routed to:

## 2020-01-27 NOTE — TELEPHONE ENCOUNTER
Received call from patient, he stated he was told to call back this week and ask for RN as Dr. Araujo was not available last week.

## 2020-02-07 ENCOUNTER — ANCILLARY PROCEDURE (OUTPATIENT)
Dept: MRI IMAGING | Facility: CLINIC | Age: 30
End: 2020-02-07
Attending: PSYCHIATRY & NEUROLOGY
Payer: COMMERCIAL

## 2020-02-07 DIAGNOSIS — R20.0 HAND NUMBNESS: ICD-10-CM

## 2020-02-07 RX ORDER — GADOBUTROL 604.72 MG/ML
10 INJECTION INTRAVENOUS ONCE
Status: COMPLETED | OUTPATIENT
Start: 2020-02-07 | End: 2020-02-07

## 2020-02-07 RX ADMIN — GADOBUTROL 10 ML: 604.72 INJECTION INTRAVENOUS at 14:33

## 2020-02-07 NOTE — DISCHARGE INSTRUCTIONS
MRI Contrast Discharge Instructions    The IV contrast you received today will pass out of your body in your  urine. This will happen in the next 24 hours. You will not feel this process.  Your urine will not change color.    Drink at least 4 extra glasses of water or juice today (unless your doctor  has restricted your fluids). This reduces the stress on your kidneys.  You may take your regular medicines.    If you are on dialysis: It is best to have dialysis today.    If you have a reaction: Most reactions happen right away. If you have  any new symptoms after leaving the hospital (such as hives or swelling),  call your hospital at the correct number below. Or call your family doctor.  If you have breathing distress or wheezing, call 911.    Special instructions: ***    I have read and understand the above information.    Signature:______________________________________ Date:___________    Staff:__________________________________________ Date:___________     Time:__________    [place] [facility] Departments:    ___[place]: [phone]  ___[facility]: [phone]  ___[place]: [phone] ___[facility]: [phone]  ___[facility]: [phone]  ___[facility]: [phone]  ___[phone]  ___[facility]: [phone]  ___[phone]

## 2020-02-20 ENCOUNTER — TELEPHONE (OUTPATIENT)
Dept: NEUROLOGY | Facility: CLINIC | Age: 30
End: 2020-02-20

## 2020-02-20 NOTE — TELEPHONE ENCOUNTER
Called him to review MRI brain and C spine results. Encouraged him to see Dr. Lorenzo.   Patient states he was pulled over for caring marijuana and was placed in [facility] for this.  He states that this was [place] medical cannabis.  I have not recertified him for 2020.  He states it was an older bottle from 2019 and he had left it in his car.  He wanted me to recertify him for [place] medical cannabis.  I explained to him I am not able to do that because he no longer has seizures after his right temporal lobectomy.  We cannot recertify him to help with legal proceedings related to this.  I advised him to talk to his .  He explained that he needs medical cannabis for sleep and appetite.  I encouraged him to talk to his primary care doctor regarding this.    I also reviewed the following MRI CD and bring spine imaging results with him.     MRI Brain:   Impression:  1. No acute intracranial pathology.  2. Postsurgical changes of the right temporal craniotomy for right  partial temporal lobectomy. Previously seen extra-axial fluid  collection overlying the right frontal convexity is resolved.  3. Head MRA demonstrates no definite aneurysm or stenosis of the major  intracranial arteries.  4. Neck MRA demonstrates patent major cervical arteries.    C spine: Normal       I have personally reviewed the examination and initial interpretation    Rajni Araujo MD

## 2020-02-21 ENCOUNTER — TELEPHONE (OUTPATIENT)
Dept: PSYCHIATRY | Facility: CLINIC | Age: 30
End: 2020-02-21

## 2020-02-21 NOTE — TELEPHONE ENCOUNTER
Per Dr. Araujo, she affirms that he can drive. I advised the patient that I can ask Dr. Marr to sign his form if he can get a ride here today. He verbalized understanding.

## 2020-02-21 NOTE — TELEPHONE ENCOUNTER
What is the concern that needs to be addressed by a nurse? Would like his DMV form completed today as he no longer has a license. I spoke to Megha about this and she stated she would talk to Dr. Araujo and then call the patient back. Patient has a ride to come up here today. His license will be canceled after today    May a detailed message be left on voicemail? yes    Date of last office visit:     Message routed to: RN pool

## 2020-02-21 NOTE — TELEPHONE ENCOUNTER
Patient came to clinic. Dr. Marr met with him for 5-10 minutes and signed his DMV form. Patient left the office with the signed copy and states he will give it to the DMV today. A copy was retained for record keeping. Dr. Marr also provided the patient with a letter.

## 2020-03-02 ENCOUNTER — HEALTH MAINTENANCE LETTER (OUTPATIENT)
Age: 30
End: 2020-03-02

## 2020-03-17 ENCOUNTER — OFFICE VISIT (OUTPATIENT)
Dept: NEUROLOGY | Facility: CLINIC | Age: 30
End: 2020-03-17
Payer: COMMERCIAL

## 2020-03-17 VITALS
DIASTOLIC BLOOD PRESSURE: 85 MMHG | WEIGHT: 173 LBS | HEART RATE: 105 BPM | BODY MASS INDEX: 25.55 KG/M2 | SYSTOLIC BLOOD PRESSURE: 129 MMHG

## 2020-03-17 DIAGNOSIS — G56.01 CARPAL TUNNEL SYNDROME OF RIGHT WRIST: Primary | ICD-10-CM

## 2020-03-17 ASSESSMENT — PAIN SCALES - GENERAL: PAINLEVEL: NO PAIN (0)

## 2020-03-17 NOTE — PROGRESS NOTES
Chief Complaint: hand pain    History of Present Illness:    Tha Mcghee is a 29 year old man who I am seeing at the request of Dr. Araujo for right hand pain. Onset 12/19. No preceding injury, but he does say he plays daily frisbee golf up until the symptoms happen. 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  He is right handed. First symptom was pain in fingers at night. In January pain changed to daytime, but improved at night. Pain provoked by activity, like making a fist. Pain improved in his fingers, but now occurs at his wrist. He does feel that numbness affects digits 1-3. Over the last week he has noticed some nocturnal numbness in the left hand. He has worn a wrist splint for the last month. He wears it during the day and at night. He currently sees OT.     Prior pertinent laboratory work-up:  10/19: Normal Hba1c, TSH    Prior pertinent radiology work-up:  2/20: MRI C spine reported as normal  2/20: MRI Brain showed postsurgical changes of the right temporal craniotomy for right partial temporal lobectomy. Previously seen extra-axial fluid collection overlying the right frontal convexity is resolved.    Prior nerve conduction studies/EMG:  None    Past Medical History:   Past Medical History:   Diagnosis Date     Anxiety      Depression      Localization-related (focal) (partial) epilepsy and epileptic syndromes with complex partial seizures, without mention of intractable epilepsy     preliminary     Past Surgical History:  Past Surgical History:   Procedure Laterality Date     CRANIOTOMY REMOVAL OF GRID Right 9/10/2019    Procedure: Removal Of Right Sided Depth And Strip Electrodes, Right Craniotomy For Right Temporal Lobectomy And Amygliohippocampectomy Latex free;  Surgeon: Jason Funes MD;  Location: [facility] OR     DENTAL SURGERY      wisdom teeth extraction     IR CAROTID CEREBRAL ANGIOGRAM BILATERAL  1/17/2019    WADA test     MAGAN ASSISTED CRANIOTOMY Right  8/27/2019    Procedure: MAGAN Assisted Right Craniotomy For Right Sided Strip Electrodes;  Surgeon: Jason Funes MD;  Location: [facility] OR     MAGAN ASSISTED PLACEMENT DEPTH ELECTRODE Right 8/27/2019    Procedure: MAGAN Assisted Stereotactic Placement Of Right Sided Depth Electrodes;  Surgeon: Jason Funes MD;  Location: [facility] OR     Family history:    There is no known family history of hereditary neuropathies or other neuromuscular disorders.    Social History:    Occasional mariajuana. He denies tobacco and alcohol.     Medical Allergies:     Allergies   Allergen Reactions     Amoxicillin Hives     Tolerated ceftriaxone Aug 2019 without a reaction     Ceclor [Cefaclor Monohydrate] Hives     Tolerated ceftriaxone Aug 2019 without a reaction       Hydrocodone-Acetaminophen Nausea     Penicillins Hives     Tolerated ceftriaxone Aug 2019 without a reaction       Sulfa Drugs Hives     Current Medications:   Current Outpatient Medications   Medication     albuterol (PROAIR HFA/PROVENTIL HFA/VENTOLIN HFA) 108 (90 Base) MCG/ACT inhaler     ALPRAZolam (XANAX) 0.5 MG tablet     cholecalciferol (VITAMIN D3) 5000 units TABS tablet     ciclopirox (LOPROX) 0.77 % cream     cyclobenzaprine (FLEXERIL) 10 MG tablet     docusate sodium (COLACE) 100 MG capsule     gabapentin (NEURONTIN) 300 MG capsule     ketorolac (TORADOL) 10 MG tablet     lacosamide (VIMPAT) 200 MG TABS tablet     omeprazole (PRILOSEC) 10 MG DR capsule     phenytoin sodium extended (DILANTIN) 200 MG capsule     prochlorperazine (COMPAZINE) 10 MG tablet     Bgebuhnzapt-Yyhvepiw-ZI-GG (ACCUHIST DM OR)     rivaroxaban ANTICOAGULANT (XARELTO ANTICOAGULANT) 15 MG TABS tablet     rivaroxaban ANTICOAGULANT (XARELTO ANTICOAGULANT) 20 MG TABS tablet     topiramate (TOPAMAX) 50 MG tablet     venlafaxine (EFFEXOR XR) 75 MG 24 hr capsule     No current facility-administered medications for this visit.      Review of Systems: A complete review of systems was  obtained and was negative except for what was noted above.    Physical examination:    /85 (BP Location: Left arm, Patient Position: Sitting, Cuff Size: Adult Regular)   Pulse 105   Wt 78.5 kg (173 lb)   BMI 25.55 kg/m       General Appearance: NAD    Skin: There are no rashes or other skin lesions.    Musculoskeletal:  There is no scoliosis, lordosis, kyphosis, pes cavus, or hammertoes.    Neurologic examination:    Mental status:  Patient is alert, attentive, and oriented x 3.  Language is coherent and fluent without aphasia.  Memory, comprehension and ability to follow commands were intact.       Cranial nerves:   Pupils were round and reacted to light.  Extraocular movements were full. There was no face, jaw, palate or tongue weakness or atrophy. Hearing was grossly intact.  Shoulder shrug was normal.       Motor exam: No atrophy or fasciculations.  Manual muscle testing revealed MRC grade 5/5 strength throughout including proximal and distal muscles of the arms and legs, including APB    Complex motor skills: No tremor or ataxia    Sensory exam: Pin reduced in the right D1-3. Pin intact on left. Vibration normal in hands and feet.     Gait: Narrow and stable    Deep tendon reflexes:   Right Left   Triceps 2 2   Biceps 2 2   Brachioradialis 2 2   Knee jerk 2 2   Ankle jerk 2 2      Tinels and Phalen's sign present on right    Assessment:    Tha Mcghee is a 29 year old man with right wrist pain and finger numbness. Findings most likely represent CTS. Symptoms are improving with conservative therapy, but still the symptoms are quite limiting to him. Will obtain NCS to confirm and characterize median nerve injury. If confirmed and mild will continue conservative therapy. If moderate or severe with refer to hand ortho. If negative will look for other causes of wrist/joint pain. I will arrange further care at the time of the NCS study.     Plan:      1. Nerve conduction studies/EMG: eval for median  neuropathy at wrist  2. Continue conservative therapy for presumed CTS including wrist splint and occupational therapy. Pending results of #1 may refer to ortho hand.   3. Follow up in 4 months, but will discuss NCS results with him when available and arrange additional testing/referrals as needed.   ---  ADDENDUM: 6/23/20 - NCS show clear median neuropathy at the wrist. Symptoms persist despite conservative measures. Referral to ortho hand provided.     ADDENDUM:   7/28/20: He underwent successful carpal tunnel release earlier this month. The right hand is feeling better. However, he reports that over the last several months as he has increased use of his left hand (to compensate for the right hand) he has developed similar numbness and paresthesias in his left hand. NCS today showed a mild to moderate left median neuropathy at the wrist. Advised conservative measures. He will follow up with ortho.

## 2020-03-17 NOTE — LETTER
3/17/2020       RE: Tha Mcghee  [address]  [facility] [address]     Dear Colleague,    Thank you for referring your patient, Tha Mcghee, to the [facility]. Please see a copy of my visit note below.    Chief Complaint: hand pain    History of Present Illness:    Tha Mcghee is a 29 year old man who I am seeing at the request of Dr. Araujo for right hand pain. Onset 12/19. No preceding injury, but he does say he plays daily frisbee golf up until the symptoms happen. 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  He is right handed. First symptom was pain in fingers at night. In January pain changed to daytime, but improved at night. Pain provoked by activity, like making a fist. Pain improved in his fingers, but now occurs at his wrist. He does feel that numbness affects digits 1-3. Over the last week he has noticed some nocturnal numbness in the left hand. He has worn a wrist splint for the last month. He wears it during the day and at night. He currently sees OT.     Prior pertinent laboratory work-up:  10/19: Normal Hba1c, TSH    Prior pertinent radiology work-up:  2/20: MRI C spine reported as normal  2/20: MRI Brain showed postsurgical changes of the right temporal craniotomy for right partial temporal lobectomy. Previously seen extra-axial fluid collection overlying the right frontal convexity is resolved.    Prior nerve conduction studies/EMG:  None    Past Medical History:   Past Medical History:   Diagnosis Date     Anxiety      Depression      Localization-related (focal) (partial) epilepsy and epileptic syndromes with complex partial seizures, without mention of intractable epilepsy     preliminary     Past Surgical History:  Past Surgical History:   Procedure Laterality Date     CRANIOTOMY REMOVAL OF GRID Right 9/10/2019    Procedure: Removal Of Right Sided Depth And Strip Electrodes, Right Craniotomy For  Right Temporal Lobectomy And Amygliohippocampectomy Latex free;  Surgeon: Jason Funes MD;  Location: [facility] OR     DENTAL SURGERY      wisdom teeth extraction     IR CAROTID CEREBRAL ANGIOGRAM BILATERAL  1/17/2019    WADA test     MAGAN ASSISTED CRANIOTOMY Right 8/27/2019    Procedure: MAGAN Assisted Right Craniotomy For Right Sided Strip Electrodes;  Surgeon: Jason Funes MD;  Location: [facility] OR     MAGAN ASSISTED PLACEMENT DEPTH ELECTRODE Right 8/27/2019    Procedure: MAGAN Assisted Stereotactic Placement Of Right Sided Depth Electrodes;  Surgeon: Jason Funes MD;  Location: [facility] OR     Family history:    There is no known family history of hereditary neuropathies or other neuromuscular disorders.    Social History:    Occasional mariajuana. He denies tobacco and alcohol.     Medical Allergies:     Allergies   Allergen Reactions     Amoxicillin Hives     Tolerated ceftriaxone Aug 2019 without a reaction     Ceclor [Cefaclor Monohydrate] Hives     Tolerated ceftriaxone Aug 2019 without a reaction       Hydrocodone-Acetaminophen Nausea     Penicillins Hives     Tolerated ceftriaxone Aug 2019 without a reaction       Sulfa Drugs Hives     Current Medications:   Current Outpatient Medications   Medication     albuterol (PROAIR HFA/PROVENTIL HFA/VENTOLIN HFA) 108 (90 Base) MCG/ACT inhaler     ALPRAZolam (XANAX) 0.5 MG tablet     cholecalciferol (VITAMIN D3) 5000 units TABS tablet     ciclopirox (LOPROX) 0.77 % cream     cyclobenzaprine (FLEXERIL) 10 MG tablet     docusate sodium (COLACE) 100 MG capsule     gabapentin (NEURONTIN) 300 MG capsule     ketorolac (TORADOL) 10 MG tablet     lacosamide (VIMPAT) 200 MG TABS tablet     omeprazole (PRILOSEC) 10 MG DR capsule     phenytoin sodium extended (DILANTIN) 200 MG capsule     prochlorperazine (COMPAZINE) 10 MG tablet     Pjrkfemkous-Wveuafxn-ZJ-GG (ACCUHIST DM OR)     rivaroxaban ANTICOAGULANT (XARELTO ANTICOAGULANT) 15 MG TABS tablet     rivaroxaban  ANTICOAGULANT (XARELTO ANTICOAGULANT) 20 MG TABS tablet     topiramate (TOPAMAX) 50 MG tablet     venlafaxine (EFFEXOR XR) 75 MG 24 hr capsule     No current facility-administered medications for this visit.      Review of Systems: A complete review of systems was obtained and was negative except for what was noted above.    Physical examination:    /85 (BP Location: Left arm, Patient Position: Sitting, Cuff Size: Adult Regular)   Pulse 105   Wt 78.5 kg (173 lb)   BMI 25.55 kg/m       General Appearance: NAD    Skin: There are no rashes or other skin lesions.    Musculoskeletal:  There is no scoliosis, lordosis, kyphosis, pes cavus, or hammertoes.    Neurologic examination:    Mental status:  Patient is alert, attentive, and oriented x 3.  Language is coherent and fluent without aphasia.  Memory, comprehension and ability to follow commands were intact.       Cranial nerves:   Pupils were round and reacted to light.  Extraocular movements were full. There was no face, jaw, palate or tongue weakness or atrophy. Hearing was grossly intact.  Shoulder shrug was normal.       Motor exam: No atrophy or fasciculations.  Manual muscle testing revealed MRC grade 5/5 strength throughout including proximal and distal muscles of the arms and legs, including APB    Complex motor skills: No tremor or ataxia    Sensory exam: Pin reduced in the right D1-3. Pin intact on left. Vibration normal in hands and feet.     Gait: Narrow and stable    Deep tendon reflexes:   Right Left   Triceps 2 2   Biceps 2 2   Brachioradialis 2 2   Knee jerk 2 2   Ankle jerk 2 2      Tinels and Phalen's sign present on right    Assessment:    Tha Mcghee is a 29 year old man with right wrist pain and finger numbness. Findings most likely represent CTS. Symptoms are improving with conservative therapy, but still the symptoms are quite limiting to him. Will obtain NCS to confirm and characterize median nerve injury. If confirmed and mild will  continue conservative therapy. If moderate or severe with refer to hand ortho. If negative will look for other causes of wrist/joint pain. I will arrange further care at the time of the NCS study.     Plan:      1. Nerve conduction studies/EMG: eval for median neuropathy at wrist  2. Continue conservative therapy for presumed CTS including wrist splint and occupational therapy. Pending results of #1 may refer to ortho hand.   3. Follow up in 4 months, but will discuss NCS results with him when available and arrange additional testing/referrals as needed.   ---      Again, thank you for allowing me to participate in the care of your patient.      Sincerely,    Dada Lorenzo MD

## 2020-04-30 ENCOUNTER — TELEPHONE (OUTPATIENT)
Dept: NEUROLOGY | Facility: CLINIC | Age: 30
End: 2020-04-30
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
## 2020-05-01 NOTE — TELEPHONE ENCOUNTER
Patient called again and would like to talk to his provider about what happened. Please give him a call back as soon as possible. Thank you

## 2020-06-04 ENCOUNTER — TELEPHONE (OUTPATIENT)
Dept: NEUROLOGY | Facility: CLINIC | Age: 30
End: 2020-06-04

## 2020-06-04 NOTE — TELEPHONE ENCOUNTER
What is the concern that needs to be addressed by a nurse? Patient was finally approved for disability benefits and he would like to thank you for all that you did in order for him to get this. He knows it was a lot of paperwork and he really appreciates all the effort you guys put in for him to be able to get this.     May a detailed message be left on voicemail?     Date of last office visit:     Message routed to: LISA baer, Dr. Araujo

## 2020-06-23 ENCOUNTER — OFFICE VISIT (OUTPATIENT)
Dept: NEUROLOGY | Facility: CLINIC | Age: 30
End: 2020-06-23
Payer: COMMERCIAL

## 2020-06-23 DIAGNOSIS — G56.01 CARPAL TUNNEL SYNDROME OF RIGHT WRIST: Primary | ICD-10-CM

## 2020-06-23 NOTE — PROGRESS NOTES
[facility]  Electrodiagnostic Laboratory    Nerve Conduction & EMG Report          Patient:       Tha Mcghee  Patient ID:    [id number]  Gender:        Male  YOB: 1990  Age:           29 Years 9 Months        History & Examination:  29 year old man with right wrist pain and intermittent sensory changes in the fingers of the right hand. Evaluate for focal neuropathy.     Techniques: Motor and sensory conduction studies were done with surface recording electrodes. EMG was done with a concentric needle electrode.      Results:  Nerve conduction studies:  1. Right median-D2 sensory response shows severely slowed CV and moderately reduced amplitude.   2. Right ulnar-D5 and radial sensory responses are normal.   3. Right median-ulnar palmar interlatency difference is prolonged.   4. Right median-APB motor response shows moderately prolonged DL, normal amplitude and normal CV in the forearm segment.   5. Right ulnar-ADM motor response is normal.   6. Right median-lumbrical vs ulnar-interosseous latency difference is prolonged.    Needle EMG of selected right upper limb muscles was performed as tabulated below. No abnormal spontaneous activity was observed in the sampled muscles. Motor unit potential morphology was normal. Recruitment was mildly reduced in the right APB muscle.      Interpretation:  This is an abnormal study. There is electrophysiologic evidence of a moderate right-sided median neuropathy at the wrist (e.g carpal tunnel syndrome).     Dada Lorenzo MD  Department of Neurology        Sensory NCS      Nerve / Sites Rec. Site Onset Peak Ref. NP Amp Ref. PP Amp Dist Colten Ref. Temp     ms ms ms  V  V  V cm m/s m/s  C   R MEDIAN - Dig II Anti      Wrist Dig II 4.11 5.89  6.0 10.0 7.2 14 34.0 48.0 32.7   R ULNAR - Dig V Anti      Wrist Dig V 2.50 3.18  31.1 8.0 55.8 12.5 50.0 48.0 32.2   R RADIAL - Snuff      Forearm Snuff 1.67 2.24  53.3 15.0 45.1 10 60.0 48.0    R MEDIAN - Ulnar  - Palmar      Median Wrist 2.08 2.40 2.40 1.2  2.8 8 38.4  32.9      Ulnar Wrist 1.30 1.82 2.40 17.5  23.5 8 61.4  32.9       Motor NCS      Nerve / Sites Rec. Site Lat Ref. Amp Ref. Rel Amp Dist Colten Ref. Dur. Area Temp.     ms ms mV mV % cm m/s m/s ms %  C   R MEDIAN - APB      Wrist APB 6.20 4.40 5.4 5.0 100 8   7.14 100 32.2      Elbow APB 10.57  5.5  102 22 50.3 48.0 8.39 86.3 32.4   R ULNAR - ADM      Wrist ADM 2.76 3.50 9.7 5.0 100 8   6.72 100 32.6      B.Elbow ADM 6.56  8.5  87.2 22 57.9 48.0 6.04 101 32.7      A.Elbow ADM 8.28  8.7  89.5 9 52.4 48.0 6.35 98.2 32.7   R MEDIAN - II Lumb      Median II Lumb 5.10  1.5  100 10   7.60 100 32.5      Ulnar Palm Int 3.13  3.7  238 10   5.83 157 32.5       F  Wave      Nerve Min F Lat Max F Lat Mean FLat Temp.    ms ms ms  C   R ULNAR 26.61 28.02 27.40 32.2       EMG Summary Table     Spontaneous MUAP Recruitment    IA Fib/PSW Fasc H.F. Amp Dur. PPP Pattern   R. FIRST D INTEROSS N None None None N N N Normal   R. PRON TERES N None None None N N N Normal   R. ABD POLL BREVIS N None None None N N N Mildly Reduced

## 2020-06-23 NOTE — LETTER
6/23/2020     RE: Tha Mcghee  [address]  [facility] [address]     Dear Colleague,    Thank you for referring your patient, Tha Mcghee, to the [facility] NEUROSPECIALTIES at [facility]. Please see a copy of my visit note below.        [facility]  Electrodiagnostic Laboratory    Nerve Conduction & EMG Report          Patient:       Tha Mcghee  Patient ID:    [id number]  Gender:        Male  YOB: 1990  Age:           29 Years 9 Months        History & Examination:  29 year old man with right wrist pain and intermittent sensory changes in the fingers of the right hand. Evaluate for focal neuropathy.     Techniques: Motor and sensory conduction studies were done with surface recording electrodes. EMG was done with a concentric needle electrode.      Results:  Nerve conduction studies:  1. Right median-D2 sensory response shows severely slowed CV and moderately reduced amplitude.   2. Right ulnar-D5 and radial sensory responses are normal.   3. Right median-ulnar palmar interlatency difference is prolonged.   4. Right median-APB motor response shows moderately prolonged DL, normal amplitude and normal CV in the forearm segment.   5. Right ulnar-ADM motor response is normal.   6. Right median-lumbrical vs ulnar-interosseous latency difference is prolonged.    Needle EMG of selected right upper limb muscles was performed as tabulated below. No abnormal spontaneous activity was observed in the sampled muscles. Motor unit potential morphology was normal. Recruitment was mildly reduced in the right APB muscle.      Interpretation:  This is an abnormal study. There is electrophysiologic evidence of a moderate right-sided median neuropathy at the wrist (e.g carpal tunnel syndrome).     Dada Lorenzo MD  Department of Neurology        Sensory NCS      Nerve / Sites Rec. Site Onset Peak Ref. NP Amp Ref. PP Amp Dist Colten Ref. Temp     ms ms ms  V  V  V cm m/s  m/s  C   R MEDIAN - Dig II Anti      Wrist Dig II 4.11 5.89  6.0 10.0 7.2 14 34.0 48.0 32.7   R ULNAR - Dig V Anti      Wrist Dig V 2.50 3.18  31.1 8.0 55.8 12.5 50.0 48.0 32.2   R RADIAL - Snuff      Forearm Snuff 1.67 2.24  53.3 15.0 45.1 10 60.0 48.0    R MEDIAN - Ulnar - Palmar      Median Wrist 2.08 2.40 2.40 1.2  2.8 8 38.4  32.9      Ulnar Wrist 1.30 1.82 2.40 17.5  23.5 8 61.4  32.9       Motor NCS      Nerve / Sites Rec. Site Lat Ref. Amp Ref. Rel Amp Dist Colten Ref. Dur. Area Temp.     ms ms mV mV % cm m/s m/s ms %  C   R MEDIAN - APB      Wrist APB 6.20 4.40 5.4 5.0 100 8   7.14 100 32.2      Elbow APB 10.57  5.5  102 22 50.3 48.0 8.39 86.3 32.4   R ULNAR - ADM      Wrist ADM 2.76 3.50 9.7 5.0 100 8   6.72 100 32.6      B.Elbow ADM 6.56  8.5  87.2 22 57.9 48.0 6.04 101 32.7      A.Elbow ADM 8.28  8.7  89.5 9 52.4 48.0 6.35 98.2 32.7   R MEDIAN - II Lumb      Median II Lumb 5.10  1.5  100 10   7.60 100 32.5      Ulnar Palm Int 3.13  3.7  238 10   5.83 157 32.5       F  Wave      Nerve Min F Lat Max F Lat Mean FLat Temp.    ms ms ms  C   R ULNAR 26.61 28.02 27.40 32.2       EMG Summary Table     Spontaneous MUAP Recruitment    IA Fib/PSW Fasc H.F. Amp Dur. PPP Pattern   R. FIRST D INTEROSS N None None None N N N Normal   R. PRON TERES N None None None N N N Normal   R. ABD POLL BREVIS N None None None N N N Mildly Reduced                        Again, thank you for allowing me to participate in the care of your patient.      Sincerely,    Dada Lorenzo MD

## 2020-07-28 ENCOUNTER — OFFICE VISIT (OUTPATIENT)
Dept: NEUROLOGY | Facility: CLINIC | Age: 30
End: 2020-07-28
Payer: COMMERCIAL

## 2020-07-28 ENCOUNTER — OFFICE VISIT (OUTPATIENT)
Dept: NEUROLOGY | Facility: CLINIC | Age: 30
End: 2020-07-28
Attending: PSYCHIATRY & NEUROLOGY
Payer: COMMERCIAL

## 2020-07-28 VITALS
SYSTOLIC BLOOD PRESSURE: 129 MMHG | BODY MASS INDEX: 26.32 KG/M2 | DIASTOLIC BLOOD PRESSURE: 90 MMHG | HEART RATE: 100 BPM | WEIGHT: 178.2 LBS | TEMPERATURE: 98.7 F

## 2020-07-28 DIAGNOSIS — G40.209 LOCALIZATION-RELATED FOCAL EPILEPSY WITH COMPLEX PARTIAL SEIZURES (H): ICD-10-CM

## 2020-07-28 DIAGNOSIS — G40.919 INTRACTABLE EPILEPSY WITHOUT STATUS EPILEPTICUS, UNSPECIFIED EPILEPSY TYPE (H): ICD-10-CM

## 2020-07-28 DIAGNOSIS — G40.219 PARTIAL EPILEPSY WITH IMPAIRMENT OF CONSCIOUSNESS, INTRACTABLE (H): ICD-10-CM

## 2020-07-28 DIAGNOSIS — G56.02 CARPAL TUNNEL SYNDROME OF LEFT WRIST: Primary | ICD-10-CM

## 2020-07-28 DIAGNOSIS — G56.01 CARPAL TUNNEL SYNDROME OF RIGHT WRIST: ICD-10-CM

## 2020-07-28 RX ORDER — TOPIRAMATE 50 MG/1
50 TABLET, FILM COATED ORAL 2 TIMES DAILY
Qty: 180 TABLET | Refills: 3 | Status: SHIPPED | OUTPATIENT
Start: 2020-07-28 | End: 2021-08-02

## 2020-07-28 RX ORDER — PHENYTOIN SODIUM 200 MG/1
200 CAPSULE, EXTENDED RELEASE ORAL 2 TIMES DAILY
Qty: 180 CAPSULE | Refills: 3 | Status: SHIPPED | OUTPATIENT
Start: 2020-07-28 | End: 2021-07-21

## 2020-07-28 RX ORDER — LACOSAMIDE 200 MG/1
200 TABLET ORAL 2 TIMES DAILY
Qty: 180 TABLET | Refills: 1 | Status: SHIPPED | OUTPATIENT
Start: 2020-07-28 | End: 2020-11-10

## 2020-07-28 RX ORDER — GABAPENTIN 300 MG/1
CAPSULE ORAL
Qty: 360 CAPSULE | Refills: 3 | Status: SHIPPED | OUTPATIENT
Start: 2020-07-28 | End: 2021-08-02

## 2020-07-28 ASSESSMENT — PAIN SCALES - GENERAL: PAINLEVEL: NO PAIN (0)
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
## 2020-07-28 NOTE — PROGRESS NOTES
[facility]  Electrodiagnostic Laboratory    Nerve Conduction & EMG Report          Patient:       Tha Mcghee  Patient ID:    [id number]  Gender:        Male  YOB: 1990  Age:           29 Years 10 Months        History & Examination:  29 year old man with numbness and paresthesias in left hand. Onset several months ago. He has a history of similar symptoms on the right, now s/p carpal tunnel release. Evaluate for focal neuropathy.     Techniques: Motor and sensory conduction studies were done with surface recording electrodes. EMG was done with a concentric needle electrode.      Results:  Nerve conduction studies:  1. Left median-D2 sensory response shows moderately to severely slowed CV and normal amplitude.   2. Left ulnar-D5 and radial sensory responses are normal.   3. Left median-ulnar palmar interlatency difference is prolonged.   4. Left median-APB motor response shows moderately prolonged DL, normal amplitude and normal CV in the forearm segment.   5. Left ulnar-ADM motor response is normal.   6. Left median-lumbrical vs ulnar-interosseous latency difference is prolonged.     Needle EMG of selected left upper limb muscles was performed as tabulated below. No abnormal spontaneous activity was observed in the sampled muscles. Motor unit potential morphology and recruitment patterns were normal.      Interpretation:  This is an abnormal study. There is electrophysiologic evidence of a mild to moderate left-sided median neuropathy at the wrist (e.g carpal tunnel syndrome).      Dada Lorenzo MD  Department of Neurology           Sensory NCS      Nerve / Sites Rec. Site Onset Peak Ref. NP Amp Ref. PP Amp Dist Colten Ref. Temp     ms ms ms  V  V  V cm m/s m/s  C   L MEDIAN - Dig II Anti      Wrist Dig II 3.91 4.79  20.3 10.0 41.9 14 35.8 48.0 30.4   L ULNAR - Dig V Anti      Wrist Dig V 2.50 3.39  39.1 8.0 60.4 12.5 50.0 48.0 30.7   L RADIAL - Snuff      Forearm Snuff 1.61 2.14   56.2 15.0 94.6 10 61.9 48.0    L MEDIAN - Ulnar - Palmar      Median Wrist 2.14 2.92 2.40 21.1  20.3 8 37.5  31.1      Ulnar Wrist 1.25 1.82 2.40 18.9  33.2 8 64.0  31       Motor NCS      Nerve / Sites Rec. Site Lat Ref. Amp Ref. Rel Amp Dist Colten Ref. Dur. Area Temp.     ms ms mV mV % cm m/s m/s ms %  C   L MEDIAN - APB      Wrist APB 5.47 4.40 7.5 5.0 100 8   7.55 100 30.8      Elbow APB 10.00  7.2  95.8 24 53.0 48.0 7.71 90.5 30.8   L ULNAR - ADM      Wrist ADM 3.18 3.50 9.1 5.0 100 8   7.76 100 30.7      B.Elbow ADM 6.72  8.7  95.5 21 59.3 48.0 6.72 99.9 30.7      A.Elbow ADM 8.28  8.4  92.8 9 57.6 48.0 7.03 96.8 30.7   L MEDIAN - II Lumb      Median II Lumb 4.90  1.4  100 10   7.14 100 31.1      Ulnar Palm Int 3.02  2.5  178 10   6.35 133 31       EMG Summary Table     Spontaneous MUAP Recruitment    IA Fib/PSW Fasc H.F. Amp Dur. PPP Pattern   L. PRON TERES N None None None N N N Normal   L. FIRST D INTEROSS N None None None N N N Normal   L. ABD POLL BREVIS N None None None N N N Normal

## 2020-07-28 NOTE — PATIENT INSTRUCTIONS
Continue same seizure medications   Get labs completed at [facility]   Follow-up with psychologist   No driving     [place] regulations on driving were reviewed with you and you should not drive until you are three months seizure/spell free.You are prohibited from operating a motor vehicle within 3 months following any seizure or other episode with sudden unconsciousness or inability to sit up, and that it is required to report most recent seizure to the DMV within 30 days after the event.    Avoid any activities that might lead to self-injury or injury of others, within 3 months following any seizure with impaired awareness or impaired motor control such activities include but are not limited to operating power tools, operating firearms, climbing ladders/trees/exposure to heights from which he might fall. Do not operate power tools or heavy machinery and equipment.  Do not swim alone, bathe in any form of tub, such as bathtub, jacuzzi, or hot tub unless there is a responsible adult close by to provide assistance in case she has a seizure and drowns. Avoid work on hot surfaces such stoves, ovens, or with scalding hot water.       Rajni Araujo MD

## 2020-07-28 NOTE — LETTER
7/28/2020     RE: Tha Mcghee  [address]  [facility] [id number]     Dear Colleague,    Thank you for referring your patient, Tha Mcghee, to the [facility] NEUROSPECIALTIES at [facility]. Please see a copy of my visit note below.        [facility]  Electrodiagnostic Laboratory    Nerve Conduction & EMG Report          Patient:       Tha Mcghee  Patient ID:    [id number]  Gender:        Male  YOB: 1990  Age:           29 Years 10 Months        History & Examination:  29 year old man with numbness and paresthesias in left hand. Onset several months ago. He has a history of similar symptoms on the right, now s/p carpal tunnel release. Evaluate for focal neuropathy.     Techniques: Motor and sensory conduction studies were done with surface recording electrodes. EMG was done with a concentric needle electrode.      Results:  Nerve conduction studies:  1. Left median-D2 sensory response shows moderately to severely slowed CV and normal amplitude.   2. Left ulnar-D5 and radial sensory responses are normal.   3. Left median-ulnar palmar interlatency difference is prolonged.   4. Left median-APB motor response shows moderately prolonged DL, normal amplitude and normal CV in the forearm segment.   5. Left ulnar-ADM motor response is normal.   6. Left median-lumbrical vs ulnar-interosseous latency difference is prolonged.     Needle EMG of selected left upper limb muscles was performed as tabulated below. No abnormal spontaneous activity was observed in the sampled muscles. Motor unit potential morphology and recruitment patterns were normal.      Interpretation:  This is an abnormal study. There is electrophysiologic evidence of a mild to moderate left-sided median neuropathy at the wrist (e.g carpal tunnel syndrome).      Dada Lorenzo MD  Department of Neurology           Sensory NCS      Nerve / Sites Rec. Site Onset Peak Ref. NP Amp Ref. PP Amp Dist Colten  Ref. Temp     ms ms ms  V  V  V cm m/s m/s  C   L MEDIAN - Dig II Anti      Wrist Dig II 3.91 4.79  20.3 10.0 41.9 14 35.8 48.0 30.4   L ULNAR - Dig V Anti      Wrist Dig V 2.50 3.39  39.1 8.0 60.4 12.5 50.0 48.0 30.7   L RADIAL - Snuff      Forearm Snuff 1.61 2.14  56.2 15.0 94.6 10 61.9 48.0    L MEDIAN - Ulnar - Palmar      Median Wrist 2.14 2.92 2.40 21.1  20.3 8 37.5  31.1      Ulnar Wrist 1.25 1.82 2.40 18.9  33.2 8 64.0  31       Motor NCS      Nerve / Sites Rec. Site Lat Ref. Amp Ref. Rel Amp Dist Colten Ref. Dur. Area Temp.     ms ms mV mV % cm m/s m/s ms %  C   L MEDIAN - APB      Wrist APB 5.47 4.40 7.5 5.0 100 8   7.55 100 30.8      Elbow APB 10.00  7.2  95.8 24 53.0 48.0 7.71 90.5 30.8   L ULNAR - ADM      Wrist ADM 3.18 3.50 9.1 5.0 100 8   7.76 100 30.7      B.Elbow ADM 6.72  8.7  95.5 21 59.3 48.0 6.72 99.9 30.7      A.Elbow ADM 8.28  8.4  92.8 9 57.6 48.0 7.03 96.8 30.7   L MEDIAN - II Lumb      Median II Lumb 4.90  1.4  100 10   7.14 100 31.1      Ulnar Palm Int 3.02  2.5  178 10   6.35 133 31       EMG Summary Table     Spontaneous MUAP Recruitment    IA Fib/PSW Fasc H.F. Amp Dur. PPP Pattern   L. PRON TERES N None None None N N N Normal   L. FIRST D INTEROSS N None None None N N N Normal   L. ABD POLL BREVIS N None None None N N N Normal                      Again, thank you for allowing me to participate in the care of your patient.      Sincerely,    Dada Lorenzo MD
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
## 2020-11-10 ENCOUNTER — VIRTUAL VISIT (OUTPATIENT)
Dept: NEUROLOGY | Facility: CLINIC | Age: 30
End: 2020-11-10
Payer: COMMERCIAL

## 2020-11-10 DIAGNOSIS — G40.219 PARTIAL EPILEPSY WITH IMPAIRMENT OF CONSCIOUSNESS, INTRACTABLE (H): ICD-10-CM

## 2020-11-10 RX ORDER — VENLAFAXINE HYDROCHLORIDE 150 MG/1
150 CAPSULE, EXTENDED RELEASE ORAL
COMMUNITY
Start: 2020-04-29 | End: 2022-04-15

## 2020-11-10 RX ORDER — LACOSAMIDE 200 MG/1
200 TABLET ORAL 2 TIMES DAILY
Qty: 180 TABLET | Refills: 1 | Status: SHIPPED | OUTPATIENT
Start: 2020-11-10 | End: 2021-06-03

## 2020-11-10 ASSESSMENT — PATIENT HEALTH QUESTIONNAIRE - PHQ9: SUM OF ALL RESPONSES TO PHQ QUESTIONS 1-9: 6

## 2020-11-10 NOTE — LETTER
"11/10/2020       RE: Tha Mcghee  : 1990   MRN: [id number]      Dear Colleague,    Thank you for referring your patient, Tha Mcghee, to the [facility] EPILEPSY CARE at [facility]. Please see a copy of my visit note below.    Tha Mcghee is a 30 year old male who is being evaluated via a billable video visit.      The patient has been notified of following:     \"This video visit will be conducted via a call between you and your physician/provider. We have found that certain health care needs can be provided without the need for an in-person physical exam.  This service lets us provide the care you need with a video conversation.  If a prescription is necessary we can send it directly to your pharmacy.  If lab work is needed we can place an order for that and you can then stop by our lab to have the test done at a later time.    Video visits are billed at different rates depending on your insurance coverage.  Please reach out to your insurance provider with any questions.    If during the course of the call the physician/provider feels a video visit is not appropriate, you will not be charged for this service.\"    Patient has given verbal consent for Video visit? Yes  How would you like to obtain your AVS? MyChart  If you are dropped from the video visit, the video invite should be resent to: Text to cell phone: [phone]  Will anyone else be joining your video visit? No        Video-Visit Details    Type of service:  Video Visit    Video Start Time: 8:51 AM  Video End Time: 9:12 AM    Originating Location (pt. Location): Home    Distant Location (provider location):  [facility] EPILEPSY CARE     Platform used for Video Visit: Michael Araujo MD    [facility] Epilepsy [facility] Progress Note    Patient:  Tha Mcghee  :  1990   Age:  30 year old   Today's Office Visit:  11/10/2020    Epilepsy Data:  Patient History  Primary Epileptologist/Provider: Rajni Araujo " M.D.  Epilepsy Syndrome: Localization-related epilepsy unspecified  Epilepsy Syndrome Status: Preliminary  Age of Onset: 20  Etiology  : Unknown  Other Relevant Dx/ Issues: History of alcohol abuse; has undergone treatment. K2 (synthetic marijuana) use   Tests/Surgery History  Last EE2011  Last MRI: 2011  Epilepsy Surgery #1 Date: 19  Epilepsy Related Surgery #1 : Type:  MAGAN Assisted Stereotactic Placement of Right Sided Depth Electrodes  Epilepsy Surgery #2 Date: 09/10/19  Epilepsy Related Surgery #2 : Type : Removal of Electrodes; Right Temporal Lobectomy  Seizure Record  Current Visit Date: 11/10/20  Previous Visit Date: 20  Months since last visit: 3.45  Seizure Type 1: Partial seizures with secondary generalization  -  with complex partial seizures evolving to generalized seizures  Description of Sz Type 1: aura flush of anxiety starting in his chest and abdomen -> confusion with lip smacking -> GTC  # of Type 1 Seizure since last visit: 0  Freq. Type 1 / Month: 0  Seizure Type 2: Complex partial seizures unspecified  Description of Sz Type 2: Stares, clicking noise with mouth,  # of Type 2 Seizure since last visit: 0  Freq. Type 2 / Month: 0                EPILEPSY HISTORY:  Copied forward: The patient had his first seizure on 2011 and he came under [facility] care on  2011 under Dr. Blanco's care initially.  He typically has aura -> GTC. His seizure type he describes as a flush of anxiety followed by confusion with lip smacking and clicking sound in his throat.  This progresses to a generalized tonic-clonic seizure.  He was initially started on phenytoin (this helps his GTC), then VPA was added 2013 (this helps HA and seizure). Lastly, topiramate was added in  which has helped his headaches and seizure. His EEGs in the past have been notable for rare left temporal epileptiform discharges.  His MRI of the brain is normal and nonlesional.     INTERVAL HISTORY: He has had  no seizures since last visit.  His last seizure was July 20, 2020 (he was sleep deprived, missed seizure medications, drove 17 hours, missed meals).  He is tolerating seizure medications with no nausea, no dizziness, no double vision, no mood changes.  His anxiety and depression is stable.  Vision is stable.  He continues to have some sensory deficits in the median nerve distribution in the left hand, does not want any intervention at this time.  No emergency room visits or hospitalizations since last visit.  Additionally he has no concussive symptoms of fogginess, headaches, decreased memory.  Overall he is doing well.      MEDICATIONS:     Vimpat 200-200    Gabapentin 300-300  Dilantin 200-200  Topiramate 50 mg twice a day        MEDICATION ISSUES:    1. Depakote: Started VPA 1/7/2013 increased depakote 1095-1390 July 2015. Not sure if depakote helped him. Depakote was stopped 9/2019 for epilepsy surgery.   2. Topiramate: TPM started 5/9/14 for seizure and headache. Topiramate higher than 75mg twice a day causes cognitive slowing.   3. Phenytoin is helpful   4. Levetiracetam 2018 caused severe depression   5. Vimpat was most helpful     REVIEW OF SYSTEMS:  Left eye vision is improved. Very depressed.  Patient denies nausea, vomiting, diarrhea.      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 He is driving. He is living with parents.      NEUROLOGICAL EXAMINATION:  There were no vitals taken for this visit. Alert, orientated, speech is fluent, face symmetric, extraocular movement intact.    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   Right Amygdala Enlargement and right posterior temporal gyral thickening noted on MRI of the brain October 2018  Depression/Anxiety   History of left optic neuritis (2017),  etiology unknown  History of substance abuse (marijuana)    Discussion: Tha has focal epilepsy with impairment in awareness, controlled, s/p Right Temporal Lobectomy 9/10/2019.  His last seizure was July 28, 2020 in the setting of missed medications and sleep deprivation.  Since his last seizure he has been compliant with his medication and focused on self-care.  He has no visual changes and no other complaints.  We will continue same seizure medications.  I have encouraged him to focus on mental health and continue care with psychiatrist. He was agreeable to this plan of care.      PLAN:   Continue  Vimpat 200-200    Dilantin 200-200  Topiramate 50 mg twice a day   Gabapentin  600 mg twice a day     He will continue follow up with psychiatrist for anxiety treatment     For severe headaches - Toradol, compazine, benadryl for pain every 6 hours as needed. If needed use this combination.     Check antiepileptic drug for efficacy, toxicity, and side effects.      Follow-up with Van in 6 months    He will drop off 's license form and we will completed    cc:   Bhaskar Morales MD   [facility]   [address], [address]   [place] [address]         JORGE GARCIA MD

## 2020-11-10 NOTE — PROGRESS NOTES
"Tha Mcghee is a 30 year old male who is being evaluated via a billable video visit.      The patient has been notified of following:     \"This video visit will be conducted via a call between you and your physician/provider. We have found that certain health care needs can be provided without the need for an in-person physical exam.  This service lets us provide the care you need with a video conversation.  If a prescription is necessary we can send it directly to your pharmacy.  If lab work is needed we can place an order for that and you can then stop by our lab to have the test done at a later time.    Video visits are billed at different rates depending on your insurance coverage.  Please reach out to your insurance provider with any questions.    If during the course of the call the physician/provider feels a video visit is not appropriate, you will not be charged for this service.\"    Patient has given verbal consent for Video visit? Yes  How would you like to obtain your AVS? MyChart  If you are dropped from the video visit, the video invite should be resent to: Text to cell phone: [phone]  Will anyone else be joining your video visit? No        Video-Visit Details    Type of service:  Video Visit    Video Start Time: 8:51 AM  Video End Time: 9:12 AM    Originating Location (pt. Location): Home    Distant Location (provider location):  [facility] EPILEPSY CARE     Platform used for Video Visit: Michael Araujo MD    [facility] Epilepsy Care Progress Note    Patient:  Tha Mcghee  :  1990   Age:  30 year old   Today's Office Visit:  11/10/2020    Epilepsy Data:  Patient History  Primary Epileptologist/Provider: Rajni Araujo M.D.  Epilepsy Syndrome: Localization-related epilepsy unspecified  Epilepsy Syndrome Status: Preliminary  Age of Onset: 20  Etiology  : Unknown  Other Relevant Dx/ Issues: History of alcohol abuse; has undergone treatment. K2 (synthetic marijuana) use   Tests/Surgery " History  Last EE2011  Last MRI: 2011  Epilepsy Surgery #1 Date: 19  Epilepsy Related Surgery #1 : Type:  MAGAN Assisted Stereotactic Placement of Right Sided Depth Electrodes  Epilepsy Surgery #2 Date: 09/10/19  Epilepsy Related Surgery #2 : Type : Removal of Electrodes; Right Temporal Lobectomy  Seizure Record  Current Visit Date: 11/10/20  Previous Visit Date: 20  Months since last visit: 3.45  Seizure Type 1: Partial seizures with secondary generalization  -  with complex partial seizures evolving to generalized seizures  Description of Sz Type 1: aura flush of anxiety starting in his chest and abdomen -> confusion with lip smacking -> GTC  # of Type 1 Seizure since last visit: 0  Freq. Type 1 / Month: 0  Seizure Type 2: Complex partial seizures unspecified  Description of Sz Type 2: Stares, clicking noise with mouth,  # of Type 2 Seizure since last visit: 0  Freq. Type 2 / Month: 0                EPILEPSY HISTORY:  Copied forward: The patient had his first seizure on 2011 and he came under [facility] care on  2011 under Dr. Blanco's care initially.  He typically has aura -> GTC. His seizure type he describes as a flush of anxiety followed by confusion with lip smacking and clicking sound in his throat.  This progresses to a generalized tonic-clonic seizure.  He was initially started on phenytoin (this helps his GTC), then VPA was added 2013 (this helps HA and seizure). Lastly, topiramate was added in  which has helped his headaches and seizure. His EEGs in the past have been notable for rare left temporal epileptiform discharges.  His MRI of the brain is normal and nonlesional.     INTERVAL HISTORY: He has had no seizures since last visit.  His last seizure was 2020 (he was sleep deprived, missed seizure medications, drove 17 hours, missed meals).  He is tolerating seizure medications with no nausea, no dizziness, no double vision, no mood changes.  His anxiety and  depression is stable.  Vision is stable.  He continues to have some sensory deficits in the median nerve distribution in the left hand, does not want any intervention at this time.  No emergency room visits or hospitalizations since last visit.  Additionally he has no concussive symptoms of fogginess, headaches, decreased memory.  Overall he is doing well.      MEDICATIONS:     Vimpat 200-200    Gabapentin 300-300  Dilantin 200-200  Topiramate 50 mg twice a day        MEDICATION ISSUES:    1. Depakote: Started VPA 1/7/2013 increased depakote [id number] July 2015. Not sure if depakote helped him. Depakote was stopped 9/2019 for epilepsy surgery.   2. Topiramate: TPM started 5/9/14 for seizure and headache. Topiramate higher than 75mg twice a day causes cognitive slowing.   3. Phenytoin is helpful   4. Levetiracetam 2018 caused severe depression   5. Vimpat was most helpful     REVIEW OF SYSTEMS:  Left eye vision is improved. Very depressed.  Patient denies nausea, vomiting, diarrhea.      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 He is driving. He is living with parents.      NEUROLOGICAL EXAMINATION:  There were no vitals taken for this visit. Alert, orientated, speech is fluent, face symmetric, extraocular movement intact.    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   Right Amygdala Enlargement and right posterior temporal gyral thickening noted on MRI of the brain October 2018  Depression/Anxiety   History of left optic neuritis (2017), etiology unknown  History of substance abuse (marijuana)    Discussion: Tha has focal epilepsy with impairment in awareness, controlled, s/p Right Temporal Lobectomy 9/10/2019.  His last seizure was July 28, 2020 in the setting of missed medications and sleep  deprivation.  Since his last seizure he has been compliant with his medication and focused on self-care.  He has no visual changes and no other complaints.  We will continue same seizure medications.  I have encouraged him to focus on mental health and continue care with psychiatrist. He was agreeable to this plan of care.      PLAN:   Continue  Vimpat 200-200    Dilantin 200-200  Topiramate 50 mg twice a day   Gabapentin  600 mg twice a day     He will continue follow up with psychiatrist for anxiety treatment     For severe headaches - Toradol, compazine, benadryl for pain every 6 hours as needed. If needed use this combination.     Check antiepileptic drug for efficacy, toxicity, and side effects.      Follow-up with Van in 6 months    He will drop off 's license form and we will completed    cc:   Bhaskar Morales MD   [facility]   [address]   [place] [address]         JORGE GARCIA MD

## 2020-12-01 ENCOUNTER — TELEPHONE (OUTPATIENT)
Dept: NEUROLOGY | Facility: CLINIC | Age: 30
End: 2020-12-01

## 2020-12-14 ENCOUNTER — HEALTH MAINTENANCE LETTER (OUTPATIENT)
Age: 30
End: 2020-12-14

## 2020-12-22 ENCOUNTER — TELEPHONE (OUTPATIENT)
Dept: NEUROLOGY | Facility: CLINIC | Age: 30
End: 2020-12-22

## 2020-12-22 NOTE — TELEPHONE ENCOUNTER
Central Prior Authorization Team   [phone]    PA Initiation    Medication: lacosamide (VIMPAT) 200 MG TABS tablet  Insurance Company: [employer] - Phone [phone] Fax [phone]  Pharmacy Filling the Rx: [id number] - [place], [place] - [address]   Filling Pharmacy Phone: [phone]  Filling Pharmacy Fax: [phone]  Start Date: 12/22/2020

## 2020-12-22 NOTE — TELEPHONE ENCOUNTER
Prior Authorization Retail Medication Request    Medication/Dose:   ICD code (if different than what is on RX): Vimpat 200 mg tabs   Previously Tried and Failed:    Rationale:      Insurance Name:    Insurance ID:        Pharmacy Information (if different than what is on RX)  Name:    Phone:

## 2020-12-23 NOTE — TELEPHONE ENCOUNTER
Spoke with the PA team, and the prior auth is being finalized. The patient should be able to  his meds later today.    Call laced to the patient and let him know.  He was instructed to call again if there are further questions or concerns

## 2021-04-18 ENCOUNTER — HEALTH MAINTENANCE LETTER (OUTPATIENT)
Age: 31
End: 2021-04-18

## 2021-06-02 DIAGNOSIS — G40.219 PARTIAL EPILEPSY WITH IMPAIRMENT OF CONSCIOUSNESS, INTRACTABLE (H): ICD-10-CM

## 2021-06-03 RX ORDER — LACOSAMIDE 200 MG/1
200 TABLET ORAL 2 TIMES DAILY
Qty: 60 TABLET | Refills: 5 | Status: SHIPPED | OUTPATIENT
Start: 2021-06-03 | End: 2021-08-02

## 2021-06-03 NOTE — TELEPHONE ENCOUNTER
lacosamide (VIMPAT) 200 MG TABS tablet      Last Written Prescription Date:  11/10/20  Last Fill Quantity: 180,   # refills: 1  Last Office Visit : 11/10/20 recommended 6 month follow up  Future Office visit:  None scheduled    Routing refill request to provider for review/approval because:  Drug controlled substance

## 2021-07-20 DIAGNOSIS — G40.919 INTRACTABLE EPILEPSY WITHOUT STATUS EPILEPTICUS, UNSPECIFIED EPILEPSY TYPE (H): ICD-10-CM

## 2021-07-21 RX ORDER — PHENYTOIN SODIUM 200 MG/1
200 CAPSULE, EXTENDED RELEASE ORAL 2 TIMES DAILY
Qty: 180 CAPSULE | Refills: 0 | Status: SHIPPED | OUTPATIENT
Start: 2021-07-21 | End: 2021-08-02

## 2021-07-21 NOTE — TELEPHONE ENCOUNTER
LCV: 11/10/2020  [facility]  NCV: 21  Last AED: 19  Filling per [facility] medication refill protocols - seizure medications.  Not all labs required.      Overridden by Rajni Araujo MD on 2020 12:20 PM  Drug-Drug  1. RIVAROXABAN / STRONG CY AND P-GP INDUCERS [Level: Major]  Other Orders: rivaroxaban ANTICOAGULANT (XARELTO ANTICOAGULANT) 15 MG TABS tablet    2. RIVAROXABAN / STRONG CY AND P-GP INDUCERS [Level: Major]  Other Orders: rivaroxaban ANTICOAGULANT (XARELTO ANTICOAGULANT) 20 MG TABS tablet

## 2021-08-02 ENCOUNTER — OFFICE VISIT (OUTPATIENT)
Dept: NEUROLOGY | Facility: CLINIC | Age: 31
End: 2021-08-02
Payer: COMMERCIAL

## 2021-08-02 VITALS
WEIGHT: 177.5 LBS | TEMPERATURE: 98.6 F | BODY MASS INDEX: 26.21 KG/M2 | SYSTOLIC BLOOD PRESSURE: 153 MMHG | HEART RATE: 101 BPM | DIASTOLIC BLOOD PRESSURE: 96 MMHG

## 2021-08-02 DIAGNOSIS — G40.219 PARTIAL EPILEPSY WITH IMPAIRMENT OF CONSCIOUSNESS, INTRACTABLE (H): ICD-10-CM

## 2021-08-02 DIAGNOSIS — G40.209 LOCALIZATION-RELATED FOCAL EPILEPSY WITH COMPLEX PARTIAL SEIZURES (H): ICD-10-CM

## 2021-08-02 DIAGNOSIS — G40.919 INTRACTABLE EPILEPSY WITHOUT STATUS EPILEPTICUS, UNSPECIFIED EPILEPSY TYPE (H): ICD-10-CM

## 2021-08-02 RX ORDER — TOPIRAMATE 50 MG/1
50 TABLET, FILM COATED ORAL 2 TIMES DAILY
Qty: 180 TABLET | Refills: 3 | Status: SHIPPED | OUTPATIENT
Start: 2021-08-02 | End: 2022-04-15

## 2021-08-02 RX ORDER — PHENYTOIN SODIUM 200 MG/1
200 CAPSULE, EXTENDED RELEASE ORAL 2 TIMES DAILY
Qty: 180 CAPSULE | Refills: 3 | Status: SHIPPED | OUTPATIENT
Start: 2021-08-02 | End: 2022-04-15

## 2021-08-02 RX ORDER — GABAPENTIN 300 MG/1
CAPSULE ORAL
Qty: 360 CAPSULE | Refills: 3 | Status: SHIPPED | OUTPATIENT
Start: 2021-08-02 | End: 2022-04-15

## 2021-08-02 RX ORDER — LACOSAMIDE 200 MG/1
200 TABLET ORAL 2 TIMES DAILY
Qty: 180 TABLET | Refills: 1 | Status: SHIPPED | OUTPATIENT
Start: 2021-08-02 | End: 2022-03-10

## 2021-08-02 RX ORDER — TADALAFIL 5 MG/1
5 TABLET ORAL
COMMUNITY
Start: 2021-06-28

## 2021-08-02 ASSESSMENT — PAIN SCALES - GENERAL: PAINLEVEL: NO PAIN (0)
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
## 2021-08-09 DIAGNOSIS — G40.209 LOCALIZATION-RELATED FOCAL EPILEPSY WITH COMPLEX PARTIAL SEIZURES (H): ICD-10-CM

## 2021-08-12 RX ORDER — GABAPENTIN 300 MG/1
CAPSULE ORAL
Qty: 360 CAPSULE | Refills: 3 | OUTPATIENT
Start: 2021-08-12

## 2021-08-18 DIAGNOSIS — G40.919 INTRACTABLE EPILEPSY WITHOUT STATUS EPILEPTICUS, UNSPECIFIED EPILEPSY TYPE (H): ICD-10-CM

## 2021-08-23 RX ORDER — TOPIRAMATE 50 MG/1
TABLET, FILM COATED ORAL
Qty: 180 TABLET | Refills: 3 | OUTPATIENT
Start: 2021-08-23

## 2021-08-23 NOTE — TELEPHONE ENCOUNTER
TOPIRAMATE 50MG TABLETS    topiramate (TOPAMAX) 50 MG tablet 180 tablet 3 8/2/2021  No   Sig - Route: Take 1 tablet (50 mg) by mouth 2 times daily - Oral   Sent to pharmacy as: Topiramate 50 MG Oral Tablet (TOPAMAX)   Class: E-Prescribe   Order: [id number]   E-Prescribing Status: Receipt confirmed by pharmacy (8/2/2021 10:11 AM CDT)   Printout Tracking    External Result Report   Pharmacy    [facility] #[id number] - [place], [place] - [address] AT [facility] [address]

## 2021-10-02 ENCOUNTER — HEALTH MAINTENANCE LETTER (OUTPATIENT)
Age: 31
End: 2021-10-02

## 2021-11-24 ENCOUNTER — TELEPHONE (OUTPATIENT)
Dept: NEUROLOGY | Facility: CLINIC | Age: 31
End: 2021-11-24
Payer: COMMERCIAL

## 2021-11-24 NOTE — TELEPHONE ENCOUNTER
Prior Authorization Retail Medication Request    Medication/Dose: Vimpat 200 MG tablets  ICD code (if different than what is on RX):    Previously Tried and Failed:  See Chart  Rationale:  See Chart    Insurance Name:    Insurance ID:        Pharmacy Information (if different than what is on RX)  Name:    Phone:

## 2021-11-26 NOTE — TELEPHONE ENCOUNTER
PA Initiation    Medication: lacosamide (VIMPAT) 200 MG TABS tablet   Insurance Company: [employer] - Phone [phone] Fax [phone]  Pharmacy Filling the Rx: [id number] - [place], [place] - [address]   Filling Pharmacy Phone: [phone]  Filling Pharmacy Fax: [phone]  Start Date: 11/26/2021

## 2021-11-29 NOTE — TELEPHONE ENCOUNTER
Prior Authorization Approval    Authorization Effective Date: 12/24/2021  Authorization Expiration Date: 12/24/2022  Medication: lacosamide (VIMPAT) 200 MG TABS tablet--APPROVED  Approved Dose/Quantity:   Reference #:     Insurance Company: [employer] - Phone [phone] Fax [phone]  Expected CoPay:       CoPay Card Available:      Foundation Assistance Needed:    Which Pharmacy is filling the prescription (Not needed for infusion/clinic administered): [email or web address] #[id number] - [place], [place] - [address] AT [facility] [address]   Pharmacy Notified: Yes  Patient Notified: Yes **Instructed pharmacy to notify patient when script is ready to /ship.**

## 2022-03-10 DIAGNOSIS — G40.219 PARTIAL EPILEPSY WITH IMPAIRMENT OF CONSCIOUSNESS, INTRACTABLE (H): ICD-10-CM

## 2022-03-10 RX ORDER — LACOSAMIDE 200 MG/1
200 TABLET ORAL 2 TIMES DAILY
Qty: 180 TABLET | Refills: 0 | Status: SHIPPED | OUTPATIENT
Start: 2022-03-10 | End: 2022-04-15

## 2022-04-15 ENCOUNTER — VIRTUAL VISIT (OUTPATIENT)
Dept: NEUROLOGY | Facility: CLINIC | Age: 32
End: 2022-04-15
Payer: COMMERCIAL

## 2022-04-15 DIAGNOSIS — G40.919 INTRACTABLE EPILEPSY WITHOUT STATUS EPILEPTICUS, UNSPECIFIED EPILEPSY TYPE (H): ICD-10-CM

## 2022-04-15 DIAGNOSIS — F32.A DEPRESSION, UNSPECIFIED DEPRESSION TYPE: Primary | ICD-10-CM

## 2022-04-15 DIAGNOSIS — G40.209 LOCALIZATION-RELATED FOCAL EPILEPSY WITH COMPLEX PARTIAL SEIZURES (H): ICD-10-CM

## 2022-04-15 DIAGNOSIS — G40.219 PARTIAL EPILEPSY WITH IMPAIRMENT OF CONSCIOUSNESS, INTRACTABLE (H): ICD-10-CM

## 2022-04-15 RX ORDER — PHENYTOIN SODIUM 200 MG/1
200 CAPSULE, EXTENDED RELEASE ORAL 2 TIMES DAILY
Qty: 180 CAPSULE | Refills: 3 | Status: SHIPPED | OUTPATIENT
Start: 2022-04-15 | End: 2022-04-26

## 2022-04-15 RX ORDER — TOPIRAMATE 50 MG/1
50 TABLET, FILM COATED ORAL 2 TIMES DAILY
Qty: 180 TABLET | Refills: 3 | Status: SHIPPED | OUTPATIENT
Start: 2022-04-15 | End: 2023-05-05

## 2022-04-15 RX ORDER — GABAPENTIN 300 MG/1
CAPSULE ORAL
Qty: 360 CAPSULE | Refills: 3 | Status: SHIPPED | OUTPATIENT
Start: 2022-04-15 | End: 2023-05-05

## 2022-04-15 RX ORDER — METOPROLOL SUCCINATE 50 MG/1
50 TABLET, EXTENDED RELEASE ORAL
COMMUNITY
Start: 2021-12-30

## 2022-04-15 RX ORDER — LACOSAMIDE 200 MG/1
200 TABLET ORAL 2 TIMES DAILY
Qty: 180 TABLET | Refills: 1 | Status: SHIPPED | OUTPATIENT
Start: 2022-04-15 | End: 2022-08-29
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
## 2022-04-15 NOTE — PATIENT INSTRUCTIONS
Continue  Vimpat 200 mg twice a day     Gabapentin 600 mg twice a day   Dilantin 200 mg twice a day   Topiramate 50 mg twice a day     Psychiatrist consult placed  Primary care provider to manage depression    Follow up with therapist weekly    Follow-up with Van in 2-4 months  Rajni Araujo MD

## 2022-04-23 ENCOUNTER — TELEPHONE (OUTPATIENT)
Dept: NEUROLOGY | Facility: CLINIC | Age: 32
End: 2022-04-23
Payer: COMMERCIAL

## 2022-04-23 NOTE — TELEPHONE ENCOUNTER
The patient called into office Saturday 2:45PM    He reports that since 4/18 he has noted some trouble with vision. He describes vision blurring on and off. Initially he attributed to new rx of Seroquel which he is finding to be very helpful for depression and suicidal ideation.     He has a history of optic neurtic in left eye in 2017 that he tells me were caused by seizures. On review of H&P from that time it states he had 8 weeks of progressive visual loss and pain in left eye/headache. Initially treated as migraine headache got better but not vision. When vision problems continued admission happened and he had an MRI confirming optic neuritis and was treated with steroids and had improvement.    He is wondering if he is starting to have optic neuritis again.    Discussed options he can go to ED or wait until Monday. He does think he can wait until Monday and will call Dr Araujo. If his vision sudden gets worse he knows to go to ED. Advised him to continue on Seroquel as it is helping his SI/Depression.    Julita Soto

## 2022-04-26 ENCOUNTER — TELEPHONE (OUTPATIENT)
Dept: NEUROLOGY | Facility: CLINIC | Age: 32
End: 2022-04-26
Payer: COMMERCIAL

## 2022-04-26 DIAGNOSIS — G40.919 INTRACTABLE EPILEPSY WITHOUT STATUS EPILEPTICUS, UNSPECIFIED EPILEPSY TYPE (H): Primary | ICD-10-CM

## 2022-04-26 RX ORDER — PHENYTOIN SODIUM 100 MG/1
100 CAPSULE, EXTENDED RELEASE ORAL EVERY MORNING
Qty: 90 CAPSULE | Refills: 1 | Status: SHIPPED | OUTPATIENT
Start: 2022-04-26 | End: 2022-08-29

## 2022-04-26 RX ORDER — PHENYTOIN SODIUM 200 MG/1
200 CAPSULE, EXTENDED RELEASE ORAL EVERY EVENING
Qty: 90 CAPSULE | Refills: 1 | Status: SHIPPED | OUTPATIENT
Start: 2022-04-26 | End: 2022-08-29

## 2022-04-26 NOTE — TELEPHONE ENCOUNTER
"  PHT level checked as part of an evaluation at [facility] because patient has been feeling \"drunk\" at times for the past few weeks.  He had also called the on-call neurology provider for [facility] 4/23/2022 with complaints of trouble with intermittent blurry vision    Additionally, patient has been having bad depression and has been put on seroquel 100mg HS.  He finds the medication has helped.      Current ASDs.    -200  -600  -200      Labs from Care Everywhere        Patient reports there has been no change to the appearance of the dilantin, he takes the 200mg size capsules.    No other medical concerns or recent changes that patient can identify.    Will discuss with  and call patient back  "

## 2022-04-26 NOTE — TELEPHONE ENCOUNTER
Discussed with   Reduce dilantin to 160-200   Recheck level in one week.    Call placed to patient  Plan discussed  Preferred pharmacy confirmed  No further questions  Prescriptions sent

## 2022-05-14 ENCOUNTER — HEALTH MAINTENANCE LETTER (OUTPATIENT)
Age: 32
End: 2022-05-14

## 2022-06-16 ENCOUNTER — TELEPHONE (OUTPATIENT)
Dept: NEUROLOGY | Facility: CLINIC | Age: 32
End: 2022-06-16
Payer: COMMERCIAL

## 2022-06-16 NOTE — TELEPHONE ENCOUNTER
----- Message from ANA Early sent at 6/9/2022  3:58 PM CDT -----  Dr. Araujo asked me to ask you to give Tad a call as at his last visit his depression symptoms had worsened and he was having more suicidal ideation. She wants to know how mood is, if he is following with psychiatry and therapist and if on antidepressants.    Thanks,  Anastasia

## 2022-06-16 NOTE — TELEPHONE ENCOUNTER
"Outreach call placed to patient as requested by Dr. Araujo to check in on his mental health. He was at work so the call was limited.     He states he continues on seroquel which is prescribed by his primary care doctor. He states he is \"way better\" than he was when he saw Dr. Araujo last. He reports he is inconsistently seeing a therapist and is not seeing a psychiatrist. He feels he is making behavioral changes to improve his mood as well. He denies any suicidal ideation, \"that's all gone\".     Dylon England RN  "

## 2022-08-29 ENCOUNTER — OFFICE VISIT (OUTPATIENT)
Dept: NEUROLOGY | Facility: CLINIC | Age: 32
End: 2022-08-29
Payer: COMMERCIAL

## 2022-08-29 ENCOUNTER — TELEPHONE (OUTPATIENT)
Dept: NEUROLOGY | Facility: CLINIC | Age: 32
End: 2022-08-29

## 2022-08-29 VITALS
DIASTOLIC BLOOD PRESSURE: 63 MMHG | BODY MASS INDEX: 25.06 KG/M2 | HEIGHT: 69 IN | HEART RATE: 80 BPM | TEMPERATURE: 98.7 F | WEIGHT: 169.2 LBS | SYSTOLIC BLOOD PRESSURE: 133 MMHG

## 2022-08-29 DIAGNOSIS — G40.219 PARTIAL EPILEPSY WITH IMPAIRMENT OF CONSCIOUSNESS, INTRACTABLE (H): ICD-10-CM

## 2022-08-29 DIAGNOSIS — G40.919 INTRACTABLE EPILEPSY WITHOUT STATUS EPILEPTICUS, UNSPECIFIED EPILEPSY TYPE (H): Primary | ICD-10-CM

## 2022-08-29 RX ORDER — PHENYTOIN SODIUM 100 MG/1
100 CAPSULE, EXTENDED RELEASE ORAL EVERY MORNING
Qty: 90 CAPSULE | Refills: 3 | Status: SHIPPED | OUTPATIENT
Start: 2022-08-29 | End: 2023-05-01

## 2022-08-29 RX ORDER — PHENYTOIN SODIUM 200 MG/1
200 CAPSULE, EXTENDED RELEASE ORAL EVERY EVENING
Qty: 90 CAPSULE | Refills: 3 | Status: SHIPPED | OUTPATIENT
Start: 2022-08-29 | End: 2023-05-05

## 2022-08-29 RX ORDER — QUETIAPINE FUMARATE 50 MG/1
100 TABLET, FILM COATED ORAL AT BEDTIME
COMMUNITY
Start: 2022-04-18

## 2022-08-29 RX ORDER — LACOSAMIDE 200 MG/1
200 TABLET ORAL 2 TIMES DAILY
Qty: 180 TABLET | Refills: 1 | Status: SHIPPED | OUTPATIENT
Start: 2022-08-29 | End: 2023-03-13

## 2022-08-29 NOTE — PATIENT INSTRUCTIONS
Continue  Vimpat 200 mg twice a day     Gabapentin 600 mg twice a day   Dilantin 160 mg morning and 200 mg night    Topiramate 50 mg twice a day (Headaches)     Follow up with psychologist and psychiatrist (strongly encouraged)     Follow-up with Van 4 months    Labs     Follow up  6 months     Rajni Araujo MD
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
## 2022-08-29 NOTE — TELEPHONE ENCOUNTER
[employer] form completed during 8/29/22 visit. Faxed and Mailed to [employer], copy given to Patient and sent to scanning
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
## 2022-09-04 ENCOUNTER — HEALTH MAINTENANCE LETTER (OUTPATIENT)
Age: 32
End: 2022-09-04

## 2022-09-16 ASSESSMENT — PATIENT HEALTH QUESTIONNAIRE - PHQ9: SUM OF ALL RESPONSES TO PHQ QUESTIONS 1-9: 7
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
## 2022-11-16 ENCOUNTER — TELEPHONE (OUTPATIENT)
Dept: NEUROLOGY | Facility: CLINIC | Age: 32
End: 2022-11-16

## 2022-11-16 NOTE — TELEPHONE ENCOUNTER
Central Prior Authorization Team   Phone: [phone]      PA Initiation    Medication: Vimpat 200 MG Tablets-PA initiated  Insurance Company: [employer] - Phone [phone] Fax [phone]  Pharmacy Filling the Rx: [id number] - [place], [place] - [address]   Filling Pharmacy Phone: [phone]  Filling Pharmacy Fax:    Start Date: 11/16/2022

## 2022-11-16 NOTE — TELEPHONE ENCOUNTER
Prior Authorization Retail Medication Request    Medication/Dose: Vimpat 200 MG Tablets  ICD code (if different than what is on RX):    Previously Tried and Failed:  See Chart  Rationale:  See Chart    Insurance Name:    Insurance ID:        Pharmacy Information (if different than what is on RX)  Name:  [facility]

## 2022-11-23 NOTE — TELEPHONE ENCOUNTER
Per Gina at [facility], they did not receive this. I have refaxed at her direction to [phone].

## 2022-11-23 NOTE — TELEPHONE ENCOUNTER
Prior Authorization Not Needed per Insurance-generic is covered without a PA    Medication: Lacosamide (VIMPAT) 200 MG Tablets-PA not needed  Insurance Company: [employer]AP - Phone [phone] Fax [phone]  Expected CoPay:      Pharmacy Filling the Rx: [id number] - [place], [place] - [address]   Pharmacy Notified:    Patient Notified:

## 2022-12-19 DIAGNOSIS — G40.209 LOCALIZATION-RELATED FOCAL EPILEPSY WITH COMPLEX PARTIAL SEIZURES (H): ICD-10-CM

## 2022-12-21 RX ORDER — GABAPENTIN 300 MG/1
CAPSULE ORAL
Qty: 360 CAPSULE | Refills: 3 | OUTPATIENT
Start: 2022-12-21

## 2023-02-19 NOTE — PROCEDURES
EEG #-4     TYPE OF STUDY: Inpatient video-EEG monitoring    DATE OF RECORDING:  10/26/2018       SOURCE FILE DURATION:  10 hours, 44 minutes, 7 seconds      HISTORY:  Day #4 of video-EEG monitoring in Tha Mcghee, a 28-year-old with medically intractable localization-related epilepsy (complex partial seizures, secondarily generalized tonic-clonic seizures).  He is undergoing evaluation for resective epilepsy surgery.  He is being treated with Depakote, Neurontin, Dilantin and Vimpat.      TECHNICAL SUMMARY: EEG is recorded from scalp electrodes placed according to the 10-20 International system.  Additional electrodes were utilized for referencing, artifact detection, and recording from other cerebral regions.  Video was continuously recorded.  Video was reviewed for clinical correlation and to assist with EEG interpretation.      FINDINGS:  While awake, desynchronized background.  Occasionally, a 9 Hz posterior dominant rhythm was briefly seen.  During sleep, symmetrical sleep spindles and vertex waves.      Hyperventilation and photic stimulation were not performed.      OTHER INTERICTAL ABNORMALITIES:  No epileptiform discharges.      ICTAL ABNORMALITIES:  No electrographic seizures.      IMPRESSION:  Normal.  No epileptiform activity or seizures.      SUMMARY OF 4 DAYS OF VIDEO-EEG MONITORING:    Normal posterior dominant rhythm. Occasional runs of right temporal slowing.  A single right anterior temporal epileptiform discharge was seen following the first 3 seizures.  Otherwise, epileptiform discharges were not seen.      Four seizures were recorded during the 4-day monitoring session.  Seizures emerged very quickly following discontinuation of topiramate.  Seizures stopped for more than 24 hours following substitution of lacosamide for topiramate.  Phenytoin and valproate were continued throughout the evaluation.      Clinically, seizures consisted of arrest of ongoing activity and lip smacking.   Initially, there was some responsiveness during a seizure including ability to read the test sentence and follow commands.  Responsiveness, however, disappeared as seizure continued.  More overt lip smacking was noted as seizure continued.  Postictally, there was rapid speech recovery and patient rubbed face with right hand.  With the second and third seizure, there was forceful movement of the head to the left.  With the third seizure, left facial twitching was noted, there was eye deviation to the left, and left arm and leg jerking.  Complete secondary generalization with bilateral involvement, however, did not occur.  Electrographically, first seizure showed right hemispheric onset with subsequent right posterior temporal and then right anterior temporal discharge.  Ictal onset in the other 3 seizures consisted of rhythmic delta that was consistently best developed at T4, T6 and T2, with lesser development with lower amplitudes at F8 and the frontal electrodes.  Rhythmic delta with this distribution continued for about 3 seconds prior to the development of a more typical anterior to mid temporal seizure discharge.  Postictally, there was a consistent transient loss of the posterior dominant rhythm over the right hemisphere.      Study confirms that patient suffers from a focal epilepsy.  The clinical features are consistent with nondominant temporal lobe activation.  Ictal EEG confirms right temporal onset with some suggestion of a mid to posterior temporal rather than an anterior temporal onset.        (Video for seizure #4 good for conference)         SANTHOSH BORJA MD             D: 10/26/2018   T: 10/27/2018   MT:       Name:     SANTHOSH HODGES   MRN:      [id number]        Account:        [id number]   :      1990           Procedure Date: 10/26/2018      Document: [id number]       Yes, Full Record

## 2023-03-13 DIAGNOSIS — G40.219 PARTIAL EPILEPSY WITH IMPAIRMENT OF CONSCIOUSNESS, INTRACTABLE (H): ICD-10-CM

## 2023-03-13 RX ORDER — LACOSAMIDE 200 MG/1
200 TABLET ORAL 2 TIMES DAILY
Qty: 180 TABLET | Refills: 0 | Status: SHIPPED | OUTPATIENT
Start: 2023-03-13 | End: 2023-05-05

## 2023-03-13 NOTE — TELEPHONE ENCOUNTER
What is the concern that needs to be addressed by a nurse? Patient needs new prescription per pharmacy. Patient is out of medication as of today.     May a detailed message be left on voicemail? Yes     Date of last office visit: 08/29/2022    Message routed to: MARILU GEORGES RN [id number] - [place] - [address]   [address] [place] [address]  Phone: [phone] Fax: [phone]

## 2023-04-25 DIAGNOSIS — G40.919 INTRACTABLE EPILEPSY WITHOUT STATUS EPILEPTICUS, UNSPECIFIED EPILEPSY TYPE (H): ICD-10-CM

## 2023-04-28 NOTE — TELEPHONE ENCOUNTER
PHENYTOIN 200MG ER CAPSULES     Last Written Prescription Date:  8/29/22  Last Fill Quantity: 90,   # refills: 3  Last Office Visit : 8/29/22  Future Office visit:  5/5/23    Routing refill request to provider for review/approval because:  Refill request does not match med list  Per last clinic visit:   Dilantin 160 mg morning and 200 mg night

## 2023-05-01 DIAGNOSIS — G40.919 INTRACTABLE EPILEPSY WITHOUT STATUS EPILEPTICUS, UNSPECIFIED EPILEPSY TYPE (H): ICD-10-CM

## 2023-05-01 RX ORDER — PHENYTOIN SODIUM 100 MG/1
100 CAPSULE, EXTENDED RELEASE ORAL EVERY MORNING
Qty: 90 CAPSULE | Refills: 1 | Status: SHIPPED | OUTPATIENT
Start: 2023-05-01 | End: 2023-05-05

## 2023-05-01 RX ORDER — PHENYTOIN SODIUM 200 MG/1
CAPSULE, EXTENDED RELEASE ORAL
Qty: 180 CAPSULE | Refills: 3 | OUTPATIENT
Start: 2023-05-01

## 2023-05-04 DIAGNOSIS — G40.209 LOCALIZATION-RELATED FOCAL EPILEPSY WITH COMPLEX PARTIAL SEIZURES (H): ICD-10-CM

## 2023-05-04 RX ORDER — GABAPENTIN 300 MG/1
CAPSULE ORAL
Qty: 360 CAPSULE | Refills: 3 | Status: CANCELLED | OUTPATIENT
Start: 2023-05-04

## 2023-05-05 ENCOUNTER — OFFICE VISIT (OUTPATIENT)
Dept: NEUROLOGY | Facility: CLINIC | Age: 33
End: 2023-05-05
Payer: COMMERCIAL

## 2023-05-05 VITALS
WEIGHT: 165.6 LBS | TEMPERATURE: 98.2 F | HEIGHT: 69 IN | BODY MASS INDEX: 24.53 KG/M2 | DIASTOLIC BLOOD PRESSURE: 75 MMHG | HEART RATE: 101 BPM | SYSTOLIC BLOOD PRESSURE: 145 MMHG

## 2023-05-05 DIAGNOSIS — G40.209 LOCALIZATION-RELATED FOCAL EPILEPSY WITH COMPLEX PARTIAL SEIZURES (H): ICD-10-CM

## 2023-05-05 DIAGNOSIS — G40.219 PARTIAL EPILEPSY WITH IMPAIRMENT OF CONSCIOUSNESS, INTRACTABLE (H): ICD-10-CM

## 2023-05-05 DIAGNOSIS — G40.919 INTRACTABLE EPILEPSY WITHOUT STATUS EPILEPTICUS, UNSPECIFIED EPILEPSY TYPE (H): ICD-10-CM

## 2023-05-05 LAB
ALT SERPL W P-5'-P-CCNC: 31 U/L (ref 10–50)
AST SERPL W P-5'-P-CCNC: 24 U/L (ref 10–50)
PHENYTOIN SERPL-MCNC: 22.7 UG/ML

## 2023-05-05 PROCEDURE — 84450 TRANSFERASE (AST) (SGOT): CPT | Mod: ORL | Performed by: PSYCHIATRY & NEUROLOGY

## 2023-05-05 PROCEDURE — 80201 ASSAY OF TOPIRAMATE: CPT | Mod: ORL | Performed by: PSYCHIATRY & NEUROLOGY

## 2023-05-05 PROCEDURE — 84460 ALANINE AMINO (ALT) (SGPT): CPT | Mod: ORL | Performed by: PSYCHIATRY & NEUROLOGY

## 2023-05-05 PROCEDURE — 80185 ASSAY OF PHENYTOIN TOTAL: CPT | Mod: ORL | Performed by: PSYCHIATRY & NEUROLOGY

## 2023-05-05 PROCEDURE — 80171 DRUG SCREEN QUANT GABAPENTIN: CPT | Mod: ORL | Performed by: PSYCHIATRY & NEUROLOGY

## 2023-05-05 PROCEDURE — 80235 DRUG ASSAY LACOSAMIDE: CPT | Mod: ORL | Performed by: PSYCHIATRY & NEUROLOGY

## 2023-05-05 PROCEDURE — 80186 ASSAY OF PHENYTOIN FREE: CPT | Mod: ORL | Performed by: PSYCHIATRY & NEUROLOGY

## 2023-05-05 RX ORDER — GABAPENTIN 300 MG/1
CAPSULE ORAL
Qty: 360 CAPSULE | Refills: 3 | Status: SHIPPED | OUTPATIENT
Start: 2023-05-05 | End: 2024-05-06

## 2023-05-05 RX ORDER — PHENYTOIN SODIUM 200 MG/1
200 CAPSULE, EXTENDED RELEASE ORAL EVERY EVENING
Qty: 90 CAPSULE | Refills: 3 | Status: SHIPPED | OUTPATIENT
Start: 2023-05-05 | End: 2024-05-28

## 2023-05-05 RX ORDER — LACOSAMIDE 200 MG/1
200 TABLET ORAL 2 TIMES DAILY
Qty: 180 TABLET | Refills: 1 | Status: SHIPPED | OUTPATIENT
Start: 2023-05-05 | End: 2023-06-12

## 2023-05-05 RX ORDER — PHENYTOIN SODIUM 100 MG/1
100 CAPSULE, EXTENDED RELEASE ORAL EVERY MORNING
Qty: 90 CAPSULE | Refills: 3 | Status: SHIPPED | OUTPATIENT
Start: 2023-05-05

## 2023-05-05 RX ORDER — TOPIRAMATE 50 MG/1
50 TABLET, FILM COATED ORAL 2 TIMES DAILY
Qty: 180 TABLET | Refills: 3 | Status: SHIPPED | OUTPATIENT
Start: 2023-05-05 | End: 2024-05-28

## 2023-05-05 NOTE — PATIENT INSTRUCTIONS
Continue  Vimpat 200 mg twice a day     Gabapentin 600 mg twice a day   Dilantin 160 mg morning and 200 mg night    Topiramate 50 mg twice a day (Headaches)     Follow up with psychologist     Labs     Follow up  6 months     Rajni Araujo MD
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
## 2023-05-08 LAB
PHENYTOIN FREE SERPL-MCNC: 1.6 UG/ML
TOPIRAMATE SERPL-MCNC: <1.5 UG/ML

## 2023-05-09 LAB — LACOSAMIDE SERPL-MCNC: 6.5 UG/ML

## 2023-05-10 LAB — GABAPENTIN SERPLBLD-MCNC: 3.5 UG/ML

## 2023-06-03 ENCOUNTER — HEALTH MAINTENANCE LETTER (OUTPATIENT)
Age: 33
End: 2023-06-03

## 2023-06-12 DIAGNOSIS — G40.219 PARTIAL EPILEPSY WITH IMPAIRMENT OF CONSCIOUSNESS, INTRACTABLE (H): ICD-10-CM

## 2023-06-12 RX ORDER — LACOSAMIDE 200 MG/1
200 TABLET ORAL 2 TIMES DAILY
Qty: 180 TABLET | Refills: 1 | Status: SHIPPED | OUTPATIENT
Start: 2023-06-12 | End: 2023-12-15

## 2023-08-23 ENCOUNTER — TELEPHONE (OUTPATIENT)
Dept: NEUROLOGY | Facility: CLINIC | Age: 33
End: 2023-08-23

## 2023-08-23 DIAGNOSIS — G40.219 PARTIAL EPILEPSY WITH IMPAIRMENT OF CONSCIOUSNESS, INTRACTABLE (H): Primary | ICD-10-CM

## 2023-08-23 NOTE — TELEPHONE ENCOUNTER
What is the concern that needs to be addressed by a nurse?   Pt. Called stating that he feels very dizzy on his dilatin and he would like to know how to proceed and if he should change or reduce his medication.    May a detailed message be left on voicemail? Yes    Date of last office visit: 5/5/2023    Message routed to: Lexii

## 2023-09-08 NOTE — TELEPHONE ENCOUNTER
Call placed to patient  Patient went to the ED and had his level checked.  His level was at 30.1  ED contacted [facility] and advice was given  He was told to hold PHT for 2 days, and the PHT was lowered to 130 - 200.    Patient got a repeat level today.  Will watch for levels and update   Patient has had no seizures.  He has an appointment scheduled with  10/23/2023  No other questions at this time

## 2023-09-09 ENCOUNTER — TELEPHONE (OUTPATIENT)
Dept: NEUROLOGY | Facility: CLINIC | Age: 33
End: 2023-09-09
Payer: COMMERCIAL

## 2023-09-09 NOTE — TELEPHONE ENCOUNTER
Received on call page from patient. Outside Dilantin level returned high at 27 (information not in chart at this time). He has been taking Dilantin 130-200 at home, a decrease from 160-200 after previous elevated level and side effects.     While he is feeling somewhat better, he still has cognitive side effects, he reports, and would like to lower the level again. He will take 100-200 starting tomorrow, and I will inform Dr. Araujo.    MD Sruthi

## 2023-09-11 DIAGNOSIS — G40.919 INTRACTABLE EPILEPSY WITHOUT STATUS EPILEPTICUS, UNSPECIFIED EPILEPSY TYPE (H): ICD-10-CM

## 2023-09-11 NOTE — TELEPHONE ENCOUNTER
Tha Mcghee is calling stating that he had his Dilantin level checked on 09/08/23. Patient states that his level was still 27.1. Patient states that he spoke w/ un-call provider over the weekend. Patient is requesting a call back at [phone] to discuss further.

## 2023-09-12 NOTE — TELEPHONE ENCOUNTER
Call placed to patient  He reports that he still has some cognition problems/fogginess, however the dizziness has improved.  He made the reduction in PHT as recommended by  (on call provider)   Will recheck a level next Monday (Sept 18th) to check for therapeutic level as per protocol  [facility]  No other questions at this time

## 2023-09-14 RX ORDER — EXTENDED PHENYTOIN SODIUM 30 MG/1
CAPSULE ORAL
Qty: 180 CAPSULE | Refills: 3 | OUTPATIENT
Start: 2023-09-14

## 2023-10-23 ENCOUNTER — OFFICE VISIT (OUTPATIENT)
Dept: NEUROLOGY | Facility: CLINIC | Age: 33
End: 2023-10-23
Payer: COMMERCIAL

## 2023-10-23 VITALS
WEIGHT: 165 LBS | SYSTOLIC BLOOD PRESSURE: 124 MMHG | DIASTOLIC BLOOD PRESSURE: 81 MMHG | TEMPERATURE: 98.2 F | HEIGHT: 69 IN | HEART RATE: 92 BPM | BODY MASS INDEX: 24.44 KG/M2

## 2023-10-23 DIAGNOSIS — G40.919 INTRACTABLE EPILEPSY WITHOUT STATUS EPILEPTICUS, UNSPECIFIED EPILEPSY TYPE (H): Primary | ICD-10-CM

## 2023-10-23 ASSESSMENT — PATIENT HEALTH QUESTIONNAIRE - PHQ9: SUM OF ALL RESPONSES TO PHQ QUESTIONS 1-9: 7

## 2023-10-23 NOTE — PATIENT INSTRUCTIONS
Continue  Vimpat 200 mg twice a day     Gabapentin 600 mg twice a day   Dilantin 100 mg morning and 200 mg night    Topiramate 50 mg twice a day (Headaches)     Labs today     Follow up 8 months     Needs DMV completed     Rajni Araujo MD
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
## 2023-10-24 ENCOUNTER — TELEPHONE (OUTPATIENT)
Dept: NEUROLOGY | Facility: CLINIC | Age: 33
End: 2023-10-24

## 2023-10-24 NOTE — TELEPHONE ENCOUNTER
Received DMV (LOC) Form to be completed. Form saved to [employer], encounter routed.   Cristina Dickerson CMA

## 2023-11-01 NOTE — TELEPHONE ENCOUNTER
DMV form signed, faxed and mailed to DPS on 11/01/2023, sent to scanning, and copy mailed to patient.

## 2023-12-13 DIAGNOSIS — G40.219 PARTIAL EPILEPSY WITH IMPAIRMENT OF CONSCIOUSNESS, INTRACTABLE (H): ICD-10-CM

## 2023-12-15 NOTE — TELEPHONE ENCOUNTER
lacosamide (VIMPAT) 200 MG TABS tablet       Last Written Prescription Date:  6-12-23  Last Fill Quantity: 180,   # refills: 1  Last Office Visit : 10-23-23  Future Office visit:  6-24-24    Routing refill request to provider for review/approval because:  Drug not on the [facility], [facility] or [facility] refill protocol or controlled substance

## 2023-12-18 RX ORDER — LACOSAMIDE 200 MG/1
200 TABLET ORAL 2 TIMES DAILY
Qty: 180 TABLET | Refills: 1 | Status: SHIPPED | OUTPATIENT
Start: 2023-12-18 | End: 2024-05-28

## 2023-12-18 NOTE — TELEPHONE ENCOUNTER
Patient is out of medication. Asking that script be sent to       [id number] - [place] - [address]  [address] [facility] [address] [place] [address]  Phone: [phone] Fax: [phone]

## 2024-01-18 ENCOUNTER — TELEPHONE (OUTPATIENT)
Dept: NEUROLOGY | Facility: CLINIC | Age: 34
End: 2024-01-18

## 2024-01-18 DIAGNOSIS — G40.219 PARTIAL EPILEPSY WITH IMPAIRMENT OF CONSCIOUSNESS, INTRACTABLE (H): Primary | ICD-10-CM

## 2024-01-18 NOTE — TELEPHONE ENCOUNTER
Tha Mcghee is calling stating that he has been unsuccessful with finding a pharmacy that has phenytoin available. Patient states he has been out for 2 days now and is concerned. Patient is requesting a call back at [phone] to discuss further.

## 2024-01-22 RX ORDER — PHENYTOIN SODIUM 100 MG/1
CAPSULE, EXTENDED RELEASE ORAL
Qty: 270 CAPSULE | Refills: 1 | Status: SHIPPED | OUTPATIENT
Start: 2024-01-22 | End: 2024-05-28

## 2024-01-22 NOTE — TELEPHONE ENCOUNTER
Patient unable to find pharmacy that has Dilantin 200 mg ER capsules. Patient uses [facility] in [place]. Please send script for Dilantin 100 mg ER capsules for 300 mg daily total. Pharmacy will try and run to see if covered by insurance.

## 2024-05-06 DIAGNOSIS — G40.209 LOCALIZATION-RELATED FOCAL EPILEPSY WITH COMPLEX PARTIAL SEIZURES (H): ICD-10-CM

## 2024-05-14 RX ORDER — GABAPENTIN 300 MG/1
CAPSULE ORAL
Qty: 360 CAPSULE | Refills: 3 | Status: SHIPPED | OUTPATIENT
Start: 2024-05-14 | End: 2024-05-28

## 2024-05-14 NOTE — TELEPHONE ENCOUNTER
gabapentin (NEURONTIN) 300 MG capsule     Last Written Prescription Date:  5/5/23  Last Fill Quantity: 360,   # refills: 3  Last Office Visit : 10/23/23  Future Office visit:  5/2824 Van Cid refill request to provider for review/approval because:   gabapentin (NEURONTIN) 300 MG capsule not on the  [facility] refill protocol or controlled substance

## 2024-05-28 ENCOUNTER — VIRTUAL VISIT (OUTPATIENT)
Dept: NEUROLOGY | Facility: CLINIC | Age: 34
End: 2024-05-28
Payer: COMMERCIAL

## 2024-05-28 VITALS — BODY MASS INDEX: 23.63 KG/M2 | WEIGHT: 160 LBS

## 2024-05-28 DIAGNOSIS — G40.219 PARTIAL EPILEPSY WITH IMPAIRMENT OF CONSCIOUSNESS, INTRACTABLE (H): ICD-10-CM

## 2024-05-28 DIAGNOSIS — G40.209 LOCALIZATION-RELATED FOCAL EPILEPSY WITH COMPLEX PARTIAL SEIZURES (H): ICD-10-CM

## 2024-05-28 DIAGNOSIS — G40.919 INTRACTABLE EPILEPSY WITHOUT STATUS EPILEPTICUS, UNSPECIFIED EPILEPSY TYPE (H): ICD-10-CM

## 2024-05-28 RX ORDER — PHENYTOIN SODIUM 100 MG/1
CAPSULE, EXTENDED RELEASE ORAL
Qty: 270 CAPSULE | Refills: 3 | Status: SHIPPED | OUTPATIENT
Start: 2024-05-28

## 2024-05-28 RX ORDER — LACOSAMIDE 200 MG/1
200 TABLET ORAL 2 TIMES DAILY
Qty: 180 TABLET | Refills: 1 | Status: SHIPPED | OUTPATIENT
Start: 2024-05-28

## 2024-05-28 RX ORDER — GABAPENTIN 300 MG/1
CAPSULE ORAL
Qty: 360 CAPSULE | Refills: 3 | Status: SHIPPED | OUTPATIENT
Start: 2024-05-28

## 2024-05-28 RX ORDER — TOPIRAMATE 50 MG/1
50 TABLET, FILM COATED ORAL 2 TIMES DAILY
Qty: 180 TABLET | Refills: 3 | Status: SHIPPED | OUTPATIENT
Start: 2024-05-28

## 2024-05-28 ASSESSMENT — PAIN SCALES - GENERAL: PAINLEVEL: NO PAIN (0)
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
## 2024-05-28 NOTE — NURSING NOTE
Is the patient currently in the state of [place]? YES    Visit mode:TELEPHONE    If the visit is dropped, the patient can be reconnected by: TELEPHONE VISIT: Phone number:   Telephone Information:   Mobile [phone]       Will anyone else be joining the visit? NO  (If patient encounters technical issues they should call [phone]    How would you like to obtain your AVS? MyChart    Are changes needed to the allergy or medication list? No    Are refills needed on medications prescribed by this physician? YES    Reason for visit: NEVIN BRIONES
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
## 2024-05-28 NOTE — PATIENT INSTRUCTIONS
PLAN:   Continue  Vimpat (200 mg tablet) 200 mg twice a day     Gabapentin (300 mg tablet) 600 mg twice a day   Dilantin (100 mg ER) 100 mg morning and 200 mg night    Topiramate 50 mg twice a day (Headaches)        Follow up 8 months

## 2024-07-01 NOTE — TELEPHONE ENCOUNTER
Discussed with .  She would prefer for  to see the patient, and she wanted to make sure Zoran gets the MRIs performed as ordered.    Call returned to patient and recommendations discussed.  Patient noted that he had the MRI scheduled, but had to postpone because he had to work.  He will make sure to get the studies done    No other questions at this time     Instructed to call if questions or concens     Intermittent  KUB pending  Last bowel movement 2-3 days ago per nursing staff    6/26 Improving.  KUB with Bowel gas pattern.  LBM 6/25.      resolved

## 2024-07-06 ENCOUNTER — HEALTH MAINTENANCE LETTER (OUTPATIENT)
Age: 34
End: 2024-07-06

## 2024-12-17 ENCOUNTER — TELEPHONE (OUTPATIENT)
Dept: NEUROLOGY | Facility: CLINIC | Age: 34
End: 2024-12-17

## 2024-12-17 DIAGNOSIS — G40.219 PARTIAL EPILEPSY WITH IMPAIRMENT OF CONSCIOUSNESS, INTRACTABLE (H): ICD-10-CM

## 2024-12-17 NOTE — TELEPHONE ENCOUNTER
Incoming call from pt stating he has 3 pills left, did not realize he did not have active prescription on file. Requesting refills.     Araujo transfer pt    Last seen 05/28/24 Araujo   Next appt 04/17/25 Umesh Thompson [phone]

## 2024-12-19 RX ORDER — LACOSAMIDE 200 MG/1
200 TABLET ORAL 2 TIMES DAILY
Qty: 180 TABLET | Refills: 1 | Status: SHIPPED | OUTPATIENT
Start: 2024-12-19

## 2025-01-16 NOTE — TELEPHONE ENCOUNTER
He does not need valium for headaches. He is not having seizures. Thanks.  Ltaricia  
Med refill requests received  1. topiramate refilled per protocol  2. torodol refused - referred to originating provider  3. depakote refused - currently discontinued  4. Diazepam - deferred to   
Have Your Skin Lesions Been Treated?: not been treated
Is This A New Presentation, Or A Follow-Up?: Growths
How Severe Is Your Skin Lesion?: moderate

## 2025-04-17 PROCEDURE — 80171 DRUG SCREEN QUANT GABAPENTIN: CPT | Mod: ORL | Performed by: PSYCHIATRY & NEUROLOGY

## 2025-04-17 PROCEDURE — 80185 ASSAY OF PHENYTOIN TOTAL: CPT | Mod: ORL | Performed by: PSYCHIATRY & NEUROLOGY

## 2025-04-17 PROCEDURE — 80186 ASSAY OF PHENYTOIN FREE: CPT | Mod: ORL | Performed by: PSYCHIATRY & NEUROLOGY

## 2025-04-17 PROCEDURE — 80235 DRUG ASSAY LACOSAMIDE: CPT | Mod: ORL | Performed by: PSYCHIATRY & NEUROLOGY
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
## 2025-07-13 ENCOUNTER — HEALTH MAINTENANCE LETTER (OUTPATIENT)
Age: 35
End: 2025-07-13

## (undated) DEVICE — PACK GOWN 3/PK DISP XL SBA32GPFCB

## (undated) DEVICE — PACK NEURO MINOR UMMC SNE32MNMU4

## (undated) DEVICE — SURGICEL HEMOSTAT 4X8" 1952

## (undated) DEVICE — DRSG STERI STRIP 1/2X4" R1547

## (undated) DEVICE — LINEN TOWEL PACK X6 WHITE 5487

## (undated) DEVICE — DRAPE MAYO STAND 23X54 8337

## (undated) DEVICE — GLOVE PROTEXIS BLUE W/NEU-THERA 8.0  2D73EB80

## (undated) DEVICE — SU NUROLON 4-0 TF CR 8X18" C584D

## (undated) DEVICE — SPONGE COTTONOID 1X3" 20-10S

## (undated) DEVICE — SPONGE COTTON BALL 3/4" W/STRING 30-03

## (undated) DEVICE — BLADE CLIPPER SGL USE 9680

## (undated) DEVICE — LINEN TOWEL PACK X30 5481

## (undated) DEVICE — PACK CRANIOTOMY

## (undated) DEVICE — CATH TRAY FOLEY SURESTEP 16FR W/URINE MTR STATLK LF A303416A

## (undated) DEVICE — SYR 30ML LL W/O NDL 302832

## (undated) DEVICE — SOL NACL 0.9% IRRIG 1000ML BOTTLE 2F7124

## (undated) DEVICE — DRAPE POUCH IRR 1016

## (undated) DEVICE — ESU CORD BIPOLAR AND IRR TUBING AESCULAP US355

## (undated) DEVICE — DRSG PRIMAPORE 03 1/8X6" 66000318

## (undated) DEVICE — LINEN TOWEL PACK X5 5464

## (undated) DEVICE — Device

## (undated) DEVICE — PREP CHLORAPREP 26ML TINTED ORANGE  260815

## (undated) DEVICE — SPONGE COTTONOID 1/2X1 1/2" 20-06S

## (undated) DEVICE — ESU GROUND PAD ADULT W/CORD E7507

## (undated) DEVICE — SOL NACL 0.9% 10ML VIAL 0409-4888-02

## (undated) DEVICE — SU ETHILON 3-0 PS-1 18" 1663H

## (undated) DEVICE — NDL BLUNT 18GA 1" W/O FILTER 305181

## (undated) DEVICE — BUR ROUTER 1.4X12.8MM ANSPACH S-1R

## (undated) DEVICE — STRAP UNIVERSAL POSITIONING 2-PIECE 4X47X76" 91-287

## (undated) DEVICE — WIPES FOLEY CARE SURESTEP PROVON DFC100

## (undated) DEVICE — PIN SKULL MAYFIELD ADULT TITANIUM 3/PK A1120

## (undated) DEVICE — CATH TRAY FOLEY SURESTEP 16FR W/URNE MTR STLK LATEX A303316A

## (undated) DEVICE — DRAPE POUCH INSTRUMENT 1018

## (undated) DEVICE — SPONGE SURGIFOAM 100 1974

## (undated) DEVICE — NDL BLUNT 17GA 1.5" 8881202330

## (undated) DEVICE — SUTURE BOOTS 051003PBX

## (undated) DEVICE — PEN MARKING W/RULER DYNJSM04

## (undated) DEVICE — PAD CHUX UNDERPAD 23X24" 7136

## (undated) DEVICE — DRSG PRIMAPORE 02X3" 7133

## (undated) DEVICE — DRAPE MICROSCOPE LEICA 54X150" AR8033650

## (undated) DEVICE — DECANTER BAG 2002S

## (undated) DEVICE — SU VICRYL 2-0 CT-2 CR 8X18" J726D

## (undated) DEVICE — SYR 10ML LL W/O NDL 302995

## (undated) DEVICE — DRSG KERLIX 4 1/2"X4YDS ROLL 6715

## (undated) DEVICE — CRANIOTOME ADULT ANSPACH A-CRN

## (undated) DEVICE — PREP POVIDONE IODINE SOLUTION 10% 4OZ

## (undated) DEVICE — DRAPE IOBAN INCISE 13X13" 6640EZ

## (undated) DEVICE — MARKER SPHERES PASSIVE MEDT PACK 5 8801075

## (undated) DEVICE — RX BACITRACIN OINTMENT 0.9G 1/32OZ CUR001109

## (undated) DEVICE — PEN MARKING SKIN W/LABELS 31145918

## (undated) DEVICE — SPONGE COTTONOID 1/2X3" 80-1407

## (undated) DEVICE — DRAPE C-ARM W/STRAPS 42X72" 07-CA104

## (undated) DEVICE — PREP POVIDONE IODINE SCRUB 7.5% 4OZ APL82212

## (undated) DEVICE — SPONGE COTTONOID 1/2X3" 20-07S

## (undated) DEVICE — SU UMBILICAL TAPE .125X30" U11T

## (undated) DEVICE — PREP CHLORAPREP CLEAR 3ML 260400

## (undated) DEVICE — ESU ELEC BLADE 2.75" COATED/INSULATED E1455

## (undated) DEVICE — DRSG GAUZE 4X4" TRAY 6939

## (undated) DEVICE — SOL WATER IRRIG 1000ML BOTTLE 2F7114

## (undated) DEVICE — GLOVE PROTEXIS MICRO 7.5  2D73PM75

## (undated) DEVICE — SU SILK 2-0 TIE 12X30" A305H

## (undated) DEVICE — SPONGE COTTONOID 1/2X1/2" 20-04S

## (undated) DEVICE — SPONGE COTTONOID 1/4X1/4" 20-01S

## (undated) DEVICE — DRSG XEROFORM 5X9" CUR253590W

## (undated) DEVICE — PREP SKIN SCRUB TRAY 4461A

## (undated) DEVICE — SOL RINGERS LACTATED 500ML BAG 2B2323QA

## (undated) DEVICE — LABEL MEDICATION SYSTEM 3303-P

## (undated) DEVICE — DRAPE ARM ROSA 30X60" ROSAS00055 75165

## (undated) RX ORDER — LIDOCAINE HYDROCHLORIDE AND EPINEPHRINE 10; 10 MG/ML; UG/ML
INJECTION, SOLUTION INFILTRATION; PERINEURAL
Status: DISPENSED
Start: 2019-08-27

## (undated) RX ORDER — FENTANYL CITRATE 50 UG/ML
INJECTION, SOLUTION INTRAMUSCULAR; INTRAVENOUS
Status: DISPENSED
Start: 2019-08-27

## (undated) RX ORDER — LIDOCAINE HYDROCHLORIDE 10 MG/ML
INJECTION, SOLUTION EPIDURAL; INFILTRATION; INTRACAUDAL; PERINEURAL
Status: DISPENSED
Start: 2019-09-02

## (undated) RX ORDER — BUPIVACAINE HYDROCHLORIDE AND EPINEPHRINE 5; 5 MG/ML; UG/ML
INJECTION, SOLUTION EPIDURAL; INTRACAUDAL; PERINEURAL
Status: DISPENSED
Start: 2019-09-10

## (undated) RX ORDER — ONDANSETRON 2 MG/ML
INJECTION INTRAMUSCULAR; INTRAVENOUS
Status: DISPENSED
Start: 2019-09-10

## (undated) RX ORDER — PROPOFOL 10 MG/ML
INJECTION, EMULSION INTRAVENOUS
Status: DISPENSED
Start: 2019-08-27

## (undated) RX ORDER — BACITRACIN 50000 [IU]/1
INJECTION, POWDER, FOR SOLUTION INTRAMUSCULAR
Status: DISPENSED
Start: 2019-09-10

## (undated) RX ORDER — CEFTRIAXONE 2 G/1
INJECTION, POWDER, FOR SOLUTION INTRAMUSCULAR; INTRAVENOUS
Status: DISPENSED
Start: 2019-09-10

## (undated) RX ORDER — LORAZEPAM 2 MG/ML
INJECTION INTRAMUSCULAR
Status: DISPENSED
Start: 2019-08-27

## (undated) RX ORDER — ONDANSETRON 2 MG/ML
INJECTION INTRAMUSCULAR; INTRAVENOUS
Status: DISPENSED
Start: 2019-08-27

## (undated) RX ORDER — LABETALOL HYDROCHLORIDE 5 MG/ML
INJECTION, SOLUTION INTRAVENOUS
Status: DISPENSED
Start: 2019-08-27

## (undated) RX ORDER — DEXAMETHASONE SODIUM PHOSPHATE 4 MG/ML
INJECTION, SOLUTION INTRA-ARTICULAR; INTRALESIONAL; INTRAMUSCULAR; INTRAVENOUS; SOFT TISSUE
Status: DISPENSED
Start: 2019-08-27

## (undated) RX ORDER — BACITRACIN 50000 [IU]/1
INJECTION, POWDER, FOR SOLUTION INTRAMUSCULAR
Status: DISPENSED
Start: 2019-08-27

## (undated) RX ORDER — HYDROMORPHONE HYDROCHLORIDE 1 MG/ML
INJECTION, SOLUTION INTRAMUSCULAR; INTRAVENOUS; SUBCUTANEOUS
Status: DISPENSED
Start: 2019-09-10

## (undated) RX ORDER — SODIUM CHLORIDE 9 MG/ML
INJECTION, SOLUTION INTRAVENOUS
Status: DISPENSED
Start: 2019-01-17

## (undated) RX ORDER — FENTANYL CITRATE 50 UG/ML
INJECTION, SOLUTION INTRAMUSCULAR; INTRAVENOUS
Status: DISPENSED
Start: 2019-01-17

## (undated) RX ORDER — CLINDAMYCIN PHOSPHATE 900 MG/50ML
INJECTION, SOLUTION INTRAVENOUS
Status: DISPENSED
Start: 2019-08-27

## (undated) RX ORDER — GLYCOPYRROLATE 0.2 MG/ML
INJECTION, SOLUTION INTRAMUSCULAR; INTRAVENOUS
Status: DISPENSED
Start: 2019-08-27

## (undated) RX ORDER — LIDOCAINE HYDROCHLORIDE 20 MG/ML
INJECTION, SOLUTION EPIDURAL; INFILTRATION; INTRACAUDAL; PERINEURAL
Status: DISPENSED
Start: 2019-08-27

## (undated) RX ORDER — SODIUM CHLORIDE 9 MG/ML
INJECTION, SOLUTION INTRAVENOUS
Status: DISPENSED
Start: 2019-09-10

## (undated) RX ORDER — SODIUM CHLORIDE 9 MG/ML
INJECTION, SOLUTION INTRAVENOUS
Status: DISPENSED
Start: 2019-08-27

## (undated) RX ORDER — LIDOCAINE HYDROCHLORIDE 10 MG/ML
INJECTION, SOLUTION EPIDURAL; INFILTRATION; INTRACAUDAL; PERINEURAL
Status: DISPENSED
Start: 2019-01-17